# Patient Record
Sex: FEMALE | Race: WHITE | NOT HISPANIC OR LATINO | Employment: PART TIME | URBAN - METROPOLITAN AREA
[De-identification: names, ages, dates, MRNs, and addresses within clinical notes are randomized per-mention and may not be internally consistent; named-entity substitution may affect disease eponyms.]

---

## 2019-06-11 LAB — HBA1C MFR BLD HPLC: 5.6 %

## 2019-12-05 ENCOUNTER — OFFICE VISIT (OUTPATIENT)
Dept: FAMILY MEDICINE CLINIC | Facility: CLINIC | Age: 54
End: 2019-12-05
Payer: COMMERCIAL

## 2019-12-05 VITALS
SYSTOLIC BLOOD PRESSURE: 122 MMHG | HEIGHT: 61 IN | OXYGEN SATURATION: 96 % | TEMPERATURE: 96.1 F | HEART RATE: 89 BPM | BODY MASS INDEX: 36.93 KG/M2 | DIASTOLIC BLOOD PRESSURE: 74 MMHG | WEIGHT: 195.6 LBS

## 2019-12-05 DIAGNOSIS — E78.2 MIXED HYPERLIPIDEMIA: ICD-10-CM

## 2019-12-05 DIAGNOSIS — Z11.4 SCREENING FOR HUMAN IMMUNODEFICIENCY VIRUS: ICD-10-CM

## 2019-12-05 DIAGNOSIS — I10 ESSENTIAL HYPERTENSION: ICD-10-CM

## 2019-12-05 DIAGNOSIS — F32.0 CURRENT MILD EPISODE OF MAJOR DEPRESSIVE DISORDER WITHOUT PRIOR EPISODE (HCC): Primary | ICD-10-CM

## 2019-12-05 DIAGNOSIS — Z12.39 SCREENING FOR BREAST CANCER: ICD-10-CM

## 2019-12-05 DIAGNOSIS — M47.817 SPONDYLOSIS OF LUMBOSACRAL REGION WITHOUT MYELOPATHY OR RADICULOPATHY: ICD-10-CM

## 2019-12-05 DIAGNOSIS — F17.200 TOBACCO USE DISORDER: ICD-10-CM

## 2019-12-05 DIAGNOSIS — E66.9 CLASS 2 OBESITY WITHOUT SERIOUS COMORBIDITY WITH BODY MASS INDEX (BMI) OF 36.0 TO 36.9 IN ADULT, UNSPECIFIED OBESITY TYPE: ICD-10-CM

## 2019-12-05 DIAGNOSIS — F41.1 GAD (GENERALIZED ANXIETY DISORDER): ICD-10-CM

## 2019-12-05 DIAGNOSIS — Z11.59 NEED FOR HEPATITIS C SCREENING TEST: ICD-10-CM

## 2019-12-05 DIAGNOSIS — Z12.11 COLON CANCER SCREENING: ICD-10-CM

## 2019-12-05 DIAGNOSIS — Z12.4 CERVICAL CANCER SCREENING: ICD-10-CM

## 2019-12-05 DIAGNOSIS — G25.81 RESTLESS LEG SYNDROME: ICD-10-CM

## 2019-12-05 PROBLEM — E66.812 CLASS 2 OBESITY WITHOUT SERIOUS COMORBIDITY WITH BODY MASS INDEX (BMI) OF 36.0 TO 36.9 IN ADULT: Status: ACTIVE | Noted: 2019-12-05

## 2019-12-05 PROCEDURE — 3008F BODY MASS INDEX DOCD: CPT | Performed by: FAMILY MEDICINE

## 2019-12-05 PROCEDURE — 3078F DIAST BP <80 MM HG: CPT | Performed by: FAMILY MEDICINE

## 2019-12-05 PROCEDURE — 3725F SCREEN DEPRESSION PERFORMED: CPT | Performed by: FAMILY MEDICINE

## 2019-12-05 PROCEDURE — 99204 OFFICE O/P NEW MOD 45 MIN: CPT | Performed by: FAMILY MEDICINE

## 2019-12-05 PROCEDURE — 3074F SYST BP LT 130 MM HG: CPT | Performed by: FAMILY MEDICINE

## 2019-12-05 RX ORDER — SERTRALINE HYDROCHLORIDE 100 MG/1
100 TABLET, FILM COATED ORAL DAILY
Refills: 5 | COMMUNITY
Start: 2019-12-03 | End: 2020-01-16 | Stop reason: SDUPTHER

## 2019-12-05 RX ORDER — LORAZEPAM 0.5 MG/1
0.5 TABLET ORAL
Qty: 30 TABLET | Refills: 0 | Status: SHIPPED | OUTPATIENT
Start: 2019-12-05 | End: 2020-01-03 | Stop reason: SDUPTHER

## 2019-12-05 RX ORDER — LORAZEPAM 0.5 MG/1
0.5 TABLET ORAL
COMMUNITY
Start: 2015-01-09 | End: 2019-12-05 | Stop reason: SDUPTHER

## 2019-12-05 RX ORDER — PRAMIPEXOLE DIHYDROCHLORIDE 0.25 MG/1
0.25 TABLET ORAL DAILY
Refills: 1 | COMMUNITY
Start: 2019-11-20 | End: 2020-03-30 | Stop reason: SDUPTHER

## 2019-12-05 NOTE — ASSESSMENT & PLAN NOTE
On zoloft and ativan as needed from her prior pcp  Long discussion about use of ativan and if she is using it more than needed she will need med adjustments   She states she previously saw [facility] and will be calling them to re est care with them

## 2019-12-05 NOTE — PROGRESS NOTES
Ashely Scout 1965 female MRN: [id number]    [facility] Medicine New Patient    ASSESSMENT/PLAN  Problem List Items Addressed This Visit        Cardiovascular and Mediastinum    Essential hypertension     Well controlled on current medications from prior PCP  Due for lab work ordered today         Relevant Orders    Mammo screening bilateral w 3d & cad    Ambulatory referral to Gastroenterology    CBC and differential    Comprehensive metabolic panel    TSH, 3rd generation with Free T4 reflex    Lipid panel    UA (URINE) with reflex to Scope       Musculoskeletal and Integument    Spondylosis of lumbosacral region without myelopathy or radiculopathy     Had stimulator placed at [place] 4 years ago  Is not on any chronic pain medication             Other    Mixed hyperlipidemia     Stable on Lipitor  Due for lipid panel          Relevant Orders    Mammo screening bilateral w 3d & cad    Ambulatory referral to Gastroenterology    CBC and differential    Comprehensive metabolic panel    TSH, 3rd generation with Free T4 reflex    Lipid panel    UA (URINE) with reflex to Scope    Current mild episode of major depressive disorder without prior episode ([facility] [address] ) - Primary     On zoloft and ativan as needed from her prior pcp  Long discussion about use of ativan and if she is using it more than needed she will need med adjustments   She states she previously saw [facility] and will be calling them to re est care with them         Relevant Medications    sertraline (ZOLOFT) 100 mg tablet    sertraline (ZOLOFT) 50 mg tablet    LORazepam (ATIVAN) 0 5 mg tablet    Other Relevant Orders    Mammo screening bilateral w 3d & cad    Ambulatory referral to Gastroenterology    CBC and differential    Comprehensive metabolic panel    TSH, 3rd generation with Free T4 reflex    Lipid panel    UA (URINE) with reflex to Scope    KENDALL (generalized anxiety disorder)    Relevant Medications    sertraline (ZOLOFT) 100 mg tablet    sertraline (ZOLOFT) 50 mg tablet    LORazepam (ATIVAN) 0 5 mg tablet    Other Relevant Orders    Mammo screening bilateral w 3d & cad    Ambulatory referral to Gastroenterology    CBC and differential    Comprehensive metabolic panel    TSH, 3rd generation with Free T4 reflex    Lipid panel    UA (URINE) with reflex to Scope    Restless leg syndrome     Stable on mirapex from prior pcp         Tobacco use disorder     40 years- 1/2 PPD-3/4 PPD  Continue to encourage cessation          Relevant Orders    Mammo screening bilateral w 3d & cad    Ambulatory referral to Gastroenterology    CBC and differential    Comprehensive metabolic panel    TSH, 3rd generation with Free T4 reflex    Lipid panel    UA (URINE) with reflex to Scope    Screening for breast cancer    Colon cancer screening    Class 2 obesity without serious comorbidity with body mass index (BMI) of 36 0 to 36 9 in adult     BMI Counseling: Body mass index is 36 96 kg/m²  The BMI is above normal  Nutrition recommendations include reducing portion sizes, decreasing overall calorie intake and 3-5 servings of fruits/vegetables daily  Exercise recommendations include moderate aerobic physical activity for 150 minutes/week           Relevant Orders    Mammo screening bilateral w 3d & cad    Ambulatory referral to Gastroenterology    CBC and differential    Comprehensive metabolic panel    TSH, 3rd generation with Free T4 reflex    Lipid panel    UA (URINE) with reflex to Scope      Other Visit Diagnoses     Screening for human immunodeficiency virus        Relevant Orders    Human Immunodeficiency Virus 1/2 Antigen / Antibody ( Fourth Generation) with Reflex Testing    Cervical cancer screening        Relevant Orders    Ambulatory referral to Obstetrics / Gynecology    Need for hepatitis C screening test        Relevant Orders    Hepatitis C antibody             States she had flu shot this year at prior PCP- need to request records   Had c scope 5 years ago at [facility] Has gyn at [facility] patient no records able to review prior to todays visit     Follow up in 6 weeks with blood work done prior to next visit     Future Appointments   Date Time Provider Lorenzo Reagan   1/16/2020 10:00 AM DO [facility]          SUBJECTIVE  CC: new patient (establish care )      HPI:  Tone Hawley is a 53 y o  female who presents for new patient apt  Works as  in between jobs right now    [address]: on [address], uses ativan at night  Has been to [facility] in the past  HTN/HLD: on medication   Restless leg syndrome     PSH: nerve stimulator in the back, wisdom teeth, gallbladder    Social: tobacco use- 40 years- 1/2 PPD  Denies alcohol or drug use     15year old daughter    HPI    Review of Systems   Constitutional: Negative for chills, fatigue and fever  HENT: Negative for congestion, postnasal drip, rhinorrhea and sinus pressure  Eyes: Negative for photophobia and visual disturbance  Respiratory: Negative for cough and shortness of breath  Cardiovascular: Negative for chest pain, palpitations and leg swelling  Gastrointestinal: Negative for abdominal pain, constipation, diarrhea, nausea and vomiting  Genitourinary: Negative for difficulty urinating and dysuria  Musculoskeletal: Negative for arthralgias and myalgias  Skin: Negative for color change and rash  Neurological: Negative for dizziness, weakness, light-headedness and headaches         Historical Information   The patient history was reviewed as follows:    Past Medical History:   Diagnosis Date    Bulging lumbar disc     Chronic pain     low back    Degenerative disk disease     lumbar spine    Hypertension     Pinched nerve     lumbar spine    Psychiatric disorder     depression     Past Surgical History:   Procedure Laterality Date    CARPAL TUNNEL RELEASE Bilateral     CHOLECYSTECTOMY      WISDOM TOOTH EXTRACTION Bilateral History reviewed  No pertinent family history  Social History   Social History     Substance and Sexual Activity   Alcohol Use No     Social History     Substance and Sexual Activity   Drug Use No     Social History     Tobacco Use   Smoking Status Current Every Day Smoker    Packs/day: 1 00   Smokeless Tobacco Never Used       Medications:     Current Outpatient Medications:     atorvastatin (LIPITOR) 40 mg tablet, Take 80 mg by mouth daily , Disp: , Rfl:     lisinopril (ZESTRIL) 20 mg tablet, Take 10 mg by mouth daily , Disp: , Rfl:     LORazepam (ATIVAN) 0 5 mg tablet, Take 1 tablet (0 5 mg total) by mouth daily at bedtime, Disp: 30 tablet, Rfl: 0    sertraline (ZOLOFT) 100 mg tablet, Take 100 mg by mouth daily, Disp: , Rfl: 5    sertraline (ZOLOFT) 50 mg tablet, Take 50 mg by mouth daily, Disp: , Rfl: 1    pramipexole (MIRAPEX) 0 25 mg tablet, Take 0 25 mg by mouth daily, Disp: , Rfl: 1  Allergies   Allergen Reactions    Penicillins        OBJECTIVE    Vitals:   Vitals:    12/05/19 1501   BP: 122/74   BP Location: Left arm   Patient Position: Sitting   Cuff Size: Large   Pulse: 89   Temp: (!) 96 1 °F (35 6 °C)   TempSrc: Tympanic   SpO2: 96%   Weight: 88 7 kg (195 lb 9 6 oz)   Height: 5' 1" (1 549 m)           Physical Exam   Constitutional: She is oriented to person, place, and time  She appears well-developed and well-nourished  HENT:   Head: Normocephalic and atraumatic  Mouth/Throat: Oropharynx is clear and moist    Eyes: Pupils are equal, round, and reactive to light  Neck: Normal range of motion  Neck supple  Cardiovascular: Normal rate, regular rhythm and normal heart sounds  Pulmonary/Chest: Effort normal and breath sounds normal  No respiratory distress  She has no wheezes  Abdominal: Soft  Bowel sounds are normal  She exhibits no distension  There is no tenderness  Musculoskeletal: Normal range of motion  She exhibits no edema or tenderness     Neurological: She is alert and oriented to person, place, and time  Skin: Skin is warm and dry  Psychiatric: She has a normal mood and affect   Her behavior is normal           Labs:        Abel Lehman DO    12/5/2019

## 2019-12-05 NOTE — ASSESSMENT & PLAN NOTE
BMI Counseling: Body mass index is 36 96 kg/m²  The BMI is above normal  Nutrition recommendations include reducing portion sizes, decreasing overall calorie intake and 3-5 servings of fruits/vegetables daily  Exercise recommendations include moderate aerobic physical activity for 150 minutes/week

## 2020-01-03 DIAGNOSIS — F41.1 GAD (GENERALIZED ANXIETY DISORDER): ICD-10-CM

## 2020-01-03 RX ORDER — LORAZEPAM 0.5 MG/1
0.5 TABLET ORAL
Qty: 30 TABLET | Refills: 0 | Status: SHIPPED | OUTPATIENT
Start: 2020-01-03 | End: 2020-02-05 | Stop reason: SDUPTHER

## 2020-01-06 ENCOUNTER — HOSPITAL ENCOUNTER (OUTPATIENT)
Dept: BONE DENSITY | Facility: CLINIC | Age: 55
Discharge: HOME | End: 2020-01-06
Payer: COMMERCIAL

## 2020-01-06 VITALS — BODY MASS INDEX: 36.82 KG/M2 | HEIGHT: 61 IN | WEIGHT: 195 LBS

## 2020-01-06 DIAGNOSIS — F17.200 TOBACCO USE DISORDER: ICD-10-CM

## 2020-01-06 DIAGNOSIS — Z12.31 ENCOUNTER FOR SCREENING MAMMOGRAM FOR MALIGNANT NEOPLASM OF BREAST: ICD-10-CM

## 2020-01-06 DIAGNOSIS — F41.1 GAD (GENERALIZED ANXIETY DISORDER): ICD-10-CM

## 2020-01-06 DIAGNOSIS — I10 ESSENTIAL HYPERTENSION: ICD-10-CM

## 2020-01-06 DIAGNOSIS — F32.0 CURRENT MILD EPISODE OF MAJOR DEPRESSIVE DISORDER WITHOUT PRIOR EPISODE (HCC): ICD-10-CM

## 2020-01-06 DIAGNOSIS — E66.9 CLASS 2 OBESITY WITHOUT SERIOUS COMORBIDITY WITH BODY MASS INDEX (BMI) OF 36.0 TO 36.9 IN ADULT, UNSPECIFIED OBESITY TYPE: ICD-10-CM

## 2020-01-06 DIAGNOSIS — E78.2 MIXED HYPERLIPIDEMIA: ICD-10-CM

## 2020-01-06 PROCEDURE — 77063 BREAST TOMOSYNTHESIS BI: CPT

## 2020-01-06 PROCEDURE — 77067 SCR MAMMO BI INCL CAD: CPT

## 2020-01-15 LAB
ALBUMIN SERPL-MCNC: 4.3 G/DL (ref 3.5–5.5)
ALBUMIN/GLOB SERPL: 2.3 {RATIO} (ref 1.2–2.2)
ALP SERPL-CCNC: 88 IU/L (ref 39–117)
ALT SERPL-CCNC: 16 IU/L (ref 0–32)
APPEARANCE UR: ABNORMAL
AST SERPL-CCNC: 17 IU/L (ref 0–40)
BACTERIA URNS QL MICRO: NORMAL
BASOPHILS # BLD AUTO: 0 X10E3/UL (ref 0–0.2)
BASOPHILS NFR BLD AUTO: 0 %
BILIRUB SERPL-MCNC: <0.2 MG/DL (ref 0–1.2)
BILIRUB UR QL STRIP: NEGATIVE
BUN SERPL-MCNC: 15 MG/DL (ref 6–24)
BUN/CREAT SERPL: 21 (ref 9–23)
CALCIUM SERPL-MCNC: 9.2 MG/DL (ref 8.7–10.2)
CHLORIDE SERPL-SCNC: 101 MMOL/L (ref 96–106)
CHOLEST SERPL-MCNC: 149 MG/DL (ref 100–199)
CHOLEST/HDLC SERPL: 4.8 RATIO (ref 0–4.4)
CO2 SERPL-SCNC: 23 MMOL/L (ref 20–29)
COLOR UR: YELLOW
CREAT SERPL-MCNC: 0.73 MG/DL (ref 0.57–1)
EOSINOPHIL # BLD AUTO: 0.3 X10E3/UL (ref 0–0.4)
EOSINOPHIL NFR BLD AUTO: 3 %
EPI CELLS #/AREA URNS HPF: NORMAL /HPF (ref 0–10)
ERYTHROCYTE [DISTWIDTH] IN BLOOD BY AUTOMATED COUNT: 14.3 % (ref 11.7–15.4)
GLOBULIN SER-MCNC: 1.9 G/DL (ref 1.5–4.5)
GLUCOSE SERPL-MCNC: 99 MG/DL (ref 65–99)
GLUCOSE UR QL: NEGATIVE
HCT VFR BLD AUTO: 40.8 % (ref 34–46.6)
HCV AB S/CO SERPL IA: 0.2 S/CO RATIO (ref 0–0.9)
HDLC SERPL-MCNC: 31 MG/DL
HGB BLD-MCNC: 13.7 G/DL (ref 11.1–15.9)
HGB UR QL STRIP: NEGATIVE
HIV 1+2 AB+HIV1 P24 AG SERPL QL IA: NON REACTIVE
IMM GRANULOCYTES # BLD: 0 X10E3/UL (ref 0–0.1)
IMM GRANULOCYTES NFR BLD: 0 %
KETONES UR QL STRIP: NEGATIVE
LDLC SERPL CALC-MCNC: 52 MG/DL (ref 0–99)
LDLC SERPL DIRECT ASSAY-MCNC: 66 MG/DL (ref 0–99)
LEUKOCYTE ESTERASE UR QL STRIP: ABNORMAL
LYMPHOCYTES # BLD AUTO: 2.5 X10E3/UL (ref 0.7–3.1)
LYMPHOCYTES NFR BLD AUTO: 26 %
MCH RBC QN AUTO: 31.8 PG (ref 26.6–33)
MCHC RBC AUTO-ENTMCNC: 33.6 G/DL (ref 31.5–35.7)
MCV RBC AUTO: 95 FL (ref 79–97)
MICRO URNS: ABNORMAL
MONOCYTES # BLD AUTO: 0.8 X10E3/UL (ref 0.1–0.9)
MONOCYTES NFR BLD AUTO: 8 %
MUCOUS THREADS URNS QL MICRO: PRESENT
NEUTROPHILS # BLD AUTO: 6.1 X10E3/UL (ref 1.4–7)
NEUTROPHILS NFR BLD AUTO: 63 %
NITRITE UR QL STRIP: NEGATIVE
PH UR STRIP: 5.5 [PH] (ref 5–7.5)
PLATELET # BLD AUTO: 200 X10E3/UL (ref 150–450)
POTASSIUM SERPL-SCNC: 3.9 MMOL/L (ref 3.5–5.2)
PROT SERPL-MCNC: 6.2 G/DL (ref 6–8.5)
PROT UR QL STRIP: NEGATIVE
RBC # BLD AUTO: 4.31 X10E6/UL (ref 3.77–5.28)
RBC #/AREA URNS HPF: NORMAL /HPF (ref 0–2)
SL AMB EGFR AFRICAN AMERICAN: 107 ML/MIN/1.73
SL AMB EGFR NON AFRICAN AMERICAN: 93 ML/MIN/1.73
SL AMB T4, FREE (DIRECT): 1.01 NG/DL (ref 0.82–1.77)
SL AMB VLDL CHOLESTEROL CALC: 66 MG/DL (ref 5–40)
SODIUM SERPL-SCNC: 137 MMOL/L (ref 134–144)
SP GR UR: >=1.03 (ref 1–1.03)
TRIGL SERPL-MCNC: 329 MG/DL (ref 0–149)
TSH SERPL DL<=0.005 MIU/L-ACNC: 5.03 UIU/ML (ref 0.45–4.5)
UROBILINOGEN UR STRIP-ACNC: 0.2 MG/DL (ref 0.2–1)
WBC # BLD AUTO: 9.7 X10E3/UL (ref 3.4–10.8)
WBC #/AREA URNS HPF: NORMAL /HPF (ref 0–5)

## 2020-01-16 ENCOUNTER — OFFICE VISIT (OUTPATIENT)
Dept: FAMILY MEDICINE CLINIC | Facility: CLINIC | Age: 55
End: 2020-01-16
Payer: COMMERCIAL

## 2020-01-16 VITALS
SYSTOLIC BLOOD PRESSURE: 122 MMHG | TEMPERATURE: 97.3 F | HEIGHT: 61 IN | BODY MASS INDEX: 36.67 KG/M2 | WEIGHT: 194.2 LBS | DIASTOLIC BLOOD PRESSURE: 78 MMHG | HEART RATE: 71 BPM | OXYGEN SATURATION: 96 %

## 2020-01-16 DIAGNOSIS — E78.2 MIXED HYPERLIPIDEMIA: ICD-10-CM

## 2020-01-16 DIAGNOSIS — F17.200 TOBACCO USE DISORDER: ICD-10-CM

## 2020-01-16 DIAGNOSIS — M47.817 SPONDYLOSIS OF LUMBOSACRAL REGION WITHOUT MYELOPATHY OR RADICULOPATHY: ICD-10-CM

## 2020-01-16 DIAGNOSIS — I10 ESSENTIAL HYPERTENSION: ICD-10-CM

## 2020-01-16 DIAGNOSIS — F32.0 CURRENT MILD EPISODE OF MAJOR DEPRESSIVE DISORDER WITHOUT PRIOR EPISODE (HCC): ICD-10-CM

## 2020-01-16 DIAGNOSIS — E03.8 SUBCLINICAL HYPOTHYROIDISM: Primary | ICD-10-CM

## 2020-01-16 PROCEDURE — 3074F SYST BP LT 130 MM HG: CPT | Performed by: FAMILY MEDICINE

## 2020-01-16 PROCEDURE — 3078F DIAST BP <80 MM HG: CPT | Performed by: FAMILY MEDICINE

## 2020-01-16 PROCEDURE — 3725F SCREEN DEPRESSION PERFORMED: CPT | Performed by: FAMILY MEDICINE

## 2020-01-16 PROCEDURE — 99214 OFFICE O/P EST MOD 30 MIN: CPT | Performed by: FAMILY MEDICINE

## 2020-01-16 PROCEDURE — 3008F BODY MASS INDEX DOCD: CPT | Performed by: FAMILY MEDICINE

## 2020-01-16 RX ORDER — ATORVASTATIN CALCIUM 80 MG/1
80 TABLET, FILM COATED ORAL DAILY
Qty: 90 TABLET | Refills: 1 | Status: SHIPPED | OUTPATIENT
Start: 2020-01-16 | End: 2020-08-19 | Stop reason: SDUPTHER

## 2020-01-16 RX ORDER — SERTRALINE HYDROCHLORIDE 100 MG/1
100 TABLET, FILM COATED ORAL DAILY
Qty: 90 TABLET | Refills: 1 | Status: SHIPPED | OUTPATIENT
Start: 2020-01-16 | End: 2020-07-28 | Stop reason: SDUPTHER

## 2020-01-16 NOTE — ASSESSMENT & PLAN NOTE
Lab Results   Component Value Date    TSH 5 030 (H) 01/13/2020     Free T4 is normal  No symptoms reported   Will continue to monitor and recheck TSH in 3 months

## 2020-01-16 NOTE — ASSESSMENT & PLAN NOTE
Lab Results   Component Value Date    CHOLESTEROL 149 01/13/2020     Lab Results   Component Value Date    HDL 31 (L) 01/13/2020     Lab Results   Component Value Date    TRIG 329 (H) 01/13/2020     Well controlled on statin therapy  Patient wishes to see nutrition  We will recheck lipid in 3 months  Consider fenofibrate if TG continue to elevated

## 2020-01-16 NOTE — PROGRESS NOTES
Kimberly Mayor 1965 female MRN: [id number]    Family Medicine Follow-up Visit    ASSESSMENT/PLAN  Problem List Items Addressed This Visit        Endocrine    Subclinical hypothyroidism - Primary     Lab Results   Component Value Date    TSH 5 030 (H) 01/13/2020     Free T4 is normal  No symptoms reported   Will continue to monitor and recheck TSH in 3 months          Relevant Orders    TSH, 3rd generation with Free T4 reflex       Cardiovascular and Mediastinum    Essential hypertension     Well controlled on lisinopril  Continue medication as prescribed          Relevant Orders    TSH, 3rd generation with Free T4 reflex    Lipid panel    Comprehensive metabolic panel       Musculoskeletal and Integument    Spondylosis of lumbosacral region without myelopathy or radiculopathy     S/p stimulator placement at [facility] 4 years ago            Other    Mixed hyperlipidemia     Lab Results   Component Value Date    CHOLESTEROL 149 01/13/2020     Lab Results   Component Value Date    HDL 31 (L) 01/13/2020     Lab Results   Component Value Date    TRIG 329 (H) 01/13/2020     Well controlled on statin therapy  Patient wishes to see nutrition  We will recheck lipid in 3 months  Consider fenofibrate if TG continue to elevated          Relevant Medications    atorvastatin (LIPITOR) 80 mg tablet    Other Relevant Orders    Lipid panel    Comprehensive metabolic panel    Ambulatory referral to Nutrition Services    Current mild episode of major depressive disorder without prior episode ([address] )     Well controlled on medication  Continue to monitor  Has apt to est with Ane Venkat          Relevant Medications    sertraline (ZOLOFT) 100 mg tablet    sertraline (ZOLOFT) 50 mg tablet    Tobacco use disorder     Will further follow up at next visit and evaluate need for lung cancer screening                  Follow up in 3 months for CP, get lab work done prior to next visit       Future Appointments   Date Time Provider Department Center   3/2/2020  1:00 PM [facility]   4/16/2020 10:00 AM DO [facility]          SUBJECTIVE  CC: Follow-up (6 week)      HPI:  Sarmad De La Cruz is a 53 y o  female who presents for follow up  Had her blood work done and needs to review  HLD- no symptoms, taking medication  HTN: no cp, sob or vision changes, taking medication, no side effects  Depression-stable, on medication, has apt with abby DELUNA    Review of Systems   Constitutional: Negative for chills, fatigue and fever  HENT: Negative for congestion, postnasal drip, rhinorrhea and sinus pressure  Eyes: Negative for photophobia and visual disturbance  Respiratory: Negative for cough and shortness of breath  Cardiovascular: Negative for chest pain, palpitations and leg swelling  Gastrointestinal: Negative for abdominal pain, constipation, diarrhea, nausea and vomiting  Genitourinary: Negative for difficulty urinating and dysuria  Musculoskeletal: Negative for arthralgias and myalgias  Skin: Negative for color change and rash  Neurological: Negative for dizziness, weakness, light-headedness and headaches         Historical Information   The patient history was reviewed as follows:    Past Medical History:   Diagnosis Date    Bulging lumbar disc     Chronic pain     low back    Degenerative disk disease     lumbar spine    Hypertension     Pinched nerve     lumbar spine    Psychiatric disorder     depression     Past Surgical History:   Procedure Laterality Date    CARPAL TUNNEL RELEASE Bilateral     CHOLECYSTECTOMY      WISDOM TOOTH EXTRACTION Bilateral      Family History   Problem Relation Age of Onset    Breast cancer Maternal Aunt 29    Breast cancer Sister 64    BRCA1 Negative Sister     BRCA2 Negative Sister     Prostate cancer Maternal Uncle     Cancer Maternal Uncle         BLADDER      Social History   Social History     Substance and Sexual Activity Alcohol Use No     Social History     Substance and Sexual Activity   Drug Use No     Social History     Tobacco Use   Smoking Status Current Every Day Smoker    Packs/day: 1 00   Smokeless Tobacco Never Used       Medications:     Current Outpatient Medications:     atorvastatin (LIPITOR) 80 mg tablet, Take 1 tablet (80 mg total) by mouth daily, Disp: 90 tablet, Rfl: 1    lisinopril (ZESTRIL) 20 mg tablet, Take 10 mg by mouth daily , Disp: , Rfl:     LORazepam (ATIVAN) 0 5 mg tablet, Take 1 tablet (0 5 mg total) by mouth daily at bedtime, Disp: 30 tablet, Rfl: 0    pramipexole (MIRAPEX) 0 25 mg tablet, Take 0 25 mg by mouth daily, Disp: , Rfl: 1    sertraline (ZOLOFT) 100 mg tablet, Take 1 tablet (100 mg total) by mouth daily, Disp: 90 tablet, Rfl: 1    sertraline (ZOLOFT) 50 mg tablet, Take 1 tablet (50 mg total) by mouth daily, Disp: 90 tablet, Rfl: 1  Allergies   Allergen Reactions    Penicillins        OBJECTIVE    Vitals:   Vitals:    01/16/20 0958   BP: 122/78   BP Location: Right arm   Patient Position: Sitting   Cuff Size: Large   Pulse: 71   Temp: (!) 97 3 °F (36 3 °C)   TempSrc: Tympanic   SpO2: 96%   Weight: 88 1 kg (194 lb 3 2 oz)   Height: 5' 1" (1 549 m)           Physical Exam   Constitutional: She is oriented to person, place, and time  She appears well-developed and well-nourished  HENT:   Head: Normocephalic and atraumatic  Mouth/Throat: Oropharynx is clear and moist    Eyes: Pupils are equal, round, and reactive to light  Neck: Normal range of motion  Neck supple  Cardiovascular: Normal rate, regular rhythm and normal heart sounds  Pulmonary/Chest: Effort normal and breath sounds normal  No respiratory distress  She has no wheezes  Abdominal: Soft  Bowel sounds are normal  She exhibits no distension  There is no tenderness  Musculoskeletal: Normal range of motion  She exhibits no edema or tenderness  Neurological: She is alert and oriented to person, place, and time  Skin: Skin is warm and dry  Psychiatric: She has a normal mood and affect   Her behavior is normal           Labs:        DO Sherri    1/16/2020

## 2020-01-22 ENCOUNTER — TELEPHONE (OUTPATIENT)
Dept: FAMILY MEDICINE CLINIC | Facility: CLINIC | Age: 55
End: 2020-01-22

## 2020-02-05 DIAGNOSIS — F41.1 GAD (GENERALIZED ANXIETY DISORDER): ICD-10-CM

## 2020-02-05 RX ORDER — LORAZEPAM 0.5 MG/1
0.5 TABLET ORAL
Qty: 30 TABLET | Refills: 0 | Status: SHIPPED | OUTPATIENT
Start: 2020-02-05 | End: 2020-03-09 | Stop reason: SDUPTHER

## 2020-02-18 ENCOUNTER — OFFICE VISIT (OUTPATIENT)
Dept: FAMILY MEDICINE CLINIC | Facility: CLINIC | Age: 55
End: 2020-02-18
Payer: COMMERCIAL

## 2020-02-18 ENCOUNTER — TELEPHONE (OUTPATIENT)
Dept: FAMILY MEDICINE CLINIC | Facility: CLINIC | Age: 55
End: 2020-02-18

## 2020-02-18 VITALS
OXYGEN SATURATION: 96 % | HEART RATE: 97 BPM | WEIGHT: 191.8 LBS | SYSTOLIC BLOOD PRESSURE: 120 MMHG | TEMPERATURE: 97 F | DIASTOLIC BLOOD PRESSURE: 78 MMHG | BODY MASS INDEX: 36.21 KG/M2 | HEIGHT: 61 IN | RESPIRATION RATE: 17 BRPM

## 2020-02-18 DIAGNOSIS — R91.1 PULMONARY NODULE, RIGHT: Primary | ICD-10-CM

## 2020-02-18 DIAGNOSIS — F32.0 CURRENT MILD EPISODE OF MAJOR DEPRESSIVE DISORDER WITHOUT PRIOR EPISODE (HCC): ICD-10-CM

## 2020-02-18 DIAGNOSIS — F41.1 GAD (GENERALIZED ANXIETY DISORDER): ICD-10-CM

## 2020-02-18 DIAGNOSIS — F17.200 TOBACCO USE DISORDER: ICD-10-CM

## 2020-02-18 DIAGNOSIS — E03.8 SUBCLINICAL HYPOTHYROIDISM: ICD-10-CM

## 2020-02-18 DIAGNOSIS — E78.2 MIXED HYPERLIPIDEMIA: ICD-10-CM

## 2020-02-18 DIAGNOSIS — I10 ESSENTIAL HYPERTENSION: ICD-10-CM

## 2020-02-18 PROCEDURE — 99214 OFFICE O/P EST MOD 30 MIN: CPT | Performed by: FAMILY MEDICINE

## 2020-02-18 PROCEDURE — 3078F DIAST BP <80 MM HG: CPT | Performed by: FAMILY MEDICINE

## 2020-02-18 PROCEDURE — 3008F BODY MASS INDEX DOCD: CPT | Performed by: FAMILY MEDICINE

## 2020-02-18 PROCEDURE — 3074F SYST BP LT 130 MM HG: CPT | Performed by: FAMILY MEDICINE

## 2020-02-18 RX ORDER — LISINOPRIL 10 MG/1
10 TABLET ORAL DAILY
COMMUNITY
Start: 2020-01-14 | End: 2020-04-17 | Stop reason: SDUPTHER

## 2020-02-18 NOTE — ASSESSMENT & PLAN NOTE
Lab Results   Component Value Date    CHOLESTEROL 149 01/13/2020     Lab Results   Component Value Date    HDL 31 (L) 01/13/2020     Lab Results   Component Value Date    TRIG 329 (H) 01/13/2020     Continue statin therapy

## 2020-02-18 NOTE — TELEPHONE ENCOUNTER
THE CT YOU ASKED NANY TO GET WILL NEED A PRE CERT BECAUSE SHE JUST RECENTLY HAD ONE    [phone] IF YOU HAVE ANY QUESTIONS OF NANY

## 2020-02-18 NOTE — TELEPHONE ENCOUNTER
THE CT YOU ASKED NANY TO GET WILL NEED A PRE CERT BECAUSE SHE JUST RECENTLY HAD ONE    [phone] IF YOU HAVE ANY QUESTIONS OF

## 2020-02-18 NOTE — ASSESSMENT & PLAN NOTE
Partially seen on CT abd/pel as ordered by gyn  Will check CT chest for complete evaluation and follow up pending results of study

## 2020-02-18 NOTE — TELEPHONE ENCOUNTER
Patient needs a CT chest, this is a different study then the CT abd/pelvis she had done by her gyn  The CT abd/pel showed part of a pulmonary nodule so now she needs a CT chest to look more completely at this nodule  This is a different study completely  Please forward to our prior auth team  The study is not stat, just routine  Thank you!

## 2020-02-18 NOTE — PROGRESS NOTES
Telma Bear 1965 female MRN: [id number]    Family Medicine Acute Visit    ASSESSMENT/PLAN  Problem List Items Addressed This Visit        Endocrine    Subclinical hypothyroidism     Lab Results   Component Value Date    TSH 5 030 (H) 01/13/2020       Continue to monitor             Cardiovascular and Mediastinum    Essential hypertension     Well controlled on lisinopril            Other    Mixed hyperlipidemia     Lab Results   Component Value Date    CHOLESTEROL 149 01/13/2020     Lab Results   Component Value Date    HDL 31 (L) 01/13/2020     Lab Results   Component Value Date    TRIG 329 (H) 01/13/2020     Continue statin therapy          Current mild episode of major depressive disorder without prior episode ([address] )     Continue medication as prescribed           KENDALL (generalized anxiety disorder)    Tobacco use disorder     Continue to encourage cessation          Relevant Orders    CT chest wo contrast    Pulmonary nodule, right - Primary     Partially seen on CT abd/pel as ordered by gyn  Will check CT chest for complete evaluation and follow up pending results of study         Relevant Orders    CT chest wo contrast            Follow up for CP as scheduled     Future Appointments   Date Time Provider Lorenzo Reagan   2/20/2020  9:00 AM [facility]UA[facility] [address] [facility]   3/2/2020  1:00 PM [facility]   4/17/2020 10:45 AM DO [facility]          SUBJECTIVE  CC: Follow-up (review CT)      HPI:  Telma Bear is a 53 y o  female who presents for acute visit  Seen by GYN had CT scan done for follow up of pelvic pain '  Per patient told to follow up with her PCP for incidental finding seen on CT  Taking BP medication as prescribed  Cholesterol-tolerating statin therapy   Feels well otherwise no complaints     Review of Systems   Constitutional: Negative for chills, fatigue and fever     HENT: Negative for congestion, postnasal drip, rhinorrhea and sinus pressure  Eyes: Negative for photophobia and visual disturbance  Respiratory: Negative for cough and shortness of breath  Cardiovascular: Negative for chest pain, palpitations and leg swelling  Gastrointestinal: Negative for abdominal pain, constipation, diarrhea, nausea and vomiting  Genitourinary: Negative for difficulty urinating and dysuria  Musculoskeletal: Negative for arthralgias and myalgias  Skin: Negative for color change and rash  Neurological: Negative for dizziness, weakness, light-headedness and headaches         Historical Information   The patient history was reviewed as follows:  Past Medical History:   Diagnosis Date    Bulging lumbar disc     Chronic pain     low back    Degenerative disk disease     lumbar spine    Hypertension     Pinched nerve     lumbar spine    Psychiatric disorder     depression         Past Surgical History:   Procedure Laterality Date    CARPAL TUNNEL RELEASE Bilateral     CHOLECYSTECTOMY      COLONOSCOPY  06/15/2015    WISDOM TOOTH EXTRACTION Bilateral      Family History   Problem Relation Age of Onset    Breast cancer Maternal Aunt 29    Breast cancer Sister 64    BRCA1 Negative Sister     BRCA2 Negative Sister     Prostate cancer Maternal Uncle     Cancer Maternal Uncle         BLADDER      Social History   Social History     Substance and Sexual Activity   Alcohol Use No     Social History     Substance and Sexual Activity   Drug Use No     Social History     Tobacco Use   Smoking Status Current Every Day Smoker    Packs/day: 1 00    Types: Cigarettes   Smokeless Tobacco Never Used       Medications:     Current Outpatient Medications:     atorvastatin (LIPITOR) 80 mg tablet, Take 1 tablet (80 mg total) by mouth daily, Disp: 90 tablet, Rfl: 1    lisinopril (ZESTRIL) 10 mg tablet, Take 10 mg by mouth daily, Disp: , Rfl:     LORazepam (ATIVAN) 0 5 mg tablet, Take 1 tablet (0 5 mg total) by mouth daily at bedtime, Disp: 30 tablet, Rfl: 0    pramipexole (MIRAPEX) 0 25 mg tablet, Take 0 25 mg by mouth daily, Disp: , Rfl: 1    sertraline (ZOLOFT) 100 mg tablet, Take 1 tablet (100 mg total) by mouth daily, Disp: 90 tablet, Rfl: 1    sertraline (ZOLOFT) 50 mg tablet, Take 1 tablet (50 mg total) by mouth daily, Disp: 90 tablet, Rfl: 1    lisinopril (ZESTRIL) 20 mg tablet, Take 10 mg by mouth daily , Disp: , Rfl:     Allergies   Allergen Reactions    Penicillins        OBJECTIVE  Vitals:   Vitals:    02/18/20 0931 02/18/20 0947   BP: 120/78    BP Location: Left arm    Patient Position: Sitting    Pulse: 97    Resp: 17    Temp: (!) 97 °F (36 1 °C)    TempSrc: Tympanic    SpO2: 93% 96%   Weight: 87 kg (191 lb 12 8 oz)    Height: 5' 1" (1 549 m)          Physical Exam   Constitutional: She is oriented to person, place, and time  She appears well-developed and well-nourished  HENT:   Head: Normocephalic and atraumatic  Mouth/Throat: Oropharynx is clear and moist    Eyes: Pupils are equal, round, and reactive to light  Neck: Normal range of motion  Neck supple  Cardiovascular: Normal rate, regular rhythm and normal heart sounds  Pulmonary/Chest: Effort normal and breath sounds normal  No respiratory distress  She has no wheezes  Abdominal: Soft  Bowel sounds are normal  She exhibits no distension  There is no tenderness  Musculoskeletal: Normal range of motion  She exhibits no edema or tenderness  Neurological: She is alert and oriented to person, place, and time  Skin: Skin is warm and dry  Psychiatric: She has a normal mood and affect   Her behavior is normal                   [place], DO    2/18/2020

## 2020-02-20 ENCOUNTER — TELEPHONE (OUTPATIENT)
Dept: GASTROENTEROLOGY | Facility: CLINIC | Age: 55
End: 2020-02-20

## 2020-02-20 ENCOUNTER — OFFICE VISIT (OUTPATIENT)
Dept: GASTROENTEROLOGY | Facility: CLINIC | Age: 55
End: 2020-02-20
Payer: COMMERCIAL

## 2020-02-20 VITALS
DIASTOLIC BLOOD PRESSURE: 82 MMHG | HEART RATE: 75 BPM | WEIGHT: 192 LBS | BODY MASS INDEX: 36.25 KG/M2 | HEIGHT: 61 IN | SYSTOLIC BLOOD PRESSURE: 126 MMHG

## 2020-02-20 DIAGNOSIS — K76.0 FATTY LIVER: ICD-10-CM

## 2020-02-20 DIAGNOSIS — Z12.11 COLON CANCER SCREENING: ICD-10-CM

## 2020-02-20 DIAGNOSIS — R19.7 DIARRHEA, UNSPECIFIED TYPE: Primary | ICD-10-CM

## 2020-02-20 PROCEDURE — 3079F DIAST BP 80-89 MM HG: CPT | Performed by: NURSE PRACTITIONER

## 2020-02-20 PROCEDURE — 99203 OFFICE O/P NEW LOW 30 MIN: CPT | Performed by: NURSE PRACTITIONER

## 2020-02-20 PROCEDURE — 3074F SYST BP LT 130 MM HG: CPT | Performed by: NURSE PRACTITIONER

## 2020-02-20 PROCEDURE — 3008F BODY MASS INDEX DOCD: CPT | Performed by: NURSE PRACTITIONER

## 2020-02-20 NOTE — TELEPHONE ENCOUNTER
Roberto Snyder,   Can you add a 1 year recall for LFTs for this patient  She has a history of fatty liver  Thank you so much

## 2020-02-20 NOTE — PATIENT INSTRUCTIONS
Check stool  Studies and celiac panel   increase fluid intake   start taking daily fiber supplementation (Benefiber or Metamucil, 1 cap full in the morning)      follow-up in 6-8 weeks

## 2020-02-20 NOTE — PROGRESS NOTES
[address] [facility] - Outpatient Consultation  Yaquelin Jurado 53 y o  female MRN: [id number]  Encounter: [id number]    ASSESSMENT AND PLAN:      1  Diarrhea, unspecified type  Intermittent episodes of diarrhea for the past 4 years  She will have approximately 3 watery stools per day  She will occasionally have formed stools as well  Differential diagnoses include IBS, IBD, microscopic colitis, small intestinal bacterial overgrowth  This could also be related to patient's change in her diet, she did eliminate most carbohydrates and has been trying to eat healthier  Will check:   - Celiac Disease Antibody Profile; Future  - Calprotectin,Fecal; Future  - Fecal fat, qualitative; Future  - Giardia antigen; Future  - FECAL LEUKOCYTES; Future    -recommended that patient start taking daily fiber supplementation (Benefiber or Metamucil, 1 capful daily)   -increase fluid intake  -if above studies are negative and symptoms do not improve with fluid and fiber, will consider breath test for small intestinal bacterial overgrowth  Can also consider a repeat colonoscopy as well or if fecal calprotectin is abnormal      2  Fatty liver  Identified on CT abdomen and pelvis on 2/12  Patient had LFTs on 1/29 that were all normal     -encouraged patient to continue low-fat diet  -encouraged regular exercise  -encouraged healthy weight loss  -will check repeat LFT's in 1 year     3  Colon cancer screening  Average risk  Last colonoscopy was on 06/02/2015  This showed internal hemorrhoids and hyperplastic polyps which were removed  A 10 year recall was recommended at that time, June 2025  Followup Appointment: 6-8 weeks   ______________________________________________________________________    Chief Complaint   Patient presents with    Diarrhea    Abdominal Pain     ct scan done   referred by obdarrynn, Dr Zenaida Chan       HPI:   Yaquelin Jurado is a 53y o  year old female who presents to the office today as a new patient  She has been having intermittent episodes of diarrhea for the past 4 years  She states that she tried to identify certain food triggers, but has been unable to  She will occasionally have episodes of formed stools  She experiences watery diarrhea at least every day  She did change her diet about a year ago and had an intentional 20 lb weight loss, she has eliminated carbohydrates from her diet and has tried to eat healthier  She denies any nocturnal stooling  She denies any fever, chills, recent travel, recent antibiotic use, sick contacts  She also reports she has been under increased stress with work  She also reports occasional abdominal cramping that is relieved with defecation  She denies any rectal bleeding or melena  Her appetite is stable  She denies any new medications recently      Historical Information   Past Medical History:   Diagnosis Date    Bulging lumbar disc     Chronic pain     low back    Degenerative disk disease     lumbar spine    Hypertension     Pinched nerve     lumbar spine    Psychiatric disorder     depression     Past Surgical History:   Procedure Laterality Date    CARPAL TUNNEL RELEASE Bilateral     CHOLECYSTECTOMY      COLONOSCOPY  06/15/2015    WISDOM TOOTH EXTRACTION Bilateral      Social History     Substance and Sexual Activity   Alcohol Use No     Social History     Substance and Sexual Activity   Drug Use No     Social History     Tobacco Use   Smoking Status Current Every Day Smoker    Packs/day: 1 00    Types: Cigarettes   Smokeless Tobacco Never Used     Family History   Problem Relation Age of Onset    Breast cancer Maternal Aunt 29    Breast cancer Sister 64    BRCA1 Negative Sister     BRCA2 Negative Sister     Prostate cancer Maternal Uncle     Cancer Maternal Uncle         BLADDER    Colon cancer Maternal Uncle     Colon polyps Neg Hx        Meds/Allergies     Current Outpatient Medications:    atorvastatin (LIPITOR) 80 mg tablet    lisinopril (ZESTRIL) 10 mg tablet    LORazepam (ATIVAN) 0 5 mg tablet    pramipexole (MIRAPEX) 0 25 mg tablet    sertraline (ZOLOFT) 100 mg tablet    sertraline (ZOLOFT) 50 mg tablet    lisinopril (ZESTRIL) 20 mg tablet    Allergies   Allergen Reactions    Penicillins        PHYSICAL EXAM:    Blood pressure 126/82, pulse 75, height 5' 1" (1 549 m), weight 87 1 kg (192 lb)  Body mass index is 36 28 kg/m²  General Appearance: NAD, cooperative, alert  Eyes: Anicteric, PERRLA, EOMI  ENT:  Normocephalic, atraumatic, normal mucosa  Neck:  Supple, symmetrical, trachea midline,   Resp:  Clear to auscultation bilaterally; no rales, rhonchi or wheezing; respirations unlabored   CV:  S1 S2, Regular rate and rhythm; no murmur, rub, or gallop  GI:  Soft, +suprapubic ttp, no guarding, no rebound, non-distended; normal bowel sounds; no masses, no organomegaly   Rectal: Deferred  Musculoskeletal: No cyanosis, clubbing or edema  Normal ROM  Skin:  No jaundice, rashes, or lesions   Heme/Lymph: No palpable cervical lymphadenopathy  Psych: Normal affect, good eye contact  Neuro: No gross deficits, AAOx3    Lab Results:   Lab Results   Component Value Date    WBC 9 7 01/13/2020    HGB 13 7 01/13/2020    HCT 40 8 01/13/2020    MCV 95 01/13/2020     01/13/2020     Lab Results   Component Value Date    K 3 9 01/13/2020     01/13/2020    CO2 23 01/13/2020    BUN 15 01/13/2020    CREATININE 0 73 01/13/2020    AST 17 01/13/2020    ALT 16 01/13/2020     No results found for: IRON, TIBC, FERRITIN  No results found for: LIPASE    Radiology Results:   No results found  REVIEW OF SYSTEMS:    CONSTITUTIONAL: Denies any fever, chills, rigors, and weight loss  HEENT: No earache or tinnitus  Denies hearing loss or visual disturbances  CARDIOVASCULAR: No chest pain or palpitations     RESPIRATORY: Denies any cough, hemoptysis, positive for shortness of breath with exertion  GASTROINTESTINAL: As noted in the History of Present Illness  GENITOURINARY: No problems with urination  Denies any hematuria or dysuria  NEUROLOGIC: No dizziness or vertigo, denies headaches  MUSCULOSKELETAL:  Positive for painful joints, leg cramps  SKIN: Denies skin rashes or itching  ENDOCRINE: Denies excessive thirst  Denies intolerance to heat or cold  PSYCHOSOCIAL:  Positive for anxiety and depression     But denies SI or HI  Denies any recent memory loss

## 2020-02-20 NOTE — LETTER
February 20, 2020     Tyree Ponce [address] [place] [address]    Patient: Sarmad De La Cruz   YOB: 1965   Date of Visit: 2/20/2020       Dear Dr Sis White: Thank you for referring Claudia Lang to me for evaluation  Below are my notes for this consultation  If you have questions, please do not hesitate to call me  I look forward to following your patient along with you           Sincerely,        JUANA Valadez        CC: No Recipients

## 2020-02-21 LAB
ENDOMYSIUM IGA SER QL: NEGATIVE
GLIADIN PEPTIDE IGA SER-ACNC: 3 UNITS (ref 0–19)
GLIADIN PEPTIDE IGG SER-ACNC: 3 UNITS (ref 0–19)
IGA SERPL-MCNC: 91 MG/DL (ref 87–352)
TTG IGA SER-ACNC: <2 U/ML (ref 0–3)
TTG IGG SER-ACNC: 4 U/ML (ref 0–5)

## 2020-02-25 ENCOUNTER — TELEPHONE (OUTPATIENT)
Dept: FAMILY MEDICINE CLINIC | Facility: CLINIC | Age: 55
End: 2020-02-25

## 2020-02-25 NOTE — TELEPHONE ENCOUNTER
Left message on machine we do not do our Prior Auths  We have someone from the outside that does them    trb

## 2020-02-25 NOTE — TELEPHONE ENCOUNTER
Patient called, she is planning on having her chest CT done through [facility] and knows that she is going to need a prior authorization for this  I told her I would leave a message

## 2020-02-27 ENCOUNTER — TELEPHONE (OUTPATIENT)
Dept: GASTROENTEROLOGY | Facility: CLINIC | Age: 55
End: 2020-02-27

## 2020-02-27 NOTE — TELEPHONE ENCOUNTER
Pt states she had testing done for [facility]; dropped off stool specimen today but did not have order  Conf'd earlier call from lab and that nurse had fax'd orders over

## 2020-02-27 NOTE — TELEPHONE ENCOUNTER
Orders printed out and faxed as requested  Fax did not go through as is the same as their phone number  Called and received new fax #, [phone]  Faxed again, confirmation complete

## 2020-02-27 NOTE — TELEPHONE ENCOUNTER
DELL mssg left by Aldair Bautista at [employer] stating Pt dropped off stool collection but had no order/states she threw it away; needs to have order by 2 PM today fax'd to [phone]    (Caller did not leave CB#/mssg on phone log from [phone])

## 2020-03-02 ENCOUNTER — SOCIAL WORK (OUTPATIENT)
Dept: BEHAVIORAL/MENTAL HEALTH CLINIC | Facility: CLINIC | Age: 55
End: 2020-03-02
Payer: COMMERCIAL

## 2020-03-02 DIAGNOSIS — F43.23 ADJUSTMENT DISORDER WITH MIXED ANXIETY AND DEPRESSED MOOD: Primary | ICD-10-CM

## 2020-03-02 LAB
CALPROTECTIN STL-MCNT: 21 UG/G (ref 0–120)
FAT STL QL: NORMAL
G LAMBLIA AG STL QL IA: NEGATIVE
Lab: NORMAL
NEUTRAL FAT STL QL: NORMAL
WBC SPEC QL GRAM STN: NORMAL

## 2020-03-02 PROCEDURE — 90834 PSYTX W PT 45 MINUTES: CPT | Performed by: SOCIAL WORKER
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
## 2020-03-03 DIAGNOSIS — R91.1 PULMONARY NODULE, RIGHT: Primary | ICD-10-CM

## 2020-03-03 DIAGNOSIS — F17.200 TOBACCO USE DISORDER: ICD-10-CM

## 2020-03-09 DIAGNOSIS — F41.1 GAD (GENERALIZED ANXIETY DISORDER): ICD-10-CM

## 2020-03-09 RX ORDER — LORAZEPAM 0.5 MG/1
0.5 TABLET ORAL
Qty: 30 TABLET | Refills: 0 | Status: SHIPPED | OUTPATIENT
Start: 2020-03-09 | End: 2020-04-06 | Stop reason: SDUPTHER

## 2020-03-11 ENCOUNTER — HOSPITAL ENCOUNTER (OUTPATIENT)
Dept: CT IMAGING | Facility: HOSPITAL | Age: 55
Discharge: HOME | End: 2020-03-11
Payer: COMMERCIAL

## 2020-03-11 DIAGNOSIS — F17.200 TOBACCO USE DISORDER: ICD-10-CM

## 2020-03-11 DIAGNOSIS — R91.1 PULMONARY NODULE, RIGHT: ICD-10-CM

## 2020-03-11 PROCEDURE — 71250 CT THORAX DX C-: CPT

## 2020-03-13 ENCOUNTER — TELEPHONE (OUTPATIENT)
Dept: FAMILY MEDICINE CLINIC | Facility: CLINIC | Age: 55
End: 2020-03-13

## 2020-03-13 NOTE — TELEPHONE ENCOUNTER
Anaid,Please let patient know her CT is not yet read by radiology  Once we have a read we will call her with this  Thank you!

## 2020-03-16 ENCOUNTER — TELEPHONE (OUTPATIENT)
Dept: FAMILY MEDICINE CLINIC | Facility: CLINIC | Age: 55
End: 2020-03-16
Payer: COMMERCIAL

## 2020-03-16 PROCEDURE — G2012 BRIEF CHECK IN BY MD/QHP: HCPCS | Performed by: FAMILY MEDICINE

## 2020-03-16 NOTE — TELEPHONE ENCOUNTER
COVID-19 Virtual Visit     This virtual check-in was done via telephone  Encounter provider Rene Lizarraga DO    Provider located at [address] [address] [phone]    Recent Visits  Date Type Provider Dept   03/13/20 Telephone Dena Laura, [address] [place] [facility]   Showing recent visits within past 7 days and meeting all other requirements     Today's Visits  Date Type Provider Dept   03/16/20 Telephone Rene Lizarraga DO [facility]   Showing today's visits and meeting all other requirements     Future Appointments  No visits were found meeting these conditions  Showing future appointments within next 150 days and meeting all other requirements        Patient agrees to participate in a virtual check in via telephone or video visit instead of presenting to the office to address urgent/immediate medical needs  Patient is aware this is a billable service but at a lesser fee than in person care  After connecting through telephone, the patient was identified by name and date of birth  Destiny Moore was informed that this was a telemedicine visit and that the exam was being conducted confidentially over secure lines  Other methods to assure confidentiality were taken -the call was taken in a private area of the office    No one else was in the room  Destiny Moore acknowledged consent and understanding of privacy and security of the telemedicine visit  I informed the patient that I have reviewed her record in Epic and presented the opportunity for her to ask any questions regarding the visit today  The patient agreed to participate  Destiny Moore is a 53 y o  female who is concerned about COVID-19  She reports cough and shortness of breath  She has not traveled recently  She has not had contact with a person who is under investigation for or who is positive for COVID-19 within the last 14 days   She has not been hospitalized recently for fever and/or lower respiratory symptoms  The patient has a history of COPD  She started with a sore throat on 3/14/2020  She does also have some mild sinus pressure  She had a low-grade fever of 100 3 last evening and took 1 dose of Tylenol which resulted  She has had no further fever since then  She does have mild shortness of breath but is no different from her baseline with her history of COPD  She does have a very mild cough  She states that her daughter recently had a cold  Past Medical History:   Diagnosis Date    Bulging lumbar disc     Chronic pain     low back    Degenerative disk disease     lumbar spine    Hypertension     Pinched nerve     lumbar spine    Psychiatric disorder     depression       Past Surgical History:   Procedure Laterality Date    CARPAL TUNNEL RELEASE Bilateral     CHOLECYSTECTOMY      COLONOSCOPY  06/15/2015    COLONOSCOPY  06/02/2015    Internal hemorrhoids, hyperplastic polyps which were removed  Ten year recall recommended June 2025    WISDOM TOOTH EXTRACTION Bilateral        Current Outpatient Medications   Medication Sig Dispense Refill    atorvastatin (LIPITOR) 80 mg tablet Take 1 tablet (80 mg total) by mouth daily 90 tablet 1    lisinopril (ZESTRIL) 10 mg tablet Take 10 mg by mouth daily      lisinopril (ZESTRIL) 20 mg tablet Take 10 mg by mouth daily       LORazepam (ATIVAN) 0 5 mg tablet Take 1 tablet (0 5 mg total) by mouth daily at bedtime 30 tablet 0    pramipexole (MIRAPEX) 0 25 mg tablet Take 0 25 mg by mouth daily  1    sertraline (ZOLOFT) 100 mg tablet Take 1 tablet (100 mg total) by mouth daily 90 tablet 1    sertraline (ZOLOFT) 50 mg tablet Take 1 tablet (50 mg total) by mouth daily 90 tablet 1     No current facility-administered medications for this visit  Allergies   Allergen Reactions    Penicillins        Video Exam    Rahel Feng appears no distress       Disposition:      After clarifying the patient's history, my suspicion for COVID-19 infection is very low  I advised the patient that her symptoms do not seem consistent with COVID-19  Most likely she has viral upper respiratory infection  She will treat this symptomatically  She will increase her fluids and rest   She can take Mucinex as needed for the cough  We will see her back as scheduled  I spent 15 minutes with the patient during this virtual check-in visit

## 2020-03-17 ENCOUNTER — TELEPHONE (OUTPATIENT)
Dept: FAMILY MEDICINE CLINIC | Facility: CLINIC | Age: 55
End: 2020-03-17

## 2020-03-20 ENCOUNTER — TELEPHONE (OUTPATIENT)
Dept: FAMILY MEDICINE CLINIC | Facility: CLINIC | Age: 55
End: 2020-03-20

## 2020-03-20 DIAGNOSIS — R91.8 PULMONARY NODULES: Primary | ICD-10-CM

## 2020-03-20 NOTE — TELEPHONE ENCOUNTER
NANY is calling to see if you would be so kind and call her with her xray results that she had done last week      [phone]

## 2020-03-30 DIAGNOSIS — G25.81 RESTLESS LEG SYNDROME: Primary | ICD-10-CM

## 2020-03-30 RX ORDER — PRAMIPEXOLE DIHYDROCHLORIDE 0.25 MG/1
0.25 TABLET ORAL DAILY
Qty: 90 TABLET | Refills: 1 | Status: SHIPPED | OUTPATIENT
Start: 2020-03-30 | End: 2020-08-31 | Stop reason: SDUPTHER

## 2020-04-01 ENCOUNTER — TELEPHONE (OUTPATIENT)
Dept: BEHAVIORAL/MENTAL HEALTH CLINIC | Facility: CLINIC | Age: 55
End: 2020-04-01

## 2020-04-02 ENCOUNTER — TELEPHONE (OUTPATIENT)
Dept: GASTROENTEROLOGY | Facility: CLINIC | Age: 55
End: 2020-04-02

## 2020-04-02 ENCOUNTER — OFFICE VISIT (OUTPATIENT)
Dept: GASTROENTEROLOGY | Facility: CLINIC | Age: 55
End: 2020-04-02
Payer: COMMERCIAL

## 2020-04-02 VITALS
WEIGHT: 189 LBS | DIASTOLIC BLOOD PRESSURE: 72 MMHG | HEART RATE: 92 BPM | SYSTOLIC BLOOD PRESSURE: 124 MMHG | BODY MASS INDEX: 35.68 KG/M2 | HEIGHT: 61 IN

## 2020-04-02 DIAGNOSIS — R19.7 DIARRHEA, UNSPECIFIED TYPE: Primary | ICD-10-CM

## 2020-04-02 DIAGNOSIS — Z12.11 COLON CANCER SCREENING: ICD-10-CM

## 2020-04-02 DIAGNOSIS — K76.0 FATTY LIVER: ICD-10-CM

## 2020-04-02 PROCEDURE — 3074F SYST BP LT 130 MM HG: CPT | Performed by: NURSE PRACTITIONER

## 2020-04-02 PROCEDURE — 3078F DIAST BP <80 MM HG: CPT | Performed by: NURSE PRACTITIONER

## 2020-04-02 PROCEDURE — 99213 OFFICE O/P EST LOW 20 MIN: CPT | Performed by: NURSE PRACTITIONER

## 2020-04-02 PROCEDURE — 3008F BODY MASS INDEX DOCD: CPT | Performed by: NURSE PRACTITIONER

## 2020-04-03 ENCOUNTER — TELEMEDICINE (OUTPATIENT)
Dept: BEHAVIORAL/MENTAL HEALTH CLINIC | Facility: CLINIC | Age: 55
End: 2020-04-03
Payer: COMMERCIAL

## 2020-04-03 DIAGNOSIS — F43.23 ADJUSTMENT DISORDER WITH MIXED ANXIETY AND DEPRESSED MOOD: Primary | ICD-10-CM

## 2020-04-03 PROCEDURE — 90834 PSYTX W PT 45 MINUTES: CPT | Performed by: SOCIAL WORKER

## 2020-04-06 DIAGNOSIS — F41.1 GAD (GENERALIZED ANXIETY DISORDER): ICD-10-CM

## 2020-04-06 RX ORDER — LORAZEPAM 0.5 MG/1
0.5 TABLET ORAL
Qty: 30 TABLET | Refills: 0 | Status: SHIPPED | OUTPATIENT
Start: 2020-04-09 | End: 2020-05-05 | Stop reason: SDUPTHER

## 2020-04-08 ENCOUNTER — TELEMEDICINE (OUTPATIENT)
Dept: BEHAVIORAL/MENTAL HEALTH CLINIC | Facility: CLINIC | Age: 55
End: 2020-04-08
Payer: COMMERCIAL

## 2020-04-08 DIAGNOSIS — F43.23 ADJUSTMENT DISORDER WITH MIXED ANXIETY AND DEPRESSED MOOD: Primary | ICD-10-CM

## 2020-04-08 PROCEDURE — 90834 PSYTX W PT 45 MINUTES: CPT | Performed by: SOCIAL WORKER

## 2020-04-15 ENCOUNTER — TELEMEDICINE (OUTPATIENT)
Dept: BEHAVIORAL/MENTAL HEALTH CLINIC | Facility: CLINIC | Age: 55
End: 2020-04-15
Payer: COMMERCIAL

## 2020-04-15 DIAGNOSIS — F43.23 ADJUSTMENT DISORDER WITH MIXED ANXIETY AND DEPRESSED MOOD: Primary | ICD-10-CM

## 2020-04-15 PROCEDURE — 90834 PSYTX W PT 45 MINUTES: CPT | Performed by: SOCIAL WORKER

## 2020-04-17 ENCOUNTER — OFFICE VISIT (OUTPATIENT)
Dept: FAMILY MEDICINE CLINIC | Facility: CLINIC | Age: 55
End: 2020-04-17
Payer: COMMERCIAL

## 2020-04-17 VITALS
SYSTOLIC BLOOD PRESSURE: 116 MMHG | TEMPERATURE: 97.6 F | BODY MASS INDEX: 35.87 KG/M2 | WEIGHT: 190 LBS | RESPIRATION RATE: 16 BRPM | DIASTOLIC BLOOD PRESSURE: 62 MMHG | HEART RATE: 68 BPM | OXYGEN SATURATION: 96 % | HEIGHT: 61 IN

## 2020-04-17 DIAGNOSIS — E03.8 SUBCLINICAL HYPOTHYROIDISM: ICD-10-CM

## 2020-04-17 DIAGNOSIS — F17.200 TOBACCO USE DISORDER: ICD-10-CM

## 2020-04-17 DIAGNOSIS — E78.2 MIXED HYPERLIPIDEMIA: ICD-10-CM

## 2020-04-17 DIAGNOSIS — Z13.1 SCREENING FOR DIABETES MELLITUS: ICD-10-CM

## 2020-04-17 DIAGNOSIS — F41.1 GAD (GENERALIZED ANXIETY DISORDER): ICD-10-CM

## 2020-04-17 DIAGNOSIS — Z13.6 SCREENING FOR CARDIOVASCULAR CONDITION: ICD-10-CM

## 2020-04-17 DIAGNOSIS — F32.0 CURRENT MILD EPISODE OF MAJOR DEPRESSIVE DISORDER WITHOUT PRIOR EPISODE (HCC): ICD-10-CM

## 2020-04-17 DIAGNOSIS — G25.81 RESTLESS LEG SYNDROME: ICD-10-CM

## 2020-04-17 DIAGNOSIS — R91.8 PULMONARY NODULES: ICD-10-CM

## 2020-04-17 DIAGNOSIS — Z00.00 ANNUAL PHYSICAL EXAM: Primary | ICD-10-CM

## 2020-04-17 DIAGNOSIS — I10 ESSENTIAL HYPERTENSION: ICD-10-CM

## 2020-04-17 PROCEDURE — 99396 PREV VISIT EST AGE 40-64: CPT | Performed by: FAMILY MEDICINE

## 2020-04-17 RX ORDER — LISINOPRIL 10 MG/1
10 TABLET ORAL DAILY
Qty: 90 TABLET | Refills: 1 | Status: SHIPPED | OUTPATIENT
Start: 2020-04-17 | End: 2020-10-05 | Stop reason: SDUPTHER

## 2020-04-29 ENCOUNTER — TELEMEDICINE (OUTPATIENT)
Dept: BEHAVIORAL/MENTAL HEALTH CLINIC | Facility: CLINIC | Age: 55
End: 2020-04-29
Payer: COMMERCIAL

## 2020-04-29 DIAGNOSIS — F43.23 ADJUSTMENT DISORDER WITH MIXED ANXIETY AND DEPRESSED MOOD: Primary | ICD-10-CM

## 2020-04-29 PROCEDURE — 90834 PSYTX W PT 45 MINUTES: CPT | Performed by: SOCIAL WORKER

## 2020-05-05 DIAGNOSIS — F41.1 GAD (GENERALIZED ANXIETY DISORDER): ICD-10-CM

## 2020-05-05 RX ORDER — LORAZEPAM 0.5 MG/1
0.5 TABLET ORAL
Qty: 30 TABLET | Refills: 0 | Status: SHIPPED | OUTPATIENT
Start: 2020-05-06 | End: 2020-06-04 | Stop reason: SDUPTHER

## 2020-05-07 ENCOUNTER — TELEPHONE (OUTPATIENT)
Dept: BEHAVIORAL/MENTAL HEALTH CLINIC | Facility: CLINIC | Age: 55
End: 2020-05-07

## 2020-05-07 PROBLEM — F32.0 CURRENT MILD EPISODE OF MAJOR DEPRESSIVE DISORDER WITHOUT PRIOR EPISODE (HCC): Status: RESOLVED | Noted: 2019-12-05 | Resolved: 2020-05-07

## 2020-05-07 PROBLEM — F41.1 GAD (GENERALIZED ANXIETY DISORDER): Status: RESOLVED | Noted: 2019-12-05 | Resolved: 2020-05-07

## 2020-05-20 ENCOUNTER — TELEPHONE (OUTPATIENT)
Dept: BEHAVIORAL/MENTAL HEALTH CLINIC | Facility: CLINIC | Age: 55
End: 2020-05-20

## 2020-05-22 ENCOUNTER — TELEMEDICINE (OUTPATIENT)
Dept: BEHAVIORAL/MENTAL HEALTH CLINIC | Facility: CLINIC | Age: 55
End: 2020-05-22
Payer: COMMERCIAL

## 2020-05-22 DIAGNOSIS — F43.23 ADJUSTMENT DISORDER WITH MIXED ANXIETY AND DEPRESSED MOOD: Primary | ICD-10-CM

## 2020-05-22 PROCEDURE — 90834 PSYTX W PT 45 MINUTES: CPT | Performed by: SOCIAL WORKER

## 2020-06-04 DIAGNOSIS — F41.1 GAD (GENERALIZED ANXIETY DISORDER): ICD-10-CM

## 2020-06-04 RX ORDER — LORAZEPAM 0.5 MG/1
0.5 TABLET ORAL
Qty: 30 TABLET | Refills: 0 | Status: SHIPPED | OUTPATIENT
Start: 2020-06-04 | End: 2020-07-07 | Stop reason: SDUPTHER

## 2020-06-08 ENCOUNTER — TELEMEDICINE (OUTPATIENT)
Dept: BEHAVIORAL/MENTAL HEALTH CLINIC | Facility: CLINIC | Age: 55
End: 2020-06-08
Payer: COMMERCIAL

## 2020-06-08 DIAGNOSIS — F43.23 ADJUSTMENT DISORDER WITH MIXED ANXIETY AND DEPRESSED MOOD: Primary | ICD-10-CM

## 2020-06-08 PROCEDURE — 90834 PSYTX W PT 45 MINUTES: CPT | Performed by: SOCIAL WORKER

## 2020-07-07 DIAGNOSIS — F41.1 GAD (GENERALIZED ANXIETY DISORDER): ICD-10-CM

## 2020-07-07 RX ORDER — LORAZEPAM 0.5 MG/1
0.5 TABLET ORAL
Qty: 30 TABLET | Refills: 0 | Status: SHIPPED | OUTPATIENT
Start: 2020-07-07 | End: 2020-08-03 | Stop reason: SDUPTHER

## 2020-07-08 ENCOUNTER — HOSPITAL ENCOUNTER (OUTPATIENT)
Dept: CT IMAGING | Facility: HOSPITAL | Age: 55
Discharge: HOME | End: 2020-07-08
Payer: COMMERCIAL

## 2020-07-08 DIAGNOSIS — R91.8 PULMONARY NODULES: ICD-10-CM

## 2020-07-08 PROCEDURE — 71250 CT THORAX DX C-: CPT

## 2020-07-13 ENCOUNTER — TELEMEDICINE (OUTPATIENT)
Dept: BEHAVIORAL/MENTAL HEALTH CLINIC | Facility: CLINIC | Age: 55
End: 2020-07-13
Payer: COMMERCIAL

## 2020-07-13 DIAGNOSIS — F43.23 ADJUSTMENT DISORDER WITH MIXED ANXIETY AND DEPRESSED MOOD: Primary | ICD-10-CM

## 2020-07-13 PROCEDURE — 90834 PSYTX W PT 45 MINUTES: CPT | Performed by: SOCIAL WORKER

## 2020-07-13 NOTE — PSYCH
Virtual Regular Visit    Assessment/Plan:    Problem List Items Addressed This Visit        Other    Adjustment disorder with mixed anxiety and depressed mood - Primary        Reason for visit is   Chief Complaint   Patient presents with    Virtual Regular Visit      Encounter provider Thomas Brewster    Provider located at [address] [id number] [phone]      Recent Visits  No visits were found meeting these conditions  Showing recent visits within past 7 days and meeting all other requirements     Today's Visits  Date Type Provider Dept   07/13/20 Telemedicine Thomas Brewster [facility]   Showing today's visits and meeting all other requirements     Future Appointments  No visits were found meeting these conditions  Showing future appointments within next 150 days and meeting all other requirements        The patient was identified by name and date of birth  Melina Hopkins was informed that this is a telemedicine visit and that the visit is being conducted through telephone  My office door was closed  No one else was in the room  She acknowledged consent and understanding of privacy and security of the video platform  The patient has agreed to participate and understands they can discontinue the visit at any time  It was my intent to perform this visit via video technology but the patient was not able to do a video connection so the visit was completed via audio telephone only  Patient is aware this is a billable service  Subjective  Melina Hopkins is a 53 y o  female        HPI     Past Medical History:   Diagnosis Date    Bulging lumbar disc     Chronic pain     low back    Degenerative disk disease     lumbar spine    Hypertension     Pinched nerve     lumbar spine    Psychiatric disorder     depression       Past Surgical History:   Procedure Laterality Date    CARPAL TUNNEL RELEASE Bilateral     CHOLECYSTECTOMY      COLONOSCOPY  06/15/2015    COLONOSCOPY  06/02/2015    Internal hemorrhoids, hyperplastic polyps which were removed  Ten year recall recommended June 2025    WISDOM TOOTH EXTRACTION Bilateral        Current Outpatient Medications   Medication Sig Dispense Refill    atorvastatin (LIPITOR) 80 mg tablet Take 1 tablet (80 mg total) by mouth daily 90 tablet 1    lisinopril (ZESTRIL) 10 mg tablet Take 1 tablet (10 mg total) by mouth daily 90 tablet 1    LORazepam (ATIVAN) 0 5 mg tablet Take 1 tablet (0 5 mg total) by mouth daily at bedtime 30 tablet 0    pramipexole (MIRAPEX) 0 25 mg tablet Take 1 tablet (0 25 mg total) by mouth daily 90 tablet 1    sertraline (ZOLOFT) 100 mg tablet Take 1 tablet (100 mg total) by mouth daily 90 tablet 1    sertraline (ZOLOFT) 50 mg tablet Take 1 tablet (50 mg total) by mouth daily 90 tablet 1     No current facility-administered medications for this visit  Allergies   Allergen Reactions    Penicillins        Review of Systems    Video Exam    There were no vitals filed for this visit  Physical Exam     (D) Grecialeonel Cody attended her follow up psychotherapy session today  Grecia Cody communicated that since her last session, she went back to work last week  Grecia Cody reports that her job is very slow right now, reporting that 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  Grecialeonel Cody describes some anxiety in relation to this, when it comes to finances  Grecia Cody describes psychosocial stressors related to COVID-19 and the pandemic  Grecia Cody reports experiencing symptoms with nervousness, worrying, anxiety, feeling tired and having little energy, and feeling like she is letting her family down at times  Grecialeonel Wheatleye describes some symptoms of guilt as well   Grecialeonel Cody reports that she continues to be in this new relationship with her boyfriend, reporting that she has integrated her daughter into meeting him  Morris Boyd reports that she does spend some time with her boyfriend on the weekends, and reports that her daughter hasn't wanted to do this, and reports that her daughter will either stay with her father or grandparents  Morris Boyd reports that she doesn't want to, "push," her daughter to staying over at her boyfriends place  Morris Boyd reports that things are going well in her relationship  Morris Boyd spent time discussing this, and her communication with her family in relation to this  Morris Boyd describes anticipatory anxiety in relation to her daughter going back to school, and her choice to send her daughter back to school in person  Morris Boyd reports feeling proud of herself for having her  move out of her house, sticking with limits and boundaries, paying off debt, asserting herself, advocating for herself, utilizing effective forms of communication, and prioritizing herself and her daughter  Morris Boyd reports some interpersonal relationship stressors with her and her mother, and discussed issues with their living will  Morris Boyd communicated that recently over the 4th of July over the weekend, her and her siblings learned that her parents made (3) granddaughter the financial POA and (1) granddaughter is the medical POA  Morris Boyd discussed the impact of this in relation to her siblings  Morris Boyd and this writer processed this at length  Provided ongoing psychoeducation  Discussed ongoing skills  Reviewed limits and boundaries  Modeled effective forms of communication  (A) Morris Boyd does not appear to be endorsing criteria for ernesto at this time  Morris Boyd denies any symptoms of grandiose thinking, impulsivity, ernesto, or elevated/ hyperactive moods  Morris Boyd denies that she is experiencing any evident or immediate risk factors for self-harm, SI, or HI  Morris Boyd presented as forward thinking during session, discussed upcoming plans, and identified reasons to live  Morris Boyd presented as alert and oriented x3   Zenaida's speech presented at a normal rate, volume, and rhythm  Zenaida's eye contact was unable to be assessed as a result of this being a phone session  Zenaida's mood presented as anxious and her affect was unable to be assessed as a result of this being a phone session  Maureen Henderson describes some symptoms of feeling tired and having little energy  Maureen Henderson reports struggling with symptoms of nervousness, worrying, and anxiety at times  Maureen Henderson describes some symptoms of guilt, reporting that sometimes she feels that she is letting her family down  Maureen Henderson describes some symptoms of anticipatory anxiety in relation to psychosocial stressors  Connie Henderson plans to continue to decrease triggers associated with the COVID-19 and the pandemic, by decreasing time spent watching the news, reading news articles, and spending time on social media  Maureen Henderson plans to continue to work on actively being present in the moment  Maureen Henderson plans to lean into her jen  Maureen Henderson plans to practice radical acceptance, focusing on what she has control over, verses what she doesn't have control over, and make choices and decisions that align with both short-term and long-term goals  Maureen Henderson plans to continue to outweigh the risks verses the benefits in order to make informed decisions  Maureen Henderson plans to continue to structure her schedule, and utilize opposite action skills  Maureen Henderson plans to continue to explore unmet needs, express her feelings, and utilize effective forms of communication  Maureen ya to implement ongoing limits and boundaries, prioritize her mental health needs, and increase the use of self-care  Maureen Henderson plans to monitor and track symptoms, cycles, and stressors with ongoing distraction techniques, distress tolerance skills, coping skills, and grounding techniques  Maureen Henderson plans to continue to assert and advocate for herself  Maureen Henderson plans to outreach for additional support as needed  I spent 45 minutes directly with the patient during this visit         VIRTUAL VISIT Shikha Espinoza acknowledges that she has consented to an online visit or consultation  She understands that the online visit is based solely on information provided by her, and that, in the absence of a face-to-face physical evaluation by the physician, the diagnosis she receives is both limited and provisional in terms of accuracy and completeness  This is not intended to replace a full medical face-to-face evaluation by the physician  Deondre Hernandez understands and accepts these terms

## 2020-07-16 LAB
ALBUMIN SERPL-MCNC: 4.4 G/DL (ref 3.8–4.9)
ALBUMIN/GLOB SERPL: 2.2 {RATIO} (ref 1.2–2.2)
ALP SERPL-CCNC: 84 IU/L (ref 39–117)
ALT SERPL-CCNC: 16 IU/L (ref 0–32)
APPEARANCE UR: CLEAR
AST SERPL-CCNC: 18 IU/L (ref 0–40)
BACTERIA URNS QL MICRO: ABNORMAL
BASOPHILS # BLD AUTO: 0.1 X10E3/UL (ref 0–0.2)
BASOPHILS NFR BLD AUTO: 1 %
BILIRUB SERPL-MCNC: 0.3 MG/DL (ref 0–1.2)
BILIRUB UR QL STRIP: NEGATIVE
BUN SERPL-MCNC: 13 MG/DL (ref 6–24)
BUN/CREAT SERPL: 18 (ref 9–23)
CALCIUM SERPL-MCNC: 9.4 MG/DL (ref 8.7–10.2)
CHLORIDE SERPL-SCNC: 104 MMOL/L (ref 96–106)
CHOLEST SERPL-MCNC: 184 MG/DL (ref 100–199)
CHOLEST/HDLC SERPL: 5.8 RATIO (ref 0–4.4)
CO2 SERPL-SCNC: 27 MMOL/L (ref 20–29)
COLOR UR: YELLOW
CREAT SERPL-MCNC: 0.74 MG/DL (ref 0.57–1)
EOSINOPHIL # BLD AUTO: 0.2 X10E3/UL (ref 0–0.4)
EOSINOPHIL NFR BLD AUTO: 3 %
EPI CELLS #/AREA URNS HPF: >10 /HPF (ref 0–10)
ERYTHROCYTE [DISTWIDTH] IN BLOOD BY AUTOMATED COUNT: 13 % (ref 11.7–15.4)
GLOBULIN SER-MCNC: 2 G/DL (ref 1.5–4.5)
GLUCOSE SERPL-MCNC: 118 MG/DL (ref 65–99)
GLUCOSE UR QL: NEGATIVE
HCT VFR BLD AUTO: 40.5 % (ref 34–46.6)
HDLC SERPL-MCNC: 32 MG/DL
HGB BLD-MCNC: 13.9 G/DL (ref 11.1–15.9)
HGB UR QL STRIP: NEGATIVE
IMM GRANULOCYTES # BLD: 0 X10E3/UL (ref 0–0.1)
IMM GRANULOCYTES NFR BLD: 0 %
KETONES UR QL STRIP: NEGATIVE
LDLC SERPL CALC-MCNC: 93 MG/DL (ref 0–99)
LDLC SERPL DIRECT ASSAY-MCNC: 86 MG/DL (ref 0–99)
LEUKOCYTE ESTERASE UR QL STRIP: ABNORMAL
LYMPHOCYTES # BLD AUTO: 2.4 X10E3/UL (ref 0.7–3.1)
LYMPHOCYTES NFR BLD AUTO: 29 %
MCH RBC QN AUTO: 32.6 PG (ref 26.6–33)
MCHC RBC AUTO-ENTMCNC: 34.3 G/DL (ref 31.5–35.7)
MCV RBC AUTO: 95 FL (ref 79–97)
MICRO URNS: ABNORMAL
MONOCYTES # BLD AUTO: 0.6 X10E3/UL (ref 0.1–0.9)
MONOCYTES NFR BLD AUTO: 7 %
MUCOUS THREADS URNS QL MICRO: PRESENT
NEUTROPHILS # BLD AUTO: 5 X10E3/UL (ref 1.4–7)
NEUTROPHILS NFR BLD AUTO: 60 %
NITRITE UR QL STRIP: NEGATIVE
PH UR STRIP: 5.5 [PH] (ref 5–7.5)
PLATELET # BLD AUTO: 179 X10E3/UL (ref 150–450)
POTASSIUM SERPL-SCNC: 4.4 MMOL/L (ref 3.5–5.2)
PROT SERPL-MCNC: 6.4 G/DL (ref 6–8.5)
PROT UR QL STRIP: NEGATIVE
RBC # BLD AUTO: 4.26 X10E6/UL (ref 3.77–5.28)
RBC #/AREA URNS HPF: ABNORMAL /HPF (ref 0–2)
SL AMB EGFR AFRICAN AMERICAN: 105 ML/MIN/1.73
SL AMB EGFR NON AFRICAN AMERICAN: 91 ML/MIN/1.73
SL AMB VLDL CHOLESTEROL CALC: 59 MG/DL (ref 5–40)
SODIUM SERPL-SCNC: 141 MMOL/L (ref 134–144)
SP GR UR: 1.02 (ref 1–1.03)
TRIGL SERPL-MCNC: 297 MG/DL (ref 0–149)
TSH SERPL DL<=0.005 MIU/L-ACNC: 2.15 UIU/ML (ref 0.45–4.5)
UROBILINOGEN UR STRIP-ACNC: 0.2 MG/DL (ref 0.2–1)
WBC # BLD AUTO: 8.4 X10E3/UL (ref 3.4–10.8)
WBC #/AREA URNS HPF: ABNORMAL /HPF (ref 0–5)

## 2020-07-17 ENCOUNTER — OFFICE VISIT (OUTPATIENT)
Dept: FAMILY MEDICINE CLINIC | Facility: CLINIC | Age: 55
End: 2020-07-17
Payer: COMMERCIAL

## 2020-07-17 VITALS
HEIGHT: 61 IN | SYSTOLIC BLOOD PRESSURE: 120 MMHG | OXYGEN SATURATION: 97 % | BODY MASS INDEX: 37.31 KG/M2 | TEMPERATURE: 97.5 F | DIASTOLIC BLOOD PRESSURE: 80 MMHG | WEIGHT: 197.6 LBS | HEART RATE: 80 BPM

## 2020-07-17 DIAGNOSIS — R93.89 ABNORMAL CT OF THE CHEST: ICD-10-CM

## 2020-07-17 DIAGNOSIS — I10 ESSENTIAL HYPERTENSION: ICD-10-CM

## 2020-07-17 DIAGNOSIS — I25.10 MILD CAD: ICD-10-CM

## 2020-07-17 DIAGNOSIS — F43.23 ADJUSTMENT DISORDER WITH MIXED ANXIETY AND DEPRESSED MOOD: ICD-10-CM

## 2020-07-17 DIAGNOSIS — R91.8 PULMONARY NODULES: ICD-10-CM

## 2020-07-17 DIAGNOSIS — E03.8 SUBCLINICAL HYPOTHYROIDISM: ICD-10-CM

## 2020-07-17 DIAGNOSIS — R73.01 IFG (IMPAIRED FASTING GLUCOSE): ICD-10-CM

## 2020-07-17 DIAGNOSIS — E78.2 MIXED HYPERLIPIDEMIA: Primary | ICD-10-CM

## 2020-07-17 DIAGNOSIS — F17.200 TOBACCO USE DISORDER: ICD-10-CM

## 2020-07-17 DIAGNOSIS — G25.81 RESTLESS LEG SYNDROME: ICD-10-CM

## 2020-07-17 PROBLEM — Z13.1 SCREENING FOR DIABETES MELLITUS: Status: RESOLVED | Noted: 2020-04-17 | Resolved: 2020-07-17

## 2020-07-17 PROBLEM — Z00.00 ANNUAL PHYSICAL EXAM: Status: RESOLVED | Noted: 2020-04-17 | Resolved: 2020-07-17

## 2020-07-17 PROBLEM — E66.812 CLASS 2 OBESITY WITHOUT SERIOUS COMORBIDITY WITH BODY MASS INDEX (BMI) OF 36.0 TO 36.9 IN ADULT: Status: RESOLVED | Noted: 2019-12-05 | Resolved: 2020-07-17

## 2020-07-17 PROBLEM — E66.9 CLASS 2 OBESITY WITHOUT SERIOUS COMORBIDITY WITH BODY MASS INDEX (BMI) OF 36.0 TO 36.9 IN ADULT: Status: RESOLVED | Noted: 2019-12-05 | Resolved: 2020-07-17

## 2020-07-17 PROBLEM — Z13.6 SCREENING FOR CARDIOVASCULAR CONDITION: Status: RESOLVED | Noted: 2020-04-17 | Resolved: 2020-07-17

## 2020-07-17 PROCEDURE — 3079F DIAST BP 80-89 MM HG: CPT | Performed by: FAMILY MEDICINE

## 2020-07-17 PROCEDURE — 3074F SYST BP LT 130 MM HG: CPT | Performed by: FAMILY MEDICINE

## 2020-07-17 PROCEDURE — 99214 OFFICE O/P EST MOD 30 MIN: CPT | Performed by: FAMILY MEDICINE

## 2020-07-17 PROCEDURE — 3008F BODY MASS INDEX DOCD: CPT | Performed by: FAMILY MEDICINE

## 2020-07-17 RX ORDER — BUPROPION HYDROCHLORIDE 150 MG/1
150 TABLET, EXTENDED RELEASE ORAL 2 TIMES DAILY
Qty: 60 TABLET | Refills: 1 | Status: SHIPPED | OUTPATIENT
Start: 2020-07-17 | End: 2020-10-07 | Stop reason: ALTCHOICE

## 2020-07-17 NOTE — ASSESSMENT & PLAN NOTE
Smoking about 1 PPD  Failed patches and states chantix gave her palpations  Will trial wellbutrin   Follow up in 6 weeks for medication check

## 2020-07-17 NOTE — ASSESSMENT & PLAN NOTE
Lab Results   Component Value Date    SODIUM 141 07/15/2020    K 4 4 07/15/2020     07/15/2020    CO2 27 07/15/2020    BUN 13 07/15/2020    CREATININE 0 74 07/15/2020    GLUC 118 (H) 07/15/2020       Advised lifestyle modifications  Will add on HA1C at next lab draw

## 2020-07-17 NOTE — ASSESSMENT & PLAN NOTE
Lab Results   Component Value Date    CHOLESTEROL 184 07/15/2020    CHOLESTEROL 149 01/13/2020     Lab Results   Component Value Date    HDL 32 (L) 07/15/2020    HDL 31 (L) 01/13/2020     Lab Results   Component Value Date    TRIG 297 (H) 07/15/2020    TRIG 329 (H) 01/13/2020     Lab Results   Component Value Date    LDLCALC 93 07/15/2020     On statin therapy

## 2020-07-17 NOTE — PROGRESS NOTES
Chloe Rincon 1965 female MRN: [id number]    Family Medicine Follow-up Visit    ASSESSMENT/PLAN  Problem List Items Addressed This Visit        Endocrine    Subclinical hypothyroidism     Lab Results   Component Value Date    TSH 2 150 07/15/2020       TSH is now normal  Continue to monitor  She denies any symptoms          IFG (impaired fasting glucose)     Lab Results   Component Value Date    SODIUM 141 07/15/2020    K 4 4 07/15/2020     07/15/2020    CO2 27 07/15/2020    BUN 13 07/15/2020    CREATININE 0 74 07/15/2020    GLUC 118 (H) 07/15/2020       Advised lifestyle modifications  Will add on HA1C at next lab draw            Cardiovascular and Mediastinum    Essential hypertension     Well controlled  Continue medication as prescribed          Relevant Orders    Ambulatory referral to Cardiology    Mild CAD    Relevant Orders    Ambulatory referral to Cardiology       Other    Mixed hyperlipidemia - Primary     Lab Results   Component Value Date    CHOLESTEROL 184 07/15/2020    CHOLESTEROL 149 01/13/2020     Lab Results   Component Value Date    HDL 32 (L) 07/15/2020    HDL 31 (L) 01/13/2020     Lab Results   Component Value Date    TRIG 297 (H) 07/15/2020    TRIG 329 (H) 01/13/2020     Lab Results   Component Value Date    LDLCALC 93 07/15/2020     On statin therapy          Relevant Orders    Ambulatory referral to Cardiology    Restless leg syndrome     Stable on mirapex         Tobacco use disorder     Smoking about 1 PPD  Failed patches and states chantix gave her palpations  Will trial wellbutrin   Follow up in 6 weeks for medication check          Relevant Medications    buPROPion (WELLBUTRIN SR) 150 mg 12 hr tablet    Other Relevant Orders    Ambulatory referral to Pulmonology    Adjustment disorder with mixed anxiety and depressed mood    Relevant Medications    buPROPion (WELLBUTRIN SR) 150 mg 12 hr tablet    Pulmonary nodules    Relevant Orders    Ambulatory referral to Pulmonology Abnormal CT of the chest     Ct chest 7/8:    IMPRESSION:     Nothing to indicate malignancy      2 discoid opacities in the left lower lobe are benign scars      Several bilateral upper lobe centrilobular nodules are compatible with respiratory bronchiolitis, a smoking-related lung disease      Minimal coronary artery calcification indicating atherosclerotic heart disease             Relevant Orders    Ambulatory referral to Pulmonology    Ambulatory referral to Cardiology    BMI 37 0-37 9, adult     BMI Counseling: Body mass index is 37 34 kg/m²  The BMI is above normal  Nutrition recommendations include reducing portion sizes, decreasing overall calorie intake and 3-5 servings of fruits/vegetables daily  Exercise recommendations include moderate aerobic physical activity for 150 minutes/week  Future Appointments   Date Time Provider Lorenzo Reagan   8/10/2020 10:00 AM 23 Felisha Abhi [facility]   [facility]   9/14/2020 10:00 AM [facility]   10/12/2020 11:00 AM [facility]          SUBJECTIVE  CC: Follow-up (Check up 3 months Hypothyroidism)      HPI:  Tati Nunn is a 53 y o  female who presents for check up  Had CT chest done for her pulm nodule follow up  Also had lab work done and needs to review  States she is still having trouble quitting smoking and wants to try something else  Taking BP medication- no headache, CP or SOB  Depression/anxiety-stable on medication       HPI    Review of Systems   Constitutional: Negative for chills, fatigue and fever  HENT: Negative for congestion, postnasal drip, rhinorrhea and sinus pressure  Eyes: Negative for photophobia and visual disturbance  Respiratory: Negative for cough and shortness of breath  Cardiovascular: Negative for chest pain, palpitations and leg swelling     Gastrointestinal: Negative for abdominal pain, constipation, diarrhea, nausea and vomiting  Genitourinary: Negative for difficulty urinating and dysuria  Musculoskeletal: Negative for arthralgias and myalgias  Skin: Negative for color change and rash  Neurological: Negative for dizziness, weakness, light-headedness and headaches  Historical Information   The patient history was reviewed as follows:    Past Medical History:   Diagnosis Date    Bulging lumbar disc     Chronic pain     low back    Degenerative disk disease     lumbar spine    Hypertension     Pinched nerve     lumbar spine    Psychiatric disorder     depression     Past Surgical History:   Procedure Laterality Date    CARPAL TUNNEL RELEASE Bilateral     CHOLECYSTECTOMY      COLONOSCOPY  06/15/2015    COLONOSCOPY  06/02/2015    Internal hemorrhoids, hyperplastic polyps which were removed    Ten year recall recommended June 2025    WISDOM TOOTH EXTRACTION Bilateral      Family History   Problem Relation Age of Onset    Breast cancer Maternal Aunt 29    Breast cancer Sister 64    BRCA1 Negative Sister     BRCA2 Negative Sister     Prostate cancer Maternal Uncle     Cancer Maternal Uncle         BLADDER    Colon cancer Maternal Uncle     Colon polyps Neg Hx       Social History   Social History     Substance and Sexual Activity   Alcohol Use Yes    Frequency: 2-4 times a month    Drinks per session: 1 or 2    Binge frequency: Never     Social History     Substance and Sexual Activity   Drug Use No     Social History     Tobacco Use   Smoking Status Current Every Day Smoker    Packs/day: 1 00    Types: Cigarettes   Smokeless Tobacco Never Used       Medications:     Current Outpatient Medications:     atorvastatin (LIPITOR) 80 mg tablet, Take 1 tablet (80 mg total) by mouth daily, Disp: 90 tablet, Rfl: 1    lisinopril (ZESTRIL) 10 mg tablet, Take 1 tablet (10 mg total) by mouth daily, Disp: 90 tablet, Rfl: 1    LORazepam (ATIVAN) 0 5 mg tablet, Take 1 tablet (0 5 mg total) by mouth daily at bedtime, Disp: 30 tablet, Rfl: 0    pramipexole (MIRAPEX) 0 25 mg tablet, Take 1 tablet (0 25 mg total) by mouth daily, Disp: 90 tablet, Rfl: 1    sertraline (ZOLOFT) 100 mg tablet, Take 1 tablet (100 mg total) by mouth daily, Disp: 90 tablet, Rfl: 1    sertraline (ZOLOFT) 50 mg tablet, Take 1 tablet (50 mg total) by mouth daily, Disp: 90 tablet, Rfl: 1    buPROPion (WELLBUTRIN SR) 150 mg 12 hr tablet, Take 1 tablet (150 mg total) by mouth 2 (two) times a day, Disp: 60 tablet, Rfl: 1  Allergies   Allergen Reactions    Penicillins        OBJECTIVE    Vitals:   Vitals:    07/17/20 0939   BP: 120/80   BP Location: Right arm   Patient Position: Sitting   Cuff Size: Standard   Pulse: 80   Temp: 97 5 °F (36 4 °C)   TempSrc: Tympanic   SpO2: 97%   Weight: 89 6 kg (197 lb 9 6 oz)   Height: 5' 1" (1 549 m)           Physical Exam   Constitutional: She is oriented to person, place, and time  She appears well-developed and well-nourished  HENT:   Head: Normocephalic and atraumatic  Mouth/Throat: Oropharynx is clear and moist    Eyes: Pupils are equal, round, and reactive to light  Neck: Normal range of motion  Neck supple  Cardiovascular: Normal rate, regular rhythm and normal heart sounds  Pulmonary/Chest: Effort normal and breath sounds normal  No respiratory distress  She has no wheezes  Abdominal: Soft  Bowel sounds are normal  She exhibits no distension  There is no tenderness  Musculoskeletal: Normal range of motion  She exhibits no edema or tenderness  Neurological: She is alert and oriented to person, place, and time  Skin: Skin is warm and dry  Psychiatric: She has a normal mood and affect   Her behavior is normal           Labs:        DO Sherri    7/17/2020

## 2020-07-17 NOTE — ASSESSMENT & PLAN NOTE
BMI Counseling: Body mass index is 37 34 kg/m²  The BMI is above normal  Nutrition recommendations include reducing portion sizes, decreasing overall calorie intake and 3-5 servings of fruits/vegetables daily  Exercise recommendations include moderate aerobic physical activity for 150 minutes/week

## 2020-07-17 NOTE — ASSESSMENT & PLAN NOTE
Lab Results   Component Value Date    TSH 2 150 07/15/2020       TSH is now normal  Continue to monitor  She denies any symptoms

## 2020-07-17 NOTE — ASSESSMENT & PLAN NOTE
Ct chest 7/8:    IMPRESSION:     Nothing to indicate malignancy      2 discoid opacities in the left lower lobe are benign scars      Several bilateral upper lobe centrilobular nodules are compatible with respiratory bronchiolitis, a smoking-related lung disease      Minimal coronary artery calcification indicating atherosclerotic heart disease

## 2020-07-28 DIAGNOSIS — F32.0 CURRENT MILD EPISODE OF MAJOR DEPRESSIVE DISORDER WITHOUT PRIOR EPISODE (HCC): ICD-10-CM

## 2020-07-28 RX ORDER — SERTRALINE HYDROCHLORIDE 100 MG/1
100 TABLET, FILM COATED ORAL DAILY
Qty: 90 TABLET | Refills: 1 | Status: SHIPPED | OUTPATIENT
Start: 2020-07-28 | End: 2020-12-09 | Stop reason: SDUPTHER

## 2020-07-28 NOTE — TELEPHONE ENCOUNTER
[facility],[address], [address]

## 2020-08-03 DIAGNOSIS — F32.0 CURRENT MILD EPISODE OF MAJOR DEPRESSIVE DISORDER WITHOUT PRIOR EPISODE (HCC): ICD-10-CM

## 2020-08-03 DIAGNOSIS — F41.1 GAD (GENERALIZED ANXIETY DISORDER): ICD-10-CM

## 2020-08-04 RX ORDER — LORAZEPAM 0.5 MG/1
0.5 TABLET ORAL
Qty: 30 TABLET | Refills: 0 | Status: SHIPPED | OUTPATIENT
Start: 2020-08-04 | End: 2020-08-31 | Stop reason: SDUPTHER

## 2020-08-10 ENCOUNTER — TELEPHONE (OUTPATIENT)
Dept: BEHAVIORAL/MENTAL HEALTH CLINIC | Facility: CLINIC | Age: 55
End: 2020-08-10

## 2020-08-10 NOTE — TELEPHONE ENCOUNTER
This writer outreached to Morris Boyd for her [employer]; however, she did not answer  Left message requesting a call back

## 2020-08-19 DIAGNOSIS — E78.2 MIXED HYPERLIPIDEMIA: ICD-10-CM

## 2020-08-19 RX ORDER — ATORVASTATIN CALCIUM 80 MG/1
80 TABLET, FILM COATED ORAL DAILY
Qty: 90 TABLET | Refills: 1 | Status: SHIPPED | OUTPATIENT
Start: 2020-08-19 | End: 2020-12-09 | Stop reason: SDUPTHER

## 2020-08-20 ENCOUNTER — TELEPHONE (OUTPATIENT)
Dept: BEHAVIORAL/MENTAL HEALTH CLINIC | Facility: CLINIC | Age: 55
End: 2020-08-20

## 2020-08-20 NOTE — TELEPHONE ENCOUNTER
Returned a call from Marilou Mcconnell who apologized that she missed her last apt  Confirmed next apt

## 2020-08-31 DIAGNOSIS — G25.81 RESTLESS LEG SYNDROME: ICD-10-CM

## 2020-08-31 DIAGNOSIS — F41.1 GAD (GENERALIZED ANXIETY DISORDER): ICD-10-CM

## 2020-08-31 RX ORDER — PRAMIPEXOLE DIHYDROCHLORIDE 0.25 MG/1
0.25 TABLET ORAL DAILY
Qty: 90 TABLET | Refills: 1 | Status: SHIPPED | OUTPATIENT
Start: 2020-08-31 | End: 2021-02-03 | Stop reason: SDUPTHER

## 2020-08-31 RX ORDER — LORAZEPAM 0.5 MG/1
0.5 TABLET ORAL
Qty: 30 TABLET | Refills: 0 | Status: SHIPPED | OUTPATIENT
Start: 2020-09-04 | End: 2020-10-05 | Stop reason: SDUPTHER

## 2020-09-04 ENCOUNTER — OFFICE VISIT (OUTPATIENT)
Dept: GASTROENTEROLOGY | Facility: CLINIC | Age: 55
End: 2020-09-04
Payer: COMMERCIAL

## 2020-09-04 VITALS
TEMPERATURE: 96.8 F | HEIGHT: 61 IN | HEART RATE: 82 BPM | SYSTOLIC BLOOD PRESSURE: 122 MMHG | WEIGHT: 195 LBS | BODY MASS INDEX: 36.82 KG/M2 | DIASTOLIC BLOOD PRESSURE: 76 MMHG

## 2020-09-04 DIAGNOSIS — R19.7 DIARRHEA, UNSPECIFIED TYPE: ICD-10-CM

## 2020-09-04 DIAGNOSIS — R10.9 ABDOMINAL CRAMPING: Primary | ICD-10-CM

## 2020-09-04 DIAGNOSIS — Z12.11 COLON CANCER SCREENING: ICD-10-CM

## 2020-09-04 DIAGNOSIS — K76.0 FATTY LIVER: ICD-10-CM

## 2020-09-04 PROCEDURE — 99213 OFFICE O/P EST LOW 20 MIN: CPT | Performed by: NURSE PRACTITIONER

## 2020-09-04 RX ORDER — DICYCLOMINE HCL 20 MG
20 TABLET ORAL EVERY 6 HOURS PRN
Qty: 90 TABLET | Refills: 1 | Status: SHIPPED | OUTPATIENT
Start: 2020-09-04

## 2020-09-04 NOTE — PATIENT INSTRUCTIONS
Trial Bentyl every 6 hours as needed for pain /cramping   Can take Imodium as needed     Arrange for colonoscopy     Follow up in 6 weeks

## 2020-09-04 NOTE — PROGRESS NOTES
[id number] - Outpatient Follow-up Note  Deondre Hernandez 53 y o  female MRN: [id number]  Encounter: [id number]    ASSESSMENT AND PLAN:      1  Abdominal cramping  2  Diarrhea, unspecified type  Continues with diarrhea, 2-3 episodes of watery stools daily  She will have occasional formed stools as well  Fecal calprotectin, fecal fat, fecal leukocytes and giardia were normal in Feb 2020  Labs including celiac panel, TSH were normal  Differential diagnoses include IBS, IBD, microscopic colitis, bile salt diarrhea (hx of previous cholecystectomy)  -continue Benefiber daily   - high fiber diet  - will trial dicyclomine (BENTYL) 20 mg tablet; Take 1 tablet (20 mg total) by mouth every 6 (six) hours as needed (pain, cramping)  Dispense: 90 tablet; Refill: 1  - consider trialing Questran, pt declined and would like to wait until results of colonoscopy   - arrange for colonoscopy at [facility]     3  Fatty liver   Identified on CT abdomen and pelvis on 2/12/20  LFT's in January 2020 were normal  She has been continuing to follow a healthy diet  - encouraged pt to continue to follow low fat diet   - encouraged regular exercise   - encouraged healthy weight loss   - will repeat LFT's and US in 1 year     4  Colon cancer screening  Average risk  Last colonoscopy on 6/12/2015 showed internal hemorrhoids and hyperplastic polyps which were removed  - will arrange for colonoscopy as above       Followup Appointment:  6 weeks   ______________________________________________________________________    Chief Complaint   Patient presents with    Follow-up     discuss colon     HPI:  Deondre Hernandez is a 53y o  year old female was seen for a follow-up visit  She was last in office in April 2020  She has had chronic diarrhea for many years  She states that she still continues with persistent diarrhea, will have 2-3 episodes of watery diarrhea per day   She occasionally will have formed stools  She also reports occasional lower abdominal pain and cramping that is relieved with defecation  She has tried to cut back on fried/fatty foods which can trigger her symptoms  She denies any fever, chills, upper GI symptoms, nausea, vomiting, hematochezia, melena, nocturnal stooling  Her appetite is stable  She does report intentional 4-5 lb weight loss over the past few months  Historical Information   Past Medical History:   Diagnosis Date    Bulging lumbar disc     Chronic pain     low back    Degenerative disk disease     lumbar spine    Hypertension     Pinched nerve     lumbar spine    Psychiatric disorder     depression     Past Surgical History:   Procedure Laterality Date    CARPAL TUNNEL RELEASE Bilateral     CHOLECYSTECTOMY      COLONOSCOPY  06/15/2015    COLONOSCOPY  06/02/2015    Internal hemorrhoids, hyperplastic polyps which were removed    Ten year recall recommended June 2025    WISDOM TOOTH EXTRACTION Bilateral      Social History     Substance and Sexual Activity   Alcohol Use Yes    Frequency: 2-4 times a month    Drinks per session: 1 or 2    Binge frequency: Never     Social History     Substance and Sexual Activity   Drug Use No     Social History     Tobacco Use   Smoking Status Current Every Day Smoker    Packs/day: 1 00    Types: Cigarettes   Smokeless Tobacco Never Used     Family History   Problem Relation Age of Onset    Breast cancer Maternal Aunt 29    Breast cancer Sister 64    BRCA1 Negative Sister     BRCA2 Negative Sister     Prostate cancer Maternal Uncle     Cancer Maternal Uncle         BLADDER    Colon cancer Maternal Uncle     Colon polyps Neg Hx          Current Outpatient Medications:     atorvastatin (LIPITOR) 80 mg tablet    lisinopril (ZESTRIL) 10 mg tablet    LORazepam (ATIVAN) 0 5 mg tablet    pramipexole (MIRAPEX) 0 25 mg tablet    sertraline (ZOLOFT) 100 mg tablet    sertraline (ZOLOFT) 50 mg tablet    buPROPion (WELLBUTRIN SR) 150 mg 12 hr tablet    dicyclomine (BENTYL) 20 mg tablet  Allergies   Allergen Reactions    Penicillins      Reviewed medications and allergies and updated as indicated    PHYSICAL EXAM:    Blood pressure 122/76, pulse 82, temperature (!) 96 8 °F (36 °C), height 5' 1" (1 549 m), weight 88 5 kg (195 lb)  Body mass index is 36 84 kg/m²  General Appearance: NAD, cooperative, alert  Eyes: Anicteric, PERRLA, EOMI  ENT:  Normocephalic, atraumatic, normal mucosa  Neck:  Supple, symmetrical, trachea midline  Resp:  Clear to auscultation bilaterally; no rales, rhonchi or wheezing; respirations unlabored   CV:  S1 S2, Regular rate and rhythm; no murmur, rub, or gallop  GI:  Soft, +mild bilateral lower abdominal ttp, no guarding or rebound, normal bowel sounds; no masses, no organomegaly   Rectal: Deferred  Musculoskeletal: No cyanosis, clubbing or edema  Normal ROM  Skin:  No jaundice, rashes, or lesions   Heme/Lymph: No palpable cervical lymphadenopathy  Psych: Normal affect, good eye contact  Neuro: No gross deficits, AAOx3    Lab Results:   Lab Results   Component Value Date    WBC 8 4 07/15/2020    HGB 13 9 07/15/2020    HCT 40 5 07/15/2020    MCV 95 07/15/2020     07/15/2020     Lab Results   Component Value Date    K 4 4 07/15/2020     07/15/2020    CO2 27 07/15/2020    BUN 13 07/15/2020    CREATININE 0 74 07/15/2020    AST 18 07/15/2020    ALT 16 07/15/2020     No results found for: IRON, TIBC, FERRITIN  No results found for: LIPASE    Radiology Results:   No results found

## 2020-09-09 ENCOUNTER — CLINICAL SUPPORT (OUTPATIENT)
Dept: GASTROENTEROLOGY | Facility: CLINIC | Age: 55
End: 2020-09-09

## 2020-09-09 VITALS — HEIGHT: 61 IN | WEIGHT: 195 LBS | BODY MASS INDEX: 36.82 KG/M2

## 2020-09-09 DIAGNOSIS — R19.7 DIARRHEA, UNSPECIFIED TYPE: Primary | ICD-10-CM

## 2020-09-09 NOTE — PROGRESS NOTES
Why does your doctor want you to have this procedure? diarrhea    Do you have kidney disease?  no  If yes, are you on dialysis :     Have you had diverticulitis within the past 2 months? no    Are you diabetic?  no  If yes, insulin dependent: If yes, provide diabetic instructions sheet     Do take iron supplements?  no  If yes, instruct patient to hold iron supplement for 7 days prior    Are you on a blood thinner? no   Was the blood thinner sheet complete and faxed to cardiologist no  Plavix (clopidogrel), Coumadin (warfarin), Lovenox (enoxaparin), Xarelto (rivaroxaban), Pradaxa(dabigatran), Eliquis(apixaban) Savaysa/Lixiana (edoxapan)    Do you have an automatic implantable cardiac defibrillator (AICD)/pacemaker ([facility])? no  Was AICD/pacemaker sheet completed and faxed to cardiologist? no    Are you on home oxygen? no  If yes, continuous or nocturnal:     Have you been treated for MRSA, VRE or any communicable diseases? no    Heart attack, stroke, or stent within 3 months? no  Schedule at Hospital if within 3-6 months   Use nitroglycerin for chest pain in the last 6 months? no    History of organ  transplant?  no   If yes, notify Endo      History of neck/throat/tongue surgery or cancer? no  IF yes, notify Endo      Any problems with anesthesia in the past? no     Was stool C diff ordered?  no Stool specimen needs to be completed prior to procedure    Do have any facial or body piercings?no     Do you have a latex allergy? no     Do have an allergy to metals?  (Bravo study only) no     If pediatric patient, was consent faxed to pediatrician no     Patient rights reviewed yes     colyte instructions emailed and rx sent to pharmacy

## 2020-09-11 ENCOUNTER — OFFICE VISIT (OUTPATIENT)
Dept: FAMILY MEDICINE CLINIC | Facility: CLINIC | Age: 55
End: 2020-09-11
Payer: COMMERCIAL

## 2020-09-11 VITALS
RESPIRATION RATE: 19 BRPM | DIASTOLIC BLOOD PRESSURE: 82 MMHG | HEIGHT: 61 IN | BODY MASS INDEX: 37.04 KG/M2 | HEART RATE: 85 BPM | OXYGEN SATURATION: 95 % | WEIGHT: 196.2 LBS | SYSTOLIC BLOOD PRESSURE: 120 MMHG | TEMPERATURE: 98.4 F

## 2020-09-11 DIAGNOSIS — Z23 NEED FOR VACCINATION: ICD-10-CM

## 2020-09-11 DIAGNOSIS — I25.10 MILD CAD: ICD-10-CM

## 2020-09-11 DIAGNOSIS — I10 ESSENTIAL HYPERTENSION: ICD-10-CM

## 2020-09-11 DIAGNOSIS — F43.23 ADJUSTMENT DISORDER WITH MIXED ANXIETY AND DEPRESSED MOOD: ICD-10-CM

## 2020-09-11 DIAGNOSIS — E03.8 SUBCLINICAL HYPOTHYROIDISM: ICD-10-CM

## 2020-09-11 DIAGNOSIS — R93.89 ABNORMAL CT OF THE CHEST: ICD-10-CM

## 2020-09-11 DIAGNOSIS — F17.200 TOBACCO USE DISORDER: ICD-10-CM

## 2020-09-11 DIAGNOSIS — R91.1 PULMONARY NODULE, RIGHT: ICD-10-CM

## 2020-09-11 DIAGNOSIS — G25.81 RESTLESS LEG SYNDROME: ICD-10-CM

## 2020-09-11 DIAGNOSIS — R73.01 IFG (IMPAIRED FASTING GLUCOSE): ICD-10-CM

## 2020-09-11 DIAGNOSIS — E78.2 MIXED HYPERLIPIDEMIA: Primary | ICD-10-CM

## 2020-09-11 PROCEDURE — 99214 OFFICE O/P EST MOD 30 MIN: CPT | Performed by: FAMILY MEDICINE

## 2020-09-11 PROCEDURE — 90471 IMMUNIZATION ADMIN: CPT | Performed by: FAMILY MEDICINE

## 2020-09-11 PROCEDURE — 90682 RIV4 VACC RECOMBINANT DNA IM: CPT | Performed by: FAMILY MEDICINE

## 2020-09-11 NOTE — ASSESSMENT & PLAN NOTE
Lab Results   Component Value Date    TSH 2 150 07/15/2020       Last TSH was normal  Continue to monitor

## 2020-09-11 NOTE — ASSESSMENT & PLAN NOTE
Failed chantix and patches  We tried wellbutrin but she states she isnt ready to quit  She is down to 1/2 ppd  Wants to try gum

## 2020-09-11 NOTE — PROGRESS NOTES
Amish Hutchinson 1965 female MRN: [id number]    Family Medicine Follow-up Visit    ASSESSMENT/PLAN  Problem List Items Addressed This Visit        Endocrine    Subclinical hypothyroidism     Lab Results   Component Value Date    TSH 2 150 07/15/2020       Last TSH was normal  Continue to monitor          IFG (impaired fasting glucose)     Will check HA1C with next set of labs  Will continue to monitor diet and exercise          Relevant Orders    Lipid panel    Hemoglobin A1C    Comprehensive metabolic panel       Cardiovascular and Mediastinum    Essential hypertension     Well controlled on current medication          Relevant Orders    Lipid panel    Hemoglobin A1C    Comprehensive metabolic panel    Mild CAD     Apt with cardiology next month  Continue statin therapy          Relevant Orders    Lipid panel    Hemoglobin A1C    Comprehensive metabolic panel       Other    Mixed hyperlipidemia - Primary     Continue statin         Relevant Orders    Lipid panel    Hemoglobin A1C    Comprehensive metabolic panel    Restless leg syndrome     Stable on mirapex          Tobacco use disorder     Failed chantix and patches  We tried wellbutrin but she states she isnt ready to quit  She is down to 1/2 ppd  Wants to try gum         Relevant Medications    nicotine polacrilex (NICORETTE) 2 mg gum    Pulmonary nodule, right    Adjustment disorder with mixed anxiety and depressed mood     Continue medications as prescribed  Continue therapy with Joanna          Relevant Medications    nicotine polacrilex (NICORETTE) 2 mg gum    Abnormal CT of the chest     Patient has apt with pulmonary for follow up            Other Visit Diagnoses     Need for vaccination        Relevant Orders    influenza vaccine, quadrivalent, recombinant, PF, 0 5 mL, for patients 18 yr+ (FLUBLOK) (Completed)          Follow up in 3 months with labs prior            Future Appointments   Date Time Provider Lorenzo Reagan   9/14/2020 10:00 AM [facility]   9/16/2020  8:30 AM AC BX ROOM 01 ASC BUX Practice-Med   9/23/2020 10:00 AM MD DERRICK Aguiar Practice-Hos   10/12/2020 11:00 AM [facility]Be[facility]   10/16/2020  8:00 AM Bobo Lancaster MD CARD BE Practice-Hea   10/16/2020 10:30 AM JUANA Kohler [facility] [address] Practice-Med   11/9/2020 10:00 AM [facility]Be[facility]   12/9/2020  8:45 AM DO EDA Irene  Practice-[facility]          SUBJECTIVE  CC: Follow-up (medication check up-patient states she is feeling better but Wellbutrin did not help)      HPI:  Tati Nunn is a 53 y o  female who presents for follow up visit  States she is not yet ready to quit- she stopped the wellbutrin  She wants to try the gum  States lots of stress with daughter in [employer] school, issues with parents  She is working with Juan Nimrod  htn- stable with medications  hld- taking statin, no complaints    HPI    Review of Systems   Constitutional: Negative for chills, fatigue and fever  HENT: Negative for congestion, postnasal drip, rhinorrhea and sinus pressure  Eyes: Negative for photophobia and visual disturbance  Respiratory: Negative for cough and shortness of breath  Cardiovascular: Negative for chest pain, palpitations and leg swelling  Gastrointestinal: Negative for abdominal pain, constipation, diarrhea, nausea and vomiting  Genitourinary: Negative for difficulty urinating and dysuria  Musculoskeletal: Negative for arthralgias and myalgias  Skin: Negative for color change and rash  Neurological: Negative for dizziness, weakness, light-headedness and headaches         Historical Information   The patient history was reviewed as follows:    Past Medical History:   Diagnosis Date    Bulging lumbar disc     Chronic pain     low back    Degenerative disk disease     lumbar spine    Hypertension     Pinched nerve     lumbar spine    Psychiatric disorder depression     Past Surgical History:   Procedure Laterality Date    CARPAL TUNNEL RELEASE Bilateral     CHOLECYSTECTOMY      COLONOSCOPY  06/15/2015    COLONOSCOPY  06/02/2015    Internal hemorrhoids, hyperplastic polyps which were removed    Ten year recall recommended June 2025    WISDOM TOOTH EXTRACTION Bilateral      Family History   Problem Relation Age of Onset    Breast cancer Maternal Aunt 29    Breast cancer Sister 64    BRCA1 Negative Sister     BRCA2 Negative Sister     Prostate cancer Maternal Uncle     Cancer Maternal Uncle         BLADDER    Colon cancer Maternal Uncle     Colon polyps Neg Hx       Social History   Social History     Substance and Sexual Activity   Alcohol Use Yes    Frequency: 2-4 times a month    Drinks per session: 1 or 2    Binge frequency: Never     Social History     Substance and Sexual Activity   Drug Use No     Social History     Tobacco Use   Smoking Status Current Every Day Smoker    Packs/day: 0 50    Types: Cigarettes   Smokeless Tobacco Never Used       Medications:     Current Outpatient Medications:     atorvastatin (LIPITOR) 80 mg tablet, Take 1 tablet (80 mg total) by mouth daily, Disp: 90 tablet, Rfl: 1    dicyclomine (BENTYL) 20 mg tablet, Take 1 tablet (20 mg total) by mouth every 6 (six) hours as needed (pain, cramping), Disp: 90 tablet, Rfl: 1    lisinopril (ZESTRIL) 10 mg tablet, Take 1 tablet (10 mg total) by mouth daily, Disp: 90 tablet, Rfl: 1    LORazepam (ATIVAN) 0 5 mg tablet, Take 1 tablet (0 5 mg total) by mouth daily at bedtime, Disp: 30 tablet, Rfl: 0    pramipexole (MIRAPEX) 0 25 mg tablet, Take 1 tablet (0 25 mg total) by mouth daily, Disp: 90 tablet, Rfl: 1    sertraline (ZOLOFT) 100 mg tablet, Take 1 tablet (100 mg total) by mouth daily, Disp: 90 tablet, Rfl: 1    sertraline (ZOLOFT) 50 mg tablet, Take 1 tablet (50 mg total) by mouth daily, Disp: 90 tablet, Rfl: 1    buPROPion (WELLBUTRIN SR) 150 mg 12 hr tablet, Take 1 tablet (150 mg total) by mouth 2 (two) times a day (Patient not taking: Reported on 9/4/2020), Disp: 60 tablet, Rfl: 1    nicotine polacrilex (NICORETTE) 2 mg gum, Chew 1 each (2 mg total) as needed for smoking cessation, Disp: 100 each, Rfl: 0  Allergies   Allergen Reactions    Penicillins        OBJECTIVE    Vitals:   Vitals:    09/11/20 0920   BP: 120/82   BP Location: Right arm   Patient Position: Sitting   Cuff Size: Large   Pulse: 85   Resp: 19   Temp: 98 4 °F (36 9 °C)   TempSrc: Tympanic   SpO2: 95%   Weight: 89 kg (196 lb 3 2 oz)   Height: 5' 1" (1 549 m)           Physical Exam  Constitutional:       Appearance: She is well-developed  HENT:      Head: Normocephalic and atraumatic  Eyes:      Pupils: Pupils are equal, round, and reactive to light  Neck:      Musculoskeletal: Normal range of motion and neck supple  Cardiovascular:      Rate and Rhythm: Normal rate and regular rhythm  Heart sounds: Normal heart sounds  Pulmonary:      Effort: Pulmonary effort is normal  No respiratory distress  Breath sounds: Normal breath sounds  No wheezing  Abdominal:      General: Bowel sounds are normal  There is no distension  Palpations: Abdomen is soft  Tenderness: There is no abdominal tenderness  Musculoskeletal: Normal range of motion  General: No tenderness  Skin:     General: Skin is warm and dry  Neurological:      Mental Status: She is alert and oriented to person, place, and time     Psychiatric:         Behavior: Behavior normal             Labs:        Jhon Tom, DO    9/11/2020

## 2020-09-14 ENCOUNTER — TELEMEDICINE (OUTPATIENT)
Dept: BEHAVIORAL/MENTAL HEALTH CLINIC | Facility: CLINIC | Age: 55
End: 2020-09-14
Payer: COMMERCIAL

## 2020-09-14 DIAGNOSIS — F43.23 ADJUSTMENT DISORDER WITH MIXED ANXIETY AND DEPRESSED MOOD: Primary | ICD-10-CM

## 2020-09-14 PROCEDURE — 90834 PSYTX W PT 45 MINUTES: CPT | Performed by: SOCIAL WORKER

## 2020-09-14 NOTE — PSYCH
Virtual Regular Visit    Assessment/Plan:    Problem List Items Addressed This Visit        Other    Adjustment disorder with mixed anxiety and depressed mood - Primary        Reason for visit is   Chief Complaint   Patient presents with    Virtual Regular Visit      Encounter provider Sherice Cox    Provider located at [address] [address]  [phone]    Recent Visits  Date Type Provider Dept   09/11/20 Office Visit Froy Martínez DO [facility]   Showing recent visits within past 7 days and meeting all other requirements     Today's Visits  Date Type Provider Dept   09/14/20 Telemedicine Sherice Cox [facility]   Showing today's visits and meeting all other requirements     Future Appointments  No visits were found meeting these conditions  Showing future appointments within next 150 days and meeting all other requirements        The patient was identified by name and date of birth  Narcisa Lakisha was informed that this is a telemedicine visit and that the visit is being conducted through telephone  My office door was closed  No one else was in the room  She acknowledged consent and understanding of privacy and security of the video platform  The patient has agreed to participate and understands they can discontinue the visit at any time  It was my intent to perform this visit via video technology but the patient was not able to do a video connection so the visit was completed via audio telephone only  Patient is aware this is a billable service  Subjective  Narcisa Banuelos is a 53 y o  female        HPI     Past Medical History:   Diagnosis Date    Bulging lumbar disc     Chronic pain     low back    Degenerative disk disease     lumbar spine    Hypertension     Pinched nerve     lumbar spine    Psychiatric disorder     depression Past Surgical History:   Procedure Laterality Date    CARPAL TUNNEL RELEASE Bilateral     CHOLECYSTECTOMY      COLONOSCOPY  06/15/2015    COLONOSCOPY  06/02/2015    Internal hemorrhoids, hyperplastic polyps which were removed  Ten year recall recommended June 2025    WISDOM TOOTH EXTRACTION Bilateral        Current Outpatient Medications   Medication Sig Dispense Refill    atorvastatin (LIPITOR) 80 mg tablet Take 1 tablet (80 mg total) by mouth daily 90 tablet 1    buPROPion (WELLBUTRIN SR) 150 mg 12 hr tablet Take 1 tablet (150 mg total) by mouth 2 (two) times a day (Patient not taking: Reported on 9/4/2020) 60 tablet 1    dicyclomine (BENTYL) 20 mg tablet Take 1 tablet (20 mg total) by mouth every 6 (six) hours as needed (pain, cramping) 90 tablet 1    lisinopril (ZESTRIL) 10 mg tablet Take 1 tablet (10 mg total) by mouth daily 90 tablet 1    LORazepam (ATIVAN) 0 5 mg tablet Take 1 tablet (0 5 mg total) by mouth daily at bedtime 30 tablet 0    nicotine polacrilex (NICORETTE) 2 mg gum Chew 1 each (2 mg total) as needed for smoking cessation 100 each 0    pramipexole (MIRAPEX) 0 25 mg tablet Take 1 tablet (0 25 mg total) by mouth daily 90 tablet 1    sertraline (ZOLOFT) 100 mg tablet Take 1 tablet (100 mg total) by mouth daily 90 tablet 1    sertraline (ZOLOFT) 50 mg tablet Take 1 tablet (50 mg total) by mouth daily 90 tablet 1     No current facility-administered medications for this visit  Allergies   Allergen Reactions    Penicillins        Review of Systems    Video Exam    There were no vitals filed for this visit  Physical Exam     (D) Sushma Manrique attended her follow up psychotherapy session today  Sushma Burton reports that since her last session, she has struggled with fluctuating symptoms of anxiety and depression  Sushma Burton reported that things are going well in her new relationship, describing herself as feeling respected, loved, valued, taken care of, and safe   Sushma Manrique spent time discussing psychosocial stressors related to her parents making her nieces the medical and financial POA's, rather than Marilou Mcconnell or her siblings  Marilou Mcconnell describes feeling frustration in relation to this, describing herself as feeling hurt, sad, and anxious in relation to this  Marilou Mcconnell communicated that she is working up the courage to have a conversation with her mother surrounding her feelings in relation to this  Marilou Mcconnell spent time discussing interpersonal relationship stressors with her and various family members  Amrilou Jayesh spent time discussing her family history and the dynamic of dysfunction over the years  Marilou Jayesh discussed stressors related to her step-father having korin body dementia and stressors and grief in relation to this  Marilou Jayesh and this writer processed this at length  Provided ongoing psychoeducation  Discussed ongoing skills  Reviewed limits and boundaries  Modeled effective forms of communication  (A) Marilou Jayesh denies any evident or immediate risk factors for self-harm, SI, or HI  Marilou Jayesh presented as forward thinking during session, discussed upcoming plans, and identified reasons to live  Marilou Jayesh denies any symptoms of impulsivity, hyperactive/ elevated moods, ernesto, or grandiose thinking  Marilou Jayesh does not appear to be endorsing criteria for ernesto at this time  Marilou Yanesrodney presented as alert and oriented x3  Zenaida's speech presented at a normal rate volume, and rhythm  Zenaida's eye contact was unable to be assessed as a result of this being a phone session  Marilou Jayesh describes symptoms of sadness, depression, worrying, anxiety, and poor frustration tolerance at times  Zenaida's mood presented as depressed and anxious and her affect was unable to be assessed as a result of this being a phone session  Marilou Mcconnell describes symptoms of grief in relation to symbolic loss  Marilou Mcconnell reports some symptoms of crying episodes at times  Camelia Charter) Marilou Mcconnell plans to continue to work on acknowledging and validating her feelings   Marilou Mcconnell plans to increase the use of positive self-talk  [place] plans to prioritize her mental health needs, actively take time for herself, advocate for herself, and assert herself  [place] plans to increase the use of self-care  [place] plans to practice radical acceptance, focusing on what she has control over, verses what she doesn't have control over  [place] plans to lean into her jen, and her natural supports  [place] plans to continue to explore psychosomatic symptoms, and increase self-awareness in relation to this  [place] plans to explore unmet needs, ask for what she needs, express her feelings, and utilize effective forms of communication  [place] plans to increase balance within her schedule, and identify specific tasks to complete to increase consistency and routine  [place] plans to target fluctuating symptoms with coping skills, distress tolerance skills, grounding techniques, and distraction techniques  [place] plans to continue to identify ways to implement ongoing limits and boundaries  [place] plans to increase deep breathing techniques, while practicing mindfulness and meditation techniques  [place] plans to outreach for additional support as needed  I spent 45 minutes directly with the patient during this visit  VIRTUAL VISIT 89 Ermiasrickie Olga acknowledges that she has consented to an online visit or consultation  She understands that the online visit is based solely on information provided by her, and that, in the absence of a face-to-face physical evaluation by the physician, the diagnosis she receives is both limited and provisional in terms of accuracy and completeness  This is not intended to replace a full medical face-to-face evaluation by the physician  Shari Cheng understands and accepts these terms

## 2020-09-16 ENCOUNTER — ANESTHESIA EVENT (OUTPATIENT)
Dept: GASTROENTEROLOGY | Facility: AMBULATORY SURGERY CENTER | Age: 55
End: 2020-09-16

## 2020-09-16 ENCOUNTER — ANESTHESIA (OUTPATIENT)
Dept: GASTROENTEROLOGY | Facility: AMBULATORY SURGERY CENTER | Age: 55
End: 2020-09-16

## 2020-09-16 ENCOUNTER — HOSPITAL ENCOUNTER (OUTPATIENT)
Dept: GASTROENTEROLOGY | Facility: AMBULATORY SURGERY CENTER | Age: 55
Discharge: HOME | End: 2020-09-16
Payer: COMMERCIAL

## 2020-09-16 VITALS
TEMPERATURE: 97.1 F | WEIGHT: 195 LBS | SYSTOLIC BLOOD PRESSURE: 110 MMHG | HEIGHT: 61 IN | HEART RATE: 64 BPM | DIASTOLIC BLOOD PRESSURE: 53 MMHG | OXYGEN SATURATION: 96 % | RESPIRATION RATE: 18 BRPM | BODY MASS INDEX: 36.82 KG/M2

## 2020-09-16 VITALS — HEART RATE: 65 BPM

## 2020-09-16 DIAGNOSIS — R10.9 ABDOMINAL CRAMPING: ICD-10-CM

## 2020-09-16 DIAGNOSIS — R19.7 DIARRHEA, UNSPECIFIED TYPE: ICD-10-CM

## 2020-09-16 PROCEDURE — 45380 COLONOSCOPY AND BIOPSY: CPT | Performed by: INTERNAL MEDICINE

## 2020-09-16 RX ORDER — SODIUM CHLORIDE 9 MG/ML
75 INJECTION, SOLUTION INTRAVENOUS CONTINUOUS
Status: DISCONTINUED | OUTPATIENT
Start: 2020-09-16 | End: 2020-09-20 | Stop reason: HOSPADM

## 2020-09-16 RX ORDER — PROPOFOL 10 MG/ML
INJECTION, EMULSION INTRAVENOUS AS NEEDED
Status: DISCONTINUED | OUTPATIENT
Start: 2020-09-16 | End: 2020-09-16

## 2020-09-16 RX ADMIN — PROPOFOL 20 MG: 10 INJECTION, EMULSION INTRAVENOUS at 08:43

## 2020-09-16 RX ADMIN — SODIUM CHLORIDE: 9 INJECTION, SOLUTION INTRAVENOUS at 08:11

## 2020-09-16 RX ADMIN — PROPOFOL 30 MG: 10 INJECTION, EMULSION INTRAVENOUS at 08:39

## 2020-09-16 RX ADMIN — PROPOFOL 150 MG: 10 INJECTION, EMULSION INTRAVENOUS at 08:33

## 2020-09-16 RX ADMIN — PROPOFOL 50 MG: 10 INJECTION, EMULSION INTRAVENOUS at 08:36

## 2020-09-16 RX ADMIN — PROPOFOL 50 MG: 10 INJECTION, EMULSION INTRAVENOUS at 08:41

## 2020-09-16 RX ADMIN — SODIUM CHLORIDE 75 ML/HR: 9 INJECTION, SOLUTION INTRAVENOUS at 08:28

## 2020-09-16 NOTE — ANESTHESIA PREPROCEDURE EVALUATION
Procedure:  COLONOSCOPY    Relevant Problems   CARDIO   (+) Essential hypertension   (+) Mild CAD   (+) Mixed hyperlipidemia      ENDO   (+) Subclinical hypothyroidism      MUSCULOSKELETAL   (+) Spondylosis of lumbosacral region without myelopathy or radiculopathy        Physical Exam    Airway    Mallampati score: II  TM Distance: >3 FB  Neck ROM: full     Dental   upper dentures,     Cardiovascular  Cardiovascular exam normal    Pulmonary  Pulmonary exam normal     Other Findings        Anesthesia Plan  ASA Score- 2     Anesthesia Type- IV sedation with anesthesia with ASA Monitors  Additional Monitors:   Airway Plan:           Plan Factors-    Chart reviewed  Patient summary reviewed  Induction- intravenous  Postoperative Plan-     Informed Consent- Anesthetic plan and risks discussed with patient

## 2020-09-16 NOTE — DISCHARGE INSTRUCTIONS
Gastric Polyps   WHAT YOU NEED TO KNOW:   What are gastric polyps? Gastric polyps are growths that form in the lining of your stomach  They are not cancerous, but certain types of polyps can change into cancer  What puts me at risk for gastric polyps? · Chronic gastritis caused by NSAIDs use or ulcers    · Long-term use of proton pump inhibitor medicines (used to decrease stomach acid)    · An infection in your stomach caused by H  pylori bacteria  What are the symptoms of gastric polyps? You may have no symptoms  Large polyps may cause any of the following:  · Abdominal pain    · Indigestion    · Vomiting after meals or vomiting blood    · Dark or bloody bowel movements  How are gastric polyps diagnosed? Gastric polyps are usually found during an endoscopy for another reason  All or part of the polyp will be removed during the test  Your healthcare provider may also remove tissue from your stomach  The polyps and tissue are sent to the lab for testing  How are gastric polyps treated? Some types of polyps go away on their own  Other types may be removed if they are large, you have symptoms, or abnormal cells are found  Large polyps and abnormal cells increase your risk for cancer  You may also need antibiotics if you have an infection caused by H  pylori bacteria  Part of your stomach may be removed if the polyps cannot be removed and abnormal cells are found  When should I seek immediate care? · You have blood in your vomit  · You have dark or bloody bowel movements  · You have severe pain in your abdomen that does not go away after you take medicine  When should I contact my healthcare provider? · You have indigestion that does not go away with treatment  · You vomit after meals  · You have questions or concerns about your condition or care  CARE AGREEMENT:   You have the right to help plan your care  Learn about your health condition and how it may be treated   Discuss treatment options with your caregivers to decide what care you want to receive  You always have the right to refuse treatment  The above information is an  only  It is not intended as medical advice for individual conditions or treatments  Talk to your doctor, nurse or pharmacist before following any medical regimen to see if it is safe and effective for you  © 2017 [employer] Information is for End User's use only and may not be sold, redistributed or otherwise used for commercial purposes  All illustrations and images included in CareNotes® are the copyrighted property of A D [employer] M [employer]  or [employer] M[employer]

## 2020-09-16 NOTE — ANESTHESIA PREPROCEDURE EVALUATION
Procedure:  COLONOSCOPY    Relevant Problems   CARDIO   (+) Essential hypertension   (+) Mild CAD   (+) Mixed hyperlipidemia      ENDO   (+) Subclinical hypothyroidism      MUSCULOSKELETAL   (+) Spondylosis of lumbosacral region without myelopathy or radiculopathy        Physical Exam    Airway    Mallampati score: II  TM Distance: >3 FB  Neck ROM: full     Dental   No notable dental hx     Cardiovascular  Cardiovascular exam normal    Pulmonary  Pulmonary exam normal     Other Findings        Anesthesia Plan  ASA Score- 2     Anesthesia Type- IV sedation with anesthesia with ASA Monitors  Additional Monitors:   Airway Plan:           Plan Factors-    Chart reviewed  Patient summary reviewed  Induction- intravenous  Postoperative Plan-     Informed Consent- Anesthetic plan and risks discussed with patient

## 2020-09-16 NOTE — PROGRESS NOTES
Dr Kervin Villafuerte spoke with pt and reviewed results  No questions at this time  Pt given written and verbal instructions

## 2020-10-05 DIAGNOSIS — F41.1 GAD (GENERALIZED ANXIETY DISORDER): ICD-10-CM

## 2020-10-05 DIAGNOSIS — I10 ESSENTIAL HYPERTENSION: ICD-10-CM

## 2020-10-05 RX ORDER — LORAZEPAM 0.5 MG/1
0.5 TABLET ORAL
Qty: 30 TABLET | Refills: 0 | Status: SHIPPED | OUTPATIENT
Start: 2020-10-05 | End: 2020-11-02 | Stop reason: SDUPTHER

## 2020-10-05 RX ORDER — LISINOPRIL 10 MG/1
10 TABLET ORAL DAILY
Qty: 90 TABLET | Refills: 1 | Status: SHIPPED | OUTPATIENT
Start: 2020-10-05 | End: 2021-04-21 | Stop reason: SDUPTHER

## 2020-10-07 ENCOUNTER — CONSULT (OUTPATIENT)
Dept: PULMONOLOGY | Facility: CLINIC | Age: 55
End: 2020-10-07
Payer: COMMERCIAL

## 2020-10-07 VITALS
HEIGHT: 61 IN | OXYGEN SATURATION: 94 % | TEMPERATURE: 96.5 F | HEART RATE: 72 BPM | BODY MASS INDEX: 37.38 KG/M2 | DIASTOLIC BLOOD PRESSURE: 62 MMHG | SYSTOLIC BLOOD PRESSURE: 120 MMHG | RESPIRATION RATE: 18 BRPM | WEIGHT: 198 LBS

## 2020-10-07 DIAGNOSIS — R91.8 PULMONARY NODULES: Primary | ICD-10-CM

## 2020-10-07 DIAGNOSIS — F17.210 CIGARETTE NICOTINE DEPENDENCE WITHOUT COMPLICATION: ICD-10-CM

## 2020-10-07 DIAGNOSIS — F17.200 TOBACCO USE DISORDER: ICD-10-CM

## 2020-10-07 PROBLEM — R93.89 ABNORMAL CT OF THE CHEST: Status: RESOLVED | Noted: 2020-07-17 | Resolved: 2020-10-07

## 2020-10-07 PROCEDURE — 3074F SYST BP LT 130 MM HG: CPT | Performed by: INTERNAL MEDICINE

## 2020-10-07 PROCEDURE — 94010 BREATHING CAPACITY TEST: CPT | Performed by: INTERNAL MEDICINE

## 2020-10-07 PROCEDURE — 3078F DIAST BP <80 MM HG: CPT | Performed by: INTERNAL MEDICINE

## 2020-10-07 PROCEDURE — 99244 OFF/OP CNSLTJ NEW/EST MOD 40: CPT | Performed by: INTERNAL MEDICINE

## 2020-10-07 PROCEDURE — 99406 BEHAV CHNG SMOKING 3-10 MIN: CPT | Performed by: INTERNAL MEDICINE

## 2020-10-12 ENCOUNTER — TELEMEDICINE (OUTPATIENT)
Dept: BEHAVIORAL/MENTAL HEALTH CLINIC | Facility: CLINIC | Age: 55
End: 2020-10-12
Payer: COMMERCIAL

## 2020-10-12 ENCOUNTER — TELEPHONE (OUTPATIENT)
Dept: BEHAVIORAL/MENTAL HEALTH CLINIC | Facility: CLINIC | Age: 55
End: 2020-10-12

## 2020-10-12 DIAGNOSIS — F43.23 ADJUSTMENT DISORDER WITH MIXED ANXIETY AND DEPRESSED MOOD: Primary | ICD-10-CM

## 2020-10-12 PROCEDURE — 90834 PSYTX W PT 45 MINUTES: CPT | Performed by: SOCIAL WORKER

## 2020-10-16 ENCOUNTER — OFFICE VISIT (OUTPATIENT)
Dept: CARDIOLOGY CLINIC | Facility: CLINIC | Age: 55
End: 2020-10-16
Payer: COMMERCIAL

## 2020-10-16 VITALS
BODY MASS INDEX: 37.19 KG/M2 | HEIGHT: 61 IN | HEART RATE: 82 BPM | WEIGHT: 197 LBS | TEMPERATURE: 97.7 F | SYSTOLIC BLOOD PRESSURE: 118 MMHG | DIASTOLIC BLOOD PRESSURE: 82 MMHG

## 2020-10-16 DIAGNOSIS — I25.10 MILD CAD: ICD-10-CM

## 2020-10-16 DIAGNOSIS — I10 ESSENTIAL HYPERTENSION: ICD-10-CM

## 2020-10-16 DIAGNOSIS — R93.89 ABNORMAL CT OF THE CHEST: ICD-10-CM

## 2020-10-16 DIAGNOSIS — E78.2 MIXED HYPERLIPIDEMIA: ICD-10-CM

## 2020-10-16 PROCEDURE — 93000 ELECTROCARDIOGRAM COMPLETE: CPT | Performed by: INTERNAL MEDICINE

## 2020-10-16 PROCEDURE — 99204 OFFICE O/P NEW MOD 45 MIN: CPT | Performed by: INTERNAL MEDICINE

## 2020-10-16 PROCEDURE — 4004F PT TOBACCO SCREEN RCVD TLK: CPT | Performed by: INTERNAL MEDICINE

## 2020-10-22 ENCOUNTER — PATIENT OUTREACH (OUTPATIENT)
Dept: OTHER | Facility: CLINIC | Age: 55
End: 2020-10-22

## 2020-10-28 ENCOUNTER — OFFICE VISIT (OUTPATIENT)
Dept: GASTROENTEROLOGY | Facility: CLINIC | Age: 55
End: 2020-10-28
Payer: COMMERCIAL

## 2020-10-28 ENCOUNTER — HOSPITAL ENCOUNTER (OUTPATIENT)
Dept: NON INVASIVE DIAGNOSTICS | Age: 55
Discharge: HOME | End: 2020-10-28
Payer: COMMERCIAL

## 2020-10-28 VITALS
HEIGHT: 61 IN | DIASTOLIC BLOOD PRESSURE: 78 MMHG | SYSTOLIC BLOOD PRESSURE: 144 MMHG | WEIGHT: 196 LBS | BODY MASS INDEX: 37 KG/M2 | TEMPERATURE: 97.5 F | HEART RATE: 97 BPM

## 2020-10-28 DIAGNOSIS — I25.10 MILD CAD: ICD-10-CM

## 2020-10-28 DIAGNOSIS — K58.0 IRRITABLE BOWEL SYNDROME WITH DIARRHEA: Primary | ICD-10-CM

## 2020-10-28 DIAGNOSIS — R93.89 ABNORMAL CT OF THE CHEST: ICD-10-CM

## 2020-10-28 DIAGNOSIS — K76.0 FATTY LIVER: ICD-10-CM

## 2020-10-28 DIAGNOSIS — Z86.010 HISTORY OF COLON POLYPS: ICD-10-CM

## 2020-10-28 PROCEDURE — 3078F DIAST BP <80 MM HG: CPT | Performed by: NURSE PRACTITIONER

## 2020-10-28 PROCEDURE — 93016 CV STRESS TEST SUPVJ ONLY: CPT | Performed by: INTERNAL MEDICINE

## 2020-10-28 PROCEDURE — 93017 CV STRESS TEST TRACING ONLY: CPT

## 2020-10-28 PROCEDURE — 99213 OFFICE O/P EST LOW 20 MIN: CPT | Performed by: NURSE PRACTITIONER

## 2020-10-28 PROCEDURE — 3008F BODY MASS INDEX DOCD: CPT | Performed by: NURSE PRACTITIONER

## 2020-10-28 PROCEDURE — 93018 CV STRESS TEST I&R ONLY: CPT | Performed by: INTERNAL MEDICINE

## 2020-10-28 PROCEDURE — 3077F SYST BP >= 140 MM HG: CPT | Performed by: NURSE PRACTITIONER

## 2020-10-29 LAB
CHEST PAIN STATEMENT: NORMAL
MAX DIASTOLIC BP: 54 MMHG
MAX HEART RATE: 153 BPM
MAX PREDICTED HEART RATE: 165 BPM
MAX. SYSTOLIC BP: 174 MMHG
PROTOCOL NAME: NORMAL
REASON FOR TERMINATION: NORMAL
TARGET HR FORMULA: NORMAL
TEST INDICATION: NORMAL
TIME IN EXERCISE PHASE: NORMAL

## 2020-11-02 DIAGNOSIS — F41.1 GAD (GENERALIZED ANXIETY DISORDER): ICD-10-CM

## 2020-11-02 RX ORDER — LORAZEPAM 0.5 MG/1
0.5 TABLET ORAL
Qty: 30 TABLET | Refills: 0 | Status: SHIPPED | OUTPATIENT
Start: 2020-11-05 | End: 2020-11-30 | Stop reason: SDUPTHER

## 2020-11-09 ENCOUNTER — TELEMEDICINE (OUTPATIENT)
Dept: BEHAVIORAL/MENTAL HEALTH CLINIC | Facility: CLINIC | Age: 55
End: 2020-11-09
Payer: COMMERCIAL

## 2020-11-09 DIAGNOSIS — F43.23 ADJUSTMENT DISORDER WITH MIXED ANXIETY AND DEPRESSED MOOD: Primary | ICD-10-CM

## 2020-11-09 PROCEDURE — 90834 PSYTX W PT 45 MINUTES: CPT | Performed by: SOCIAL WORKER

## 2020-11-24 ENCOUNTER — TELEPHONE (OUTPATIENT)
Dept: BEHAVIORAL/MENTAL HEALTH CLINIC | Facility: CLINIC | Age: 55
End: 2020-11-24

## 2020-11-24 ENCOUNTER — TELEMEDICINE (OUTPATIENT)
Dept: BEHAVIORAL/MENTAL HEALTH CLINIC | Facility: CLINIC | Age: 55
End: 2020-11-24
Payer: COMMERCIAL

## 2020-11-24 DIAGNOSIS — F43.23 ADJUSTMENT DISORDER WITH MIXED ANXIETY AND DEPRESSED MOOD: Primary | ICD-10-CM

## 2020-11-24 PROCEDURE — 90832 PSYTX W PT 30 MINUTES: CPT | Performed by: SOCIAL WORKER

## 2020-11-30 DIAGNOSIS — F41.1 GAD (GENERALIZED ANXIETY DISORDER): ICD-10-CM

## 2020-11-30 RX ORDER — LORAZEPAM 0.5 MG/1
0.5 TABLET ORAL
Qty: 30 TABLET | Refills: 0 | Status: SHIPPED | OUTPATIENT
Start: 2020-11-30 | End: 2020-12-28 | Stop reason: SDUPTHER

## 2020-12-07 ENCOUNTER — TELEMEDICINE (OUTPATIENT)
Dept: BEHAVIORAL/MENTAL HEALTH CLINIC | Facility: CLINIC | Age: 55
End: 2020-12-07
Payer: COMMERCIAL

## 2020-12-07 DIAGNOSIS — F43.23 ADJUSTMENT DISORDER WITH MIXED ANXIETY AND DEPRESSED MOOD: Primary | ICD-10-CM

## 2020-12-07 PROCEDURE — 90834 PSYTX W PT 45 MINUTES: CPT | Performed by: SOCIAL WORKER

## 2020-12-09 ENCOUNTER — OFFICE VISIT (OUTPATIENT)
Dept: FAMILY MEDICINE CLINIC | Facility: CLINIC | Age: 55
End: 2020-12-09
Payer: COMMERCIAL

## 2020-12-09 VITALS
WEIGHT: 195.4 LBS | HEIGHT: 61 IN | BODY MASS INDEX: 36.89 KG/M2 | DIASTOLIC BLOOD PRESSURE: 70 MMHG | SYSTOLIC BLOOD PRESSURE: 118 MMHG | TEMPERATURE: 96.4 F | HEART RATE: 78 BPM | OXYGEN SATURATION: 96 %

## 2020-12-09 DIAGNOSIS — F43.23 ADJUSTMENT DISORDER WITH MIXED ANXIETY AND DEPRESSED MOOD: ICD-10-CM

## 2020-12-09 DIAGNOSIS — I10 ESSENTIAL HYPERTENSION: Primary | ICD-10-CM

## 2020-12-09 DIAGNOSIS — K58.0 IRRITABLE BOWEL SYNDROME WITH DIARRHEA: ICD-10-CM

## 2020-12-09 DIAGNOSIS — F32.0 CURRENT MILD EPISODE OF MAJOR DEPRESSIVE DISORDER WITHOUT PRIOR EPISODE (HCC): ICD-10-CM

## 2020-12-09 DIAGNOSIS — R91.8 PULMONARY NODULES: ICD-10-CM

## 2020-12-09 DIAGNOSIS — I25.10 MILD CAD: ICD-10-CM

## 2020-12-09 DIAGNOSIS — E78.2 MIXED HYPERLIPIDEMIA: ICD-10-CM

## 2020-12-09 DIAGNOSIS — R73.01 IFG (IMPAIRED FASTING GLUCOSE): ICD-10-CM

## 2020-12-09 PROCEDURE — 99214 OFFICE O/P EST MOD 30 MIN: CPT | Performed by: FAMILY MEDICINE

## 2020-12-09 PROCEDURE — 3078F DIAST BP <80 MM HG: CPT | Performed by: FAMILY MEDICINE

## 2020-12-09 PROCEDURE — 4004F PT TOBACCO SCREEN RCVD TLK: CPT | Performed by: FAMILY MEDICINE

## 2020-12-09 PROCEDURE — 3074F SYST BP LT 130 MM HG: CPT | Performed by: FAMILY MEDICINE

## 2020-12-09 PROCEDURE — 3008F BODY MASS INDEX DOCD: CPT | Performed by: FAMILY MEDICINE

## 2020-12-09 RX ORDER — SERTRALINE HYDROCHLORIDE 100 MG/1
100 TABLET, FILM COATED ORAL DAILY
Qty: 90 TABLET | Refills: 1 | Status: SHIPPED | OUTPATIENT
Start: 2020-12-09 | End: 2021-02-03 | Stop reason: SDUPTHER

## 2020-12-09 RX ORDER — ATORVASTATIN CALCIUM 80 MG/1
80 TABLET, FILM COATED ORAL DAILY
Qty: 90 TABLET | Refills: 1 | Status: SHIPPED | OUTPATIENT
Start: 2020-12-09 | End: 2021-08-23 | Stop reason: SDUPTHER

## 2020-12-28 DIAGNOSIS — F41.1 GAD (GENERALIZED ANXIETY DISORDER): ICD-10-CM

## 2020-12-28 RX ORDER — LORAZEPAM 0.5 MG/1
0.5 TABLET ORAL
Qty: 30 TABLET | Refills: 0 | Status: SHIPPED | OUTPATIENT
Start: 2020-12-28 | End: 2021-01-25 | Stop reason: SDUPTHER

## 2021-01-04 ENCOUNTER — TELEPHONE (OUTPATIENT)
Dept: BEHAVIORAL/MENTAL HEALTH CLINIC | Facility: CLINIC | Age: 56
End: 2021-01-04

## 2021-01-04 NOTE — TELEPHONE ENCOUNTER
This writer outreached to Michelle Sánchez for her telemedicine session; however, she did not answer  Left a message requesting a call back regarding this

## 2021-01-07 ENCOUNTER — TELEPHONE (OUTPATIENT)
Dept: FAMILY MEDICINE CLINIC | Facility: CLINIC | Age: 56
End: 2021-01-07

## 2021-01-07 NOTE — TELEPHONE ENCOUNTER
Michelle Sánchez is wondering if you have any recommendations for a neuro surgeon  Because her battery in her spinal cord implant has gone dead and her surgeon isnt in practice anymore    She is in a decent amount of pain (8 out of 10)

## 2021-01-07 NOTE — TELEPHONE ENCOUNTER
Dr Bernardo Valdes,   I contacted the patient, she will check back with us      Thank you     Marta Monday

## 2021-01-12 ENCOUNTER — TELEPHONE (OUTPATIENT)
Dept: BEHAVIORAL/MENTAL HEALTH CLINIC | Facility: CLINIC | Age: 56
End: 2021-01-12

## 2021-01-12 NOTE — TELEPHONE ENCOUNTER
Received return call from Jaquan Ibarra about her missed appointment  Was rescheduled for follow up for Feb, March, and April

## 2021-01-25 DIAGNOSIS — K76.0 FATTY LIVER: Primary | ICD-10-CM

## 2021-01-25 DIAGNOSIS — F41.1 GAD (GENERALIZED ANXIETY DISORDER): ICD-10-CM

## 2021-01-25 RX ORDER — LORAZEPAM 0.5 MG/1
0.5 TABLET ORAL
Qty: 30 TABLET | Refills: 1 | Status: SHIPPED | OUTPATIENT
Start: 2021-01-25 | End: 2021-03-29 | Stop reason: SDUPTHER

## 2021-02-01 ENCOUNTER — TELEMEDICINE (OUTPATIENT)
Dept: BEHAVIORAL/MENTAL HEALTH CLINIC | Facility: CLINIC | Age: 56
End: 2021-02-01
Payer: COMMERCIAL

## 2021-02-01 DIAGNOSIS — F43.23 ADJUSTMENT DISORDER WITH MIXED ANXIETY AND DEPRESSED MOOD: Primary | ICD-10-CM

## 2021-02-01 PROCEDURE — 90834 PSYTX W PT 45 MINUTES: CPT | Performed by: SOCIAL WORKER

## 2021-02-01 NOTE — PSYCH
Virtual Regular Visit    Assessment/Plan:    Problem List Items Addressed This Visit        Other    Adjustment disorder with mixed anxiety and depressed mood - Primary        Reason for visit is   Chief Complaint   Patient presents with    Virtual Regular Visit      Encounter provider Neo Garcia    Provider located at [address] [id number] [phone]    Recent Visits  No visits were found meeting these conditions  Showing recent visits within past 7 days and meeting all other requirements     Today's Visits  Date Type Provider Dept   02/01/21 Telemedicine Neo Garcia [facility]   Showing today's visits and meeting all other requirements     Future Appointments  No visits were found meeting these conditions  Showing future appointments within next 150 days and meeting all other requirements      The patient was identified by name and date of birth  Janey Andersen was informed that this is a telemedicine visit and that the visit is being conducted through Phone and patient was informed that this is not a secure, HIPAA-compliant platform  She agrees to proceed  My office door was closed  No one else was in the room  She acknowledged consent and understanding of privacy and security of the video platform  The patient has agreed to participate and understands they can discontinue the visit at any time  It was my intent to perform this visit via video technology but the patient was not able to do a video connection so the visit was completed via audio telephone only  *This note was not shared with the patient due to this being a psychotherapy note  *    Patient is aware this is a billable service  Subjective  Janey Andersen is a 63 y o  female        HPI     Past Medical History:   Diagnosis Date    Bulging lumbar disc     Chronic pain     low back  Degenerative disk disease     lumbar spine    Hyperlipidemia     Hypertension     Pinched nerve     lumbar spine    Psychiatric disorder     depression       Past Surgical History:   Procedure Laterality Date    CARPAL TUNNEL RELEASE Bilateral 1998    CHOLECYSTECTOMY  2008    COLONOSCOPY  06/15/2015    COLONOSCOPY  06/02/2015    Internal hemorrhoids, hyperplastic polyps which were removed  Ten year recall recommended June 2025    COLONOSCOPY  09/16/2020    normal terminal ileum, 1 small polyp in the distal sigmoid colon that was hyperplastic  Lynn Trammell She did have a small amount of retained stool in the colon  She did have a small amount of retained stool in the colon  A 5 year recall was recommended due to poor prep, September 2025  [facility] STIMULATOR IMPLANT      2014    WISDOM TOOTH EXTRACTION Bilateral 1982       Current Outpatient Medications   Medication Sig Dispense Refill    atorvastatin (LIPITOR) 80 mg tablet Take 1 tablet (80 mg total) by mouth daily 90 tablet 1    cholestyramine (QUESTRAN) 4 g packet Take 1 packet (4 g total) by mouth daily at bedtime 30 packet 2    dicyclomine (BENTYL) 20 mg tablet Take 1 tablet (20 mg total) by mouth every 6 (six) hours as needed (pain, cramping) 90 tablet 1    lisinopril (ZESTRIL) 10 mg tablet Take 1 tablet (10 mg total) by mouth daily 90 tablet 1    LORazepam (ATIVAN) 0 5 mg tablet Take 1 tablet (0 5 mg total) by mouth daily at bedtime 30 tablet 1    pramipexole (MIRAPEX) 0 25 mg tablet Take 1 tablet (0 25 mg total) by mouth daily 90 tablet 1    sertraline (ZOLOFT) 100 mg tablet Take 1 tablet (100 mg total) by mouth daily 90 tablet 1    sertraline (ZOLOFT) 50 mg tablet Take 1 tablet (50 mg total) by mouth daily 90 tablet 1     No current facility-administered medications for this visit           Allergies   Allergen Reactions    Penicillins Hives and Throat Swelling       Review of Systems    Video Exam    There were no vitals filed for this visit  Physical Exam     (D) Placido Kevin attended her follow up psychotherapy session today  Cass De reports that since her last session, her ex-boyfriend outreached to her, and reports that he asked to work on things and get back together  Placido Kevin reports that she and her ex-boyfriend were able to talk, and reports that they worked through some previous issues  Placido Kevin communicated that she set some limits and boundaries with her ex-boyfriend, in trying to work things out and get back together  Placido Kevin reports that they are working on their relationship, and reports that they are back in a relationship together since 12/27  Placido Kevin reports that he cut back on his drinking, and reports that her is respecting her limits and boundaries  Placido Kevin reports that she hasn't brought her daughter around him just yet, reporting that she is still trying to assess the relationship  Placido Kevin reports that he also apologized to her family as well  Placido Kevin discussed some psychosocial stressors related to her father struggling with Dewey Adrián Body Dementia, and reports noticing a decline in his mental status  Placido Kevin reports that her mother too has been struggling with medical issues  Placido Kevin reports that her parents have decided to sell their house and move into an assisted living facility  Placido Kevin describes grief in relation to symbolic loss surrounding this  Placido Kevin reports that her parents have been accepted in [facility], reporting that they plan to move within the next month into a cottage  Placido Yihaylee reports that her niece is their financial POA in relation to this  Placido Herberth reports that her brother is struggling with this decisions and isn't supportive of it, causing some friction within the family  Placido Kevin reports that she has made the decision to go back to college, and work towards earning her [employer], a (3) year program through [employer]   Discussed some stressors related to the global pandemic, her daughter being at home and doing [employer] school, and some financial stressors  Jj Figueroa and this writer processed this at length  Provided ongoing psychoeducation  Discussed limits and boundaries  Reviewed ongoing skills  Modeled effective forms of communication  (A) Jj Figueroa reports a slight reduction in the intensity and frequency of her symptoms since her last session  Eye contact was unable to be assessed as a result of this being a phone session  Presented as alert and oriented x3  Speech presented at a normal rate, volume, and rhythm  Denies any evident or immediate risk factors for self-harm, SI, or HI  Presented as future oriented during session  Does not appear to be endorsing criteria for ernesto at this time  Denies symptoms of grandiose thinking, ernesto, impulsivity, and elevated/ hyperactive moods  Zenaida's mood presented as slightly down and anxious and her affect was unable to be assessed as a result of this being a phone session  Jj Figueroa describes symptoms of feeling nervous, anxious, and on edge, and some struggles with not being able to stop and control worry  Jj Figueroa reports some lower moods as well, along with some sleep disturbances  Jj Figueroa describes grief in relation to symbolic loss  Ale Novak [employer] plans to actively work on being present in the moment, making space for her feelings and emotions  Tivis Marine plans to work on validating her feelings, acknowledging them, identifying them, associating them, and increasing compassion for herself  Tivis Marine plans to decrease judgment towards herself, and decipher between facts and feelings, when it comes to decreasing negative thinking traps  Tivis Marine plans to continue to work through stages of grief in relation to symbolic loss with her parents decline in health and decision to move into an assisted living facility  [employer] plans to prioritize her mental health needs, and identify ongoing ways to implement limits and boundaries   [employer] plans to continue to assert herself and advocate for herself, while utilizing effective forms of communication to address interpersonal relationship stressors  Elle Humphrey plans to self-manage ongoing symptoms with varying skills  Elle Humphrey plans to structure her schedule, increase balance, and practice radical acceptance  Elle Humphrey plans to continue to lean into her jen and natural supports  Elle Humphrey plans to actively implement self-care into her routine  Elle Humphrey plans to outreach for additional support as needed  I spent 45 minutes directly with the patient during this visit  VIRTUAL VISIT 89 Pablo Espinoza acknowledges that she has consented to an online visit or consultation  She understands that the online visit is based solely on information provided by her, and that, in the absence of a face-to-face physical evaluation by the physician, the diagnosis she receives is both limited and provisional in terms of accuracy and completeness  This is not intended to replace a full medical face-to-face evaluation by the physician  Zahra Saxena understands and accepts these terms

## 2021-02-03 DIAGNOSIS — F32.0 CURRENT MILD EPISODE OF MAJOR DEPRESSIVE DISORDER WITHOUT PRIOR EPISODE (HCC): ICD-10-CM

## 2021-02-03 DIAGNOSIS — G25.81 RESTLESS LEG SYNDROME: ICD-10-CM

## 2021-02-03 RX ORDER — PRAMIPEXOLE DIHYDROCHLORIDE 0.25 MG/1
0.25 TABLET ORAL DAILY
Qty: 90 TABLET | Refills: 1 | Status: SHIPPED | OUTPATIENT
Start: 2021-02-03 | End: 2021-05-10 | Stop reason: SDUPTHER

## 2021-02-03 RX ORDER — SERTRALINE HYDROCHLORIDE 100 MG/1
100 TABLET, FILM COATED ORAL DAILY
Qty: 90 TABLET | Refills: 1 | Status: SHIPPED | OUTPATIENT
Start: 2021-02-03 | End: 2021-08-03 | Stop reason: SDUPTHER

## 2021-02-19 ENCOUNTER — TELEPHONE (OUTPATIENT)
Dept: PULMONOLOGY | Facility: CLINIC | Age: 56
End: 2021-02-19

## 2021-02-19 NOTE — TELEPHONE ENCOUNTER
Left message for patient to call and schedule f/u in July with Dr Catrachito Varghese  Also to make sure ct lung screen gets done prior

## 2021-02-24 DIAGNOSIS — F41.1 GAD (GENERALIZED ANXIETY DISORDER): ICD-10-CM

## 2021-02-24 RX ORDER — LORAZEPAM 0.5 MG/1
0.5 TABLET ORAL
Qty: 30 TABLET | Refills: 1 | Status: CANCELLED | OUTPATIENT
Start: 2021-02-24 | End: 2021-03-26

## 2021-03-01 ENCOUNTER — TELEPHONE (OUTPATIENT)
Dept: BEHAVIORAL/MENTAL HEALTH CLINIC | Facility: CLINIC | Age: 56
End: 2021-03-01

## 2021-03-01 NOTE — TELEPHONE ENCOUNTER
This writer outreached to Moises Harris and left a message requesting a call back regarding her session today

## 2021-03-04 ENCOUNTER — TELEMEDICINE (OUTPATIENT)
Dept: BEHAVIORAL/MENTAL HEALTH CLINIC | Facility: CLINIC | Age: 56
End: 2021-03-04
Payer: COMMERCIAL

## 2021-03-04 DIAGNOSIS — F43.23 ADJUSTMENT DISORDER WITH MIXED ANXIETY AND DEPRESSED MOOD: Primary | ICD-10-CM

## 2021-03-04 PROCEDURE — 90834 PSYTX W PT 45 MINUTES: CPT | Performed by: SOCIAL WORKER

## 2021-03-04 NOTE — PSYCH
Virtual Regular Visit    Assessment/Plan:    Problem List Items Addressed This Visit        Other    Adjustment disorder with mixed anxiety and depressed mood - Primary        Reason for visit is   Chief Complaint   Patient presents with    Virtual Regular Visit      Encounter provider Olga Lidia Hatch    Provider located at [address] [facility] [id number] [phone]      Recent Visits  Date Type Provider Dept   03/01/21 Telephone [facility]   Showing recent visits within past 7 days and meeting all other requirements     Today's Visits  Date Type Provider Dept   03/04/21 Telemedicine Joanna Nguyen [facility]   Showing today's visits and meeting all other requirements     Future Appointments  No visits were found meeting these conditions  Showing future appointments within next 150 days and meeting all other requirements      The patient was identified by name and date of birth  Zahra Saxena was informed that this is a telemedicine visit and that the visit is being conducted through telephone  My office door was closed  No one else was in the room  She acknowledged consent and understanding of privacy and security of the video platform  The patient has agreed to participate and understands they can discontinue the visit at any time  It was my intent to perform this visit via video technology but the patient was not able to do a video connection so the visit was completed via audio telephone only  *This note was not shared with the patient due to it being a psychotherapy note  *     Patient is aware this is a billable service  Subjective  Zahra Saxena is a 63 y o  female        HPI     Past Medical History:   Diagnosis Date    Bulging lumbar disc     Chronic pain     low back    Degenerative disk disease     lumbar spine    Hyperlipidemia     Hypertension     Pinched nerve     lumbar spine    Psychiatric disorder     depression       Past Surgical History:   Procedure Laterality Date    CARPAL TUNNEL RELEASE Bilateral 1998    CHOLECYSTECTOMY  2008    COLONOSCOPY  06/15/2015    COLONOSCOPY  06/02/2015    Internal hemorrhoids, hyperplastic polyps which were removed  Ten year recall recommended June 2025    COLONOSCOPY  09/16/2020    normal terminal ileum, 1 small polyp in the distal sigmoid colon that was hyperplastic  Nataliya Jackson She did have a small amount of retained stool in the colon  She did have a small amount of retained stool in the colon  A 5 year recall was recommended due to poor prep, September 2025  [facility] STIMULATOR IMPLANT      2014    WISDOM TOOTH EXTRACTION Bilateral 1982       Current Outpatient Medications   Medication Sig Dispense Refill    atorvastatin (LIPITOR) 80 mg tablet Take 1 tablet (80 mg total) by mouth daily 90 tablet 1    cholestyramine (QUESTRAN) 4 g packet Take 1 packet (4 g total) by mouth daily at bedtime 30 packet 2    dicyclomine (BENTYL) 20 mg tablet Take 1 tablet (20 mg total) by mouth every 6 (six) hours as needed (pain, cramping) 90 tablet 1    lisinopril (ZESTRIL) 10 mg tablet Take 1 tablet (10 mg total) by mouth daily 90 tablet 1    LORazepam (ATIVAN) 0 5 mg tablet Take 1 tablet (0 5 mg total) by mouth daily at bedtime 30 tablet 1    pramipexole (MIRAPEX) 0 25 mg tablet Take 1 tablet (0 25 mg total) by mouth daily 90 tablet 1    sertraline (ZOLOFT) 100 mg tablet Take 1 tablet (100 mg total) by mouth daily 90 tablet 1    sertraline (ZOLOFT) 50 mg tablet Take 1 tablet (50 mg total) by mouth daily 90 tablet 1     No current facility-administered medications for this visit           Allergies   Allergen Reactions    Penicillins Hives and Throat Swelling       Review of Systems    Video Exam    There were no vitals filed for this visit     Physical Exam     (D) Portia Hernandez attended her follow up psychotherapy session today  Portia Hernandez reports that since her last session, she went to her annual appointment with her OBGYN  Portia Hernandez reports that her PAP smear test came back abnormal, reporting that she had a physical exam  Mary Hilario reported that she had to have a procedure done this past Tuesday, she had to get (2) different biopsys done, and also have a scraping done  Portia Hernandez reports that there is concern of cancer, and reports that she has to wait a week to get her results back  Portia Hernandez discussed symptoms of worrying and anxiety surrounding this, especially due to having a family history of cancer  Portia Hernandez reported feeling down, depressed, and reports crying episodes at times, worrying about this  Portia Hernandez reports fearing that she has cancer, and fears about her daughter, reporting that she is sleeping more, and eating less  Portia Hernandez reports that she has been working on trying to be present in the moment  Portia Hernandez reports that her sister and her mother have been her primary supports throughout this time, and feels that her boyfriend could be more supportive to her, and reports that he is not  Portia Hernandez discussed stressors related to transitioning her parents to the [facility], and the plan to sell their house  Portia Hernandez reports that today her father is being tested for dementia by the [facility]  Portia Hernandez reports that they specifically chose this place, due to them having a memory unit as well, for when the time comes that her mother and father cannot live in the apartment together anymore  Portia Hernandez reports psychosocial stressors related to working, being a single mother, caring for her parents, having a relationship, and making time for everything  Portia Maciash and this writer processed this at length  Discussed ongoing skills  Reviewed limits and boundaries  Modeled effective forms of communication       (A) Portia Hernandez discussed some increased anticipatory anxiety, reporting symptoms of worrying too much about different, not being able to stop and control worrying, and feeling nervous, anxious, and on edge  Chadd Mazariegos reports feeling tired and having little energy, reporting little interest and pleasure and doing things, reporting some lower moods, and sadness at times  Darlines mood presented as anxious and depressed, and her affect was unable to be assessed as a result of this being a phone session  Chadd Mazariegos describes symptoms of intrusive, ruminating, obsessive, and repetitive thoughts  Chadd Mazariegos presented as alert and oriented x3  Zenaida's speech presented at a normal rate, volume, and rhythm  Denies any evident or immediate risk factors for self-harm, SI, or HI  Denies any symptoms of ernesto, grandiose thinking, impulsivity, or hyperactive/ elevated moods  Zenaida's eye contact was unable to be assessed as a result of this being a phone session  Chadd Mazariegos reports increased sleep, and decreased appetite  Dori Naranjo) Chadd Mazariegos plans to actively work on implementing grounding techniques, sensory related skills, coping skills, distraction techniques, and distress tolerance skills to target increased symptom presentation  Chadd Mazariegos plans to utilize mindfulness and meditation techniques, along with deep breathing techniques  Chadd Mazariegos plans to continue to work on prioritizing her mental health and physical health needs  Chadd Mazariegos plans to increase the use of self-care, take time for herself, and work on being present in the moment  Chadd Mazariegos plans to utilize ongoing self-compassion, validate her feelings, and give herself credit  Chaddmalina Mazariegos plans to decipher between facts verses feelings, challenging negative thinking traps, and increasing the use of automatic positive self-talk  Chadd Mazariegos plans to continue to utilize effective forms of communication to assert herself, advocate for herself, and target fluctuating symptoms  Chadd Mazareigos plans to make space for her ongoing feeling and emotions, and expressing them   Chadd Mazariegos plans to continue to work on leaning into her jen, and leaning into her natural supports, having balance, and increasing structure in her schedule  Igor Hanson plans to outreach for additional support as needed  I spent 45 minutes directly with the patient during this visit  10:31am-11:16am       VIRTUAL VISIT 89 Pablo Espinoza acknowledges that she has consented to an online visit or consultation  She understands that the online visit is based solely on information provided by her, and that, in the absence of a face-to-face physical evaluation by the physician, the diagnosis she receives is both limited and provisional in terms of accuracy and completeness  This is not intended to replace a full medical face-to-face evaluation by the physician  Yaquelin Jurado understands and accepts these terms

## 2021-03-27 LAB
ALBUMIN SERPL-MCNC: 4.4 G/DL (ref 3.8–4.9)
ALBUMIN SERPL-MCNC: 4.6 G/DL (ref 3.8–4.9)
ALBUMIN/GLOB SERPL: 2.4 {RATIO} (ref 1.2–2.2)
ALP SERPL-CCNC: 81 IU/L (ref 39–117)
ALP SERPL-CCNC: 84 IU/L (ref 39–117)
ALT SERPL-CCNC: 14 IU/L (ref 0–32)
ALT SERPL-CCNC: 17 IU/L (ref 0–32)
AST SERPL-CCNC: 14 IU/L (ref 0–40)
AST SERPL-CCNC: 16 IU/L (ref 0–40)
BILIRUB DIRECT SERPL-MCNC: 0.08 MG/DL (ref 0–0.4)
BILIRUB SERPL-MCNC: 0.3 MG/DL (ref 0–1.2)
BILIRUB SERPL-MCNC: 0.3 MG/DL (ref 0–1.2)
BUN SERPL-MCNC: 12 MG/DL (ref 6–24)
BUN/CREAT SERPL: 16 (ref 9–23)
CALCIUM SERPL-MCNC: 10 MG/DL (ref 8.7–10.2)
CHLORIDE SERPL-SCNC: 101 MMOL/L (ref 96–106)
CHOLEST SERPL-MCNC: 173 MG/DL (ref 100–199)
CHOLEST/HDLC SERPL: 4.7 RATIO (ref 0–4.4)
CO2 SERPL-SCNC: 28 MMOL/L (ref 20–29)
CREAT SERPL-MCNC: 0.76 MG/DL (ref 0.57–1)
EST. AVERAGE GLUCOSE BLD GHB EST-MCNC: 120 MG/DL
GLOBULIN SER-MCNC: 1.9 G/DL (ref 1.5–4.5)
GLUCOSE SERPL-MCNC: 108 MG/DL (ref 65–99)
HBA1C MFR BLD: 5.8 % (ref 4.8–5.6)
HDLC SERPL-MCNC: 37 MG/DL
LDLC SERPL CALC-MCNC: 95 MG/DL (ref 0–99)
POTASSIUM SERPL-SCNC: 5 MMOL/L (ref 3.5–5.2)
PROT SERPL-MCNC: 6.5 G/DL (ref 6–8.5)
PROT SERPL-MCNC: 6.6 G/DL (ref 6–8.5)
SL AMB EGFR AFRICAN AMERICAN: 101 ML/MIN/1.73
SL AMB EGFR NON AFRICAN AMERICAN: 88 ML/MIN/1.73
SL AMB VLDL CHOLESTEROL CALC: 41 MG/DL (ref 5–40)
SODIUM SERPL-SCNC: 142 MMOL/L (ref 134–144)
TRIGL SERPL-MCNC: 244 MG/DL (ref 0–149)

## 2021-03-29 DIAGNOSIS — F41.1 GAD (GENERALIZED ANXIETY DISORDER): ICD-10-CM

## 2021-03-29 RX ORDER — LORAZEPAM 0.5 MG/1
0.5 TABLET ORAL
Qty: 30 TABLET | Refills: 1 | Status: SHIPPED | OUTPATIENT
Start: 2021-03-29 | End: 2021-05-24 | Stop reason: SDUPTHER

## 2021-04-03 ENCOUNTER — IMMUNIZATIONS (OUTPATIENT)
Dept: FAMILY MEDICINE CLINIC | Facility: HOSPITAL | Age: 56
End: 2021-04-03

## 2021-04-03 DIAGNOSIS — Z23 ENCOUNTER FOR IMMUNIZATION: Primary | ICD-10-CM

## 2021-04-03 PROCEDURE — 91300 SARS-COV-2 / COVID-19 MRNA VACCINE (PFIZER-BIONTECH) 30 MCG: CPT

## 2021-04-03 PROCEDURE — 0001A SARS-COV-2 / COVID-19 MRNA VACCINE (PFIZER-BIONTECH) 30 MCG: CPT

## 2021-04-05 ENCOUNTER — TELEPHONE (OUTPATIENT)
Dept: BEHAVIORAL/MENTAL HEALTH CLINIC | Facility: CLINIC | Age: 56
End: 2021-04-05

## 2021-04-05 NOTE — TELEPHONE ENCOUNTER
This writer outreached to Kumar Montaño for her session; however, she did not answer  This writer left a message requesting a call back in relation to this

## 2021-04-21 DIAGNOSIS — I10 ESSENTIAL HYPERTENSION: ICD-10-CM

## 2021-04-21 RX ORDER — LISINOPRIL 10 MG/1
10 TABLET ORAL DAILY
Qty: 90 TABLET | Refills: 1 | Status: SHIPPED | OUTPATIENT
Start: 2021-04-21 | End: 2021-10-14 | Stop reason: SDUPTHER

## 2021-04-28 ENCOUNTER — IMMUNIZATIONS (OUTPATIENT)
Dept: FAMILY MEDICINE CLINIC | Facility: HOSPITAL | Age: 56
End: 2021-04-28

## 2021-04-28 DIAGNOSIS — Z23 ENCOUNTER FOR IMMUNIZATION: Primary | ICD-10-CM

## 2021-04-28 PROCEDURE — 0002A SARS-COV-2 / COVID-19 MRNA VACCINE (PFIZER-BIONTECH) 30 MCG: CPT

## 2021-04-28 PROCEDURE — 91300 SARS-COV-2 / COVID-19 MRNA VACCINE (PFIZER-BIONTECH) 30 MCG: CPT

## 2021-05-03 ENCOUNTER — TELEMEDICINE (OUTPATIENT)
Dept: BEHAVIORAL/MENTAL HEALTH CLINIC | Facility: CLINIC | Age: 56
End: 2021-05-03
Payer: COMMERCIAL

## 2021-05-03 ENCOUNTER — TELEPHONE (OUTPATIENT)
Dept: BEHAVIORAL/MENTAL HEALTH CLINIC | Facility: CLINIC | Age: 56
End: 2021-05-03

## 2021-05-03 DIAGNOSIS — F43.23 ADJUSTMENT DISORDER WITH MIXED ANXIETY AND DEPRESSED MOOD: Primary | ICD-10-CM

## 2021-05-03 PROCEDURE — 90834 PSYTX W PT 45 MINUTES: CPT | Performed by: SOCIAL WORKER

## 2021-05-03 NOTE — TELEPHONE ENCOUNTER
Outreached to Sushma Manrique for her session via telehealth; however, she did not answer  Left message requesting a call back in relation to this
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
## 2021-05-10 ENCOUNTER — OFFICE VISIT (OUTPATIENT)
Dept: FAMILY MEDICINE CLINIC | Facility: CLINIC | Age: 56
End: 2021-05-10
Payer: COMMERCIAL

## 2021-05-10 VITALS
OXYGEN SATURATION: 94 % | RESPIRATION RATE: 16 BRPM | HEART RATE: 87 BPM | TEMPERATURE: 97.9 F | HEIGHT: 61 IN | BODY MASS INDEX: 36.93 KG/M2 | SYSTOLIC BLOOD PRESSURE: 140 MMHG | DIASTOLIC BLOOD PRESSURE: 80 MMHG | WEIGHT: 195.6 LBS

## 2021-05-10 DIAGNOSIS — R73.01 IFG (IMPAIRED FASTING GLUCOSE): ICD-10-CM

## 2021-05-10 DIAGNOSIS — G25.81 RESTLESS LEG SYNDROME: ICD-10-CM

## 2021-05-10 DIAGNOSIS — E03.8 SUBCLINICAL HYPOTHYROIDISM: ICD-10-CM

## 2021-05-10 DIAGNOSIS — R91.8 PULMONARY NODULES: ICD-10-CM

## 2021-05-10 DIAGNOSIS — E78.2 MIXED HYPERLIPIDEMIA: ICD-10-CM

## 2021-05-10 DIAGNOSIS — I10 ESSENTIAL HYPERTENSION: ICD-10-CM

## 2021-05-10 DIAGNOSIS — F43.23 ADJUSTMENT DISORDER WITH MIXED ANXIETY AND DEPRESSED MOOD: Primary | ICD-10-CM

## 2021-05-10 PROBLEM — Z12.39 SCREENING FOR BREAST CANCER: Status: RESOLVED | Noted: 2019-12-05 | Resolved: 2021-05-10

## 2021-05-10 PROBLEM — Z12.11 COLON CANCER SCREENING: Status: RESOLVED | Noted: 2019-12-05 | Resolved: 2021-05-10

## 2021-05-10 PROCEDURE — 3079F DIAST BP 80-89 MM HG: CPT | Performed by: FAMILY MEDICINE

## 2021-05-10 PROCEDURE — 3008F BODY MASS INDEX DOCD: CPT | Performed by: FAMILY MEDICINE

## 2021-05-10 PROCEDURE — 3725F SCREEN DEPRESSION PERFORMED: CPT | Performed by: FAMILY MEDICINE

## 2021-05-10 PROCEDURE — 4004F PT TOBACCO SCREEN RCVD TLK: CPT | Performed by: FAMILY MEDICINE

## 2021-05-10 PROCEDURE — 3077F SYST BP >= 140 MM HG: CPT | Performed by: FAMILY MEDICINE

## 2021-05-10 PROCEDURE — 99214 OFFICE O/P EST MOD 30 MIN: CPT | Performed by: FAMILY MEDICINE

## 2021-05-10 RX ORDER — PRAMIPEXOLE DIHYDROCHLORIDE 0.5 MG/1
0.5 TABLET ORAL DAILY
Qty: 90 TABLET | Refills: 1 | Status: SHIPPED | OUTPATIENT
Start: 2021-05-10 | End: 2021-10-13 | Stop reason: SDUPTHER

## 2021-05-10 NOTE — ASSESSMENT & PLAN NOTE
Emergency Department  [address] [facility] [address]  Phone:  [phone]  Fax:  [phone]                                    Josh Reinoso   MRN: [id number]    Department:   Emergency Department   Date of Visit:  7/13/2020           After Visit Summary Signature Page    I have received my discharge instructions, and my questions have been answered. I have discussed any challenges I see with this plan with the nurse or doctor.    ..........................................................................................................................................  Patient/Patient Representative Signature      ..........................................................................................................................................  Patient Representative Print Name and Relationship to Patient    ..................................................               ................................................  Date                                   Time    ..........................................................................................................................................  Reviewed by Signature/Title    ...................................................              ..............................................  Date                                               Time          22EPIC Rev 08/18        Continue medication as prescribed and BH apts with [facility]

## 2021-05-10 NOTE — PROGRESS NOTES
Gamaliel Baron 1965 female MRN: [id number]    Family Medicine Follow-up Visit    ASSESSMENT/PLAN  Problem List Items Addressed This Visit        Endocrine    Subclinical hypothyroidism    Relevant Orders    Lipid panel    Comprehensive metabolic panel    CBC and differential    TSH, 3rd generation with Free T4 reflex    UA (URINE) with reflex to Scope    Hemoglobin A1C    IFG (impaired fasting glucose)     Lab Results   Component Value Date    HGBA1C 5 8 (H) 03/26/2021     Continue lifestyle modifications          Relevant Orders    Lipid panel    Comprehensive metabolic panel    CBC and differential    TSH, 3rd generation with Free T4 reflex    UA (URINE) with reflex to Scope    Hemoglobin A1C       Cardiovascular and Mediastinum    Essential hypertension     Stable continue medication as prescribed          Relevant Orders    Lipid panel    Comprehensive metabolic panel    CBC and differential    TSH, 3rd generation with Free T4 reflex    UA (URINE) with reflex to Scope    Hemoglobin A1C       Other    Mixed hyperlipidemia     continue statin therapy and lifestyle modification          Relevant Orders    Lipid panel    Comprehensive metabolic panel    CBC and differential    TSH, 3rd generation with Free T4 reflex    UA (URINE) with reflex to Scope    Hemoglobin A1C    Restless leg syndrome    Relevant Medications    pramipexole (MIRAPEX) 0 5 mg tablet    Other Relevant Orders    Lipid panel    Comprehensive metabolic panel    CBC and differential    TSH, 3rd generation with Free T4 reflex    UA (URINE) with reflex to Scope    Hemoglobin A1C    Adjustment disorder with mixed anxiety and depressed mood - Primary     Continue medication as prescribed and BH apts with Joanna          Relevant Orders    Lipid panel    Comprehensive metabolic panel    CBC and differential    TSH, 3rd generation with Free T4 reflex    UA (URINE) with reflex to Scope    Hemoglobin A1C    Pulmonary nodules     Stable, follows with pulmonary          BMI 36 0-36 9,adult     BMI Counseling: Body mass index is 36 96 kg/m²  The BMI is above normal  Nutrition recommendations include reducing portion sizes, decreasing overall calorie intake and 3-5 servings of fruits/vegetables daily  Exercise recommendations include moderate aerobic physical activity for 150 minutes/week  Relevant Orders    Lipid panel    Comprehensive metabolic panel    CBC and differential    TSH, 3rd generation with Free T4 reflex    UA (URINE) with reflex to Scope    Hemoglobin A1C        Follow up in 3-4 months for CP             Future Appointments   Date Time Provider Lorenzo Reagan   6/7/2021  1:00 PM [address]   7/13/2021  2:00 PM Leigha Clink [facility]   8/9/2021  3:00 PM Leigha Clink [facility]   8/16/2021 10:45 AM DO EDA Snow  [facility]          SUBJECTIVE  CC: Follow-up (Check up 4 months Hypertension)      HPI:  Olivia Low is a 63 y o  female who presents for check up  Anxiety/depression- she is working with [facility] for therapy, stresses with parents home, relationship ending  HTN- stable on medication   HLD- taking statin, admits some trouble with diet lately     HPI    Review of Systems   Constitutional: Negative for chills, fatigue and fever  HENT: Negative for congestion, postnasal drip, rhinorrhea and sinus pressure  Eyes: Negative for photophobia and visual disturbance  Respiratory: Negative for cough and shortness of breath  Cardiovascular: Negative for chest pain, palpitations and leg swelling  Gastrointestinal: Negative for abdominal pain, constipation, diarrhea, nausea and vomiting  Genitourinary: Negative for difficulty urinating and dysuria  Musculoskeletal: Negative for arthralgias and myalgias  Skin: Negative for color change and rash  Neurological: Negative for dizziness, weakness, light-headedness and headaches  Historical Information   The patient history was reviewed as follows:    Past Medical History:   Diagnosis Date    Bulging lumbar disc     Chronic pain     low back    Degenerative disk disease     lumbar spine    Hyperlipidemia     Hypertension     Pinched nerve     lumbar spine    Psychiatric disorder     depression     Past Surgical History:   Procedure Laterality Date    CARPAL TUNNEL RELEASE Bilateral 1998    CHOLECYSTECTOMY  2008    COLONOSCOPY  06/15/2015    COLONOSCOPY  06/02/2015    Internal hemorrhoids, hyperplastic polyps which were removed  Ten year recall recommended June 2025    COLONOSCOPY  09/16/2020    normal terminal ileum, 1 small polyp in the distal sigmoid colon that was hyperplastic  Heriberto Mcfarland She did have a small amount of retained stool in the colon  She did have a small amount of retained stool in the colon  A 5 year recall was recommended due to poor prep, September 2025       SPINAL CORD STIMULATOR IMPLANT      2014    WISDOM TOOTH EXTRACTION Bilateral 1982     Family History   Problem Relation Age of Onset    Skin cancer Mother     Heart disease Father     Breast cancer Maternal Aunt 29    Breast cancer Sister 64    BRCA1 Negative Sister     BRCA2 Negative Sister     Prostate cancer Maternal Uncle     Cancer Maternal Uncle         BLADDER    Colon cancer Maternal Uncle     Colon polyps Neg Hx       Social History   Social History     Substance and Sexual Activity   Alcohol Use Yes    Frequency: 2-4 times a month    Drinks per session: 1 or 2    Binge frequency: Never     Social History     Substance and Sexual Activity   Drug Use No     Social History     Tobacco Use   Smoking Status Current Every Day Smoker    Packs/day: 1 00    Years: 43 00    Pack years: 43 00    Types: Cigarettes    Start date: 1977   Smokeless Tobacco Never Used   Tobacco Comment    smokes 10 cigs daily as of 10/20       Medications:     Current Outpatient Medications:    atorvastatin (LIPITOR) 80 mg tablet, Take 1 tablet (80 mg total) by mouth daily, Disp: 90 tablet, Rfl: 1    cholestyramine (QUESTRAN) 4 g packet, Take 1 packet (4 g total) by mouth daily at bedtime, Disp: 30 packet, Rfl: 2    dicyclomine (BENTYL) 20 mg tablet, Take 1 tablet (20 mg total) by mouth every 6 (six) hours as needed (pain, cramping), Disp: 90 tablet, Rfl: 1    lisinopril (ZESTRIL) 10 mg tablet, Take 1 tablet (10 mg total) by mouth daily, Disp: 90 tablet, Rfl: 1    LORazepam (ATIVAN) 0 5 mg tablet, Take 1 tablet (0 5 mg total) by mouth daily at bedtime, Disp: 30 tablet, Rfl: 1    pramipexole (MIRAPEX) 0 5 mg tablet, Take 1 tablet (0 5 mg total) by mouth daily, Disp: 90 tablet, Rfl: 1    sertraline (ZOLOFT) 100 mg tablet, Take 1 tablet (100 mg total) by mouth daily, Disp: 90 tablet, Rfl: 1    sertraline (ZOLOFT) 50 mg tablet, Take 1 tablet (50 mg total) by mouth daily, Disp: 90 tablet, Rfl: 1  Allergies   Allergen Reactions    Penicillins Hives and Throat Swelling       OBJECTIVE    Vitals:   Vitals:    05/10/21 1037   BP: 140/80   BP Location: Right arm   Patient Position: Sitting   Cuff Size: Standard   Pulse: 87   Resp: 16   Temp: 97 9 °F (36 6 °C)   TempSrc: Tympanic   SpO2: 94%   Weight: 88 7 kg (195 lb 9 6 oz)   Height: 5' 1" (1 549 m)           Physical Exam  Constitutional:       Appearance: She is well-developed  HENT:      Head: Normocephalic and atraumatic  Eyes:      Pupils: Pupils are equal, round, and reactive to light  Neck:      Musculoskeletal: Normal range of motion and neck supple  Cardiovascular:      Rate and Rhythm: Normal rate and regular rhythm  Heart sounds: Normal heart sounds  Pulmonary:      Effort: Pulmonary effort is normal  No respiratory distress  Breath sounds: Normal breath sounds  No wheezing  Abdominal:      General: Bowel sounds are normal  There is no distension  Palpations: Abdomen is soft  Tenderness:  There is no abdominal tenderness  Musculoskeletal: Normal range of motion  General: No tenderness  Skin:     General: Skin is warm and dry  Neurological:      Mental Status: She is alert and oriented to person, place, and time     Psychiatric:         Behavior: Behavior normal             Labs:        DO Sherri    5/10/2021

## 2021-05-10 NOTE — ASSESSMENT & PLAN NOTE
Lab Results   Component Value Date    HGBA1C 5 8 (H) 03/26/2021     Continue lifestyle modifications

## 2021-05-18 ENCOUNTER — TELEPHONE (OUTPATIENT)
Dept: PULMONOLOGY | Facility: CLINIC | Age: 56
End: 2021-05-18

## 2021-05-24 DIAGNOSIS — F41.1 GAD (GENERALIZED ANXIETY DISORDER): ICD-10-CM

## 2021-05-24 RX ORDER — LORAZEPAM 0.5 MG/1
0.5 TABLET ORAL
Qty: 30 TABLET | Refills: 1 | Status: SHIPPED | OUTPATIENT
Start: 2021-05-24 | End: 2021-07-22 | Stop reason: SDUPTHER

## 2021-06-07 ENCOUNTER — TELEMEDICINE (OUTPATIENT)
Dept: BEHAVIORAL/MENTAL HEALTH CLINIC | Facility: CLINIC | Age: 56
End: 2021-06-07
Payer: COMMERCIAL

## 2021-06-07 DIAGNOSIS — F43.23 ADJUSTMENT DISORDER WITH MIXED ANXIETY AND DEPRESSED MOOD: Primary | ICD-10-CM

## 2021-06-07 PROCEDURE — 90834 PSYTX W PT 45 MINUTES: CPT | Performed by: SOCIAL WORKER
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
## 2021-07-12 ENCOUNTER — TELEPHONE (OUTPATIENT)
Dept: BEHAVIORAL/MENTAL HEALTH CLINIC | Facility: CLINIC | Age: 56
End: 2021-07-12

## 2021-07-12 NOTE — TELEPHONE ENCOUNTER
Spoke with Hector Lawrence who needed to cancel her apt for tomorrow due to work schedule, will need scheduling on Monday's- will review with staff

## 2021-07-22 DIAGNOSIS — F41.1 GAD (GENERALIZED ANXIETY DISORDER): ICD-10-CM

## 2021-07-22 RX ORDER — LORAZEPAM 0.5 MG/1
0.5 TABLET ORAL
Qty: 30 TABLET | Refills: 1 | Status: SHIPPED | OUTPATIENT
Start: 2021-07-22 | End: 2021-09-16 | Stop reason: SDUPTHER

## 2021-07-27 DIAGNOSIS — F32.0 CURRENT MILD EPISODE OF MAJOR DEPRESSIVE DISORDER WITHOUT PRIOR EPISODE (HCC): ICD-10-CM

## 2021-08-03 DIAGNOSIS — F32.0 CURRENT MILD EPISODE OF MAJOR DEPRESSIVE DISORDER WITHOUT PRIOR EPISODE (HCC): ICD-10-CM

## 2021-08-03 RX ORDER — SERTRALINE HYDROCHLORIDE 100 MG/1
100 TABLET, FILM COATED ORAL DAILY
Qty: 90 TABLET | Refills: 1 | Status: SHIPPED | OUTPATIENT
Start: 2021-08-03 | End: 2022-01-31 | Stop reason: SDUPTHER

## 2021-08-09 ENCOUNTER — TELEPHONE (OUTPATIENT)
Dept: BEHAVIORAL/MENTAL HEALTH CLINIC | Facility: CLINIC | Age: 56
End: 2021-08-09

## 2021-08-09 NOTE — TELEPHONE ENCOUNTER
This writer spoke with Precious Barlow by phone earlier today and confirmed her appointment, reporting that she was traveling to the [place] [address] and would be available at 2:45pm for her session  This writer sent Precious Barlow an [employer] link for her session; however, she did not log in  This writer contacted her and it went straight to voice-mail left requesting a call back regarding her session

## 2021-08-16 ENCOUNTER — TELEMEDICINE (OUTPATIENT)
Dept: BEHAVIORAL/MENTAL HEALTH CLINIC | Facility: CLINIC | Age: 56
End: 2021-08-16
Payer: COMMERCIAL

## 2021-08-16 DIAGNOSIS — F43.23 ADJUSTMENT DISORDER WITH MIXED ANXIETY AND DEPRESSED MOOD: Primary | ICD-10-CM

## 2021-08-16 PROCEDURE — 90834 PSYTX W PT 45 MINUTES: CPT | Performed by: SOCIAL WORKER
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
## 2021-08-23 DIAGNOSIS — E78.2 MIXED HYPERLIPIDEMIA: ICD-10-CM

## 2021-08-23 RX ORDER — ATORVASTATIN CALCIUM 80 MG/1
80 TABLET, FILM COATED ORAL DAILY
Qty: 90 TABLET | Refills: 1 | Status: SHIPPED | OUTPATIENT
Start: 2021-08-23 | End: 2022-02-15 | Stop reason: SDUPTHER

## 2021-09-08 ENCOUNTER — TELEPHONE (OUTPATIENT)
Dept: FAMILY MEDICINE CLINIC | Facility: CLINIC | Age: 56
End: 2021-09-08

## 2021-09-08 NOTE — TELEPHONE ENCOUNTER
Patient called  She is having bad pain in her one knee that is causing fluid build up  She knows she has arthritis in it  She said it has been getting bad the past couple days  No one has any openings here, so she asked if there was someone you can recommend that she can see for her knee pain  I told her I would check with you and I can call her back    [phone]

## 2021-09-09 ENCOUNTER — TELEPHONE (OUTPATIENT)
Dept: FAMILY MEDICINE CLINIC | Facility: CLINIC | Age: 56
End: 2021-09-09

## 2021-09-09 ENCOUNTER — APPOINTMENT (OUTPATIENT)
Dept: RADIOLOGY | Facility: CLINIC | Age: 56
End: 2021-09-09
Payer: COMMERCIAL

## 2021-09-09 ENCOUNTER — OFFICE VISIT (OUTPATIENT)
Dept: URGENT CARE | Facility: CLINIC | Age: 56
End: 2021-09-09
Payer: COMMERCIAL

## 2021-09-09 VITALS
BODY MASS INDEX: 35.88 KG/M2 | TEMPERATURE: 97.5 F | OXYGEN SATURATION: 97 % | HEIGHT: 62 IN | SYSTOLIC BLOOD PRESSURE: 126 MMHG | RESPIRATION RATE: 16 BRPM | DIASTOLIC BLOOD PRESSURE: 70 MMHG | WEIGHT: 195 LBS | HEART RATE: 86 BPM

## 2021-09-09 DIAGNOSIS — M25.561 ACUTE PAIN OF RIGHT KNEE: ICD-10-CM

## 2021-09-09 DIAGNOSIS — M25.561 CHRONIC PAIN OF RIGHT KNEE: Primary | ICD-10-CM

## 2021-09-09 DIAGNOSIS — G89.29 CHRONIC PAIN OF RIGHT KNEE: Primary | ICD-10-CM

## 2021-09-09 PROCEDURE — G0382 LEV 3 HOSP TYPE B ED VISIT: HCPCS | Performed by: PHYSICIAN ASSISTANT

## 2021-09-09 PROCEDURE — 73562 X-RAY EXAM OF KNEE 3: CPT

## 2021-09-09 PROCEDURE — 99283 EMERGENCY DEPT VISIT LOW MDM: CPT | Performed by: PHYSICIAN ASSISTANT

## 2021-09-09 RX ORDER — MELOXICAM 15 MG/1
15 TABLET ORAL DAILY
Qty: 20 TABLET | Refills: 0 | Status: SHIPPED | OUTPATIENT
Start: 2021-09-09 | End: 2021-09-17

## 2021-09-09 NOTE — TELEPHONE ENCOUNTER
Christos Anderson wanted me to let you know she was able to get into [facility] today for her knee    Thank you

## 2021-09-09 NOTE — PATIENT INSTRUCTIONS
take Mobic as directed  Follow up with PCP in 3-5 days  Proceed to  ER if symptoms worsen  Arthritis   AMBULATORY CARE:   Arthritis  is pain or disease in one or more joints  There are many types of arthritis  Types such as rheumatoid arthritis cause inflammation in the joints  Other types wear away the cartilage between joints, such as osteoarthritis  This makes the bones of the joint rub together when you move the joint  An infection from bacteria, a virus, or a fungus can also cause arthritis  Your symptoms may be constant, or symptoms may come and go  Arthritis often gets worse over time and can cause permanent joint damage  Common signs and symptoms of arthritis:   · Pain, swelling, or stiffness in the joint    · Limited range of motion in the joint    · Warmth or redness over the joint    · Tenderness when you touch the joint    · Stiff joints in the morning that loosen with movement    · A creaking or grinding sound when you move the joint    · Fever    Call your doctor or rheumatologist if:   · You have a fever and severe joint pain or swelling  · You cannot move the affected joint  · You have severe joint pain you cannot tolerate  · You have a new or worsening rash  · Your pain or swelling does not get better with treatment  · You have questions or concerns about your condition or care  Treatment  will depend on the type of arthritis you have and if it is severe  You may need any of the following:  · Acetaminophen  decreases pain and fever  It is available without a doctor's order  Ask how much to take and how often to take it  Follow directions  Read the labels of all other medicines you are using to see if they also contain acetaminophen, or ask your doctor or pharmacist  Acetaminophen can cause liver damage if not taken correctly  Do not use more than 4 grams (4,000 milligrams) total of acetaminophen in one day       · NSAIDs , such as ibuprofen, help decrease swelling, pain, and fever  This medicine is available with or without a doctor's order  NSAIDs can cause stomach bleeding or kidney problems in certain people  If you take blood thinner medicine, always ask your healthcare provider if NSAIDs are safe for you  Always read the medicine label and follow directions  · Steroid medicine  helps reduce swelling and pain  · Prescription pain medicine  may be given  Ask your healthcare provider how to take this medicine safely  Some prescription pain medicines contain acetaminophen  Do not take other medicines that contain acetaminophen without talking to your healthcare provider  Too much acetaminophen may cause liver damage  Prescription pain medicine may cause constipation  Ask your healthcare provider how to prevent or treat constipation  · Surgery  may be needed to repair or replace a damaged joint  Manage your symptoms:   · Rest your painful joint so it can heal   Your healthcare provider may recommend crutches or a walker if the affected joint is in a leg  · Apply ice or heat to the joint  Both can help decrease swelling and pain  Ice may also help prevent tissue damage  Use an ice pack, or put crushed ice in a plastic bag  Cover it with a towel and place it on your joint for 15 to 20 minutes every hour or as directed  You can apply heat for 20 minutes every 2 hours  Heat treatment includes hot packs or heat lamps  · Elevate your joint  Elevation helps reduce swelling and pain  Raise your joint above the level of your heart as often as you can  Prop your painful joint on pillows to keep it above your heart comfortably  Manage arthritis:   · Talk to your healthcare providers about your arthritis medicines  Some medicines may only be needed when you have arthritis pain  You may need to take other medicines every day to prevent arthritis from getting worse  Your healthcare providers will help you understand all your medicines and when to take them   It is important to take the medicines as directed, even if you start to feel better  You can continue to have joint damage and inflammation even if you do not feel it  · Eat a variety of healthy foods  Healthy foods include fruits, vegetables, whole-grain breads, low-fat dairy products, beans, lean meats, and fish  Ask if you need to be on a special diet  A diet rich in calcium and vitamin D may decrease your risk of osteoporosis  Foods high in calcium include milk, cheese, broccoli, and tofu  Vitamin D may be found in meat, fish, fortified milk, cereal and bread  Ask if you need calcium or vitamin D supplements  · Go to physical or occupational therapy as directed  A physical therapist can teach you exercises to improve flexibility and range of motion  You may also be shown non-weight-bearing exercises that are safe for your joints, such as swimming  Exercise can help keep your joints flexible and reduce pain  An occupational therapist can help you learn to do your daily activities when your joints are stiff or sore  · Maintain a healthy weight  Extra weight puts increased pressure on your joints  Ask your healthcare provider what you should weigh  If you need to lose weight, he or she can help you create a weight loss program  Weight loss can help reduce pain and increase your ability to do your activities  · Wear flat or low-heeled shoes  This will help decrease pain and reduce pressure on your ankle, knee, and hip joints  · Do not smoke  Nicotine and other chemicals in cigarettes and cigars can damage your bones and joints  Ask your healthcare provider for information if you currently smoke and need help to quit  E-cigarettes or smokeless tobacco still contain nicotine  Talk to your healthcare provider before you use these products  Support devices:   · Orthotic shoes or insoles  help support your feet when you walk      · Crutches, a cane, or a walker  may help decrease your risk for falling  They also decrease stress on affected joints  · Devices to prevent falls  include raised toilet seats and bathtub bars to help you get up from sitting  Handrails can be placed in areas where you need balance and support  · Devices to help with support and rest  include splints to wear on your hands and a firm pillow while you sleep  Use a pillow that is firm enough to support your neck and head  Follow up with your healthcare provider or rheumatologist as directed:  Write down your questions so you remember to ask them during your visits  © Copyright [employer] 2021 Information is for End User's use only and may not be sold, redistributed or otherwise used for commercial purposes  All illustrations and images included in CareNotes® are the copyrighted property of [employer]  or Candice Mohr  The above information is an  only  It is not intended as medical advice for individual conditions or treatments  Talk to your doctor, nurse or pharmacist before following any medical regimen to see if it is safe and effective for you

## 2021-09-09 NOTE — PROGRESS NOTES
[facility]        NAME: Rony Taylor is a 63 y o  female  : 1965    MRN: [id number]  DATE: 2021  TIME: 5:11 PM    Assessment and Plan   Chronic pain of right knee [M25 561, G89 29]  1  Chronic pain of right knee  XR knee 3 vw right non injury    meloxicam (MOBIC) 15 mg tablet         Patient Instructions      take Mobic as directed  Follow up with PCP in 3-5 days  Proceed to  ER if symptoms worsen  Chief Complaint     Chief Complaint   Patient presents with    Knee Pain     hx of right knee pain  she reports she was referred by her PCP  History of Present Illness        62-year-old female presents with right knee pain  Patient reports she has had chronic knee pain for years  Has been getting injections from time to time in her right knee which improves it for some time  Patient reports recently been D knee started to bother her again become more painful and swollen  No traumas reported  Patient was to see her orthopedic that she sees in the past but they declined her visit because change of insurance  Patient contact PCP who told her to come to urgent care to be evaluated  Patient reports that she has follow-up appointment for PCP on Monday and with new orthopedic doctor on Tuesday  Knee Pain   The incident occurred more than 1 week ago  The incident occurred at home  There was no injury mechanism  The pain is present in the right knee  The quality of the pain is described as aching  The pain is moderate  The pain has been fluctuating since onset  Pertinent negatives include no inability to bear weight, loss of motion, loss of sensation, muscle weakness, numbness or tingling  She reports no foreign bodies present  Nothing aggravates the symptoms  She has tried nothing for the symptoms  The treatment provided no relief  Review of Systems   Review of Systems   Constitutional: Negative  HENT: Negative  Eyes: Negative  Respiratory: Negative  Cardiovascular: Negative  Gastrointestinal: Negative  Musculoskeletal: Negative  Skin: Negative  Neurological: Negative  Negative for tingling and numbness           Current Medications       Current Outpatient Medications:     atorvastatin (LIPITOR) 80 mg tablet, Take 1 tablet (80 mg total) by mouth daily, Disp: 90 tablet, Rfl: 1    cholestyramine (QUESTRAN) 4 g packet, Take 1 packet (4 g total) by mouth daily at bedtime, Disp: 30 packet, Rfl: 2    dicyclomine (BENTYL) 20 mg tablet, Take 1 tablet (20 mg total) by mouth every 6 (six) hours as needed (pain, cramping), Disp: 90 tablet, Rfl: 1    lisinopril (ZESTRIL) 10 mg tablet, Take 1 tablet (10 mg total) by mouth daily, Disp: 90 tablet, Rfl: 1    LORazepam (ATIVAN) 0 5 mg tablet, Take 1 tablet (0 5 mg total) by mouth daily at bedtime, Disp: 30 tablet, Rfl: 1    meloxicam (MOBIC) 15 mg tablet, Take 1 tablet (15 mg total) by mouth daily, Disp: 20 tablet, Rfl: 0    pramipexole (MIRAPEX) 0 5 mg tablet, Take 1 tablet (0 5 mg total) by mouth daily, Disp: 90 tablet, Rfl: 1    sertraline (ZOLOFT) 100 mg tablet, Take 1 tablet (100 mg total) by mouth daily, Disp: 90 tablet, Rfl: 1    sertraline (ZOLOFT) 50 mg tablet, Take 1 tablet (50 mg total) by mouth daily, Disp: 90 tablet, Rfl: 1    Current Allergies     Allergies as of 09/09/2021 - Reviewed 09/09/2021   Allergen Reaction Noted    Penicillins Hives and Throat Swelling 03/05/2016            The following portions of the patient's history were reviewed and updated as appropriate: allergies, current medications, past family history, past medical history, past social history, past surgical history and problem list      Past Medical History:   Diagnosis Date    Bulging lumbar disc     Chronic pain     low back    Degenerative disk disease     lumbar spine    Hyperlipidemia     Hypertension     Pinched nerve     lumbar spine    Psychiatric disorder     depression       Past Surgical History: Procedure Laterality Date    CARPAL TUNNEL RELEASE Bilateral 1998    CHOLECYSTECTOMY  2008    COLONOSCOPY  06/15/2015    COLONOSCOPY  06/02/2015    Internal hemorrhoids, hyperplastic polyps which were removed  Ten year recall recommended June 2025    COLONOSCOPY  09/16/2020    normal terminal ileum, 1 small polyp in the distal sigmoid colon that was hyperplastic  Melissa Hawk She did have a small amount of retained stool in the colon  She did have a small amount of retained stool in the colon  A 5 year recall was recommended due to poor prep, September 2025   SPINAL CORD STIMULATOR IMPLANT      2014    WISDOM TOOTH EXTRACTION Bilateral 1982       Family History   Problem Relation Age of Onset    Skin cancer Mother     Heart disease Father     Breast cancer Maternal Aunt 29    Breast cancer Sister 64    BRCA1 Negative Sister     BRCA2 Negative Sister     Prostate cancer Maternal Uncle     Cancer Maternal Uncle         BLADDER    Colon cancer Maternal Uncle     Colon polyps Neg Hx          Medications have been verified  Objective   /70   Pulse 86   Temp 97 5 °F (36 4 °C)   Resp 16   Ht 5' 2" (1 575 m)   Wt 88 5 kg (195 lb)   LMP  (LMP Unknown)   SpO2 97%   BMI 35 67 kg/m²   No LMP recorded (lmp unknown)  Patient is postmenopausal        Physical Exam     Physical Exam  Vitals and nursing note reviewed  Constitutional:       General: She is not in acute distress  Appearance: She is well-developed  HENT:      Head: Normocephalic and atraumatic  Right Ear: External ear normal       Left Ear: External ear normal       Nose: Nose normal       Mouth/Throat:      Pharynx: No oropharyngeal exudate  Eyes:      General:         Right eye: No discharge  Left eye: No discharge  Conjunctiva/sclera: Conjunctivae normal    Pulmonary:      Effort: Pulmonary effort is normal  No respiratory distress  Musculoskeletal:         General: Normal range of motion  Cervical back: Normal range of motion and neck supple  Right knee: No swelling, deformity, effusion, erythema, ecchymosis, lacerations, bony tenderness or crepitus  Normal range of motion  Tenderness (  Generalized tenderness to knee) present  No LCL laxity, MCL laxity, ACL laxity or PCL laxity  Normal alignment, normal meniscus and normal patellar mobility  Normal pulse  Skin:     General: Skin is warm and dry  Neurological:      Mental Status: She is alert and oriented to person, place, and time  x-rays reviewed  No fractures noted  Degenerative knee noted    Pending radiologist interpretation

## 2021-09-13 ENCOUNTER — OFFICE VISIT (OUTPATIENT)
Dept: FAMILY MEDICINE CLINIC | Facility: CLINIC | Age: 56
End: 2021-09-13
Payer: COMMERCIAL

## 2021-09-13 VITALS
TEMPERATURE: 98.7 F | OXYGEN SATURATION: 95 % | HEART RATE: 100 BPM | WEIGHT: 199.8 LBS | BODY MASS INDEX: 36.77 KG/M2 | RESPIRATION RATE: 16 BRPM | SYSTOLIC BLOOD PRESSURE: 124 MMHG | DIASTOLIC BLOOD PRESSURE: 76 MMHG | HEIGHT: 62 IN

## 2021-09-13 DIAGNOSIS — G89.29 CHRONIC PAIN OF RIGHT KNEE: Primary | ICD-10-CM

## 2021-09-13 DIAGNOSIS — M25.461 SWELLING OF KNEE JOINT, RIGHT: ICD-10-CM

## 2021-09-13 DIAGNOSIS — M25.561 CHRONIC PAIN OF RIGHT KNEE: Primary | ICD-10-CM

## 2021-09-13 PROCEDURE — 3725F SCREEN DEPRESSION PERFORMED: CPT | Performed by: FAMILY MEDICINE

## 2021-09-13 PROCEDURE — 99214 OFFICE O/P EST MOD 30 MIN: CPT | Performed by: FAMILY MEDICINE

## 2021-09-13 NOTE — PROGRESS NOTES
Trung  1965 female MRN: [id number]    Family Medicine Acute Visit    ASSESSMENT/PLAN  Problem List Items Addressed This Visit        Other    Chronic pain of right knee - Primary    Relevant Orders    Lyme Antibody Profile with reflex to WB    Sedimentation rate, automated    Rheumatoid Arthritis Profile    Uric acid    Ambulatory referral to Orthopedic Surgery    Swelling of knee joint, right    Relevant Orders    Lyme Antibody Profile with reflex to WB    Sedimentation rate, automated    Rheumatoid Arthritis Profile    Uric acid    Ambulatory referral to Orthopedic Surgery          Has apt with ortho tomorrow  Get labs and follow up for CP scheduled next week  Future Appointments   Date Time Provider Lorenzo Reagan   9/14/2021 11:15 AM Ray Gastelum MD [facility]TH[facility]   9/20/2021  8:30 AM DO EDA Polanco  [facility]   10/11/2021  3:00 PM [facility]   11/8/2021  2:00 PM [facility]          SUBJECTIVE  CC: Knee Pain (Patient reports pain in her right knee that started about a week ago  She was seen at urgent care on 09/09/2021 and her xrays showed osteoarthritis  She has an appointment to follow-up with Dr Riaz Giron tomorrow )      HPI:  Trung  is a 63 y o  female who presents for knee pain  Right knee pain x 1 month which 1 week ago got extreme she was limping could hardly walk  She was in tears  She had swelling  Tried, heat, ice, advil, tylenol  She had seen Katelin Apple for an injection a few years ago  States now with her new insurance she can not see Richelle Uribe  She was then referred to Dr Riaz Giron in [facility] and has apt with him tomorrow  She was seen at urgent care- given [facility] which gave her hives  Review of Systems   Constitutional: Negative for chills, fatigue and fever  HENT: Negative for congestion, postnasal drip, rhinorrhea and sinus pressure      Eyes: Negative for photophobia and visual disturbance  Respiratory: Negative for cough and shortness of breath  Cardiovascular: Negative for chest pain, palpitations and leg swelling  Gastrointestinal: Negative for abdominal pain, constipation, diarrhea, nausea and vomiting  Genitourinary: Negative for difficulty urinating and dysuria  Musculoskeletal: Positive for joint swelling  Negative for arthralgias and myalgias  Skin: Negative for color change and rash  Neurological: Negative for dizziness, weakness, light-headedness and headaches  Historical Information   The patient history was reviewed as follows:  Past Medical History:   Diagnosis Date    Bulging lumbar disc     Chronic pain     low back    Degenerative disk disease     lumbar spine    Hyperlipidemia     Hypertension     Pinched nerve     lumbar spine    Psychiatric disorder     depression         Past Surgical History:   Procedure Laterality Date    CARPAL TUNNEL RELEASE Bilateral 1998    CHOLECYSTECTOMY  2008    COLONOSCOPY  06/15/2015    COLONOSCOPY  06/02/2015    Internal hemorrhoids, hyperplastic polyps which were removed  Ten year recall recommended June 2025    COLONOSCOPY  09/16/2020    normal terminal ileum, 1 small polyp in the distal sigmoid colon that was hyperplastic  Marcine Grow She did have a small amount of retained stool in the colon  She did have a small amount of retained stool in the colon  A 5 year recall was recommended due to poor prep, September 2025       SPINAL CORD STIMULATOR IMPLANT      2014    WISDOM TOOTH EXTRACTION Bilateral 1982     Family History   Problem Relation Age of Onset    Skin cancer Mother     Heart disease Father     Breast cancer Maternal Aunt 29    Breast cancer Sister 64    BRCA1 Negative Sister     BRCA2 Negative Sister     Prostate cancer Maternal Uncle     Cancer Maternal Uncle         BLADDER    Colon cancer Maternal Uncle     Colon polyps Neg Hx       Social History   Social History     Substance and Sexual Activity   Alcohol Use Yes     Social History     Substance and Sexual Activity   Drug Use No     Social History     Tobacco Use   Smoking Status Current Every Day Smoker    Packs/day: 1 00    Years: 43 00    Pack years: 43 00    Types: Cigarettes    Start date: 1977   Smokeless Tobacco Never Used   Tobacco Comment    smokes 10 cigs daily as of 10/20       Medications:     Current Outpatient Medications:     atorvastatin (LIPITOR) 80 mg tablet, Take 1 tablet (80 mg total) by mouth daily, Disp: 90 tablet, Rfl: 1    cholestyramine (QUESTRAN) 4 g packet, Take 1 packet (4 g total) by mouth daily at bedtime, Disp: 30 packet, Rfl: 2    dicyclomine (BENTYL) 20 mg tablet, Take 1 tablet (20 mg total) by mouth every 6 (six) hours as needed (pain, cramping), Disp: 90 tablet, Rfl: 1    sertraline (ZOLOFT) 100 mg tablet, Take 1 tablet (100 mg total) by mouth daily, Disp: 90 tablet, Rfl: 1    sertraline (ZOLOFT) 50 mg tablet, Take 1 tablet (50 mg total) by mouth daily, Disp: 90 tablet, Rfl: 1    lisinopril (ZESTRIL) 10 mg tablet, Take 1 tablet (10 mg total) by mouth daily, Disp: 90 tablet, Rfl: 1    LORazepam (ATIVAN) 0 5 mg tablet, Take 1 tablet (0 5 mg total) by mouth daily at bedtime, Disp: 30 tablet, Rfl: 1    meloxicam (MOBIC) 15 mg tablet, Take 1 tablet (15 mg total) by mouth daily (Patient not taking: Reported on 9/13/2021), Disp: 20 tablet, Rfl: 0    pramipexole (MIRAPEX) 0 5 mg tablet, Take 1 tablet (0 5 mg total) by mouth daily, Disp: 90 tablet, Rfl: 1    Allergies   Allergen Reactions    Penicillins Hives and Throat Swelling    Meloxicam Hives and Itching       OBJECTIVE  Vitals:   Vitals:    09/13/21 1501   BP: 124/76   BP Location: Right arm   Patient Position: Sitting   Cuff Size: Large   Pulse: 100   Resp: 16   Temp: 98 7 °F (37 1 °C)   TempSrc: Tympanic   SpO2: 95%   Weight: 90 6 kg (199 lb 12 8 oz)   Height: 5' 2" (1 575 m)         Physical Exam  Constitutional:       Appearance: She is well-developed  HENT:      Head: Normocephalic and atraumatic  Eyes:      Extraocular Movements: Extraocular movements intact  Cardiovascular:      Rate and Rhythm: Normal rate and regular rhythm  Heart sounds: Normal heart sounds  Pulmonary:      Effort: Pulmonary effort is normal  No respiratory distress  Breath sounds: Normal breath sounds  No wheezing  Abdominal:      General: Bowel sounds are normal  There is no distension  Palpations: Abdomen is soft  Tenderness: There is no abdominal tenderness  Musculoskeletal:         General: Normal range of motion  Cervical back: Normal range of motion and neck supple  Right knee: Swelling, bony tenderness and crepitus present  Tenderness present over the lateral joint line  Skin:     General: Skin is warm and dry  Neurological:      Mental Status: She is alert and oriented to person, place, and time     Psychiatric:         Behavior: Behavior normal                     Bradley Frankel, DO    9/13/2021

## 2021-09-14 ENCOUNTER — OFFICE VISIT (OUTPATIENT)
Dept: OBGYN CLINIC | Facility: CLINIC | Age: 56
End: 2021-09-14
Payer: COMMERCIAL

## 2021-09-14 ENCOUNTER — APPOINTMENT (OUTPATIENT)
Dept: RADIOLOGY | Facility: CLINIC | Age: 56
End: 2021-09-14
Payer: COMMERCIAL

## 2021-09-14 VITALS
SYSTOLIC BLOOD PRESSURE: 120 MMHG | DIASTOLIC BLOOD PRESSURE: 72 MMHG | HEIGHT: 62 IN | WEIGHT: 200 LBS | BODY MASS INDEX: 36.8 KG/M2

## 2021-09-14 DIAGNOSIS — G89.29 CHRONIC PAIN OF RIGHT KNEE: ICD-10-CM

## 2021-09-14 DIAGNOSIS — M25.561 RIGHT KNEE PAIN, UNSPECIFIED CHRONICITY: ICD-10-CM

## 2021-09-14 DIAGNOSIS — M25.561 CHRONIC PAIN OF RIGHT KNEE: ICD-10-CM

## 2021-09-14 DIAGNOSIS — M17.11 PRIMARY OSTEOARTHRITIS OF RIGHT KNEE: Primary | ICD-10-CM

## 2021-09-14 PROCEDURE — 99243 OFF/OP CNSLTJ NEW/EST LOW 30: CPT | Performed by: ORTHOPAEDIC SURGERY

## 2021-09-14 PROCEDURE — 20610 DRAIN/INJ JOINT/BURSA W/O US: CPT | Performed by: ORTHOPAEDIC SURGERY

## 2021-09-14 PROCEDURE — 73560 X-RAY EXAM OF KNEE 1 OR 2: CPT

## 2021-09-14 RX ORDER — LIDOCAINE HYDROCHLORIDE 10 MG/ML
7 INJECTION, SOLUTION EPIDURAL; INFILTRATION; INTRACAUDAL; PERINEURAL
Status: COMPLETED | OUTPATIENT
Start: 2021-09-14 | End: 2021-09-14

## 2021-09-14 RX ORDER — BETAMETHASONE SODIUM PHOSPHATE AND BETAMETHASONE ACETATE 3; 3 MG/ML; MG/ML
6 INJECTION, SUSPENSION INTRA-ARTICULAR; INTRALESIONAL; INTRAMUSCULAR; SOFT TISSUE
Status: COMPLETED | OUTPATIENT
Start: 2021-09-14 | End: 2021-09-14

## 2021-09-14 RX ADMIN — LIDOCAINE HYDROCHLORIDE 7 ML: 10 INJECTION, SOLUTION EPIDURAL; INFILTRATION; INTRACAUDAL; PERINEURAL at 12:29

## 2021-09-14 RX ADMIN — BETAMETHASONE SODIUM PHOSPHATE AND BETAMETHASONE ACETATE 6 MG: 3; 3 INJECTION, SUSPENSION INTRA-ARTICULAR; INTRALESIONAL; INTRAMUSCULAR; SOFT TISSUE at 12:29

## 2021-09-14 NOTE — LETTER
September 14, 2021     [place], [address] [place] [address]    Patient: Solomon Lawrence   YOB: 1965   Date of Visit: 9/14/2021       Dear Dr Tucker Min: Thank you for referring Maximiliano Pratt to me for evaluation  Below are my notes for this consultation  If you have questions, please do not hesitate to call me  I look forward to following your patient along with you           Sincerely,        Joseph Washington MD        CC: No Recipients

## 2021-09-14 NOTE — PROGRESS NOTES
Assessment:     1  Primary osteoarthritis of right knee    2  Chronic pain of right knee        Plan:     Problem List Items Addressed This Visit        Musculoskeletal and Integument    Primary osteoarthritis of right knee - Primary     Findings consistent with right knee primary osteoarthritis/effusion  Imaging and prognosis reviewed with patient  Start with aspiration 5 cc from knee followed by cortisone injection, patient tolerated procedure well  Ice knee over next few days  Avoid high impact activities, kneeling, crawling, repetitive stairs  She can use OTC nsaids, voltaren gel for pain  Low impact exercises such as stationary bike, elliptical recommended for exercise  Can repeat CSI in 3-4 months if effective, other option would be gel injections  Patient can set 3 months follow up but if doing well can cancel  All patient's questions were answered to her satisfaction  This note is created using dictation transcription  It may contain typographical errors, grammatical errors, improperly dictated words, background noise and other errors  Relevant Medications    lidocaine (PF) (XYLOCAINE-MPF) 1 % injection 7 mL (Completed)    betamethasone acetate-betamethasone sodium phosphate (CELESTONE) injection 6 mg (Completed)    Other Relevant Orders    XR knee 1 or 2 vw right    Large joint arthrocentesis: R knee (Completed)       Other    Chronic pain of right knee          Subjective:     Patient ID: Maddie Washington is a 63 y o  female  Chief Complaint:  63 yr old female in for evaluation of right knee pain  Referred by Nica Kauffman  She denies any injury or trauma, just gradual increase in knee pain over past few weeks  She is   Pain primarily anterior knee region and worse with standing, walking, stairs, getting up from seated positions  Knee will swell, worse at end of work day  She notes crunching, cracking, popping in knee and declines locking or instability   She has had 2 cortisone injections last was 2 yrs ago which offered good relief  She will use OTC medications as needed  Information on patient's intake form was reviewed  Allergy:  Allergies   Allergen Reactions    Penicillins Hives and Throat Swelling    Meloxicam Hives and Itching     Medications:  all current active meds have been reviewed  Past Medical History:  Past Medical History:   Diagnosis Date    Bulging lumbar disc     Chronic pain     low back    Degenerative disk disease     lumbar spine    Hyperlipidemia     Hypertension     Pinched nerve     lumbar spine    Psychiatric disorder     depression     Past Surgical History:  Past Surgical History:   Procedure Laterality Date    CARPAL TUNNEL RELEASE Bilateral 1998    CHOLECYSTECTOMY  2008    COLONOSCOPY  06/15/2015    COLONOSCOPY  06/02/2015    Internal hemorrhoids, hyperplastic polyps which were removed  Ten year recall recommended June 2025    COLONOSCOPY  09/16/2020    normal terminal ileum, 1 small polyp in the distal sigmoid colon that was hyperplastic  Bartholome Pinks She did have a small amount of retained stool in the colon  She did have a small amount of retained stool in the colon  A 5 year recall was recommended due to poor prep, September 2025       SPINAL CORD STIMULATOR IMPLANT      2014    WISDOM TOOTH EXTRACTION Bilateral 1982     Family History:  Family History   Problem Relation Age of Onset    Skin cancer Mother     Heart disease Father     Breast cancer Maternal Aunt 29    Breast cancer Sister 64    BRCA1 Negative Sister     BRCA2 Negative Sister     Prostate cancer Maternal Uncle     Cancer Maternal Uncle         BLADDER    Colon cancer Maternal Uncle     Colon polyps Neg Hx      Social History:  Social History     Substance and Sexual Activity   Alcohol Use Yes     Social History     Substance and Sexual Activity   Drug Use No     Social History     Tobacco Use   Smoking Status Current Every Day Smoker    Packs/day: 1 00    Years: 43 00    Pack years: 43 00    Types: Cigarettes    Start date: 1977   Smokeless Tobacco Never Used   Tobacco Comment    smokes 10 cigs daily as of 10/20     Review of Systems   Constitutional: Negative for chills and fever  HENT: Negative for ear pain and sore throat  Eyes: Negative for pain and visual disturbance  Respiratory: Negative for cough and shortness of breath  Cardiovascular: Negative for chest pain and palpitations  Gastrointestinal: Negative for abdominal pain and vomiting  Genitourinary: Negative for dysuria and hematuria  Musculoskeletal: Positive for arthralgias (Right knee), gait problem ( antalgic) and joint swelling (Right knee)  Negative for back pain  Skin: Negative for color change and rash  Neurological: Negative for seizures and syncope  Psychiatric/Behavioral: Negative  All other systems reviewed and are negative  Objective:  BP Readings from Last 1 Encounters:   09/14/21 120/72      Wt Readings from Last 1 Encounters:   09/14/21 90 7 kg (200 lb)      BMI:   Estimated body mass index is 36 58 kg/m² as calculated from the following:    Height as of this encounter: 5' 2" (1 575 m)  Weight as of this encounter: 90 7 kg (200 lb)  BSA:   Estimated body surface area is 1 91 meters squared as calculated from the following:    Height as of this encounter: 5' 2" (1 575 m)  Weight as of this encounter: 90 7 kg (200 lb)  Physical Exam  Vitals and nursing note reviewed  Constitutional:       Appearance: Normal appearance  She is well-developed  HENT:      Head: Normocephalic and atraumatic  Right Ear: External ear normal       Left Ear: External ear normal    Eyes:      Extraocular Movements: Extraocular movements intact  Conjunctiva/sclera: Conjunctivae normal    Pulmonary:      Effort: Pulmonary effort is normal    Musculoskeletal:      Cervical back: Neck supple  Right knee: No effusion ( trace)        Instability Tests: [facility] test negative and lateral Rolf test negative  Skin:     General: Skin is warm and dry  Neurological:      Mental Status: She is alert and oriented to person, place, and time  Deep Tendon Reflexes: Reflexes are normal and symmetric  Psychiatric:         Mood and Affect: Mood normal          Behavior: Behavior normal        Right Knee Exam     Muscle Strength   The patient has normal right knee strength  Tenderness   The patient is experiencing tenderness in the patella, medial joint line and lateral joint line  Range of Motion   Extension: normal Right knee extension: pain  Flexion: normal     Tests   Rolf:  Medial - negative Lateral - negative  Varus: negative Valgus: negative  Patellar apprehension: negative    Other   Erythema: absent  Scars: absent  Sensation: normal  Pulse: present  Swelling: mild  Effusion: no effusion ( trace) present    Comments: Well tracking patella with crepitation             I have personally reviewed pertinent films in PACS and my interpretation is xr right knee demonstrates mild to moderate degenerative changes throughtout the knee with tricompartmental osteophytes   Large joint arthrocentesis: R knee  Universal Protocol:  Consent: Verbal consent obtained    Risks and benefits: risks, benefits and alternatives were discussed  Consent given by: patient  Patient understanding: patient states understanding of the procedure being performed  Site marked: the operative site was marked  Supporting Documentation  Indications: pain   Procedure Details  Location: knee - R knee  Preparation: Patient was prepped and draped in the usual sterile fashion  Needle size: 22 G  Ultrasound guidance: no  Approach: Superolateral   Medications administered: 7 mL lidocaine (PF) 1 %; 6 mg betamethasone acetate-betamethasone sodium phosphate 6 (3-3) mg/mL (5 cc lidocaine was used for local anesthetic)    Aspirate amount: 5 mL  Aspirate: yellow and clear    Patient tolerance: patient tolerated the procedure well with no immediate complications  Dressing:  Sterile dressing applied        Scribe Attestation    I,:  rSi De am acting as a scribe while in the presence of the attending physician :       I,:  Edel Torres MD personally performed the services described in this documentation    as scribed in my presence :

## 2021-09-14 NOTE — ASSESSMENT & PLAN NOTE
Findings consistent with right knee primary osteoarthritis/effusion  Imaging and prognosis reviewed with patient  Start with aspiration 5 cc from knee followed by cortisone injection, patient tolerated procedure well  Ice knee over next few days  Avoid high impact activities, kneeling, crawling, repetitive stairs  She can use OTC nsaids, voltaren gel for pain  Low impact exercises such as stationary bike, elliptical recommended for exercise  Can repeat CSI in 3-4 months if effective, other option would be gel injections  Patient can set 3 months follow up but if doing well can cancel  All patient's questions were answered to her satisfaction  This note is created using dictation transcription  It may contain typographical errors, grammatical errors, improperly dictated words, background noise and other errors

## 2021-09-15 LAB
ALBUMIN SERPL-MCNC: 4.3 G/DL (ref 3.8–4.9)
ALBUMIN/GLOB SERPL: 2.2 {RATIO} (ref 1.2–2.2)
ALP SERPL-CCNC: 87 IU/L (ref 44–121)
ALT SERPL-CCNC: 25 IU/L (ref 0–32)
APPEARANCE UR: CLEAR
AST SERPL-CCNC: 21 IU/L (ref 0–40)
BACTERIA URNS QL MICRO: NORMAL
BASOPHILS # BLD AUTO: 0.1 X10E3/UL (ref 0–0.2)
BASOPHILS NFR BLD AUTO: 1 %
BILIRUB SERPL-MCNC: 0.2 MG/DL (ref 0–1.2)
BILIRUB UR QL STRIP: NEGATIVE
BUN SERPL-MCNC: 14 MG/DL (ref 6–24)
BUN/CREAT SERPL: 19 (ref 9–23)
CALCIUM SERPL-MCNC: 9.5 MG/DL (ref 8.7–10.2)
CASTS URNS QL MICRO: NORMAL /LPF
CHLORIDE SERPL-SCNC: 102 MMOL/L (ref 96–106)
CHOLEST SERPL-MCNC: 169 MG/DL (ref 100–199)
CHOLEST/HDLC SERPL: 5 RATIO (ref 0–4.4)
CO2 SERPL-SCNC: 28 MMOL/L (ref 20–29)
COLOR UR: YELLOW
CREAT SERPL-MCNC: 0.74 MG/DL (ref 0.57–1)
EOSINOPHIL # BLD AUTO: 0.3 X10E3/UL (ref 0–0.4)
EOSINOPHIL NFR BLD AUTO: 5 %
EPI CELLS #/AREA URNS HPF: NORMAL /HPF (ref 0–10)
ERYTHROCYTE [DISTWIDTH] IN BLOOD BY AUTOMATED COUNT: 13.2 % (ref 11.7–15.4)
EST. AVERAGE GLUCOSE BLD GHB EST-MCNC: 120 MG/DL
GLOBULIN SER-MCNC: 2 G/DL (ref 1.5–4.5)
GLUCOSE SERPL-MCNC: 102 MG/DL (ref 65–99)
GLUCOSE UR QL: NEGATIVE
HBA1C MFR BLD: 5.8 % (ref 4.8–5.6)
HCT VFR BLD AUTO: 39.4 % (ref 34–46.6)
HDLC SERPL-MCNC: 34 MG/DL
HGB BLD-MCNC: 13.5 G/DL (ref 11.1–15.9)
HGB UR QL STRIP: ABNORMAL
IMM GRANULOCYTES # BLD: 0 X10E3/UL (ref 0–0.1)
IMM GRANULOCYTES NFR BLD: 0 %
KETONES UR QL STRIP: NEGATIVE
LDLC SERPL CALC-MCNC: 88 MG/DL (ref 0–99)
LDLC SERPL DIRECT ASSAY-MCNC: 75 MG/DL (ref 0–99)
LEUKOCYTE ESTERASE UR QL STRIP: ABNORMAL
LYMPHOCYTES # BLD AUTO: 1.9 X10E3/UL (ref 0.7–3.1)
LYMPHOCYTES NFR BLD AUTO: 28 %
MCH RBC QN AUTO: 32.6 PG (ref 26.6–33)
MCHC RBC AUTO-ENTMCNC: 34.3 G/DL (ref 31.5–35.7)
MCV RBC AUTO: 95 FL (ref 79–97)
MICRO URNS: ABNORMAL
MONOCYTES # BLD AUTO: 0.5 X10E3/UL (ref 0.1–0.9)
MONOCYTES NFR BLD AUTO: 8 %
NEUTROPHILS # BLD AUTO: 3.9 X10E3/UL (ref 1.4–7)
NEUTROPHILS NFR BLD AUTO: 58 %
NITRITE UR QL STRIP: NEGATIVE
PH UR STRIP: 6.5 [PH] (ref 5–7.5)
PLATELET # BLD AUTO: 172 X10E3/UL (ref 150–450)
POTASSIUM SERPL-SCNC: 4.2 MMOL/L (ref 3.5–5.2)
PROT SERPL-MCNC: 6.3 G/DL (ref 6–8.5)
PROT UR QL STRIP: NEGATIVE
RBC # BLD AUTO: 4.14 X10E6/UL (ref 3.77–5.28)
RBC #/AREA URNS HPF: NORMAL /HPF (ref 0–2)
SL AMB EGFR AFRICAN AMERICAN: 105 ML/MIN/1.73
SL AMB EGFR NON AFRICAN AMERICAN: 91 ML/MIN/1.73
SL AMB VLDL CHOLESTEROL CALC: 47 MG/DL (ref 5–40)
SODIUM SERPL-SCNC: 141 MMOL/L (ref 134–144)
SP GR UR: 1.01 (ref 1–1.03)
TRIGL SERPL-MCNC: 281 MG/DL (ref 0–149)
TSH SERPL DL<=0.005 MIU/L-ACNC: 3.16 UIU/ML (ref 0.45–4.5)
UROBILINOGEN UR STRIP-ACNC: 0.2 MG/DL (ref 0.2–1)
WBC # BLD AUTO: 6.8 X10E3/UL (ref 3.4–10.8)
WBC #/AREA URNS HPF: NORMAL /HPF (ref 0–5)

## 2021-09-16 DIAGNOSIS — F41.1 GAD (GENERALIZED ANXIETY DISORDER): ICD-10-CM

## 2021-09-16 LAB
B BURGDOR IGG+IGM SER-ACNC: <0.91 ISR (ref 0–0.9)
CCP IGA+IGG SERPL IA-ACNC: 8 UNITS (ref 0–19)
ERYTHROCYTE [SEDIMENTATION RATE] IN BLOOD BY WESTERGREN METHOD: 5 MM/HR (ref 0–40)
RHEUMATOID FACT SERPL-ACNC: <10 IU/ML (ref 0–13.9)
URATE SERPL-MCNC: 4.5 MG/DL (ref 3–7.2)

## 2021-09-16 RX ORDER — LORAZEPAM 0.5 MG/1
0.5 TABLET ORAL
Qty: 30 TABLET | Refills: 0 | Status: SHIPPED | OUTPATIENT
Start: 2021-09-16 | End: 2021-10-13 | Stop reason: SDUPTHER

## 2021-09-20 ENCOUNTER — OFFICE VISIT (OUTPATIENT)
Dept: FAMILY MEDICINE CLINIC | Facility: CLINIC | Age: 56
End: 2021-09-20
Payer: COMMERCIAL

## 2021-09-20 VITALS
SYSTOLIC BLOOD PRESSURE: 118 MMHG | HEIGHT: 62 IN | BODY MASS INDEX: 35.96 KG/M2 | OXYGEN SATURATION: 98 % | WEIGHT: 195.4 LBS | RESPIRATION RATE: 18 BRPM | HEART RATE: 85 BPM | TEMPERATURE: 98.1 F | DIASTOLIC BLOOD PRESSURE: 76 MMHG

## 2021-09-20 DIAGNOSIS — I10 ESSENTIAL HYPERTENSION: ICD-10-CM

## 2021-09-20 DIAGNOSIS — G25.81 RESTLESS LEG SYNDROME: ICD-10-CM

## 2021-09-20 DIAGNOSIS — F17.210 CIGARETTE NICOTINE DEPENDENCE WITHOUT COMPLICATION: ICD-10-CM

## 2021-09-20 DIAGNOSIS — R91.8 PULMONARY NODULES: ICD-10-CM

## 2021-09-20 DIAGNOSIS — Z00.00 ANNUAL PHYSICAL EXAM: Primary | ICD-10-CM

## 2021-09-20 DIAGNOSIS — Z23 NEED FOR IMMUNIZATION AGAINST INFLUENZA: ICD-10-CM

## 2021-09-20 DIAGNOSIS — M17.11 PRIMARY OSTEOARTHRITIS OF RIGHT KNEE: ICD-10-CM

## 2021-09-20 DIAGNOSIS — R73.01 IFG (IMPAIRED FASTING GLUCOSE): ICD-10-CM

## 2021-09-20 DIAGNOSIS — E03.8 SUBCLINICAL HYPOTHYROIDISM: ICD-10-CM

## 2021-09-20 DIAGNOSIS — E78.2 MIXED HYPERLIPIDEMIA: ICD-10-CM

## 2021-09-20 DIAGNOSIS — F43.23 ADJUSTMENT DISORDER WITH MIXED ANXIETY AND DEPRESSED MOOD: ICD-10-CM

## 2021-09-20 DIAGNOSIS — I25.10 MILD CAD: ICD-10-CM

## 2021-09-20 PROCEDURE — 99396 PREV VISIT EST AGE 40-64: CPT | Performed by: FAMILY MEDICINE

## 2021-09-20 PROCEDURE — 90471 IMMUNIZATION ADMIN: CPT

## 2021-09-20 PROCEDURE — 90682 RIV4 VACC RECOMBINANT DNA IM: CPT

## 2021-09-20 NOTE — PROGRESS NOTES
[address] [facility]    NAME: Rony Taylor  AGE: 63 y o  SEX: female  : 1965     DATE: 2021     Assessment and Plan:     Problem List Items Addressed This Visit        Endocrine    Subclinical hypothyroidism     TSH normal now  Will continue to monitor          IFG (impaired fasting glucose)     Lab Results   Component Value Date    HGBA1C 5 8 (H) 2021       Stable from last check will continue lifestyle modifications          Relevant Orders    Lipid panel    Comprehensive metabolic panel    Hemoglobin A1C       Cardiovascular and Mediastinum    Essential hypertension     Stable continue medication as prescribed          Relevant Orders    Lipid panel    Comprehensive metabolic panel    Hemoglobin A1C    Mild CAD     Stress test was normal  Has est with cardiology and encourage to continue at least yearly follow up with them             Musculoskeletal and Integument    Primary osteoarthritis of right knee     S/p injection by ortho  Continue follow up  Referral to PT         Relevant Orders    Ambulatory referral to Physical Therapy       Other    Mixed hyperlipidemia     Continue high dose statin  Watch diet more closely          Relevant Orders    Lipid panel    Comprehensive metabolic panel    Hemoglobin A1C    Restless leg syndrome    Current cigarette smoker    Adjustment disorder with mixed anxiety and depressed mood     Stable on zoloft and ativan as needed          Pulmonary nodules     Overdue for CT chest follow up,  Reminded patient to complete and follow up with Pulm            Other Visit Diagnoses     Annual physical exam    -  Primary    Need for immunization against influenza        Relevant Orders    influenza vaccine, quadrivalent, recombinant, PF, 0 5 mL, for patients 18 yr+ (FLUBLOK) (Completed)          Immunizations and preventive care screenings were discussed with patient today   Appropriate education was printed on patient's after visit summary  Counseling:  Alcohol/drug use: discussed moderation in alcohol intake, the recommendations for healthy alcohol use, and avoidance of illicit drug use  Dental Health: discussed importance of regular tooth brushing, flossing, and dental visits  Injury prevention: discussed safety/seat belts, safety helmets, smoke detectors, carbon dioxide detectors, and smoking near bedding or upholstery  Sexual health: discussed sexually transmitted diseases, partner selection, use of condoms, avoidance of unintended pregnancy, and contraceptive alternatives  · Exercise: the importance of regular exercise/physical activity was discussed  Recommend exercise 3-5 times per week for at least 30 minutes  Return in about 4 months (around 2022) for Medication check up  Chief Complaint:     Chief Complaint   Patient presents with    Physical Exam     No new or ongoing issues to be addressed today  History of Present Illness:     Adult Annual Physical   Patient here for a comprehensive physical exam      Diet and Physical Activity  · Diet/Nutrition: well balanced diet  · Exercise: moderate cardiovascular exercise  Depression Screening  PHQ-9 Depression Screening    PHQ-9:   Frequency of the following problems over the past two weeks:      Little interest or pleasure in doing things: 0 - not at all  Feeling down, depressed, or hopeless: 0 - not at all  Trouble falling or staying asleep, or sleeping too much: 0 - not at all  Feeling tired or having little energy: 0 - not at all  Poor appetite or overeatin - not at all  Feeling bad about yourself - or that you are a failure or have let yourself or your family down: 0 - not at all  Trouble concentrating on things, such as reading the newspaper or watching television: 0 - not at all  Moving or speaking so slowly that other people could have noticed   Or the opposite - being so fidgety or restless that you have been moving around a lot more than usual: 0 - not at all  Thoughts that you would be better off dead, or of hurting yourself in some way: 0 - not at all  PHQ-2 Score: 0  PHQ-9 Score: 0          /GYN Health  · Follows with GYN at [facility]  47 of Systems:     Review of Systems   Constitutional: Negative for chills, fatigue and fever  HENT: Negative for congestion, postnasal drip, rhinorrhea and sinus pressure  Eyes: Negative for photophobia and visual disturbance  Respiratory: Negative for cough and shortness of breath  Cardiovascular: Negative for chest pain, palpitations and leg swelling  Gastrointestinal: Negative for abdominal pain, constipation, diarrhea, nausea and vomiting  Genitourinary: Negative for difficulty urinating and dysuria  Musculoskeletal: Negative for arthralgias and myalgias  Skin: Negative for color change and rash  Neurological: Negative for dizziness, weakness, light-headedness and headaches  Past Medical History:     Past Medical History:   Diagnosis Date    Bulging lumbar disc     Chronic pain     low back    Degenerative disk disease     lumbar spine    Hyperlipidemia     Hypertension     Pinched nerve     lumbar spine    Psychiatric disorder     depression      Past Surgical History:     Past Surgical History:   Procedure Laterality Date    CARPAL TUNNEL RELEASE Bilateral 1998    CHOLECYSTECTOMY  2008    COLONOSCOPY  06/15/2015    COLONOSCOPY  06/02/2015    Internal hemorrhoids, hyperplastic polyps which were removed  Ten year recall recommended June 2025    COLONOSCOPY  09/16/2020    normal terminal ileum, 1 small polyp in the distal sigmoid colon that was hyperplastic  Georgette Ormond She did have a small amount of retained stool in the colon  She did have a small amount of retained stool in the colon  A 5 year recall was recommended due to poor prep, September 2025       SPINAL CORD STIMULATOR IMPLANT      2014    WISDOM TOOTH EXTRACTION Bilateral 17      Social History:     Social History     Socioeconomic History    Marital status:      Spouse name: None    Number of children: 1    Years of education: None    Highest education level: None   Occupational History    Occupation: not working    Tobacco Use    Smoking status: Current Every Day Smoker     Packs/day: 1 00     Years: 43 00     Pack years: 43 00     Types: Cigarettes     Start date: 1977    Smokeless tobacco: Never Used    Tobacco comment: smokes 10 cigs daily as of 10/20   Vaping Use    Vaping Use: Never used   Substance and Sexual Activity    Alcohol use: Yes    Drug use: No    Sexual activity: Yes     Partners: Male     Birth control/protection: None   Other Topics Concern    None   Social History Narrative         Social Determinants of Health     Financial Resource Strain: Low Risk     Difficulty of Paying Living Expenses: Not very hard   Food Insecurity: No Food Insecurity    Worried About Running Out of Food in the Last Year: Never true    Patricia of Food in the Last Year: Never true   Transportation Needs: No Transportation Needs    Lack of Transportation (Medical): No    Lack of Transportation (Non-Medical):  No   Physical Activity: Insufficiently Active    Days of Exercise per Week: 3 days    Minutes of Exercise per Session: 30 min   Stress: No Stress Concern Present    Feeling of Stress : Not at all   Social Connections: Socially Isolated    Frequency of Communication with Friends and Family: More than three times a week    Frequency of Social Gatherings with Friends and Family: Never    Attends Tenriism Services: Never    Active Member of Clubs or Organizations: No    Attends Club or Organization Meetings: Never    Marital Status:    Intimate Partner Violence: Not At Risk    Fear of Current or Ex-Partner: No    Emotionally Abused: No    Physically Abused: No    Sexually Abused: No      Family History:     Family History   Problem Relation Age of Onset    Skin cancer Mother     Heart disease Father     Breast cancer Maternal Aunt 29    Breast cancer Sister 64    BRCA1 Negative Sister     BRCA2 Negative Sister     Prostate cancer Maternal Uncle     Cancer Maternal Uncle         BLADDER    Colon cancer Maternal Uncle     Colon polyps Neg Hx       Current Medications:     Current Outpatient Medications   Medication Sig Dispense Refill    atorvastatin (LIPITOR) 80 mg tablet Take 1 tablet (80 mg total) by mouth daily 90 tablet 1    cholestyramine (QUESTRAN) 4 g packet Take 1 packet (4 g total) by mouth daily at bedtime 30 packet 2    dicyclomine (BENTYL) 20 mg tablet Take 1 tablet (20 mg total) by mouth every 6 (six) hours as needed (pain, cramping) 90 tablet 1    LORazepam (ATIVAN) 0 5 mg tablet Take 1 tablet (0 5 mg total) by mouth daily at bedtime 30 tablet 0    sertraline (ZOLOFT) 100 mg tablet Take 1 tablet (100 mg total) by mouth daily 90 tablet 1    sertraline (ZOLOFT) 50 mg tablet Take 1 tablet (50 mg total) by mouth daily 90 tablet 1    lisinopril (ZESTRIL) 10 mg tablet Take 1 tablet (10 mg total) by mouth daily 90 tablet 1    pramipexole (MIRAPEX) 0 5 mg tablet Take 1 tablet (0 5 mg total) by mouth daily 90 tablet 1     No current facility-administered medications for this visit  Allergies: Allergies   Allergen Reactions    Penicillins Hives and Throat Swelling    Meloxicam Hives and Itching      Physical Exam:     /76 (BP Location: Left arm, Patient Position: Sitting, Cuff Size: Large)   Pulse 85   Temp 98 1 °F (36 7 °C) (Tympanic)   Resp 18   Ht 5' 2 21" (1 58 m)   Wt 88 6 kg (195 lb 6 4 oz)   LMP  (LMP Unknown)   SpO2 98%   Breastfeeding No   BMI 35 50 kg/m²     Physical Exam  Constitutional:       General: She is not in acute distress  Appearance: Normal appearance  She is not ill-appearing, toxic-appearing or diaphoretic  HENT:      Head: Normocephalic and atraumatic        Right Ear: Tympanic membrane and ear canal normal       Left Ear: Tympanic membrane and ear canal normal       Nose: Nose normal  No congestion  Mouth/Throat:      Mouth: Mucous membranes are moist       Pharynx: Oropharynx is clear  No oropharyngeal exudate  Eyes:      Extraocular Movements: Extraocular movements intact  Conjunctiva/sclera: Conjunctivae normal       Pupils: Pupils are equal, round, and reactive to light  Cardiovascular:      Rate and Rhythm: Normal rate and regular rhythm  Pulses: Normal pulses  Heart sounds: No murmur heard  Pulmonary:      Effort: Pulmonary effort is normal       Breath sounds: Normal breath sounds  No wheezing, rhonchi or rales  Abdominal:      General: Bowel sounds are normal  There is no distension  Palpations: Abdomen is soft  Tenderness: There is no abdominal tenderness  Musculoskeletal:         General: No swelling or tenderness  Normal range of motion  Cervical back: Normal range of motion and neck supple  Skin:     General: Skin is warm and dry  Capillary Refill: Capillary refill takes less than 2 seconds  Neurological:      General: No focal deficit present  Mental Status: She is alert and oriented to person, place, and time  Cranial Nerves: No cranial nerve deficit  Psychiatric:         Mood and Affect: Mood normal          Behavior: Behavior normal          Thought Content:  Thought content normal           Mireya Morris DO  [address]

## 2021-09-20 NOTE — PATIENT INSTRUCTIONS

## 2021-09-20 NOTE — ASSESSMENT & PLAN NOTE
Stress test was normal  Has est with cardiology and encourage to continue at least yearly follow up with them

## 2021-09-20 NOTE — ASSESSMENT & PLAN NOTE
Lab Results   Component Value Date    HGBA1C 5 8 (H) 09/14/2021       Stable from last check will continue lifestyle modifications

## 2021-09-29 ENCOUNTER — OFFICE VISIT (OUTPATIENT)
Dept: OBGYN CLINIC | Facility: CLINIC | Age: 56
End: 2021-09-29
Payer: COMMERCIAL

## 2021-09-29 VITALS
BODY MASS INDEX: 36.44 KG/M2 | SYSTOLIC BLOOD PRESSURE: 122 MMHG | HEIGHT: 62 IN | WEIGHT: 198 LBS | DIASTOLIC BLOOD PRESSURE: 72 MMHG

## 2021-09-29 DIAGNOSIS — M17.11 PRIMARY OSTEOARTHRITIS OF RIGHT KNEE: Primary | ICD-10-CM

## 2021-09-29 DIAGNOSIS — M25.561 CHRONIC PAIN OF RIGHT KNEE: ICD-10-CM

## 2021-09-29 DIAGNOSIS — G89.29 CHRONIC PAIN OF RIGHT KNEE: ICD-10-CM

## 2021-09-29 PROCEDURE — 3074F SYST BP LT 130 MM HG: CPT | Performed by: ORTHOPAEDIC SURGERY

## 2021-09-29 PROCEDURE — 99213 OFFICE O/P EST LOW 20 MIN: CPT | Performed by: ORTHOPAEDIC SURGERY

## 2021-09-29 PROCEDURE — 3078F DIAST BP <80 MM HG: CPT | Performed by: ORTHOPAEDIC SURGERY

## 2021-09-29 PROCEDURE — 3008F BODY MASS INDEX DOCD: CPT | Performed by: ORTHOPAEDIC SURGERY

## 2021-09-29 NOTE — PROGRESS NOTES
Assessment:     1  Primary osteoarthritis of right knee    2  Chronic pain of right knee        Plan:     Problem List Items Addressed This Visit        Musculoskeletal and Integument    Primary osteoarthritis of right knee - Primary     Findings consistent with right knee osteoarthritis  Patient underwent a right knee corticosteroid injection on 09/14/2021  Unfortunately she received no pain relief from the injection  I discussed with the patient this time we can order joint supplementation injections and provide her with a hinged knee brace  She was agreeable to both treatment options  Will refer patient to physical therapy for knee rehabilitation  The pre authorization for joint supplement was prescribed at today's visit  She was fitted for a hinged knee brace at today's visit  I will follow-up with her once the injections are approved  All patient's questions were answered to her satisfaction  This note is created using dictation transcription  It may contain typographical errors, grammatical errors, improperly dictated words, background noise and other errors  Relevant Orders    Injection procedure prior authorization    Brace    Ambulatory referral to Physical Therapy       Other    Chronic pain of right knee         Subjective:     Patient ID: Dennis Guardado is a 63 y o  female  Chief Complaint:  Patient is a 65-year-old who presents the office today for follow-up evaluation her right knee osteoarthritis  She underwent a right knee corticosteroid injection on 09/14/2021 and was provided with no pain relief  She states ,that she will experience daily intermittent sharp pain which is exacerbated with prolonged periods of standing and walking  She states that her pain will shoot down her leg  She states that she will experience pain with ambulating up and down stairs  She states that she is a  and is constantly on her feet for prolonged periods of time    She notes swelling about her knee   She localizes her pain over the medial lateral and anterior aspects of her right knee  She will use Advil to help alleviate her pain  Allergy:  Allergies   Allergen Reactions    Penicillins Hives and Throat Swelling    Meloxicam Hives and Itching     Medications:  all current active meds have been reviewed  Past Medical History:  Past Medical History:   Diagnosis Date    Bulging lumbar disc     Chronic pain     low back    Degenerative disk disease     lumbar spine    Hyperlipidemia     Hypertension     Pinched nerve     lumbar spine    Psychiatric disorder     depression     Past Surgical History:  Past Surgical History:   Procedure Laterality Date    CARPAL TUNNEL RELEASE Bilateral 1998    CHOLECYSTECTOMY  2008    COLONOSCOPY  06/15/2015    COLONOSCOPY  06/02/2015    Internal hemorrhoids, hyperplastic polyps which were removed  Ten year recall recommended June 2025    COLONOSCOPY  09/16/2020    normal terminal ileum, 1 small polyp in the distal sigmoid colon that was hyperplastic  Ferol Jimi She did have a small amount of retained stool in the colon  She did have a small amount of retained stool in the colon  A 5 year recall was recommended due to poor prep, September 2025       SPINAL CORD STIMULATOR IMPLANT      2014    WISDOM TOOTH EXTRACTION Bilateral 1982     Family History:  Family History   Problem Relation Age of Onset    Skin cancer Mother     Heart disease Father     Breast cancer Maternal Aunt 29    Breast cancer Sister 64    BRCA1 Negative Sister     BRCA2 Negative Sister     Prostate cancer Maternal Uncle     Cancer Maternal Uncle         BLADDER    Colon cancer Maternal Uncle     Colon polyps Neg Hx      Social History:  Social History     Substance and Sexual Activity   Alcohol Use Yes     Social History     Substance and Sexual Activity   Drug Use No     Social History     Tobacco Use   Smoking Status Current Every Day Smoker    Packs/day: 1 00    Years: 43 00    Pack years: 43 00    Types: Cigarettes    Start date: 1977   Smokeless Tobacco Never Used   Tobacco Comment    smokes 10 cigs daily as of 10/20     Review of Systems   Constitutional: Positive for activity change  Negative for chills, fever and unexpected weight change  HENT: Negative for hearing loss, nosebleeds and sore throat  Eyes: Negative for pain, redness and visual disturbance  Respiratory: Negative for cough, shortness of breath and wheezing  Cardiovascular: Negative for chest pain, palpitations and leg swelling  Gastrointestinal: Negative for abdominal pain, nausea and vomiting  Endocrine: Negative for polyphagia and polyuria  Genitourinary: Negative for dysuria and hematuria  Musculoskeletal: Positive for arthralgias (Right knee), gait problem (Antalgic) and joint swelling (Right knee)  Skin: Negative for rash and wound  Neurological: Negative for dizziness, numbness and headaches  Psychiatric/Behavioral: Negative for decreased concentration and suicidal ideas  The patient is not nervous/anxious  Objective:  BP Readings from Last 1 Encounters:   09/29/21 122/72      Wt Readings from Last 1 Encounters:   09/29/21 89 8 kg (198 lb)      BMI:   Estimated body mass index is 36 21 kg/m² as calculated from the following:    Height as of this encounter: 5' 2" (1 575 m)  Weight as of this encounter: 89 8 kg (198 lb)  BSA:   Estimated body surface area is 1 9 meters squared as calculated from the following:    Height as of this encounter: 5' 2" (1 575 m)  Weight as of this encounter: 89 8 kg (198 lb)  Physical Exam  Vitals and nursing note reviewed  Constitutional:       Appearance: Normal appearance  She is well-developed  HENT:      Head: Normocephalic and atraumatic  Right Ear: External ear normal       Left Ear: External ear normal    Eyes:      Extraocular Movements: Extraocular movements intact        Conjunctiva/sclera: Conjunctivae normal    Pulmonary: Effort: Pulmonary effort is normal    Musculoskeletal:      Cervical back: Neck supple  Right knee: No effusion  Instability Tests: Medial Rolf test negative and lateral Rolf test negative  Skin:     General: Skin is warm and dry  Neurological:      Mental Status: She is alert and oriented to person, place, and time  Deep Tendon Reflexes: Reflexes are normal and symmetric  Psychiatric:         Mood and Affect: Mood normal          Behavior: Behavior normal        Right Knee Exam     Muscle Strength   The patient has normal right knee strength  Tenderness   The patient is experiencing tenderness in the patella, medial retinaculum and lateral retinaculum  Range of Motion   Extension: 0   Flexion: 120     Tests   Rolf:  Medial - negative Lateral - negative  Varus: negative Valgus: negative  Patellar apprehension: negative    Other   Erythema: absent  Scars: absent  Sensation: normal  Pulse: present  Swelling: mild  Effusion: no effusion present    Comments:  Patellofemoral joint crepitation with knee motion            No new images reviewed today      Scribe Attestation    I,:  Mine Mcneill am acting as a scribe while in the presence of the attending physician :       I,:  Martita Diggs MD personally performed the services described in this documentation    as scribed in my presence :

## 2021-09-29 NOTE — ASSESSMENT & PLAN NOTE
Findings consistent with right knee osteoarthritis  Patient underwent a right knee corticosteroid injection on 09/14/2021  Unfortunately she received no pain relief from the injection  I discussed with the patient this time we can order joint supplementation injections and provide her with a hinged knee brace  She was agreeable to both treatment options  Will refer patient to physical therapy for knee rehabilitation  The pre authorization for joint supplement was prescribed at today's visit  She was fitted for a hinged knee brace at today's visit  I will follow-up with her once the injections are approved  All patient's questions were answered to her satisfaction  This note is created using dictation transcription  It may contain typographical errors, grammatical errors, improperly dictated words, background noise and other errors

## 2021-10-08 ENCOUNTER — TELEPHONE (OUTPATIENT)
Dept: OBGYN CLINIC | Facility: CLINIC | Age: 56
End: 2021-10-08

## 2021-10-11 ENCOUNTER — TELEMEDICINE (OUTPATIENT)
Dept: BEHAVIORAL/MENTAL HEALTH CLINIC | Facility: CLINIC | Age: 56
End: 2021-10-11
Payer: COMMERCIAL

## 2021-10-11 DIAGNOSIS — M17.11 PRIMARY OSTEOARTHRITIS OF RIGHT KNEE: Primary | ICD-10-CM

## 2021-10-11 DIAGNOSIS — F43.23 ADJUSTMENT DISORDER WITH MIXED ANXIETY AND DEPRESSED MOOD: Primary | ICD-10-CM

## 2021-10-11 PROCEDURE — 90834 PSYTX W PT 45 MINUTES: CPT | Performed by: SOCIAL WORKER

## 2021-10-11 RX ORDER — METHYLPREDNISOLONE 4 MG/1
TABLET ORAL
Qty: 21 TABLET | Refills: 0 | Status: SHIPPED | OUTPATIENT
Start: 2021-10-11 | End: 2022-08-01

## 2021-10-13 ENCOUNTER — EVALUATION (OUTPATIENT)
Dept: PHYSICAL THERAPY | Facility: CLINIC | Age: 56
End: 2021-10-13
Payer: COMMERCIAL

## 2021-10-13 DIAGNOSIS — G25.81 RESTLESS LEG SYNDROME: ICD-10-CM

## 2021-10-13 DIAGNOSIS — F41.1 GAD (GENERALIZED ANXIETY DISORDER): ICD-10-CM

## 2021-10-13 DIAGNOSIS — M17.11 PRIMARY LOCALIZED OSTEOARTHRITIS OF RIGHT KNEE: Primary | ICD-10-CM

## 2021-10-13 DIAGNOSIS — M17.11 PRIMARY OSTEOARTHRITIS OF RIGHT KNEE: ICD-10-CM

## 2021-10-13 PROCEDURE — 97161 PT EVAL LOW COMPLEX 20 MIN: CPT | Performed by: PHYSICAL THERAPIST

## 2021-10-13 PROCEDURE — 97110 THERAPEUTIC EXERCISES: CPT | Performed by: PHYSICAL THERAPIST

## 2021-10-14 DIAGNOSIS — I10 ESSENTIAL HYPERTENSION: ICD-10-CM

## 2021-10-14 RX ORDER — LISINOPRIL 10 MG/1
10 TABLET ORAL DAILY
Qty: 90 TABLET | Refills: 1 | Status: SHIPPED | OUTPATIENT
Start: 2021-10-14 | End: 2022-04-18 | Stop reason: SDUPTHER

## 2021-10-14 RX ORDER — LORAZEPAM 0.5 MG/1
0.5 TABLET ORAL
Qty: 30 TABLET | Refills: 0 | Status: SHIPPED | OUTPATIENT
Start: 2021-10-14 | End: 2021-11-17 | Stop reason: SDUPTHER

## 2021-10-14 RX ORDER — PRAMIPEXOLE DIHYDROCHLORIDE 0.5 MG/1
0.5 TABLET ORAL DAILY
Qty: 90 TABLET | Refills: 1 | Status: SHIPPED | OUTPATIENT
Start: 2021-10-14 | End: 2022-03-25 | Stop reason: SDUPTHER

## 2021-10-19 ENCOUNTER — OFFICE VISIT (OUTPATIENT)
Dept: PHYSICAL THERAPY | Facility: CLINIC | Age: 56
End: 2021-10-19
Payer: COMMERCIAL

## 2021-10-19 DIAGNOSIS — M17.11 PRIMARY OSTEOARTHRITIS OF RIGHT KNEE: ICD-10-CM

## 2021-10-19 DIAGNOSIS — M17.11 PRIMARY LOCALIZED OSTEOARTHRITIS OF RIGHT KNEE: Primary | ICD-10-CM

## 2021-10-19 PROCEDURE — 97140 MANUAL THERAPY 1/> REGIONS: CPT | Performed by: PHYSICAL THERAPIST

## 2021-10-19 PROCEDURE — 97110 THERAPEUTIC EXERCISES: CPT | Performed by: PHYSICAL THERAPIST

## 2021-10-19 PROCEDURE — 97010 HOT OR COLD PACKS THERAPY: CPT | Performed by: PHYSICAL THERAPIST

## 2021-10-21 ENCOUNTER — OFFICE VISIT (OUTPATIENT)
Dept: PHYSICAL THERAPY | Facility: CLINIC | Age: 56
End: 2021-10-21
Payer: COMMERCIAL

## 2021-10-21 DIAGNOSIS — M17.11 PRIMARY LOCALIZED OSTEOARTHRITIS OF RIGHT KNEE: Primary | ICD-10-CM

## 2021-10-21 DIAGNOSIS — M17.11 PRIMARY OSTEOARTHRITIS OF RIGHT KNEE: ICD-10-CM

## 2021-10-21 PROCEDURE — 97140 MANUAL THERAPY 1/> REGIONS: CPT | Performed by: PHYSICAL THERAPIST

## 2021-10-21 PROCEDURE — 97112 NEUROMUSCULAR REEDUCATION: CPT | Performed by: PHYSICAL THERAPIST

## 2021-10-21 PROCEDURE — 97110 THERAPEUTIC EXERCISES: CPT | Performed by: PHYSICAL THERAPIST

## 2021-10-25 ENCOUNTER — OFFICE VISIT (OUTPATIENT)
Dept: PHYSICAL THERAPY | Facility: CLINIC | Age: 56
End: 2021-10-25
Payer: COMMERCIAL

## 2021-10-25 DIAGNOSIS — M17.11 PRIMARY LOCALIZED OSTEOARTHRITIS OF RIGHT KNEE: Primary | ICD-10-CM

## 2021-10-25 DIAGNOSIS — M17.11 PRIMARY OSTEOARTHRITIS OF RIGHT KNEE: ICD-10-CM

## 2021-10-25 PROCEDURE — 97110 THERAPEUTIC EXERCISES: CPT | Performed by: PHYSICAL THERAPIST

## 2021-10-25 PROCEDURE — 97140 MANUAL THERAPY 1/> REGIONS: CPT | Performed by: PHYSICAL THERAPIST

## 2021-10-26 ENCOUNTER — PROCEDURE VISIT (OUTPATIENT)
Dept: OBGYN CLINIC | Facility: CLINIC | Age: 56
End: 2021-10-26
Payer: COMMERCIAL

## 2021-10-26 VITALS
DIASTOLIC BLOOD PRESSURE: 74 MMHG | SYSTOLIC BLOOD PRESSURE: 120 MMHG | WEIGHT: 202.4 LBS | BODY MASS INDEX: 37.25 KG/M2 | HEIGHT: 62 IN

## 2021-10-26 DIAGNOSIS — M25.461 EFFUSION OF RIGHT KNEE: ICD-10-CM

## 2021-10-26 DIAGNOSIS — M17.11 PRIMARY OSTEOARTHRITIS OF RIGHT KNEE: Primary | ICD-10-CM

## 2021-10-26 PROCEDURE — 3078F DIAST BP <80 MM HG: CPT | Performed by: PHYSICIAN ASSISTANT

## 2021-10-26 PROCEDURE — 3008F BODY MASS INDEX DOCD: CPT | Performed by: PHYSICIAN ASSISTANT

## 2021-10-26 PROCEDURE — 99213 OFFICE O/P EST LOW 20 MIN: CPT | Performed by: PHYSICIAN ASSISTANT

## 2021-10-26 PROCEDURE — 20610 DRAIN/INJ JOINT/BURSA W/O US: CPT | Performed by: PHYSICIAN ASSISTANT

## 2021-10-26 PROCEDURE — 3074F SYST BP LT 130 MM HG: CPT | Performed by: PHYSICIAN ASSISTANT

## 2021-10-26 RX ORDER — HYALURONATE SODIUM 10 MG/ML
20 SYRINGE (ML) INTRAARTICULAR
Status: COMPLETED | OUTPATIENT
Start: 2021-10-26 | End: 2021-10-26

## 2021-10-26 RX ADMIN — Medication 20 MG: at 09:02

## 2021-10-28 ENCOUNTER — OFFICE VISIT (OUTPATIENT)
Dept: PHYSICAL THERAPY | Facility: CLINIC | Age: 56
End: 2021-10-28
Payer: COMMERCIAL

## 2021-10-28 DIAGNOSIS — M17.11 PRIMARY LOCALIZED OSTEOARTHRITIS OF RIGHT KNEE: Primary | ICD-10-CM

## 2021-10-28 DIAGNOSIS — M17.11 PRIMARY OSTEOARTHRITIS OF RIGHT KNEE: ICD-10-CM

## 2021-10-28 PROCEDURE — 97140 MANUAL THERAPY 1/> REGIONS: CPT

## 2021-10-28 PROCEDURE — 97110 THERAPEUTIC EXERCISES: CPT

## 2021-10-28 PROCEDURE — 97112 NEUROMUSCULAR REEDUCATION: CPT

## 2021-11-02 ENCOUNTER — PROCEDURE VISIT (OUTPATIENT)
Dept: OBGYN CLINIC | Facility: CLINIC | Age: 56
End: 2021-11-02
Payer: COMMERCIAL

## 2021-11-02 VITALS
SYSTOLIC BLOOD PRESSURE: 130 MMHG | BODY MASS INDEX: 37.76 KG/M2 | WEIGHT: 200 LBS | HEIGHT: 61 IN | DIASTOLIC BLOOD PRESSURE: 75 MMHG

## 2021-11-02 DIAGNOSIS — M17.11 PRIMARY OSTEOARTHRITIS OF RIGHT KNEE: Primary | ICD-10-CM

## 2021-11-02 PROCEDURE — 20610 DRAIN/INJ JOINT/BURSA W/O US: CPT | Performed by: PHYSICIAN ASSISTANT

## 2021-11-02 RX ORDER — HYALURONATE SODIUM 10 MG/ML
20 SYRINGE (ML) INTRAARTICULAR
Status: COMPLETED | OUTPATIENT
Start: 2021-11-02 | End: 2021-11-02

## 2021-11-02 RX ADMIN — Medication 20 MG: at 08:52

## 2021-11-04 ENCOUNTER — OFFICE VISIT (OUTPATIENT)
Dept: PHYSICAL THERAPY | Facility: CLINIC | Age: 56
End: 2021-11-04
Payer: COMMERCIAL

## 2021-11-04 DIAGNOSIS — M17.11 PRIMARY LOCALIZED OSTEOARTHRITIS OF RIGHT KNEE: Primary | ICD-10-CM

## 2021-11-04 DIAGNOSIS — M17.11 PRIMARY OSTEOARTHRITIS OF RIGHT KNEE: ICD-10-CM

## 2021-11-04 PROCEDURE — 97110 THERAPEUTIC EXERCISES: CPT | Performed by: PHYSICAL THERAPIST

## 2021-11-04 PROCEDURE — 97140 MANUAL THERAPY 1/> REGIONS: CPT | Performed by: PHYSICAL THERAPIST

## 2021-11-08 ENCOUNTER — TELEMEDICINE (OUTPATIENT)
Dept: BEHAVIORAL/MENTAL HEALTH CLINIC | Facility: CLINIC | Age: 56
End: 2021-11-08
Payer: COMMERCIAL

## 2021-11-08 DIAGNOSIS — F43.23 ADJUSTMENT DISORDER WITH MIXED ANXIETY AND DEPRESSED MOOD: Primary | ICD-10-CM

## 2021-11-08 PROCEDURE — 90832 PSYTX W PT 30 MINUTES: CPT | Performed by: SOCIAL WORKER

## 2021-11-09 ENCOUNTER — PROCEDURE VISIT (OUTPATIENT)
Dept: OBGYN CLINIC | Facility: CLINIC | Age: 56
End: 2021-11-09
Payer: COMMERCIAL

## 2021-11-09 VITALS
DIASTOLIC BLOOD PRESSURE: 72 MMHG | WEIGHT: 200 LBS | HEIGHT: 61 IN | SYSTOLIC BLOOD PRESSURE: 122 MMHG | BODY MASS INDEX: 37.76 KG/M2

## 2021-11-09 DIAGNOSIS — M17.11 PRIMARY OSTEOARTHRITIS OF RIGHT KNEE: Primary | ICD-10-CM

## 2021-11-09 PROCEDURE — 20610 DRAIN/INJ JOINT/BURSA W/O US: CPT | Performed by: PHYSICIAN ASSISTANT

## 2021-11-09 PROCEDURE — 3008F BODY MASS INDEX DOCD: CPT | Performed by: PHYSICIAN ASSISTANT

## 2021-11-09 RX ORDER — HYALURONATE SODIUM 10 MG/ML
20 SYRINGE (ML) INTRAARTICULAR
Status: COMPLETED | OUTPATIENT
Start: 2021-11-09 | End: 2021-11-09

## 2021-11-09 RX ADMIN — Medication 20 MG: at 08:43

## 2021-11-11 ENCOUNTER — OFFICE VISIT (OUTPATIENT)
Dept: PHYSICAL THERAPY | Facility: CLINIC | Age: 56
End: 2021-11-11
Payer: COMMERCIAL

## 2021-11-11 DIAGNOSIS — M17.11 PRIMARY LOCALIZED OSTEOARTHRITIS OF RIGHT KNEE: Primary | ICD-10-CM

## 2021-11-11 DIAGNOSIS — M17.11 PRIMARY OSTEOARTHRITIS OF RIGHT KNEE: ICD-10-CM

## 2021-11-11 PROCEDURE — 97112 NEUROMUSCULAR REEDUCATION: CPT

## 2021-11-11 PROCEDURE — 97140 MANUAL THERAPY 1/> REGIONS: CPT

## 2021-11-11 PROCEDURE — 97110 THERAPEUTIC EXERCISES: CPT

## 2021-11-12 ENCOUNTER — APPOINTMENT (OUTPATIENT)
Dept: PHYSICAL THERAPY | Facility: CLINIC | Age: 56
End: 2021-11-12
Payer: COMMERCIAL

## 2021-11-16 ENCOUNTER — OFFICE VISIT (OUTPATIENT)
Dept: PHYSICAL THERAPY | Facility: CLINIC | Age: 56
End: 2021-11-16
Payer: COMMERCIAL

## 2021-11-16 DIAGNOSIS — M17.11 PRIMARY LOCALIZED OSTEOARTHRITIS OF RIGHT KNEE: Primary | ICD-10-CM

## 2021-11-16 DIAGNOSIS — M17.11 PRIMARY OSTEOARTHRITIS OF RIGHT KNEE: ICD-10-CM

## 2021-11-16 PROCEDURE — 97112 NEUROMUSCULAR REEDUCATION: CPT

## 2021-11-16 PROCEDURE — 97110 THERAPEUTIC EXERCISES: CPT

## 2021-11-16 PROCEDURE — 97140 MANUAL THERAPY 1/> REGIONS: CPT

## 2021-11-17 DIAGNOSIS — F41.1 GAD (GENERALIZED ANXIETY DISORDER): ICD-10-CM

## 2021-11-18 RX ORDER — LORAZEPAM 0.5 MG/1
0.5 TABLET ORAL
Qty: 30 TABLET | Refills: 0 | Status: SHIPPED | OUTPATIENT
Start: 2021-11-18 | End: 2021-12-16 | Stop reason: SDUPTHER

## 2021-11-23 ENCOUNTER — EVALUATION (OUTPATIENT)
Dept: PHYSICAL THERAPY | Facility: CLINIC | Age: 56
End: 2021-11-23
Payer: COMMERCIAL

## 2021-11-23 DIAGNOSIS — G89.29 CHRONIC PAIN OF RIGHT KNEE: ICD-10-CM

## 2021-11-23 DIAGNOSIS — M25.561 CHRONIC PAIN OF RIGHT KNEE: ICD-10-CM

## 2021-11-23 DIAGNOSIS — M17.11 PRIMARY OSTEOARTHRITIS OF RIGHT KNEE: Primary | ICD-10-CM

## 2021-11-23 PROCEDURE — 97110 THERAPEUTIC EXERCISES: CPT | Performed by: PHYSICAL THERAPIST

## 2021-11-23 PROCEDURE — 97140 MANUAL THERAPY 1/> REGIONS: CPT | Performed by: PHYSICAL THERAPIST

## 2021-11-30 ENCOUNTER — OFFICE VISIT (OUTPATIENT)
Dept: PHYSICAL THERAPY | Facility: CLINIC | Age: 56
End: 2021-11-30
Payer: COMMERCIAL

## 2021-11-30 DIAGNOSIS — M25.561 CHRONIC PAIN OF RIGHT KNEE: ICD-10-CM

## 2021-11-30 DIAGNOSIS — M17.11 PRIMARY OSTEOARTHRITIS OF RIGHT KNEE: Primary | ICD-10-CM

## 2021-11-30 DIAGNOSIS — G89.29 CHRONIC PAIN OF RIGHT KNEE: ICD-10-CM

## 2021-11-30 DIAGNOSIS — M17.11 PRIMARY LOCALIZED OSTEOARTHRITIS OF RIGHT KNEE: ICD-10-CM

## 2021-11-30 PROCEDURE — 97110 THERAPEUTIC EXERCISES: CPT | Performed by: PHYSICAL THERAPIST

## 2021-11-30 PROCEDURE — 97140 MANUAL THERAPY 1/> REGIONS: CPT | Performed by: PHYSICAL THERAPIST

## 2021-12-07 ENCOUNTER — OFFICE VISIT (OUTPATIENT)
Dept: PHYSICAL THERAPY | Facility: CLINIC | Age: 56
End: 2021-12-07
Payer: COMMERCIAL

## 2021-12-07 DIAGNOSIS — M17.11 PRIMARY OSTEOARTHRITIS OF RIGHT KNEE: Primary | ICD-10-CM

## 2021-12-07 DIAGNOSIS — M17.11 PRIMARY LOCALIZED OSTEOARTHRITIS OF RIGHT KNEE: ICD-10-CM

## 2021-12-07 DIAGNOSIS — M25.561 CHRONIC PAIN OF RIGHT KNEE: ICD-10-CM

## 2021-12-07 DIAGNOSIS — G89.29 CHRONIC PAIN OF RIGHT KNEE: ICD-10-CM

## 2021-12-07 PROCEDURE — 97140 MANUAL THERAPY 1/> REGIONS: CPT | Performed by: PHYSICAL THERAPIST

## 2021-12-07 PROCEDURE — 97110 THERAPEUTIC EXERCISES: CPT | Performed by: PHYSICAL THERAPIST

## 2021-12-13 ENCOUNTER — TELEMEDICINE (OUTPATIENT)
Dept: BEHAVIORAL/MENTAL HEALTH CLINIC | Facility: CLINIC | Age: 56
End: 2021-12-13
Payer: COMMERCIAL

## 2021-12-13 DIAGNOSIS — F43.23 ADJUSTMENT DISORDER WITH MIXED ANXIETY AND DEPRESSED MOOD: Primary | ICD-10-CM

## 2021-12-13 PROCEDURE — 90834 PSYTX W PT 45 MINUTES: CPT | Performed by: SOCIAL WORKER

## 2021-12-14 ENCOUNTER — OFFICE VISIT (OUTPATIENT)
Dept: PHYSICAL THERAPY | Facility: CLINIC | Age: 56
End: 2021-12-14
Payer: COMMERCIAL

## 2021-12-14 DIAGNOSIS — M17.11 PRIMARY OSTEOARTHRITIS OF RIGHT KNEE: Primary | ICD-10-CM

## 2021-12-14 DIAGNOSIS — G89.29 CHRONIC PAIN OF RIGHT KNEE: ICD-10-CM

## 2021-12-14 DIAGNOSIS — M25.561 CHRONIC PAIN OF RIGHT KNEE: ICD-10-CM

## 2021-12-14 PROCEDURE — 97112 NEUROMUSCULAR REEDUCATION: CPT

## 2021-12-14 PROCEDURE — 97110 THERAPEUTIC EXERCISES: CPT

## 2021-12-14 PROCEDURE — 97140 MANUAL THERAPY 1/> REGIONS: CPT

## 2021-12-16 DIAGNOSIS — F41.1 GAD (GENERALIZED ANXIETY DISORDER): ICD-10-CM

## 2021-12-17 RX ORDER — LORAZEPAM 0.5 MG/1
0.5 TABLET ORAL
Qty: 30 TABLET | Refills: 0 | Status: SHIPPED | OUTPATIENT
Start: 2021-12-17 | End: 2022-01-17 | Stop reason: SDUPTHER

## 2021-12-21 ENCOUNTER — EVALUATION (OUTPATIENT)
Dept: PHYSICAL THERAPY | Facility: CLINIC | Age: 56
End: 2021-12-21
Payer: COMMERCIAL

## 2021-12-21 DIAGNOSIS — G89.29 CHRONIC PAIN OF RIGHT KNEE: ICD-10-CM

## 2021-12-21 DIAGNOSIS — M25.561 CHRONIC PAIN OF RIGHT KNEE: ICD-10-CM

## 2021-12-21 DIAGNOSIS — M17.11 PRIMARY LOCALIZED OSTEOARTHRITIS OF RIGHT KNEE: ICD-10-CM

## 2021-12-21 DIAGNOSIS — M17.11 PRIMARY OSTEOARTHRITIS OF RIGHT KNEE: Primary | ICD-10-CM

## 2021-12-21 PROCEDURE — 97140 MANUAL THERAPY 1/> REGIONS: CPT | Performed by: PHYSICAL THERAPIST

## 2021-12-21 PROCEDURE — 97110 THERAPEUTIC EXERCISES: CPT | Performed by: PHYSICAL THERAPIST

## 2022-01-17 ENCOUNTER — TELEPHONE (OUTPATIENT)
Dept: BEHAVIORAL/MENTAL HEALTH CLINIC | Facility: CLINIC | Age: 57
End: 2022-01-17

## 2022-01-17 DIAGNOSIS — F41.1 GAD (GENERALIZED ANXIETY DISORDER): ICD-10-CM

## 2022-01-17 RX ORDER — LORAZEPAM 0.5 MG/1
0.5 TABLET ORAL
Qty: 30 TABLET | Refills: 0 | Status: SHIPPED | OUTPATIENT
Start: 2022-01-17 | End: 2022-02-15 | Stop reason: SDUPTHER

## 2022-01-24 ENCOUNTER — TELEMEDICINE (OUTPATIENT)
Dept: BEHAVIORAL/MENTAL HEALTH CLINIC | Facility: CLINIC | Age: 57
End: 2022-01-24
Payer: COMMERCIAL

## 2022-01-24 DIAGNOSIS — F43.23 ADJUSTMENT DISORDER WITH MIXED ANXIETY AND DEPRESSED MOOD: Primary | ICD-10-CM

## 2022-01-24 PROCEDURE — 90834 PSYTX W PT 45 MINUTES: CPT | Performed by: SOCIAL WORKER
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
## 2022-01-26 DIAGNOSIS — F32.0 CURRENT MILD EPISODE OF MAJOR DEPRESSIVE DISORDER WITHOUT PRIOR EPISODE (HCC): ICD-10-CM

## 2022-01-31 DIAGNOSIS — F32.0 CURRENT MILD EPISODE OF MAJOR DEPRESSIVE DISORDER WITHOUT PRIOR EPISODE (HCC): ICD-10-CM

## 2022-01-31 RX ORDER — SERTRALINE HYDROCHLORIDE 100 MG/1
100 TABLET, FILM COATED ORAL DAILY
Qty: 90 TABLET | Refills: 1 | Status: SHIPPED | OUTPATIENT
Start: 2022-01-31 | End: 2022-08-01 | Stop reason: SDUPTHER

## 2022-02-02 ENCOUNTER — HOSPITAL ENCOUNTER (OUTPATIENT)
Dept: CT IMAGING | Facility: HOSPITAL | Age: 57
Discharge: HOME | End: 2022-02-02
Attending: INTERNAL MEDICINE
Payer: COMMERCIAL

## 2022-02-02 DIAGNOSIS — F17.210 CIGARETTE NICOTINE DEPENDENCE WITHOUT COMPLICATION: ICD-10-CM

## 2022-02-02 PROCEDURE — 71271 CT THORAX LUNG CANCER SCR C-: CPT

## 2022-02-03 LAB
ALBUMIN SERPL-MCNC: 4.6 G/DL (ref 3.8–4.9)
ALBUMIN/GLOB SERPL: 1.9 {RATIO} (ref 1.2–2.2)
ALP SERPL-CCNC: 93 IU/L (ref 44–121)
ALT SERPL-CCNC: 19 IU/L (ref 0–32)
AST SERPL-CCNC: 17 IU/L (ref 0–40)
BILIRUB SERPL-MCNC: 0.3 MG/DL (ref 0–1.2)
BUN SERPL-MCNC: 15 MG/DL (ref 6–24)
BUN/CREAT SERPL: 22 (ref 9–23)
CALCIUM SERPL-MCNC: 9.8 MG/DL (ref 8.7–10.2)
CHLORIDE SERPL-SCNC: 97 MMOL/L (ref 96–106)
CHOLEST SERPL-MCNC: 204 MG/DL (ref 100–199)
CHOLEST/HDLC SERPL: 5.5 RATIO (ref 0–4.4)
CO2 SERPL-SCNC: 25 MMOL/L (ref 20–29)
CREAT SERPL-MCNC: 0.69 MG/DL (ref 0.57–1)
EST. AVERAGE GLUCOSE BLD GHB EST-MCNC: 126 MG/DL
GLOBULIN SER-MCNC: 2.4 G/DL (ref 1.5–4.5)
GLUCOSE SERPL-MCNC: 96 MG/DL (ref 65–99)
HBA1C MFR BLD: 6 % (ref 4.8–5.6)
HDLC SERPL-MCNC: 37 MG/DL
LDLC SERPL CALC-MCNC: 106 MG/DL (ref 0–99)
LDLC SERPL DIRECT ASSAY-MCNC: 105 MG/DL (ref 0–99)
POTASSIUM SERPL-SCNC: 4.3 MMOL/L (ref 3.5–5.2)
PROT SERPL-MCNC: 7 G/DL (ref 6–8.5)
SL AMB EGFR AFRICAN AMERICAN: 112 ML/MIN/1.73
SL AMB EGFR NON AFRICAN AMERICAN: 97 ML/MIN/1.73
SL AMB VLDL CHOLESTEROL CALC: 61 MG/DL (ref 5–40)
SODIUM SERPL-SCNC: 138 MMOL/L (ref 134–144)
TRIGL SERPL-MCNC: 360 MG/DL (ref 0–149)

## 2022-02-10 ENCOUNTER — OFFICE VISIT (OUTPATIENT)
Dept: FAMILY MEDICINE CLINIC | Facility: CLINIC | Age: 57
End: 2022-02-10
Payer: COMMERCIAL

## 2022-02-10 VITALS
RESPIRATION RATE: 17 BRPM | HEIGHT: 62 IN | WEIGHT: 200 LBS | DIASTOLIC BLOOD PRESSURE: 78 MMHG | TEMPERATURE: 97.5 F | BODY MASS INDEX: 36.8 KG/M2 | HEART RATE: 73 BPM | SYSTOLIC BLOOD PRESSURE: 130 MMHG | OXYGEN SATURATION: 95 %

## 2022-02-10 DIAGNOSIS — F43.23 ADJUSTMENT DISORDER WITH MIXED ANXIETY AND DEPRESSED MOOD: Primary | ICD-10-CM

## 2022-02-10 DIAGNOSIS — I10 ESSENTIAL HYPERTENSION: ICD-10-CM

## 2022-02-10 DIAGNOSIS — R91.8 PULMONARY NODULES: ICD-10-CM

## 2022-02-10 DIAGNOSIS — K58.0 IRRITABLE BOWEL SYNDROME WITH DIARRHEA: ICD-10-CM

## 2022-02-10 DIAGNOSIS — E03.8 SUBCLINICAL HYPOTHYROIDISM: ICD-10-CM

## 2022-02-10 DIAGNOSIS — M77.8 RIGHT ELBOW TENDINITIS: ICD-10-CM

## 2022-02-10 DIAGNOSIS — R73.01 IFG (IMPAIRED FASTING GLUCOSE): ICD-10-CM

## 2022-02-10 DIAGNOSIS — E78.2 MIXED HYPERLIPIDEMIA: ICD-10-CM

## 2022-02-10 PROCEDURE — 3078F DIAST BP <80 MM HG: CPT | Performed by: FAMILY MEDICINE

## 2022-02-10 PROCEDURE — 3008F BODY MASS INDEX DOCD: CPT | Performed by: FAMILY MEDICINE

## 2022-02-10 PROCEDURE — 3725F SCREEN DEPRESSION PERFORMED: CPT | Performed by: FAMILY MEDICINE

## 2022-02-10 PROCEDURE — 99214 OFFICE O/P EST MOD 30 MIN: CPT | Performed by: FAMILY MEDICINE

## 2022-02-10 PROCEDURE — 3075F SYST BP GE 130 - 139MM HG: CPT | Performed by: FAMILY MEDICINE

## 2022-02-10 RX ORDER — PREDNISONE 10 MG/1
TABLET ORAL
Qty: 21 TABLET | Refills: 0 | Status: SHIPPED | OUTPATIENT
Start: 2022-02-10 | End: 2022-08-01

## 2022-02-10 NOTE — ASSESSMENT & PLAN NOTE
BMI Counseling: Body mass index is 36 35 kg/m²  The BMI is above normal  Nutrition recommendations include reducing portion sizes, decreasing overall calorie intake and 3-5 servings of fruits/vegetables daily  Exercise recommendations include exercising 3-5 times per week

## 2022-02-10 NOTE — PROGRESS NOTES
Tati Nunn 1965 female MRN: [id number]    Family Medicine Follow-up Visit    ASSESSMENT/PLAN  Problem List Items Addressed This Visit        Digestive    Irritable bowel syndrome with diarrhea     Well controlled on medication at this time             Endocrine    Subclinical hypothyroidism    Relevant Medications    predniSONE 10 mg tablet    IFG (impaired fasting glucose)     Increased slightly since last visit  Will watch diet more closely             Cardiovascular and Mediastinum    Essential hypertension     Well controlled on lisinopril             Musculoskeletal and Integument    Right elbow tendinitis    Relevant Medications    predniSONE 10 mg tablet       Other    Mixed hyperlipidemia    Adjustment disorder with mixed anxiety and depressed mood - Primary     On zoloft, feeling well, also has session with Juan Roldan  Pulmonary nodules     Follows with pulmonary, most recent CT scan was stable          BMI 36 0-36 9,adult     BMI Counseling: Body mass index is 36 35 kg/m²  The BMI is above normal  Nutrition recommendations include reducing portion sizes, decreasing overall calorie intake and 3-5 servings of fruits/vegetables daily  Exercise recommendations include exercising 3-5 times per week  Follow up for CP in September or sooner if needed          Future Appointments   Date Time Provider Lorenzo Reagan   2/14/2022  2:00 PM [address]   3/14/2022  2:00 PM [facility]   4/18/2022 11:00 AM [facility]   9/26/2022  9:15 AM DO [facility]          JACI  CC: Follow-up (4 month check up  No new issues  )      HPI:  Tati Nunn is a 61 y o  female who presents for check up  Reports right elbow pain, slight swelling, on and off for about a month  Tried motrin  No numbness or tingling down the arm        HPI    Review of Systems   Constitutional: Negative for chills, fatigue and fever  HENT: Negative for congestion, postnasal drip, rhinorrhea and sinus pressure  Eyes: Negative for photophobia and visual disturbance  Respiratory: Negative for cough and shortness of breath  Cardiovascular: Negative for chest pain, palpitations and leg swelling  Gastrointestinal: Negative for abdominal pain, constipation, diarrhea, nausea and vomiting  Genitourinary: Negative for difficulty urinating and dysuria  Musculoskeletal: Negative for arthralgias and myalgias  Skin: Negative for color change and rash  Neurological: Negative for dizziness, weakness, light-headedness and headaches  Historical Information   The patient history was reviewed as follows:    Past Medical History:   Diagnosis Date    Anxiety 2005    Bulging lumbar disc     Chronic pain     low back    Degenerative disk disease     lumbar spine    Depression 2005    Hyperlipidemia     Hypertension     Pinched nerve     lumbar spine    Psychiatric disorder     depression    Shingles 20179     Past Surgical History:   Procedure Laterality Date    CARPAL TUNNEL RELEASE Bilateral 1998    CHOLECYSTECTOMY  2008    COLONOSCOPY  06/15/2015    COLONOSCOPY  06/02/2015    Internal hemorrhoids, hyperplastic polyps which were removed  Ten year recall recommended June 2025    COLONOSCOPY  09/16/2020    normal terminal ileum, 1 small polyp in the distal sigmoid colon that was hyperplastic  Tran Lee She did have a small amount of retained stool in the colon  She did have a small amount of retained stool in the colon  A 5 year recall was recommended due to poor prep, September 2025       SPINAL CORD STIMULATOR IMPLANT      2014    SPINE SURGERY  2017    Stimulator inplant    WISDOM TOOTH EXTRACTION Bilateral 1982     Family History   Problem Relation Age of Onset    Skin cancer Mother     Heart disease Father         Dx with lewy body dementia in 2012    Dementia Father         Dx with lewy body dementia in 2012    Breast cancer Maternal Aunt 29    Breast cancer Sister 64    BRCA1 Negative Sister     BRCA2 Negative Sister     Depression Sister     Prostate cancer Maternal Uncle     Cancer Maternal Uncle         BLADDER    Colon cancer Maternal Uncle     Colon polyps Neg Hx       Social History   Social History     Substance and Sexual Activity   Alcohol Use Yes    Alcohol/week: 0 0 standard drinks    Comment: Social drinker     Social History     Substance and Sexual Activity   Drug Use No     Social History     Tobacco Use   Smoking Status Current Every Day Smoker    Packs/day: 1 00    Years: 43 00    Pack years: 43 00    Types: Cigarettes    Start date: 1977   Smokeless Tobacco Never Used   Tobacco Comment    smokes 10 cigs daily as of 10/20       Medications:     Current Outpatient Medications:     dicyclomine (BENTYL) 20 mg tablet, Take 1 tablet (20 mg total) by mouth every 6 (six) hours as needed (pain, cramping), Disp: 90 tablet, Rfl: 1    LORazepam (ATIVAN) 0 5 mg tablet, Take 1 tablet (0 5 mg total) by mouth daily at bedtime, Disp: 30 tablet, Rfl: 0    sertraline (ZOLOFT) 100 mg tablet, Take 1 tablet (100 mg total) by mouth daily, Disp: 90 tablet, Rfl: 1    sertraline (ZOLOFT) 50 mg tablet, Take 1 tablet (50 mg total) by mouth daily, Disp: 90 tablet, Rfl: 1    atorvastatin (LIPITOR) 80 mg tablet, Take 1 tablet (80 mg total) by mouth daily, Disp: 90 tablet, Rfl: 1    cholestyramine (QUESTRAN) 4 g packet, Take 1 packet (4 g total) by mouth daily at bedtime, Disp: 30 packet, Rfl: 2    lisinopril (ZESTRIL) 10 mg tablet, Take 1 tablet (10 mg total) by mouth daily, Disp: 90 tablet, Rfl: 1    methylPREDNISolone 4 MG tablet therapy pack, Use as directed on package, Disp: 21 tablet, Rfl: 0    pramipexole (MIRAPEX) 0 5 mg tablet, Take 1 tablet (0 5 mg total) by mouth daily, Disp: 90 tablet, Rfl: 1    predniSONE 10 mg tablet, Take 60 mg x 1 day, then 50 mg x1 day, then 40 mg x1 day, then 30 mg x1 day, then 20 mg x 1 day, then  10 mg x1 day, Disp: 21 tablet, Rfl: 0  Allergies   Allergen Reactions    Penicillins Hives and Throat Swelling    Meloxicam Hives and Itching       OBJECTIVE    Vitals:   Vitals:    02/10/22 1000   BP: 130/78   BP Location: Right arm   Patient Position: Sitting   Cuff Size: Standard   Pulse: 73   Resp: 17   Temp: 97 5 °F (36 4 °C)   TempSrc: Tympanic   SpO2: 95%   Weight: 90 7 kg (200 lb)   Height: 5' 2 2" (1 58 m)           Physical Exam  Constitutional:       Appearance: She is well-developed  HENT:      Head: Normocephalic and atraumatic  Eyes:      Extraocular Movements: Extraocular movements intact  Conjunctiva/sclera: Conjunctivae normal    Cardiovascular:      Rate and Rhythm: Normal rate and regular rhythm  Heart sounds: Normal heart sounds  Pulmonary:      Effort: Pulmonary effort is normal  No respiratory distress  Breath sounds: Normal breath sounds  No wheezing  Musculoskeletal:      Right elbow: Swelling present  Decreased range of motion  Tenderness present  Cervical back: Normal range of motion and neck supple  Skin:     General: Skin is warm and dry  Neurological:      Mental Status: She is alert and oriented to person, place, and time     Psychiatric:         Mood and Affect: Mood normal          Behavior: Behavior normal             Labs:        Stan Mosley DO    2/10/2022

## 2022-02-14 ENCOUNTER — TELEMEDICINE (OUTPATIENT)
Dept: BEHAVIORAL/MENTAL HEALTH CLINIC | Facility: CLINIC | Age: 57
End: 2022-02-14
Payer: COMMERCIAL

## 2022-02-14 DIAGNOSIS — F43.23 ADJUSTMENT DISORDER WITH MIXED ANXIETY AND DEPRESSED MOOD: Primary | ICD-10-CM

## 2022-02-14 PROCEDURE — 90834 PSYTX W PT 45 MINUTES: CPT | Performed by: SOCIAL WORKER
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
## 2022-02-15 DIAGNOSIS — E78.2 MIXED HYPERLIPIDEMIA: ICD-10-CM

## 2022-02-15 DIAGNOSIS — F41.1 GAD (GENERALIZED ANXIETY DISORDER): ICD-10-CM

## 2022-02-15 RX ORDER — ATORVASTATIN CALCIUM 80 MG/1
80 TABLET, FILM COATED ORAL DAILY
Qty: 90 TABLET | Refills: 1 | Status: SHIPPED | OUTPATIENT
Start: 2022-02-15 | End: 2022-05-16

## 2022-02-15 RX ORDER — LORAZEPAM 0.5 MG/1
0.5 TABLET ORAL
Qty: 30 TABLET | Refills: 0 | Status: SHIPPED | OUTPATIENT
Start: 2022-02-15 | End: 2022-03-16 | Stop reason: SDUPTHER

## 2022-03-14 ENCOUNTER — TELEPHONE (OUTPATIENT)
Dept: BEHAVIORAL/MENTAL HEALTH CLINIC | Facility: CLINIC | Age: 57
End: 2022-03-14

## 2022-03-16 DIAGNOSIS — F41.1 GAD (GENERALIZED ANXIETY DISORDER): ICD-10-CM

## 2022-03-16 RX ORDER — LORAZEPAM 0.5 MG/1
0.5 TABLET ORAL
Qty: 30 TABLET | Refills: 0 | Status: SHIPPED | OUTPATIENT
Start: 2022-03-16 | End: 2022-04-18 | Stop reason: SDUPTHER

## 2022-03-16 NOTE — TELEPHONE ENCOUNTER
Patient is requesting a refill for lorazepam 0 5mg to be sent to [facility]  She can be reached at [phone]

## 2022-03-25 DIAGNOSIS — G25.81 RESTLESS LEG SYNDROME: ICD-10-CM

## 2022-03-25 RX ORDER — PRAMIPEXOLE DIHYDROCHLORIDE 0.5 MG/1
0.5 TABLET ORAL DAILY
Qty: 90 TABLET | Refills: 1 | Status: SHIPPED | OUTPATIENT
Start: 2022-03-25 | End: 2022-06-30 | Stop reason: SDUPTHER

## 2022-04-18 ENCOUNTER — TELEMEDICINE (OUTPATIENT)
Dept: BEHAVIORAL/MENTAL HEALTH CLINIC | Facility: CLINIC | Age: 57
End: 2022-04-18
Payer: COMMERCIAL

## 2022-04-18 DIAGNOSIS — F43.23 ADJUSTMENT DISORDER WITH MIXED ANXIETY AND DEPRESSED MOOD: Primary | ICD-10-CM

## 2022-04-18 DIAGNOSIS — I10 ESSENTIAL HYPERTENSION: ICD-10-CM

## 2022-04-18 DIAGNOSIS — F41.1 GAD (GENERALIZED ANXIETY DISORDER): ICD-10-CM

## 2022-04-18 PROCEDURE — 90834 PSYTX W PT 45 MINUTES: CPT | Performed by: SOCIAL WORKER

## 2022-04-18 RX ORDER — LISINOPRIL 10 MG/1
10 TABLET ORAL DAILY
Qty: 90 TABLET | Refills: 1 | Status: SHIPPED | OUTPATIENT
Start: 2022-04-18 | End: 2022-07-17

## 2022-04-18 RX ORDER — LORAZEPAM 0.5 MG/1
0.5 TABLET ORAL
Qty: 30 TABLET | Refills: 0 | Status: SHIPPED | OUTPATIENT
Start: 2022-04-18 | End: 2022-05-18 | Stop reason: SDUPTHER

## 2022-04-18 NOTE — PSYCH
Psychotherapy Provided: Individual Psychotherapy 45 minutes  Length of time in session: 45 minutes  Current suicide risk : Low   Behavioral Health Treatment Plan [facility]: Diagnosis and Treatment Plan explained to Hansel Drummond relates understanding diagnosis and is agreeable to Treatment Plan  Yes    Virtual Regular Visit    Verification of patient location: [place]  Patient is located in the following state in which I hold an active license [place]    Assessment/Plan:    Problem List Items Addressed This Visit        Other    Adjustment disorder with mixed anxiety and depressed mood - Primary        Goals addressed in session: Goal 1 Follow up psychotherapy session  Reason for visit is   Chief Complaint   Patient presents with    Virtual Regular Visit      Encounter provider Grazyna Culver    Provider located at [place] [id number]  [address] [address] [phone]    Recent Visits  No visits were found meeting these conditions  Showing recent visits within past 7 days and meeting all other requirements  Today's Visits  Date Type Provider Dept   04/18/22 [facility]   Showing today's visits and meeting all other requirements  Future Appointments  No visits were found meeting these conditions  Showing future appointments within next 150 days and meeting all other requirements     The patient was identified by name and date of birth  Diannetom Brush was informed that this is a telemedicine visit and that the visit is being conducted throughTelephone  My office door was closed  No one else was in the room  She acknowledged consent and understanding of privacy and security of the video platform  The patient has agreed to participate and understands they can discontinue the visit at any time   It was my intent to perform this visit via video technology but the patient was not able to do a video connection so the visit was completed via audio telephone only  Patient is aware this is a billable service  Subjective  Kimberly Horowitz is a 61 y o  female  HPI     Past Medical History:   Diagnosis Date    Anxiety 2005    Bulging lumbar disc     Chronic pain     low back    Degenerative disk disease     lumbar spine    Depression 2005    Hyperlipidemia     Hypertension     Pinched nerve     lumbar spine    Psychiatric disorder     depression    Shingles [id number]       Past Surgical History:   Procedure Laterality Date    CARPAL TUNNEL RELEASE Bilateral 1998    CHOLECYSTECTOMY  2008    COLONOSCOPY  06/15/2015    COLONOSCOPY  06/02/2015    Internal hemorrhoids, hyperplastic polyps which were removed  Ten year recall recommended June 2025    COLONOSCOPY  09/16/2020    normal terminal ileum, 1 small polyp in the distal sigmoid colon that was hyperplastic  [place] She did have a small amount of retained stool in the colon  She did have a small amount of retained stool in the colon  A 5 year recall was recommended due to poor prep, September 2025       SPINAL CORD STIMULATOR IMPLANT      2014    SPINE SURGERY  2017    Stimulator inplant    WISDOM TOOTH EXTRACTION Bilateral 1982       Current Outpatient Medications   Medication Sig Dispense Refill    atorvastatin (LIPITOR) 80 mg tablet Take 1 tablet (80 mg total) by mouth daily 90 tablet 1    cholestyramine (QUESTRAN) 4 g packet Take 1 packet (4 g total) by mouth daily at bedtime 30 packet 2    dicyclomine (BENTYL) 20 mg tablet Take 1 tablet (20 mg total) by mouth every 6 (six) hours as needed (pain, cramping) 90 tablet 1    lisinopril (ZESTRIL) 10 mg tablet Take 1 tablet (10 mg total) by mouth daily 90 tablet 1    LORazepam (ATIVAN) 0 5 mg tablet Take 1 tablet (0 5 mg total) by mouth daily at bedtime 30 tablet 0    methylPREDNISolone 4 MG tablet therapy pack Use as directed on package 21 tablet 0    pramipexole (MIRAPEX) 0 5 mg tablet Take 1 tablet (0 5 mg total) by mouth daily 90 tablet 1    predniSONE 10 mg tablet Take 60 mg x 1 day, then 50 mg x1 day, then 40 mg x1 day, then 30 mg x1 day, then 20 mg x 1 day, then  10 mg x1 day 21 tablet 0    sertraline (ZOLOFT) 100 mg tablet Take 1 tablet (100 mg total) by mouth daily 90 tablet 1    sertraline (ZOLOFT) 50 mg tablet Take 1 tablet (50 mg total) by mouth daily 90 tablet 1     No current facility-administered medications for this visit  Allergies   Allergen Reactions    Penicillins Hives and Throat Swelling    Meloxicam Hives and Itching       Review of Systems    Video Exam    There were no vitals filed for this visit  Physical Exam     (D) Placido Kevin attended her follow up psychotherapy session today  Placido Kevin reported that since her last session, she has struggled with psychosocial stressors and interpersonal relationship stressors  Placido Kevin reported that she and her partner have struggled with some interpersonal relationship stressors, reporting that her partner struggles with alcoholism, and has for the past five years  Placido Kevin reported that her partner communicated that he doesn't want to be in a fully committed relationship, yet reports that he wants to have her in her life and see where things go  Placido Kevin reported that she feels like her partner leans into the relationship and then leans out of it, and reports that when he leans out, she feels triggered by this, describing an anxious attachment style  Placido Kevin reports that she has been trying to identify healthy patterns in the behaviors, and unhealthy patterns in the behaviors  Placido Yihaylee reports that she is work on identifying ways to implement limits and boundaries  Placido Yihaylee described past relationship trauma, and how this has impacted her emotionally  Placido Yihaylee discussed some stressors related to her parents and their cognitive decline  Placido Herberth describes grief in relation to symbolic loss   Placido Kevin reported that she continues to read the book, "Co-Dependent No More," and reports having increased self-awareness in relation to this  Mei Sanchez described some stressors with managing schoolwork, being a single mother, working, and managing multiple aspects of her life  Mei Sanchez and this writer processed this at length  Provided ongoing psychoeducation  Discussed ongoing skills  Reviewed limits and boundaries  Modeled effective forms of communication  (A) Mei Sanchez denies any evident or immediate risk factors for self-harm, SI, or HI  Mei Sanchez denies any symptoms of ernesto  Zenaida's speech presented at a normal rate, volume, and rhythm  Mei Sanchez presented as alert and oriented x3  Zenaida's eye contact was unable to be assessed as a result of this being a phone session  Zenaida's mood presented as anxious, and slightly down, and her affect was unable to be assessed as a result of this being a phone session  Mei Sanchez describes grief in relation to symbolic loss  Mei Sanchez describes feeling nervous, anxious, and on edge  Mei Sanchez reports some ruminating, repetitive, and obsessive thoughts at times, describing shame, guilt, and vulnerability  (P) Mei Sanchez plans to work on journaling to process through feelings and emotions, specifically when reading through the book, "Co-Dependent No More " Mei Sanchez plans to honor her feelings, and emotions, honor them, and make space for them  Mei Sanchez plans to increase positive affirmations, positive self-talk, and implement grounding statements  Mei Sanchez plans to lean into her jen, and her natural supports  Mei Sanchez plans to continue to work on engaging in the use of self-care, working on mindfully being present in the moment, and taking time for herself   Mei Sanchez plans to outweigh risks verses benefits in order to make informed decisions, and align choices and decisions with what is in the best interest of her, while continuing to prioritize her needs over the needs of others and challenge unhealthy patterns and behaviors of co-dependency  Lynne Cota plans to target ongoing symptoms with skill use  Lynne Cota plans to assert herself, advocate for herself, ask for what she needs, and address interpersonal relationship conflict with healthy forms of communication  Lynne Cota plans to implement the use of radical acceptance  Lynne Cota plans to reach out for additional support as needed  I spent 45 minutes directly with the patient during this visit  10:45am-11:30am     VIRTUAL VISIT 89 Pablo Espinoza verbally agrees to participate in [employer]  Pt is aware that [employer] could be limited without vital signs or the ability to perform a full hands-on physical exam  Zenaida Banks understands she or the provider may request at any time to terminate the video visit and request the patient to seek care or treatment in person

## 2022-05-09 DIAGNOSIS — G25.81 RESTLESS LEG SYNDROME: Primary | ICD-10-CM

## 2022-05-16 ENCOUNTER — TELEPHONE (OUTPATIENT)
Dept: BEHAVIORAL/MENTAL HEALTH CLINIC | Facility: CLINIC | Age: 57
End: 2022-05-16

## 2022-05-16 NOTE — TELEPHONE ENCOUNTER
This writer reached out to Amado Xiao in relation to her session, left a message requesting a call back

## 2022-05-18 DIAGNOSIS — F41.1 GAD (GENERALIZED ANXIETY DISORDER): ICD-10-CM

## 2022-05-19 RX ORDER — LORAZEPAM 0.5 MG/1
0.5 TABLET ORAL
Qty: 30 TABLET | Refills: 0 | Status: SHIPPED | OUTPATIENT
Start: 2022-05-19 | End: 2022-06-17 | Stop reason: SDUPTHER

## 2022-06-17 DIAGNOSIS — F41.1 GAD (GENERALIZED ANXIETY DISORDER): ICD-10-CM

## 2022-06-17 RX ORDER — LORAZEPAM 0.5 MG/1
0.5 TABLET ORAL
Qty: 30 TABLET | Refills: 0 | Status: SHIPPED | OUTPATIENT
Start: 2022-06-17 | End: 2022-07-20 | Stop reason: SDUPTHER

## 2022-06-27 ENCOUNTER — TELEPHONE (OUTPATIENT)
Dept: BEHAVIORAL/MENTAL HEALTH CLINIC | Facility: CLINIC | Age: 57
End: 2022-06-27

## 2022-06-27 NOTE — TELEPHONE ENCOUNTER
Reached out to [facility] [place] [facility] regarding canceled session last week  Left a message requesting a call back to reschedule this

## 2022-06-30 DIAGNOSIS — G25.81 RESTLESS LEG SYNDROME: ICD-10-CM

## 2022-06-30 RX ORDER — PRAMIPEXOLE DIHYDROCHLORIDE 0.5 MG/1
1 TABLET ORAL DAILY
Qty: 180 TABLET | Refills: 0 | Status: SHIPPED | OUTPATIENT
Start: 2022-06-30 | End: 2022-09-28

## 2022-07-20 DIAGNOSIS — F41.1 GAD (GENERALIZED ANXIETY DISORDER): ICD-10-CM

## 2022-07-20 RX ORDER — LORAZEPAM 0.5 MG/1
0.5 TABLET ORAL
Qty: 30 TABLET | Refills: 0 | Status: SHIPPED | OUTPATIENT
Start: 2022-07-20 | End: 2022-08-22 | Stop reason: SDUPTHER

## 2022-08-01 DIAGNOSIS — F32.0 CURRENT MILD EPISODE OF MAJOR DEPRESSIVE DISORDER WITHOUT PRIOR EPISODE (HCC): ICD-10-CM

## 2022-08-01 RX ORDER — SERTRALINE HYDROCHLORIDE 100 MG/1
100 TABLET, FILM COATED ORAL DAILY
Qty: 90 TABLET | Refills: 1 | Status: SHIPPED | OUTPATIENT
Start: 2022-08-01 | End: 2022-10-30

## 2022-08-09 ENCOUNTER — OFFICE VISIT (OUTPATIENT)
Dept: SLEEP CENTER | Facility: HOSPITAL | Age: 57
End: 2022-08-09
Payer: COMMERCIAL

## 2022-08-09 VITALS
SYSTOLIC BLOOD PRESSURE: 120 MMHG | HEART RATE: 85 BPM | HEIGHT: 62 IN | WEIGHT: 197.4 LBS | BODY MASS INDEX: 36.33 KG/M2 | DIASTOLIC BLOOD PRESSURE: 60 MMHG | OXYGEN SATURATION: 91 %

## 2022-08-09 DIAGNOSIS — G47.09 OTHER INSOMNIA: ICD-10-CM

## 2022-08-09 DIAGNOSIS — I10 ESSENTIAL HYPERTENSION: ICD-10-CM

## 2022-08-09 DIAGNOSIS — F32.A ANXIETY AND DEPRESSION: ICD-10-CM

## 2022-08-09 DIAGNOSIS — E66.9 OBESITY (BMI 30-39.9): ICD-10-CM

## 2022-08-09 DIAGNOSIS — G89.4 CHRONIC PAIN DISORDER: ICD-10-CM

## 2022-08-09 DIAGNOSIS — Z96.89 SPINAL CORD STIMULATOR STATUS: ICD-10-CM

## 2022-08-09 DIAGNOSIS — G47.33 OSA (OBSTRUCTIVE SLEEP APNEA): ICD-10-CM

## 2022-08-09 DIAGNOSIS — F17.210 CIGARETTE NICOTINE DEPENDENCE WITHOUT COMPLICATION: ICD-10-CM

## 2022-08-09 DIAGNOSIS — F41.9 ANXIETY AND DEPRESSION: ICD-10-CM

## 2022-08-09 DIAGNOSIS — E61.1 IRON DEFICIENCY: ICD-10-CM

## 2022-08-09 DIAGNOSIS — I25.10 MILD CAD: ICD-10-CM

## 2022-08-09 DIAGNOSIS — G25.81 RESTLESS LEG SYNDROME: Primary | ICD-10-CM

## 2022-08-09 PROCEDURE — 3074F SYST BP LT 130 MM HG: CPT | Performed by: INTERNAL MEDICINE

## 2022-08-09 PROCEDURE — 3078F DIAST BP <80 MM HG: CPT | Performed by: INTERNAL MEDICINE

## 2022-08-09 PROCEDURE — 99244 OFF/OP CNSLTJ NEW/EST MOD 40: CPT | Performed by: INTERNAL MEDICINE

## 2022-08-09 NOTE — PROGRESS NOTES
[facility], [place] - [address] [place] [address] Ezio Kathleen  61 y o  female  :1965  XIR:[id number]  DOS:2022    Physician Requesting Consult: Mare Schilling DO             Reason for Consult : At your kind request I saw Rose Garcia for initial sleep evaluation today  She is diagnosed with restless leg syndrome and is here to establish care  PFSH, Problem List, Medications & Allergies were reviewed in EMR  Tri Bryant  has a past medical history of Anxiety (), Bulging lumbar disc, Chronic pain, Degenerative disk disease, Depression (), Hyperlipidemia, Hypertension, Pinched nerve, Psychiatric disorder, and Shingles ()  She has a current medication list which includes the following prescription(s): dicyclomine, lorazepam, pramipexole, sertraline, sertraline, atorvastatin, and lisinopril  HPI:  She is using Mirapex1 mg q h s  And symptoms are controlled since dose was doubled recently  Family report loud snoring but have not noted breathing difficulties during sleep  She is not aware of snoring or modifying factors  Other Complaints: none  Restless Leg Syndrome: has typical symptoms but controlled not disturbing sleep since on current medication;  Parasomnia: no features reported    Sleep Routine (on average):   Typical Bedtime:  11:30 p m  Gets OOB:  7:00 a m  TIB:7 5 hrs  Sleep latency:< 15 minutes  She uses lorazepam as a sleep aid  Sleep Interruptions:infrequent/nite unless she is experiencing restless leg symptoms  Awakens: needing an alarm  Upon awakening: usually feels refreshed   Tri Bryant denies Excessive Daytime Sleepiness  or napping   She rated herself at Total score: 5 /24 on the [place] Sleepiness Scale  Habits:  reports that she has been smoking cigarettes  She started smoking about 45 years ago  She has a 43 00 pack-year smoking history   She has never used smokeless tobacco  ;   E-Cigarette/Vaping    E-Cigarette Use Never User     ;  reports no history of drug use ; reports current alcohol use  ; Caffeine use:limited ; Exercise routine: regular    Family History: Negative for sleep disturbance  ROS - reviewed and as attached  Significant for weight has been stable  She reported no nasal, respiratory or cardiac symptoms  She has musculoskeletal aches and pains  Back pain is controlled with use of spinal cord stimulator but she suspects battery is running down  She has feelings of anxiety and depression for which she is on Zoloft  EXAM:  /60 (BP Location: Left arm, Cuff Size: Adult)   Pulse 85   Ht 5' 2 2" (1 58 m)   Wt 89 5 kg (197 lb 6 4 oz)   LMP  (LMP Unknown)   SpO2 91%   BMI 35 87 kg/m²    General: Well groomed female, well appearing, in no apparent distress  Neurological: Alert and orientated;  cooperative; Cranial nerves intact;    Psychiatric: Speech:clear and coherent;  Normal mood, affect & thought   Skin: warm and dry; Color& Hydration good; no facial rashes or lesions   HEENT:  Craniofacial anatomy: normal Sinuses: non- tender  TMJ: Normal    Eyes: EOM's intact;  conjunctiva/corneas clear   Ears: Externallyappear normal     Nasal Airway: is patent Septum:intact; Mucosa: normal; Turbinates: normal; Rhinorrhea: None   Mouth: Lips: normal posture; Dentition: edentulous and dentures - upper  Mucosa:moist  ; Hard Palate:normal    Oropharryx: crowded and AP narrowing Tongue: Mallampati:Class IV, Mobile and MacroglossiaSoft Palate:  redundant  Tonsils: absent  Neck:excess fatty tissue; Neck Circumference: 15 "; Supple; no abnormal masses; Thyroid:normal  Trachea:central     Lymph: No Cervical or Submandibular Lymhadenopathy  Heart: S1,S2 normal; RRR; no gallop; no murmurs   Lungs: Respiratory Effort:normal  Air entry good bilaterally  No wheezes  No rales  Abdomen: Obese, Soft & non-tender    Extremities: No pedal edema  No clubbing or cyanosis      Musculoskeletal:  Motor normal; Gait:normal        IMPRESSION: Primary/Secondary Sleep Diagnoses (to Medical or Psych conditions) & Comorbidities   1  Restless leg syndrome  Ambulatory Referral to Sleep Medicine    Diagnostic Sleep Study    Ferritin    Ferritin   2  DEVI (obstructive sleep apnea)  Diagnostic Sleep Study   3  Other insomnia  Diagnostic Sleep Study   4  Chronic pain disorder     5  Anxiety and depression     6  Essential hypertension     7  Mild CAD     8  Current cigarette smoker     9  Iron deficiency  Ferritin    Ferritin   10  Spinal cord stimulator status     11  Obesity (BMI 30-39  9)          PLAN:   With respect to above conditions, comprehensive counseling provided on pathophysiology; effects on symptoms and comorbidities, diagnostic strategies & limitations; treatment options; risks or no treament; risks & benefits of the various therapeutic options; costs and insurance aspects  I advised weight reduction, avoiding sleeping supine, using alcohol or sedating medications close to bed time and on safe driving practices  Nocturnal polysomnography is indicated and a diagnostic study will be scheduled  I advised on nonpharmacologic measures for restless legs symptoms  Advised dose reduction to 0 75 mg q h s     Consideration to be given to an alternate to Zoloft that may aggravate restless leg symptoms  Medications  such as Wellbutrin do not cause restless leg symptoms  Ferritin assay to be undertaken  Multi component Cognitive behavioral therapy for Insomnia undertaken - Sleep Restriction, Stimulus control, Relaxation techniques and Sleep hygiene were discussed  Also advised smoking cessation and she is contemplating      Follow-up to be scheduled after the studies to review results, further details of treatment options and to initiate/adjust therapy  Sincerely,      Authenticated electronically on 78/13/06   Board Certified Specialist     Portions of the record may have been created with voice recognition software   Occasional wrong word or "sound a like" substitutions may have occurred due to the inherent limitations of voice recognition software  There may also be notations and random deletions of words or characters from malfunctioning software  Read the chart carefully and recognize, using context, where substitutions/deletions have occurred

## 2022-08-09 NOTE — PATIENT INSTRUCTIONS
What you can do to improve your sleep: (Sleep Hygiene) Basic rules for a good night's sleep    Create a regular sleep schedule  This will help you form a sleep routine  Keep a record of your sleep patterns, and any sleeping problems you have  Bring the record to follow-up visits with healthcare providers  Avoid prolonged use of light-emitting screens before bedtime or watching TV in bed  Avoid forcing sleep  Do not take naps  Naps could make it hard for you to fall asleep at bedtime  Deal with your worries before bedtime  Keep your bedroom cool, quiet, and dark  Turn on white noise, such as a fan, to help you relax  Do not use your bed for any activity that will keep you awake  Do not read, exercise, eat, or watch TV in your bedroom  Get up if you do not fall asleep within 20 minutes  Move to another room and do something relaxing until you become sleepy  Limit caffeine, alcohol, nicotine and food to earlier in the day  Only drink caffeine in the morning  Do not drink alcohol within 6 hours of bedtime  Do not eat a heavy meal right before you go to bed  Avoid smoking, especially in the evening  Exercise regularly  Daily exercise will help you sleep better  Do not exercise within 4 hours of bedtime  Stimulus control therapy rules  1  Go to bed only when sleepy  2  Do not watch television, read, eat, or worry while in bed  Use bed only for sleep and sex  3  Get out of bed if unable to fall asleep within 20 minutes and go to another room  Return to bed only when sleepy  Repeat this step as many times as necessary throughout the night  4  Set an alarm clock to wake up at a fixed time each morning, including weekends  5  Do not take a nap during the day  Data from: [address] [place] [address], [address] Nonpharmacologic treatments of insomnia  J Clin Psychiatry 2634; 53:37  Go to [employer]ASM website for more information: Sleepeducation  org     Recommended Reading:  Book by authors Rajendra Mak No More sleepless nights    What is DEVI? Obstructive sleep apnea is a common and serious sleep disorder that causes you to stop breathing during sleep  The airway repeatedly becomes blocked, limiting the amount of air that reaches your lungs  When this happens, you may snore loudly or making choking noises as you try to breathe  Your brain and body becomes oxygen deprived and you may wake up  This may happen a few times a night, or in more severe cases, several hundred times a night  Sleep apnea can make you wake up in the morning feeling tired or unrefreshed even though you have had a full night of sleep  During the day, you may feel fatigued, have difficulty concentrating or you may even unintentionally fall asleep  This is because your body is waking up numerous times throughout the night, even though you might not be conscious of each awakening  The lack of oxygen your body receives can have negative long-term consequences for your health  This includes:  High blood pressure  Heart disease  Irregular heart rhythms  Stroke  Pre-diabetes and diabetes  Depression    Testing  An objective evaluation of your sleep may be needed before your board certified sleep physician can make a diagnosis  Options include:   In-lab overnight sleep study  This type of sleep study requires you to stay overnight at a sleep center, in a bed that may resemble a hotel room  You will sleep with sensors hooked up to various parts of your body  These sensors record your brain waves, heartbeat, breathing and movement  An overnight sleep study also provides your doctor with the most complete information about your sleep  Learn more about an overnight sleep study       Home sleep apnea test  Some patients with high risk factors for obstructive sleep apnea and no other medical disorders may be candidates for a home sleep apnea test  The testing equipment differs in that it is less complicated than what is used in an overnight sleep study  As such, does not give all the data an in-lab will and if negative, may not mean you do not have the problem  Treatment for sleep apnea  includes using a continuous positive airway pressure (CPAP) machine to keep your airway open during sleep  A mask is placed over your nose and mouth, or just your nose  The mask is hooked to the CPAP machine that blows a gentle stream of air into the mask when you breathe  This helps keep your airway open so you can breathe more regularly  Extra oxygen may be given to you through the machine  You may be given a mouth device  It looks like a mouth guard or dental retainer and stops your tongue and mouth tissues from blocking your throat while you sleep  Surgery may be needed to remove extra tissues that block your mouth, throat, or nose  Manage sleep apnea:   Do not smoke  Nicotine and other chemicals in cigarettes and cigars can cause lung damage  Ask your healthcare provider for information if you currently smoke and need help to quit  E-cigarettes or smokeless tobacco still contain nicotine  Talk to your healthcare provider before you use these products  Do not drink alcohol or take sedative medicine before you go to sleep  Alcohol and sedatives can relax the muscles and tissues around your throat  This can block the airflow to your lungs  Maintain a healthy weight  Excess tissue around your throat may restrict your breathing  Ask your healthcare provider for information if you need to lose weight  Sleep on your side or use pillows designed to prevent sleep apnea  This prevents your tongue or other tissues from blocking your throat  You can also raise the head of your bed  Driving Safety  Refrain from driving when drowsy  Follow up with your healthcare provider as directed:  Write down your questions so you remember to ask them during your visits  Go to AASM website for more information: Sleepeducation  org     What is DEVI?    Obstructive sleep apnea is a common and serious sleep disorder that causes you to stop breathing during sleep  The airway repeatedly becomes blocked, limiting the amount of air that reaches your lungs  When this happens, you may snore loudly or making choking noises as you try to breathe  Your brain and body becomes oxygen deprived and you may wake up  This may happen a few times a night, or in more severe cases, several hundred times a night  Sleep apnea can make you wake up in the morning feeling tired or unrefreshed even though you have had a full night of sleep  During the day, you may feel fatigued, have difficulty concentrating or you may even unintentionally fall asleep  This is because your body is waking up numerous times throughout the night, even though you might not be conscious of each awakening  The lack of oxygen your body receives can have negative long-term consequences for your health  This includes:  High blood pressure  Heart disease  Irregular heart rhythms  Stroke  Pre-diabetes and diabetes  Depression    Testing  An objective evaluation of your sleep may be needed before your board certified sleep physician can make a diagnosis  Options include:   In-lab overnight sleep study  This type of sleep study requires you to stay overnight at a sleep center, in a bed that may resemble a hotel room  You will sleep with sensors hooked up to various parts of your body  These sensors record your brain waves, heartbeat, breathing and movement  An overnight sleep study also provides your doctor with the most complete information about your sleep  Learn more about an overnight sleep study  Home sleep apnea test  Some patients with high risk factors for obstructive sleep apnea and no other medical disorders may be candidates for a home sleep apnea test  The testing equipment differs in that it is less complicated than what is used in an overnight sleep study   As such, does not give all the data an in-lab will and if negative, may not mean you do not have the problem  Treatment for sleep apnea  includes using a continuous positive airway pressure (CPAP) machine to keep your airway open during sleep  A mask is placed over your nose and mouth, or just your nose  The mask is hooked to the CPAP machine that blows a gentle stream of air into the mask when you breathe  This helps keep your airway open so you can breathe more regularly  Extra oxygen may be given to you through the machine  You may be given a mouth device  It looks like a mouth guard or dental retainer and stops your tongue and mouth tissues from blocking your throat while you sleep  Surgery may be needed to remove extra tissues that block your mouth, throat, or nose  Manage sleep apnea:   Do not smoke  Nicotine and other chemicals in cigarettes and cigars can cause lung damage  Ask your healthcare provider for information if you currently smoke and need help to quit  E-cigarettes or smokeless tobacco still contain nicotine  Talk to your healthcare provider before you use these products  Do not drink alcohol or take sedative medicine before you go to sleep  Alcohol and sedatives can relax the muscles and tissues around your throat  This can block the airflow to your lungs  Maintain a healthy weight  Excess tissue around your throat may restrict your breathing  Ask your healthcare provider for information if you need to lose weight  Sleep on your side or use pillows designed to prevent sleep apnea  This prevents your tongue or other tissues from blocking your throat  You can also raise the head of your bed  Driving Safety  Refrain from driving when drowsy  Follow up with your healthcare provider as directed:  Write down your questions so you remember to ask them during your visits  Go to AAS website for more information: Sleepeducation  org             Nursing Support:  When: Monday through Friday 7A-5PM except holidays  Where: Our direct line is [phone]  If you are having a true emergency please call 911  In the event that the line is busy or it is after hours please leave a voice message and we will return your call  Please speak clearly, leaving your full name, birth date, best number to reach you and the reason for your call  Medication refills: We will need the name of the medication, the dosage, the ordering provider, whether you get a 30 or 90 day refill, and the pharmacy name and address  Medications will be ordered by the provider only  Nurses cannot call in prescriptions  Please allow 7 days for medication refills  Physician requested updates: If your provider requested that you call with an update after starting medication, please be ready to provide us the medication and dosage, what time you take your medication, the time you attempt to fall asleep, time you fall asleep, when you wake up, and what time you get out of bed  Sleep Study Results: We will contact you with sleep study results and/or next steps after the physician has reviewed your testing

## 2022-08-09 NOTE — PROGRESS NOTES
Review of Systems      Genitourinary none   Cardiology none   Gastrointestinal none   Neurology need to move extremities   Constitutional none   Integumentary none   Psychiatry anxiety and depression   Musculoskeletal joint pain, muscle aches, back pain, legs twitching/jerking and leg cramps   Pulmonary none   ENT none   Endocrine none   Hematological none

## 2022-08-22 DIAGNOSIS — F41.1 GAD (GENERALIZED ANXIETY DISORDER): ICD-10-CM

## 2022-08-23 RX ORDER — LORAZEPAM 0.5 MG/1
0.5 TABLET ORAL
Qty: 30 TABLET | Refills: 0 | Status: SHIPPED | OUTPATIENT
Start: 2022-08-23 | End: 2022-09-22 | Stop reason: SDUPTHER

## 2022-08-25 DIAGNOSIS — E78.2 MIXED HYPERLIPIDEMIA: ICD-10-CM

## 2022-08-25 RX ORDER — ATORVASTATIN CALCIUM 80 MG/1
80 TABLET, FILM COATED ORAL DAILY
Qty: 90 TABLET | Refills: 0 | Status: SHIPPED | OUTPATIENT
Start: 2022-08-25 | End: 2022-11-23

## 2022-09-09 ENCOUNTER — OFFICE VISIT (OUTPATIENT)
Dept: URGENT CARE | Facility: CLINIC | Age: 57
End: 2022-09-09
Payer: COMMERCIAL

## 2022-09-09 VITALS
SYSTOLIC BLOOD PRESSURE: 122 MMHG | OXYGEN SATURATION: 97 % | TEMPERATURE: 97.2 F | RESPIRATION RATE: 16 BRPM | HEART RATE: 68 BPM | DIASTOLIC BLOOD PRESSURE: 78 MMHG

## 2022-09-09 DIAGNOSIS — H00.031 CELLULITIS OF RIGHT UPPER EYELID: Primary | ICD-10-CM

## 2022-09-09 PROCEDURE — G0382 LEV 3 HOSP TYPE B ED VISIT: HCPCS | Performed by: PHYSICIAN ASSISTANT

## 2022-09-09 RX ORDER — DOXYCYCLINE 100 MG/1
100 CAPSULE ORAL 2 TIMES DAILY
Qty: 14 CAPSULE | Refills: 0 | Status: SHIPPED | OUTPATIENT
Start: 2022-09-09 | End: 2022-09-16

## 2022-09-09 NOTE — PATIENT INSTRUCTIONS
Cellulitis   WHAT YOU NEED TO KNOW:   Cellulitis is a skin infection caused by bacteria  Cellulitis is common and can become severe  Cellulitis usually appears on the lower legs  It can also appear on the arms, face, and other areas  Cellulitis develops when bacteria enter a crack or break in your skin, such as a scratch, bite, or cut  DISCHARGE INSTRUCTIONS:   Return to the emergency department if:   Your wound gets larger and more painful  You feel a crackling under your skin when you touch it  You have purple dots or bumps on your skin, or you see bleeding under your skin  You see red streaks coming from the infected area  Call your doctor if:   The red, warm, swollen area gets larger  Your fever or pain does not go away or gets worse  The area does not get smaller after 3 days of antibiotics  You have questions or concerns about your condition or care  Medicines: You should start to see improvement in 3 days  If cellulitis is not treated, the infection can spread through your body and become life-threatening  You may need any of the following medicines:  Antibiotics  help treat the bacterial infection  Acetaminophen  decreases pain and fever  It is available without a doctor's order  Ask how much to take and how often to take it  Follow directions  Read the labels of all other medicines you are using to see if they also contain acetaminophen, or ask your doctor or pharmacist  Acetaminophen can cause liver damage if not taken correctly  Do not use more than 4 grams (4,000 milligrams) total of acetaminophen in one day  NSAIDs , such as ibuprofen, help decrease swelling, pain, and fever  This medicine is available with or without a doctor's order  NSAIDs can cause stomach bleeding or kidney problems in certain people  If you take blood thinner medicine, always ask your healthcare provider if NSAIDs are safe for you  Always read the medicine label and follow directions      Take your medicine as directed  Contact your healthcare provider if you think your medicine is not helping or if you have side effects  Tell him or her if you are allergic to any medicine  Keep a list of the medicines, vitamins, and herbs you take  Include the amounts, and when and why you take them  Bring the list or the pill bottles to follow-up visits  Carry your medicine list with you in case of an emergency  Self-care:   Wash the area with soap and water every day  Gently pat dry  Use bandages if directed by your healthcare provider  Elevate the area above the level of your heart  as often as you can  This will help decrease swelling and pain  Prop the area on pillows or blankets to keep it elevated comfortably  Place a cool, damp cloth on the area  Use clean cloths and clean water  You can do this as often as you need to  Cool, damp cloths may help decrease pain  Apply cream or ointment as directed  These help protect the area  Most over-the-counter products, such as petroleum jelly, are good to use  Ask your healthcare provider about specific creams or ointments you should use  Prevent cellulitis:   Do not scratch bug bites or areas of injury  You increase your risk for cellulitis by scratching these areas  Do not share personal items, such as towels, clothing, and razors  Clean exercise equipment  with germ-killing  before and after you use it  Treat athlete's foot  This can help prevent the spread of a bacterial skin infection  Wash your hands often  Use soap and water  Wash your hands after you use the bathroom, change a child's diapers, or sneeze  Wash your hands before you prepare or eat food  Use lotion to prevent dry, cracked skin  Follow up with your doctor within 3 days, or as directed:  He or she will check if your cellulitis is getting better  Write down your questions so you remember to ask them during your visits    © Copyright [address] 2022 Information is for End User's use only and may not be sold, redistributed or otherwise used for commercial purposes  All illustrations and images included in CareNotes® are the copyrighted property of A D A M [employer] Inc  or Candice Mohr  The above information is an  only  It is not intended as medical advice for individual conditions or treatments  Talk to your doctor, nurse or pharmacist before following any medical regimen to see if it is safe and effective for you

## 2022-09-12 DIAGNOSIS — G25.81 RESTLESS LEG SYNDROME: ICD-10-CM

## 2022-09-12 RX ORDER — PRAMIPEXOLE DIHYDROCHLORIDE 0.5 MG/1
1 TABLET ORAL DAILY
Qty: 180 TABLET | Refills: 0 | Status: SHIPPED | OUTPATIENT
Start: 2022-09-12 | End: 2022-12-11

## 2022-09-16 NOTE — PROGRESS NOTES
[facility]        NAME: Gamaliel Baron is a 61 y o  female  : 1965    MRN: [id number]  DATE:  2022  TIME: 8:57 AM    Assessment and Plan   Cellulitis of right upper eyelid [H00 031]  1  Cellulitis of right upper eyelid  doxycycline monohydrate (MONODOX) 100 mg capsule         Patient Instructions     Patient has right upper eyelid cellulitis which I will treat with an oral antibiotic and recommended warm compresses as well as Tylenol/NSAIDs for pain  Should be re-evaluated if condition persists or worsens  Follow up with PCP in 3-5 days  Proceed to  ER if symptoms worsen  Chief Complaint     Chief Complaint   Patient presents with    Eyelid Pain     Redness, swelling and pain in the upper right eyelid  Began three days ago  Pt reports using warm compresses with no relief  History of Present Illness       Patient presents with a red, swollen, painful right upper eyelid which she has had for the past 3 days  Denies any injury or trauma to the area  She reports any active discharge or crusting  Denies photophobia or visual changes  Has been applying warm compresses  Denies any symptoms in the left eye  Review of Systems   Review of Systems   Constitutional: Negative  Eyes:        Right upper eyelid pain, redness, swelling with no known trauma  Respiratory: Negative  Cardiovascular: Negative  Gastrointestinal: Negative  Genitourinary: Negative  Current Medications       Current Outpatient Medications:     atorvastatin (LIPITOR) 80 mg tablet, Take 1 tablet (80 mg total) by mouth daily, Disp: 90 tablet, Rfl: 0    dicyclomine (BENTYL) 20 mg tablet, Take 1 tablet (20 mg total) by mouth every 6 (six) hours as needed (pain, cramping), Disp: 90 tablet, Rfl: 1    doxycycline monohydrate (MONODOX) 100 mg capsule, Take 1 capsule (100 mg total) by mouth 2 (two) times a day for 7 days Take with food (non-dairy)  , Disp: 14 capsule, Rfl: 0   LORazepam (ATIVAN) 0 5 mg tablet, Take 1 tablet (0 5 mg total) by mouth daily at bedtime, Disp: 30 tablet, Rfl: 0    sertraline (ZOLOFT) 100 mg tablet, Take 1 tablet (100 mg total) by mouth daily, Disp: 90 tablet, Rfl: 1    lisinopril (ZESTRIL) 10 mg tablet, Take 1 tablet (10 mg total) by mouth daily, Disp: 90 tablet, Rfl: 1    pramipexole (MIRAPEX) 0 5 mg tablet, Take 2 tablets (1 mg total) by mouth daily, Disp: 180 tablet, Rfl: 0    sertraline (ZOLOFT) 50 mg tablet, Take 1 tablet (50 mg total) by mouth daily, Disp: 90 tablet, Rfl: 1    Current Allergies     Allergies as of 09/09/2022 - Reviewed 09/09/2022   Allergen Reaction Noted    Penicillins Hives and Throat Swelling 03/05/2016    Meloxicam Hives and Itching 09/13/2021            The following portions of the patient's history were reviewed and updated as appropriate: allergies, current medications, past family history, past medical history, past social history, past surgical history and problem list      Past Medical History:   Diagnosis Date    Anxiety 2005    Bulging lumbar disc     Chronic pain     low back    Degenerative disk disease     lumbar spine    Depression 2005    Hyperlipidemia     Hypertension     Pinched nerve     lumbar spine    Psychiatric disorder     depression    Shingles [id number]       Past Surgical History:   Procedure Laterality Date    CARPAL TUNNEL RELEASE Bilateral 1998    CHOLECYSTECTOMY  2008    COLONOSCOPY  06/15/2015    COLONOSCOPY  06/02/2015    Internal hemorrhoids, hyperplastic polyps which were removed  Ten year recall recommended June 2025    COLONOSCOPY  09/16/2020    normal terminal ileum, 1 small polyp in the distal sigmoid colon that was hyperplastic  Francisco Subramanian She did have a small amount of retained stool in the colon  She did have a small amount of retained stool in the colon  A 5 year recall was recommended due to poor prep, September 2025       SPINAL CORD STIMULATOR IMPLANT      2014    SPINE SURGERY 2017    Stimulator inplant    WISDOM TOOTH EXTRACTION Bilateral 1982       Family History   Problem Relation Age of Onset    Skin cancer Mother     Heart disease Father         Dx with lewy body dementia in 2012    Dementia Father         Dx with lewy body dementia in 2012    Breast cancer Maternal Aunt 29    Breast cancer Sister 64    BRCA1 Negative Sister     BRCA2 Negative Sister     Depression Sister     Prostate cancer Maternal Uncle     Cancer Maternal Uncle         BLADDER    Colon cancer Maternal Uncle     Colon polyps Neg Hx          Medications have been verified  Objective   /78   Pulse 68   Temp (!) 97 2 °F (36 2 °C)   Resp 16   LMP  (LMP Unknown)   SpO2 97%   No LMP recorded (lmp unknown)  Patient is postmenopausal        Physical Exam     Physical Exam  Vitals reviewed  Constitutional:       General: She is not in acute distress  Appearance: She is well-developed  Eyes:      Comments: Diffuse soft tissue swelling of the right upper eyelid  No crusting on lid margins are active discharge  No stye visualized  Conjunctiva is normal    Neurological:      Mental Status: She is alert and oriented to person, place, and time

## 2022-09-22 DIAGNOSIS — F41.1 GAD (GENERALIZED ANXIETY DISORDER): ICD-10-CM

## 2022-09-22 RX ORDER — LORAZEPAM 0.5 MG/1
0.5 TABLET ORAL
Qty: 30 TABLET | Refills: 0 | Status: SHIPPED | OUTPATIENT
Start: 2022-09-22 | End: 2022-10-20 | Stop reason: SDUPTHER

## 2022-09-26 ENCOUNTER — OFFICE VISIT (OUTPATIENT)
Dept: FAMILY MEDICINE CLINIC | Facility: CLINIC | Age: 57
End: 2022-09-26
Payer: COMMERCIAL

## 2022-09-26 VITALS
WEIGHT: 199.8 LBS | BODY MASS INDEX: 36.77 KG/M2 | TEMPERATURE: 97 F | HEIGHT: 62 IN | OXYGEN SATURATION: 94 % | SYSTOLIC BLOOD PRESSURE: 120 MMHG | RESPIRATION RATE: 16 BRPM | DIASTOLIC BLOOD PRESSURE: 80 MMHG | HEART RATE: 79 BPM

## 2022-09-26 DIAGNOSIS — E66.9 OBESITY (BMI 35.0-39.9 WITHOUT COMORBIDITY): ICD-10-CM

## 2022-09-26 DIAGNOSIS — R91.8 PULMONARY NODULES: ICD-10-CM

## 2022-09-26 DIAGNOSIS — R73.01 IFG (IMPAIRED FASTING GLUCOSE): ICD-10-CM

## 2022-09-26 DIAGNOSIS — Z12.2 ENCOUNTER FOR SCREENING FOR LUNG CANCER: ICD-10-CM

## 2022-09-26 DIAGNOSIS — E78.2 MIXED HYPERLIPIDEMIA: ICD-10-CM

## 2022-09-26 DIAGNOSIS — Z00.00 ANNUAL PHYSICAL EXAM: Primary | ICD-10-CM

## 2022-09-26 DIAGNOSIS — Z23 NEED FOR IMMUNIZATION AGAINST INFLUENZA: ICD-10-CM

## 2022-09-26 DIAGNOSIS — K58.0 IRRITABLE BOWEL SYNDROME WITH DIARRHEA: ICD-10-CM

## 2022-09-26 DIAGNOSIS — I10 ESSENTIAL HYPERTENSION: ICD-10-CM

## 2022-09-26 DIAGNOSIS — F43.23 ADJUSTMENT DISORDER WITH MIXED ANXIETY AND DEPRESSED MOOD: ICD-10-CM

## 2022-09-26 DIAGNOSIS — F17.210 SMOKING GREATER THAN 20 PACK YEARS: ICD-10-CM

## 2022-09-26 DIAGNOSIS — G25.81 RESTLESS LEG SYNDROME: ICD-10-CM

## 2022-09-26 PROCEDURE — 99396 PREV VISIT EST AGE 40-64: CPT | Performed by: FAMILY MEDICINE

## 2022-09-26 PROCEDURE — 90682 RIV4 VACC RECOMBINANT DNA IM: CPT | Performed by: FAMILY MEDICINE

## 2022-09-26 PROCEDURE — 3725F SCREEN DEPRESSION PERFORMED: CPT | Performed by: FAMILY MEDICINE

## 2022-09-26 PROCEDURE — 90471 IMMUNIZATION ADMIN: CPT | Performed by: FAMILY MEDICINE

## 2022-09-26 NOTE — PROGRESS NOTES
[facility]    NAME: Corbin Crockett  AGE: 61 y o  SEX: female  : 1965     DATE: 2022     Assessment and Plan:     Problem List Items Addressed This Visit        Digestive    Irritable bowel syndrome with diarrhea    Relevant Orders    Lipid panel    Comprehensive metabolic panel    CBC and differential    TSH, 3rd generation with Free T4 reflex    UA (URINE) with reflex to Scope    Hemoglobin A1C       Endocrine    IFG (impaired fasting glucose)    Relevant Orders    Lipid panel    Comprehensive metabolic panel    CBC and differential    TSH, 3rd generation with Free T4 reflex    UA (URINE) with reflex to Scope    Hemoglobin A1C       Cardiovascular and Mediastinum    Essential hypertension     Well controlled on lisinopril          Relevant Orders    Lipid panel    Comprehensive metabolic panel    CBC and differential    TSH, 3rd generation with Free T4 reflex    UA (URINE) with reflex to Scope    Hemoglobin A1C       Other    Mixed hyperlipidemia    Relevant Orders    Lipid panel    Comprehensive metabolic panel    CBC and differential    TSH, 3rd generation with Free T4 reflex    UA (URINE) with reflex to Scope    Hemoglobin A1C    Restless leg syndrome    Relevant Orders    Lipid panel    Comprehensive metabolic panel    CBC and differential    TSH, 3rd generation with Free T4 reflex    UA (URINE) with reflex to Scope    Hemoglobin A1C    Adjustment disorder with mixed anxiety and depressed mood     Stable on zoloft  She no longer needs to see Froy Neol she is feeling well         Relevant Orders    Lipid panel    Comprehensive metabolic panel    CBC and differential    TSH, 3rd generation with Free T4 reflex    UA (URINE) with reflex to Scope    Hemoglobin A1C    Pulmonary nodules    Obesity (BMI 35 0-39 9 without comorbidity)     BMI Counseling: Body mass index is 36 54 kg/m²   The BMI is above normal  Nutrition recommendations include reducing portion sizes, decreasing overall calorie intake and 3-5 servings of fruits/vegetables daily  Exercise recommendations include vigorous aerobic physical activity for 75 minutes/week  Relevant Orders    Lipid panel    Comprehensive metabolic panel    CBC and differential    TSH, 3rd generation with Free T4 reflex    UA (URINE) with reflex to Scope    Hemoglobin A1C      Other Visit Diagnoses     Annual physical exam    -  Primary    Relevant Orders    Lipid panel    Comprehensive metabolic panel    CBC and differential    TSH, 3rd generation with Free T4 reflex    UA (URINE) with reflex to Scope    Hemoglobin A1C    Need for immunization against influenza        Relevant Orders    influenza vaccine, quadrivalent, recombinant, PF, 0 5 mL, for patients 18 yr+ (FLUBLOK) (Completed)    Encounter for screening for lung cancer        Relevant Orders    CT lung screening program    Smoking greater than 20 pack years        Relevant Orders    CT lung screening program          Immunizations and preventive care screenings were discussed with patient today  Appropriate education was printed on patient's after visit summary  Counseling:  Alcohol/drug use: discussed moderation in alcohol intake, the recommendations for healthy alcohol use, and avoidance of illicit drug use  Dental Health: discussed importance of regular tooth brushing, flossing, and dental visits  Injury prevention: discussed safety/seat belts, safety helmets, smoke detectors, carbon dioxide detectors, and smoking near bedding or upholstery  Sexual health: discussed sexually transmitted diseases, partner selection, use of condoms, avoidance of unintended pregnancy, and contraceptive alternatives  · Exercise: the importance of regular exercise/physical activity was discussed  Recommend exercise 3-5 times per week for at least 30 minutes            Return in about 4 months (around 1/26/2023) for medication check up      Chief Complaint:     Chief Complaint   Patient presents with    Annual Exam     Complete Physical       History of Present Illness:     Adult Annual Physical   Patient here for a comprehensive physical exam      Diet and Physical Activity  · Diet/Nutrition: well balanced diet  · Exercise: moderate cardiovascular exercise  Depression Screening  PHQ-2/9 Depression Screening    Little interest or pleasure in doing things: 0 - not at all  Feeling down, depressed, or hopeless: 0 - not at all  Trouble falling or staying asleep, or sleeping too much: 0 - not at all  Feeling tired or having little energy: 0 - not at all  Poor appetite or overeatin - not at all  Feeling bad about yourself - or that you are a failure or have let yourself or your family down: 0 - not at all  Trouble concentrating on things, such as reading the newspaper or watching television: 0 - not at all  Moving or speaking so slowly that other people could have noticed  Or the opposite - being so fidgety or restless that you have been moving around a lot more than usual: 0 - not at all  Thoughts that you would be better off dead, or of hurting yourself in some way: 0 - not at all  PHQ-9 Score: 0   PHQ-9 Interpretation: No or Minimal depression          /GYN Health  · Follows with Dr Roberto Maldonado     Review of Systems:     Review of Systems   Constitutional: Negative for chills, fatigue and fever  HENT: Negative for congestion, postnasal drip, rhinorrhea and sinus pressure  Eyes: Negative for photophobia and visual disturbance  Respiratory: Negative for cough and shortness of breath  Cardiovascular: Negative for chest pain, palpitations and leg swelling  Gastrointestinal: Negative for abdominal pain, constipation, diarrhea, nausea and vomiting  Genitourinary: Negative for difficulty urinating and dysuria  Musculoskeletal: Negative for arthralgias and myalgias  Skin: Negative for color change and rash     Neurological: Negative for dizziness, weakness, light-headedness and headaches  Past Medical History:     Past Medical History:   Diagnosis Date    Anxiety 2005    Bulging lumbar disc     Chronic pain     low back    Degenerative disk disease     lumbar spine    Depression 2005    Hyperlipidemia     Hypertension     Pinched nerve     lumbar spine    Psychiatric disorder     depression    Shingles 20179      Past Surgical History:     Past Surgical History:   Procedure Laterality Date    CARPAL TUNNEL RELEASE Bilateral 1998    CHOLECYSTECTOMY  2008    COLONOSCOPY  06/15/2015    COLONOSCOPY  06/02/2015    Internal hemorrhoids, hyperplastic polyps which were removed  Ten year recall recommended June 2025    COLONOSCOPY  09/16/2020    normal terminal ileum, 1 small polyp in the distal sigmoid colon that was hyperplastic  Yanna Prows She did have a small amount of retained stool in the colon  She did have a small amount of retained stool in the colon  A 5 year recall was recommended due to poor prep, September 2025  [facility] STIMULATOR IMPLANT      2014    SPINE SURGERY  2017    Stimulator inplant    WISDOM TOOTH EXTRACTION Bilateral 1982      Social History:     Social History     Socioeconomic History    Marital status:      Spouse name: None    Number of children: 1    Years of education: None    Highest education level: None   Occupational History    Occupation: not working    Tobacco Use    Smoking status: Current Every Day Smoker     Packs/day: 1 00     Years: 43 00     Pack years: 43 00     Types: Cigarettes     Start date: 1977    Smokeless tobacco: Never Used    Tobacco comment: smokes 10 cigs daily as of 10/20   Vaping Use    Vaping Use: Never used   Substance and Sexual Activity    Alcohol use:  Yes     Alcohol/week: 0 0 standard drinks     Comment: Social drinker    Drug use: No    Sexual activity: Yes     Partners: Male     Birth control/protection: Inserts, None   Other Topics Concern    None   Social History Narrative         Social Determinants of Health     Financial Resource Strain: Not on file   Food Insecurity: Not on file   Transportation Needs: Not on file   Physical Activity: Not on file   Stress: Not on file   Social Connections: Not on file   Intimate Partner Violence: Not At Risk    Fear of Current or Ex-Partner: No    Emotionally Abused: No    Physically Abused: No    Sexually Abused: No   Housing Stability: Not on file      Family History:     Family History   Problem Relation Age of Onset    Skin cancer Mother     Heart disease Father         Dx with lewy body dementia in 2012    Dementia Father         Dx with lewy body dementia in 2012    Breast cancer Maternal Aunt 29    Breast cancer Sister 64    BRCA1 Negative Sister     BRCA2 Negative Sister     Depression Sister     Prostate cancer Maternal Uncle     Cancer Maternal Uncle         BLADDER    Colon cancer Maternal Uncle     Colon polyps Neg Hx       Current Medications:     Current Outpatient Medications   Medication Sig Dispense Refill    atorvastatin (LIPITOR) 80 mg tablet Take 1 tablet (80 mg total) by mouth daily 90 tablet 0    dicyclomine (BENTYL) 20 mg tablet Take 1 tablet (20 mg total) by mouth every 6 (six) hours as needed (pain, cramping) 90 tablet 1    lisinopril (ZESTRIL) 10 mg tablet Take 1 tablet (10 mg total) by mouth daily 90 tablet 1    LORazepam (ATIVAN) 0 5 mg tablet Take 1 tablet (0 5 mg total) by mouth daily at bedtime 30 tablet 0    pramipexole (MIRAPEX) 0 5 mg tablet Take 2 tablets (1 mg total) by mouth daily 180 tablet 0    sertraline (ZOLOFT) 100 mg tablet Take 1 tablet (100 mg total) by mouth daily 90 tablet 1    sertraline (ZOLOFT) 50 mg tablet Take 1 tablet (50 mg total) by mouth daily 90 tablet 1     No current facility-administered medications for this visit  Allergies:      Allergies   Allergen Reactions    Penicillins Hives and Throat Swelling    Meloxicam Hives and Itching      Physical Exam:     /80 (BP Location: Left arm, Patient Position: Sitting, Cuff Size: Large)   Pulse 79   Temp (!) 97 °F (36 1 °C) (Tympanic)   Resp 16   Ht 5' 2" (1 575 m)   Wt 90 6 kg (199 lb 12 8 oz)   LMP  (LMP Unknown)   SpO2 94%   BMI 36 54 kg/m²     Physical Exam  Constitutional:       General: She is not in acute distress  Appearance: Normal appearance  She is not ill-appearing, toxic-appearing or diaphoretic  HENT:      Head: Normocephalic and atraumatic  Right Ear: Tympanic membrane and ear canal normal       Left Ear: Tympanic membrane and ear canal normal       Nose: Nose normal  No congestion  Mouth/Throat:      Mouth: Mucous membranes are moist       Pharynx: Oropharynx is clear  No oropharyngeal exudate  Eyes:      Extraocular Movements: Extraocular movements intact  Conjunctiva/sclera: Conjunctivae normal       Pupils: Pupils are equal, round, and reactive to light  Cardiovascular:      Rate and Rhythm: Normal rate and regular rhythm  Pulses: Normal pulses  Heart sounds: No murmur heard  Pulmonary:      Effort: Pulmonary effort is normal       Breath sounds: Normal breath sounds  No wheezing, rhonchi or rales  Abdominal:      General: Bowel sounds are normal  There is no distension  Palpations: Abdomen is soft  Tenderness: There is no abdominal tenderness  Musculoskeletal:         General: No swelling or tenderness  Normal range of motion  Cervical back: Normal range of motion and neck supple  Skin:     General: Skin is warm and dry  Capillary Refill: Capillary refill takes less than 2 seconds  Neurological:      General: No focal deficit present  Mental Status: She is alert and oriented to person, place, and time  Cranial Nerves: No cranial nerve deficit  Psychiatric:         Mood and Affect: Mood normal          Behavior: Behavior normal          Thought Content:  Thought content normal           [facility], DO  [facility]

## 2022-09-26 NOTE — ASSESSMENT & PLAN NOTE
BMI Counseling: Body mass index is 36 54 kg/m²  The BMI is above normal  Nutrition recommendations include reducing portion sizes, decreasing overall calorie intake and 3-5 servings of fruits/vegetables daily  Exercise recommendations include vigorous aerobic physical activity for 75 minutes/week

## 2022-09-26 NOTE — PATIENT INSTRUCTIONS

## 2022-10-12 DIAGNOSIS — I10 ESSENTIAL HYPERTENSION: ICD-10-CM

## 2022-10-12 RX ORDER — LISINOPRIL 10 MG/1
10 TABLET ORAL DAILY
Qty: 90 TABLET | Refills: 1 | Status: SHIPPED | OUTPATIENT
Start: 2022-10-12 | End: 2023-01-10

## 2022-10-20 DIAGNOSIS — F41.1 GAD (GENERALIZED ANXIETY DISORDER): ICD-10-CM

## 2022-10-20 RX ORDER — LORAZEPAM 0.5 MG/1
0.5 TABLET ORAL
Qty: 30 TABLET | Refills: 0 | Status: SHIPPED | OUTPATIENT
Start: 2022-10-20 | End: 2022-11-19

## 2022-11-23 DIAGNOSIS — F41.1 GAD (GENERALIZED ANXIETY DISORDER): ICD-10-CM

## 2022-11-23 DIAGNOSIS — E78.2 MIXED HYPERLIPIDEMIA: ICD-10-CM

## 2022-11-23 RX ORDER — ATORVASTATIN CALCIUM 80 MG/1
80 TABLET, FILM COATED ORAL DAILY
Qty: 90 TABLET | Refills: 1 | Status: SHIPPED | OUTPATIENT
Start: 2022-11-23 | End: 2023-02-21

## 2022-11-23 RX ORDER — LORAZEPAM 0.5 MG/1
0.5 TABLET ORAL
Qty: 30 TABLET | Refills: 0 | Status: SHIPPED | OUTPATIENT
Start: 2022-11-23 | End: 2022-12-23

## 2022-12-06 LAB
ALBUMIN SERPL-MCNC: 4.6 G/DL (ref 3.8–4.9)
ALBUMIN/GLOB SERPL: 2.2 {RATIO} (ref 1.2–2.2)
ALP SERPL-CCNC: 94 IU/L (ref 44–121)
ALT SERPL-CCNC: 17 IU/L (ref 0–32)
APPEARANCE UR: ABNORMAL
AST SERPL-CCNC: 13 IU/L (ref 0–40)
BASOPHILS # BLD AUTO: 0.1 X10E3/UL (ref 0–0.2)
BASOPHILS NFR BLD AUTO: 1 %
BILIRUB SERPL-MCNC: 0.2 MG/DL (ref 0–1.2)
BILIRUB UR QL STRIP: NEGATIVE
BUN SERPL-MCNC: 14 MG/DL (ref 6–24)
BUN/CREAT SERPL: 21 (ref 9–23)
CALCIUM SERPL-MCNC: 9.2 MG/DL (ref 8.7–10.2)
CHLORIDE SERPL-SCNC: 102 MMOL/L (ref 96–106)
CHOLEST SERPL-MCNC: 188 MG/DL (ref 100–199)
CHOLEST/HDLC SERPL: 5.4 RATIO (ref 0–4.4)
CO2 SERPL-SCNC: 26 MMOL/L (ref 20–29)
COLOR UR: YELLOW
CREAT SERPL-MCNC: 0.68 MG/DL (ref 0.57–1)
EGFR: 101 ML/MIN/1.73
EOSINOPHIL # BLD AUTO: 0.2 X10E3/UL (ref 0–0.4)
EOSINOPHIL NFR BLD AUTO: 3 %
ERYTHROCYTE [DISTWIDTH] IN BLOOD BY AUTOMATED COUNT: 13.1 % (ref 11.7–15.4)
EST. AVERAGE GLUCOSE BLD GHB EST-MCNC: 123 MG/DL
FERRITIN SERPL-MCNC: 92 NG/ML (ref 15–150)
GLOBULIN SER-MCNC: 2.1 G/DL (ref 1.5–4.5)
GLUCOSE SERPL-MCNC: 121 MG/DL (ref 70–99)
GLUCOSE UR QL: NEGATIVE
HBA1C MFR BLD: 5.9 % (ref 4.8–5.6)
HCT VFR BLD AUTO: 40.8 % (ref 34–46.6)
HDLC SERPL-MCNC: 35 MG/DL
HGB BLD-MCNC: 14.2 G/DL (ref 11.1–15.9)
HGB UR QL STRIP: NEGATIVE
IMM GRANULOCYTES # BLD: 0 X10E3/UL (ref 0–0.1)
IMM GRANULOCYTES NFR BLD: 1 %
KETONES UR QL STRIP: NEGATIVE
LDLC SERPL CALC-MCNC: 104 MG/DL (ref 0–99)
LDLC SERPL DIRECT ASSAY-MCNC: 91 MG/DL (ref 0–99)
LEUKOCYTE ESTERASE UR QL STRIP: NEGATIVE
LYMPHOCYTES # BLD AUTO: 2.2 X10E3/UL (ref 0.7–3.1)
LYMPHOCYTES NFR BLD AUTO: 32 %
MCH RBC QN AUTO: 32 PG (ref 26.6–33)
MCHC RBC AUTO-ENTMCNC: 34.8 G/DL (ref 31.5–35.7)
MCV RBC AUTO: 92 FL (ref 79–97)
MICRO URNS: ABNORMAL
MONOCYTES # BLD AUTO: 0.5 X10E3/UL (ref 0.1–0.9)
MONOCYTES NFR BLD AUTO: 7 %
NEUTROPHILS # BLD AUTO: 3.8 X10E3/UL (ref 1.4–7)
NEUTROPHILS NFR BLD AUTO: 56 %
NITRITE UR QL STRIP: NEGATIVE
PH UR STRIP: 6 [PH] (ref 5–7.5)
PLATELET # BLD AUTO: 180 X10E3/UL (ref 150–450)
POTASSIUM SERPL-SCNC: 4.5 MMOL/L (ref 3.5–5.2)
PROT SERPL-MCNC: 6.7 G/DL (ref 6–8.5)
PROT UR QL STRIP: NEGATIVE
RBC # BLD AUTO: 4.44 X10E6/UL (ref 3.77–5.28)
SL AMB VLDL CHOLESTEROL CALC: 49 MG/DL (ref 5–40)
SODIUM SERPL-SCNC: 140 MMOL/L (ref 134–144)
SP GR UR: 1.02 (ref 1–1.03)
TRIGL SERPL-MCNC: 284 MG/DL (ref 0–149)
TSH SERPL DL<=0.005 MIU/L-ACNC: 2.69 UIU/ML (ref 0.45–4.5)
UROBILINOGEN UR STRIP-ACNC: 0.2 MG/DL (ref 0.2–1)
WBC # BLD AUTO: 6.8 X10E3/UL (ref 3.4–10.8)

## 2022-12-16 DIAGNOSIS — F41.1 GAD (GENERALIZED ANXIETY DISORDER): ICD-10-CM

## 2022-12-16 DIAGNOSIS — G25.81 RESTLESS LEG SYNDROME: ICD-10-CM

## 2022-12-16 RX ORDER — PRAMIPEXOLE DIHYDROCHLORIDE 0.5 MG/1
1 TABLET ORAL DAILY
Qty: 180 TABLET | Refills: 0 | Status: SHIPPED | OUTPATIENT
Start: 2022-12-16 | End: 2023-03-16

## 2022-12-16 RX ORDER — LORAZEPAM 0.5 MG/1
0.5 TABLET ORAL
Qty: 30 TABLET | Refills: 0 | Status: SHIPPED | OUTPATIENT
Start: 2022-12-22 | End: 2023-01-21

## 2022-12-28 ENCOUNTER — TELEPHONE (OUTPATIENT)
Dept: FAMILY MEDICINE CLINIC | Facility: CLINIC | Age: 57
End: 2022-12-28

## 2022-12-28 NOTE — TELEPHONE ENCOUNTER
Pt called requesting refill of ativan  Filled on 12/22  Patient made aware and will check with pharm

## 2023-01-12 ENCOUNTER — OFFICE VISIT (OUTPATIENT)
Dept: OBGYN CLINIC | Facility: CLINIC | Age: 58
End: 2023-01-12

## 2023-01-12 ENCOUNTER — APPOINTMENT (OUTPATIENT)
Dept: RADIOLOGY | Facility: CLINIC | Age: 58
End: 2023-01-12

## 2023-01-12 VITALS
SYSTOLIC BLOOD PRESSURE: 140 MMHG | WEIGHT: 205 LBS | BODY MASS INDEX: 37.73 KG/M2 | DIASTOLIC BLOOD PRESSURE: 70 MMHG | HEIGHT: 62 IN

## 2023-01-12 DIAGNOSIS — M17.11 PRIMARY OSTEOARTHRITIS OF RIGHT KNEE: ICD-10-CM

## 2023-01-12 DIAGNOSIS — M17.12 PRIMARY OSTEOARTHRITIS OF LEFT KNEE: Primary | ICD-10-CM

## 2023-01-12 DIAGNOSIS — M25.562 LEFT KNEE PAIN, UNSPECIFIED CHRONICITY: ICD-10-CM

## 2023-01-12 NOTE — PROGRESS NOTES
Assessment:     1  Primary osteoarthritis of left knee    2  Primary osteoarthritis of right knee        Plan:     Problem List Items Addressed This Visit        Musculoskeletal and Integument    Primary osteoarthritis of right knee    Relevant Orders    Injection Procedure Prior Authorization   Other Visit Diagnoses     Primary osteoarthritis of left knee    -  Primary    Relevant Orders    XR knee 4+ vw left injury    Injection Procedure Prior Authorization        Findings consistent with bilateral knee osteoarthritis  Imaging and prognosis reviewed with patient  Since patient had good relief from euflexxa in right knee she will have referral placed for both knee's  CSI gave no significant relief in right knee in past  Continue with HEP as shown by therapy, stationary bike, elliptical for exercise  Continue with OTC anti inflammatories, voltaren gel for pain  See patient back to start euflexxa bilateral knee's once approved  All patient's questions were answered to her satisfaction  This note is created using dictation transcription  It may contain typographical errors, grammatical errors, improperly dictated words, background noise and other errors  Subjective:     Patient ID: Solo Ellsworth is a 61 y o  female  Chief Complaint:  61 yr old female in for evaluation of left knee pain  Treating for known osteoarthritis of right knee  For the past 3 weeks she has been experiencing more anterior based knee pain  Worse getting up from seated positions, stairs  She has to go up and down stairs one at a time  Denies any new injury or trauma  Denies any locking, instability  Cracking and popping with motion of knee  She states the euflexxa injections she got 11/2021 were beneficial but have worn off right knee  She is performing HEP as shown by therapy for both knee's  Using voltaren gel and OTC anti inflammatories for pain control       Allergy:  Allergies   Allergen Reactions   • Penicillins Hives and Throat Swelling   • Meloxicam Hives and Itching     Medications:  all current active meds have been reviewed  Past Medical History:  Past Medical History:   Diagnosis Date   • Anxiety 2005   • Bulging lumbar disc    • Chronic pain     low back   • Degenerative disk disease     lumbar spine   • Depression 2005   • Hyperlipidemia    • Hypertension    • Pinched nerve     lumbar spine   • Psychiatric disorder     depression   • Shingles 20179     Past Surgical History:  Past Surgical History:   Procedure Laterality Date   • CARPAL TUNNEL RELEASE Bilateral 1998   • CHOLECYSTECTOMY  2008   • COLONOSCOPY  06/15/2015   • COLONOSCOPY  06/02/2015    Internal hemorrhoids, hyperplastic polyps which were removed  Ten year recall recommended June 2025   • COLONOSCOPY  09/16/2020    normal terminal ileum, 1 small polyp in the distal sigmoid colon that was hyperplastic  Yanna Prows She did have a small amount of retained stool in the colon  She did have a small amount of retained stool in the colon  A 5 year recall was recommended due to poor prep, September 2025      • SPINAL CORD STIMULATOR IMPLANT      2014   • SPINE SURGERY  2017    Stimulator inplant   • WISDOM TOOTH EXTRACTION Bilateral 1982     Family History:  Family History   Problem Relation Age of Onset   • Skin cancer Mother    • Heart disease Father         Dx with lewy body dementia in 2012   • Dementia Father         Dx with lewy body dementia in 2012   • Breast cancer Maternal Aunt 28   • Breast cancer Sister 64   • BRCA1 Negative Sister    • BRCA2 Negative Sister    • Depression Sister    • Prostate cancer Maternal Uncle    • Cancer Maternal Uncle         BLADDER   • Colon cancer Maternal Uncle    • Colon polyps Neg Hx      Social History:  Social History     Substance and Sexual Activity   Alcohol Use Yes   • Alcohol/week: 0 0 standard drinks    Comment: Social drinker     Social History     Substance and Sexual Activity   Drug Use No     Social History     Tobacco Use Smoking Status Every Day   • Packs/day: 1 00   • Years: 43 00   • Pack years: 43 00   • Types: Cigarettes   • Start date: 0   Smokeless Tobacco Never   Tobacco Comments    smokes 10 cigs daily as of 10/20     Review of Systems   Constitutional: Negative for chills and fever  HENT: Negative for ear pain and sore throat  Eyes: Negative for pain and visual disturbance  Respiratory: Negative for cough and shortness of breath  Cardiovascular: Negative for chest pain and palpitations  Gastrointestinal: Negative for abdominal pain and vomiting  Genitourinary: Negative for dysuria and hematuria  Musculoskeletal: Positive for arthralgias (Bilateral knee), gait problem (Antalgic) and joint swelling (Bilateral knee)  Negative for back pain  Skin: Negative for color change and rash  Neurological: Negative for seizures and syncope  Psychiatric/Behavioral: Negative  All other systems reviewed and are negative  Objective:  BP Readings from Last 1 Encounters:   01/12/23 140/70      Wt Readings from Last 1 Encounters:   01/12/23 93 kg (205 lb)      BMI:   Estimated body mass index is 37 49 kg/m² as calculated from the following:    Height as of this encounter: 5' 2" (1 575 m)  Weight as of this encounter: 93 kg (205 lb)  BSA:   Estimated body surface area is 1 93 meters squared as calculated from the following:    Height as of this encounter: 5' 2" (1 575 m)  Weight as of this encounter: 93 kg (205 lb)  Physical Exam  Vitals and nursing note reviewed  Constitutional:       Appearance: Normal appearance  She is well-developed  HENT:      Head: Normocephalic and atraumatic  Right Ear: External ear normal       Left Ear: External ear normal    Eyes:      Extraocular Movements: Extraocular movements intact        Conjunctiva/sclera: Conjunctivae normal    Pulmonary:      Effort: Pulmonary effort is normal    Musculoskeletal:         General: Tenderness (left knee arthralgia ) present  Cervical back: Neck supple  Right knee: Effusion (grade 1) present  Instability Tests: Medial Rolf test negative and lateral Rolf test negative  Left knee: Effusion (grade 1 ) present  Instability Tests: Medial Rolf test negative and lateral Rolf test negative  Skin:     General: Skin is warm and dry  Neurological:      Mental Status: She is alert and oriented to person, place, and time  Deep Tendon Reflexes: Reflexes are normal and symmetric  Psychiatric:         Mood and Affect: Mood normal          Behavior: Behavior normal        Right Knee Exam     Muscle Strength   The patient has normal right knee strength  Tenderness   The patient is experiencing no tenderness  Tests   Rolf:  Medial - negative Lateral - negative  Varus: negative Valgus: negative  Patellar apprehension: negative    Other   Erythema: absent  Scars: absent  Sensation: normal  Pulse: present  Swelling: mild  Effusion: effusion (grade 1) present    Comments:  Crepitation with motion of patella      Left Knee Exam     Muscle Strength   The patient has normal left knee strength  Tenderness   The patient is experiencing no tenderness  Range of Motion   Extension: normal   Flexion: normal     Tests   Rolf:  Medial - negative Lateral - negative  Varus: negative Valgus: negative  Patellar apprehension: negative    Other   Erythema: absent  Scars: absent  Sensation: normal  Pulse: present  Swelling: mild  Effusion: effusion (grade 1 ) present    Comments:  Crepitation with motion of patella            I have personally reviewed pertinent films in PACS and my interpretation is xr left knee demonstrates mild patellofemoral arthritis with spurring , no fracture or dislocation        Scribe Attestation    I,:  Alisa Almeida am acting as a scribe while in the presence of the attending physician :       I,:  Zuri Hedrick MD personally performed the services described in this documentation    as scribed in my presence :

## 2023-01-25 DIAGNOSIS — F32.0 CURRENT MILD EPISODE OF MAJOR DEPRESSIVE DISORDER WITHOUT PRIOR EPISODE (HCC): ICD-10-CM

## 2023-01-25 DIAGNOSIS — F41.1 GAD (GENERALIZED ANXIETY DISORDER): ICD-10-CM

## 2023-01-25 RX ORDER — LORAZEPAM 0.5 MG/1
0.5 TABLET ORAL
Qty: 30 TABLET | Refills: 0 | Status: SHIPPED | OUTPATIENT
Start: 2023-01-25 | End: 2023-02-24

## 2023-01-25 RX ORDER — SERTRALINE HYDROCHLORIDE 100 MG/1
100 TABLET, FILM COATED ORAL DAILY
Qty: 90 TABLET | Refills: 1 | Status: SHIPPED | OUTPATIENT
Start: 2023-01-25 | End: 2023-04-25

## 2023-02-08 ENCOUNTER — PROCEDURE VISIT (OUTPATIENT)
Dept: OBGYN CLINIC | Facility: CLINIC | Age: 58
End: 2023-02-08

## 2023-02-08 VITALS
DIASTOLIC BLOOD PRESSURE: 72 MMHG | SYSTOLIC BLOOD PRESSURE: 130 MMHG | HEART RATE: 103 BPM | HEIGHT: 62 IN | BODY MASS INDEX: 37.73 KG/M2 | WEIGHT: 205 LBS

## 2023-02-08 DIAGNOSIS — M25.461 EFFUSION OF RIGHT KNEE: ICD-10-CM

## 2023-02-08 DIAGNOSIS — M17.11 PRIMARY OSTEOARTHRITIS OF RIGHT KNEE: Primary | ICD-10-CM

## 2023-02-08 RX ORDER — HYALURONATE SODIUM 10 MG/ML
20 SYRINGE (ML) INTRAARTICULAR
Status: COMPLETED | OUTPATIENT
Start: 2023-02-08 | End: 2023-02-08

## 2023-02-08 RX ADMIN — Medication 20 MG: at 14:48

## 2023-02-08 NOTE — PROGRESS NOTES
Assessment:     1  Primary osteoarthritis of right knee    2  Effusion of right knee        Plan:     Problem List Items Addressed This Visit        Musculoskeletal and Integument    Effusion of right knee    Relevant Medications    Sodium Hyaluronate 20 mg (Completed)    Other Relevant Orders    Large joint arthrocentesis: R knee (Completed)    Primary osteoarthritis of right knee - Primary    Relevant Medications    Sodium Hyaluronate 20 mg (Completed)    Other Relevant Orders    Large joint arthrocentesis: R knee (Completed)         Findings consistent with right knee osteoarthritis and effusion  Findings and treatment options were discussed with the patient  The right knee was aspirated and the 1st of 3 Euflexxa injections was given today  She tolerated the procedure well  Advised to apply cold compress today  Follow-up in 1 week for the 2nd injection  All questions were answered to patient's satisfaction  Subjective:     Patient ID: Donald Ward is a 61 y o  female  Chief Complaint:  Patient is a 50-year-old who is following up for right knee osteoarthritis  She is here for the 1st of 3 right knee Euflexxa injections  Last series was in November 2021  She got significant relief  Pain recently returned  It is aching in nature  She feels her knee is swollen  Joint supplement injections were ordered for the left knee, but she was told they were not approved      Allergy:  Allergies   Allergen Reactions   • Penicillins Hives and Throat Swelling   • Meloxicam Hives and Itching     Medications:  all current active meds have been reviewed  Past Medical History:  Past Medical History:   Diagnosis Date   • Anxiety 2005   • Bulging lumbar disc    • Chronic pain     low back   • Degenerative disk disease     lumbar spine   • Depression 2005   • Hyperlipidemia    • Hypertension    • Pinched nerve     lumbar spine   • Psychiatric disorder     depression   • Shingles [id number]     Past Surgical History:  Past Surgical History:   Procedure Laterality Date   • CARPAL TUNNEL RELEASE Bilateral 1998   • CHOLECYSTECTOMY  2008   • COLONOSCOPY  06/15/2015   • COLONOSCOPY  06/02/2015    Internal hemorrhoids, hyperplastic polyps which were removed  Ten year recall recommended June 2025   • COLONOSCOPY  09/16/2020    normal terminal ileum, 1 small polyp in the distal sigmoid colon that was hyperplastic  Maira March She did have a small amount of retained stool in the colon  She did have a small amount of retained stool in the colon  A 5 year recall was recommended due to poor prep, September 2025  • SPINAL CORD STIMULATOR IMPLANT      2014   • SPINE SURGERY  2017    Stimulator inplant   • WISDOM TOOTH EXTRACTION Bilateral 1982     Family History:  Family History   Problem Relation Age of Onset   • Skin cancer Mother    • Heart disease Father         Dx with lewy body dementia in 2012   • Dementia Father         Dx with lewy body dementia in 2012   • Breast cancer Maternal Aunt 28   • Breast cancer Sister 64   • BRCA1 Negative Sister    • BRCA2 Negative Sister    • Depression Sister    • Prostate cancer Maternal Uncle    • Cancer Maternal Uncle         BLADDER   • Colon cancer Maternal Uncle    • Colon polyps Neg Hx      Social History:  Social History     Substance and Sexual Activity   Alcohol Use Yes   • Alcohol/week: 0 0 standard drinks    Comment: Social drinker     Social History     Substance and Sexual Activity   Drug Use No     Social History     Tobacco Use   Smoking Status Every Day   • Packs/day: 1 00   • Years: 43 00   • Pack years: 43 00   • Types: Cigarettes   • Start date: 1977   Smokeless Tobacco Never   Tobacco Comments    smokes 10 cigs daily as of 10/20     Review of Systems   Constitutional: Negative for chills, fever and unexpected weight change  HENT: Negative for hearing loss, nosebleeds and sore throat  Eyes: Negative for pain, redness and visual disturbance     Respiratory: Negative for cough, shortness of breath and wheezing  Cardiovascular: Negative for chest pain, palpitations and leg swelling  Gastrointestinal: Negative for abdominal pain, nausea and vomiting  Endocrine: Negative for polyphagia and polyuria  Genitourinary: Negative for dysuria and hematuria  Musculoskeletal: Positive for arthralgias (Right knee), gait problem (Antalgic) and joint swelling (Right knee)  Skin: Negative for rash and wound  Neurological: Negative for dizziness, numbness and headaches  Psychiatric/Behavioral: Negative for decreased concentration and suicidal ideas  The patient is not nervous/anxious  Objective:  BP Readings from Last 1 Encounters:   02/08/23 130/72      Wt Readings from Last 1 Encounters:   02/08/23 93 kg (205 lb)      BMI:   Estimated body mass index is 37 49 kg/m² as calculated from the following:    Height as of this encounter: 5' 2" (1 575 m)  Weight as of this encounter: 93 kg (205 lb)  BSA:   Estimated body surface area is 1 93 meters squared as calculated from the following:    Height as of this encounter: 5' 2" (1 575 m)  Weight as of this encounter: 93 kg (205 lb)  Physical Exam  Vitals and nursing note reviewed  Constitutional:       Appearance: Normal appearance  She is well-developed  HENT:      Head: Normocephalic and atraumatic  Right Ear: External ear normal       Left Ear: External ear normal    Eyes:      Extraocular Movements: Extraocular movements intact  Conjunctiva/sclera: Conjunctivae normal    Pulmonary:      Effort: Pulmonary effort is normal    Musculoskeletal:      Cervical back: Neck supple  Right knee: Effusion (trace) present  Instability Tests: Medial Rolf test negative and lateral Rolf test negative  Skin:     General: Skin is warm and dry  Neurological:      Mental Status: She is alert and oriented to person, place, and time  Deep Tendon Reflexes: Reflexes are normal and symmetric     Psychiatric:         Mood and Affect: Mood normal          Behavior: Behavior normal        Right Knee Exam     Muscle Strength   The patient has normal right knee strength  Tenderness   Right knee tenderness location: patellofemoral     Range of Motion   Extension: 0   Flexion: 120     Tests   Rolf:  Medial - negative Lateral - negative  Varus: negative Valgus: negative  Patellar apprehension: negative    Other   Erythema: absent  Scars: absent  Sensation: normal  Pulse: present  Swelling: mild  Effusion: effusion (trace) present    Comments:  Patellofemoral joint crepitation with knee motion            No new images reviewed today  Large joint arthrocentesis: R knee  Universal Protocol:  Consent: Verbal consent obtained    Risks and benefits: risks, benefits and alternatives were discussed  Consent given by: patient  Patient understanding: patient states understanding of the procedure being performed    Supporting Documentation  Indications: pain and joint swelling   Procedure Details  Location: knee - R knee  Preparation: Patient was prepped and draped in the usual sterile fashion  Needle size: 18 G (25-gauge for anesthetic)  Approach: superior  Medications administered: 20 mg Sodium Hyaluronate 20 MG/2ML    Aspirate amount: 1 mL  Aspirate: clear and yellow    Patient tolerance: patient tolerated the procedure well with no immediate complications  Dressing:  Sterile dressing applied    5 cc Naropin used for anesthetic

## 2023-02-14 ENCOUNTER — PROCEDURE VISIT (OUTPATIENT)
Dept: OBGYN CLINIC | Facility: CLINIC | Age: 58
End: 2023-02-14

## 2023-02-14 VITALS
SYSTOLIC BLOOD PRESSURE: 130 MMHG | HEIGHT: 62 IN | BODY MASS INDEX: 38.09 KG/M2 | WEIGHT: 207 LBS | DIASTOLIC BLOOD PRESSURE: 68 MMHG

## 2023-02-14 DIAGNOSIS — M17.11 PRIMARY OSTEOARTHRITIS OF RIGHT KNEE: Primary | ICD-10-CM

## 2023-02-14 DIAGNOSIS — M17.12 PRIMARY LOCALIZED OSTEOARTHRITIS OF LEFT KNEE: ICD-10-CM

## 2023-02-14 RX ORDER — HYALURONATE SODIUM 10 MG/ML
20 SYRINGE (ML) INTRAARTICULAR
Status: COMPLETED | OUTPATIENT
Start: 2023-02-14 | End: 2023-02-14

## 2023-02-14 RX ADMIN — Medication 20 MG: at 14:18

## 2023-02-14 NOTE — PROGRESS NOTES
Assessment:     1  Primary osteoarthritis of right knee    2  Primary localized osteoarthritis of left knee        Plan:     Problem List Items Addressed This Visit        Musculoskeletal and Integument    Primary osteoarthritis of right knee - Primary    Relevant Medications    Sodium Hyaluronate 20 mg (Completed)    Other Relevant Orders    Large joint arthrocentesis: R knee (Completed)    Primary localized osteoarthritis of left knee    Relevant Medications    Sodium Hyaluronate 20 mg (Completed)    Other Relevant Orders    Large joint arthrocentesis: L knee (Completed)         Findings consistent with bilateral knee osteoarthritis  Findings and treatment options were discussed with the patient  The 2nd of 3 right knee and 1st of 3 left knee Euflexxa injections were given today  She tolerated the procedure well  Advised to apply cold compress today  Follow-up in 1 week for the third injection for the right knee and second injection for the left knee  All questions were answered to patient's satisfaction  Subjective:     Patient ID: Aaron Sanchez is a 61 y o  female  Chief Complaint:  Patient is a 65-year-old who is following up for bilateral knee osteoarthritis  She is here for the 2nd of 3 right knee and 1st of 3 left knee Euflexxa injections  She felt some improvement with the first right knee injection      Allergy:  Allergies   Allergen Reactions   • Penicillins Hives and Throat Swelling   • Meloxicam Hives and Itching     Medications:  all current active meds have been reviewed  Past Medical History:  Past Medical History:   Diagnosis Date   • Anxiety 2005   • Bulging lumbar disc    • Chronic pain     low back   • Degenerative disk disease     lumbar spine   • Depression 2005   • Hyperlipidemia    • Hypertension    • Pinched nerve     lumbar spine   • Psychiatric disorder     depression   • Shingles [id number]     Past Surgical History:  Past Surgical History:   Procedure Laterality Date   • CARPAL TUNNEL RELEASE Bilateral 1998   • CHOLECYSTECTOMY  2008   • COLONOSCOPY  06/15/2015   • COLONOSCOPY  06/02/2015    Internal hemorrhoids, hyperplastic polyps which were removed  Ten year recall recommended June 2025   • COLONOSCOPY  09/16/2020    normal terminal ileum, 1 small polyp in the distal sigmoid colon that was hyperplastic  Melanie Lyon She did have a small amount of retained stool in the colon  She did have a small amount of retained stool in the colon  A 5 year recall was recommended due to poor prep, September 2025  • SPINAL CORD STIMULATOR IMPLANT      2014   • SPINE SURGERY  2017    Stimulator inplant   • WISDOM TOOTH EXTRACTION Bilateral 1982     Family History:  Family History   Problem Relation Age of Onset   • Skin cancer Mother    • Heart disease Father         Dx with lewy body dementia in 2012   • Dementia Father         Dx with lewy body dementia in 2012   • Breast cancer Maternal Aunt 28   • Breast cancer Sister 64   • BRCA1 Negative Sister    • BRCA2 Negative Sister    • Depression Sister    • Prostate cancer Maternal Uncle    • Cancer Maternal Uncle         BLADDER   • Colon cancer Maternal Uncle    • Colon polyps Neg Hx      Social History:  Social History     Substance and Sexual Activity   Alcohol Use Yes   • Alcohol/week: 0 0 standard drinks    Comment: Social drinker     Social History     Substance and Sexual Activity   Drug Use No     Social History     Tobacco Use   Smoking Status Every Day   • Packs/day: 1 00   • Years: 43 00   • Pack years: 43 00   • Types: Cigarettes   • Start date: 1977   Smokeless Tobacco Never   Tobacco Comments    smokes 10 cigs daily as of 10/20     Review of Systems   Constitutional: Negative for chills, fever and unexpected weight change  HENT: Negative for hearing loss, nosebleeds and sore throat  Eyes: Negative for pain, redness and visual disturbance  Respiratory: Negative for cough, shortness of breath and wheezing      Cardiovascular: Negative for chest pain, palpitations and leg swelling  Gastrointestinal: Negative for abdominal pain, nausea and vomiting  Endocrine: Negative for polyphagia and polyuria  Genitourinary: Negative for dysuria and hematuria  Musculoskeletal: Positive for arthralgias (bilateral knees), gait problem (Antalgic) and joint swelling (Right knee)  Skin: Negative for rash and wound  Neurological: Negative for dizziness, numbness and headaches  Psychiatric/Behavioral: Negative for decreased concentration and suicidal ideas  The patient is not nervous/anxious  Objective:  BP Readings from Last 1 Encounters:   02/14/23 130/68      Wt Readings from Last 1 Encounters:   02/14/23 93 9 kg (207 lb)      BMI:   Estimated body mass index is 37 86 kg/m² as calculated from the following:    Height as of this encounter: 5' 2" (1 575 m)  Weight as of this encounter: 93 9 kg (207 lb)  BSA:   Estimated body surface area is 1 94 meters squared as calculated from the following:    Height as of this encounter: 5' 2" (1 575 m)  Weight as of this encounter: 93 9 kg (207 lb)  Physical Exam  Vitals and nursing note reviewed  Constitutional:       Appearance: Normal appearance  She is well-developed  HENT:      Head: Normocephalic and atraumatic  Right Ear: External ear normal       Left Ear: External ear normal    Eyes:      Extraocular Movements: Extraocular movements intact  Conjunctiva/sclera: Conjunctivae normal    Pulmonary:      Effort: Pulmonary effort is normal    Musculoskeletal:      Cervical back: Neck supple  Right knee: No effusion  Instability Tests: Medial Rolf test negative and lateral Rolf test negative  Left knee: No effusion  Instability Tests: Medial Rolf test negative and lateral Rolf test negative  Skin:     General: Skin is warm and dry  Neurological:      Mental Status: She is alert and oriented to person, place, and time        Deep Tendon Reflexes: Reflexes are normal and symmetric  Psychiatric:         Mood and Affect: Mood normal          Behavior: Behavior normal        Right Knee Exam     Muscle Strength   The patient has normal right knee strength  Tenderness   Right knee tenderness location: patellofemoral     Range of Motion   Extension: 0   Flexion: 120     Tests   Rolf:  Medial - negative Lateral - negative  Varus: negative Valgus: negative  Patellar apprehension: negative    Other   Erythema: absent  Scars: absent  Sensation: normal  Pulse: present  Swelling: mild  Effusion: no effusion present    Comments:  Patellofemoral joint crepitation with knee motion      Left Knee Exam     Tenderness   The patient is experiencing no tenderness  Range of Motion   The patient has normal left knee ROM  Tests   Rolf:  Medial - negative Lateral - negative  Varus: negative Valgus: negative    Other   Erythema: absent  Scars: absent  Sensation: normal  Pulse: present  Swelling: mild  Effusion: no effusion present    Comments:  Patellofemoral crepitation            No new images reviewed today  Large joint arthrocentesis: R knee  Universal Protocol:  Consent: Verbal consent obtained  Risks and benefits: risks, benefits and alternatives were discussed  Consent given by: patient  Patient understanding: patient states understanding of the procedure being performed    Supporting Documentation  Indications: pain   Procedure Details  Location: knee - R knee  Preparation: Patient was prepped and draped in the usual sterile fashion  Needle size: 22 G  Approach: anterolateral  Medications administered: 20 mg Sodium Hyaluronate 20 MG/2ML    Patient tolerance: patient tolerated the procedure well with no immediate complications  Dressing:  Sterile dressing applied    Large joint arthrocentesis: L knee  Universal Protocol:  Consent: Verbal consent obtained    Risks and benefits: risks, benefits and alternatives were discussed  Consent given by: patient  Patient understanding: patient states understanding of the procedure being performed    Supporting Documentation  Indications: pain   Procedure Details  Location: knee - L knee  Preparation: Patient was prepped and draped in the usual sterile fashion  Needle size: 22 G  Approach: anterolateral  Medications administered: 20 mg Sodium Hyaluronate 20 MG/2ML    Patient tolerance: patient tolerated the procedure well with no immediate complications  Dressing:  Sterile dressing applied

## 2023-02-21 ENCOUNTER — PROCEDURE VISIT (OUTPATIENT)
Dept: OBGYN CLINIC | Facility: CLINIC | Age: 58
End: 2023-02-21

## 2023-02-21 VITALS
WEIGHT: 207 LBS | BODY MASS INDEX: 38.09 KG/M2 | DIASTOLIC BLOOD PRESSURE: 72 MMHG | SYSTOLIC BLOOD PRESSURE: 118 MMHG | HEIGHT: 62 IN

## 2023-02-21 DIAGNOSIS — M17.12 PRIMARY LOCALIZED OSTEOARTHRITIS OF LEFT KNEE: Primary | ICD-10-CM

## 2023-02-21 DIAGNOSIS — M17.11 PRIMARY OSTEOARTHRITIS OF RIGHT KNEE: ICD-10-CM

## 2023-02-21 RX ORDER — HYALURONATE SODIUM 10 MG/ML
20 SYRINGE (ML) INTRAARTICULAR
Status: COMPLETED | OUTPATIENT
Start: 2023-02-21 | End: 2023-02-21

## 2023-02-21 RX ADMIN — Medication 20 MG: at 14:42

## 2023-02-21 NOTE — PROGRESS NOTES
Assessment:     1  Primary localized osteoarthritis of left knee    2  Primary osteoarthritis of right knee        Plan:     Problem List Items Addressed This Visit        Musculoskeletal and Integument    Primary osteoarthritis of right knee    Relevant Medications    Sodium Hyaluronate 20 mg (Completed)    Other Relevant Orders    Large joint arthrocentesis: R knee (Completed)    Primary localized osteoarthritis of left knee - Primary    Relevant Medications    Sodium Hyaluronate 20 mg (Completed)    Other Relevant Orders    Large joint arthrocentesis: L knee (Completed)         Findings consistent with bilateral knee osteoarthritis  Findings and treatment options were discussed with the patient  The 3rd of 3 right knee and 2nd of 3 left knee Euflexxa injections were given today  She tolerated the procedure well  Advised to apply cold compress today  Follow-up in 1 week for the third injection for the left knee  All questions were answered to patient's satisfaction  Subjective:     Patient ID: Elisha Vila is a 61 y o  female  Chief Complaint:  Patient is a 23-year-old who is following up for bilateral knee osteoarthritis  She is here for the 3rd of 3 right knee and 2nd of 3 left knee Euflexxa injections  No issues after the last injections      Allergy:  Allergies   Allergen Reactions   • Penicillins Hives and Throat Swelling   • Meloxicam Hives and Itching     Medications:  all current active meds have been reviewed  Past Medical History:  Past Medical History:   Diagnosis Date   • Anxiety 2005   • Bulging lumbar disc    • Chronic pain     low back   • Degenerative disk disease     lumbar spine   • Depression 2005   • Hyperlipidemia    • Hypertension    • Pinched nerve     lumbar spine   • Psychiatric disorder     depression   • Shingles 20179     Past Surgical History:  Past Surgical History:   Procedure Laterality Date   • CARPAL TUNNEL RELEASE Bilateral 1998   • CHOLECYSTECTOMY  2008   • COLONOSCOPY 06/15/2015   • COLONOSCOPY  06/02/2015    Internal hemorrhoids, hyperplastic polyps which were removed  Ten year recall recommended June 2025   • COLONOSCOPY  09/16/2020    normal terminal ileum, 1 small polyp in the distal sigmoid colon that was hyperplastic  Babatunde [place] She did have a small amount of retained stool in the colon  She did have a small amount of retained stool in the colon  A 5 year recall was recommended due to poor prep, September 2025  • SPINAL CORD STIMULATOR IMPLANT      2014   • SPINE SURGERY  2017    Stimulator inplant   • WISDOM TOOTH EXTRACTION Bilateral 1982     Family History:  Family History   Problem Relation Age of Onset   • Skin cancer Mother    • Heart disease Father         Dx with lewy body dementia in 2012   • Dementia Father         Dx with lewy body dementia in 2012   • Breast cancer Maternal Aunt 28   • Breast cancer Sister 64   • BRCA1 Negative Sister    • BRCA2 Negative Sister    • Depression Sister    • Prostate cancer Maternal Uncle    • Cancer Maternal Uncle         BLADDER   • Colon cancer Maternal Uncle    • Colon polyps Neg Hx      Social History:  Social History     Substance and Sexual Activity   Alcohol Use Yes   • Alcohol/week: 0 0 standard drinks    Comment: Social drinker     Social History     Substance and Sexual Activity   Drug Use No     Social History     Tobacco Use   Smoking Status Every Day   • Packs/day: 1 00   • Years: 43 00   • Pack years: 43 00   • Types: Cigarettes   • Start date: 1977   Smokeless Tobacco Never   Tobacco Comments    smokes 10 cigs daily as of 10/20     Review of Systems   Constitutional: Negative for chills, fever and unexpected weight change  HENT: Negative for hearing loss, nosebleeds and sore throat  Eyes: Negative for pain, redness and visual disturbance  Respiratory: Negative for cough, shortness of breath and wheezing  Cardiovascular: Negative for chest pain, palpitations and leg swelling     Gastrointestinal: Negative for abdominal pain, nausea and vomiting  Endocrine: Negative for polyphagia and polyuria  Genitourinary: Negative for dysuria and hematuria  Musculoskeletal: Positive for arthralgias (bilateral knees), gait problem (Antalgic) and joint swelling (Right knee)  Skin: Negative for rash and wound  Neurological: Negative for dizziness, numbness and headaches  Psychiatric/Behavioral: Negative for decreased concentration and suicidal ideas  The patient is not nervous/anxious  Objective:  BP Readings from Last 1 Encounters:   02/21/23 118/72      Wt Readings from Last 1 Encounters:   02/21/23 93 9 kg (207 lb)      BMI:   Estimated body mass index is 37 86 kg/m² as calculated from the following:    Height as of this encounter: 5' 2" (1 575 m)  Weight as of this encounter: 93 9 kg (207 lb)  BSA:   Estimated body surface area is 1 94 meters squared as calculated from the following:    Height as of this encounter: 5' 2" (1 575 m)  Weight as of this encounter: 93 9 kg (207 lb)  Physical Exam  Vitals and nursing note reviewed  Constitutional:       Appearance: Normal appearance  She is well-developed  HENT:      Head: Normocephalic and atraumatic  Right Ear: External ear normal       Left Ear: External ear normal    Eyes:      Extraocular Movements: Extraocular movements intact  Conjunctiva/sclera: Conjunctivae normal    Pulmonary:      Effort: Pulmonary effort is normal    Musculoskeletal:      Cervical back: Neck supple  Right knee: No effusion  Instability Tests: Medial Rolf test negative and lateral Rolf test negative  Left knee: No effusion  Instability Tests: Medial Rolf test negative and lateral Rolf test negative  Skin:     General: Skin is warm and dry  Neurological:      Mental Status: She is alert and oriented to person, place, and time  Deep Tendon Reflexes: Reflexes are normal and symmetric     Psychiatric:         Mood and Affect: Mood normal          Behavior: Behavior normal        Right Knee Exam     Muscle Strength   The patient has normal right knee strength  Tenderness   Right knee tenderness location: patellofemoral     Range of Motion   Extension: 0   Flexion: 120     Tests   Rolf:  Medial - negative Lateral - negative  Varus: negative Valgus: negative  Patellar apprehension: negative    Other   Erythema: absent  Scars: absent  Sensation: normal  Pulse: present  Swelling: mild  Effusion: no effusion present    Comments:  Patellofemoral joint crepitation with knee motion      Left Knee Exam     Tenderness   The patient is experiencing no tenderness  Range of Motion   The patient has normal left knee ROM  Tests   Rolf:  Medial - negative Lateral - negative  Varus: negative Valgus: negative    Other   Erythema: absent  Scars: absent  Sensation: normal  Pulse: present  Swelling: mild  Effusion: no effusion present    Comments:  Patellofemoral crepitation            No new images reviewed today  Large joint arthrocentesis: R knee  Universal Protocol:  Consent: Verbal consent obtained  Risks and benefits: risks, benefits and alternatives were discussed  Consent given by: patient  Patient understanding: patient states understanding of the procedure being performed    Supporting Documentation  Indications: pain   Procedure Details  Location: knee - R knee  Preparation: Patient was prepped and draped in the usual sterile fashion  Needle size: 22 G  Approach: anterolateral  Medications administered: 20 mg Sodium Hyaluronate 20 MG/2ML    Patient tolerance: patient tolerated the procedure well with no immediate complications  Dressing:  Sterile dressing applied    Large joint arthrocentesis: L knee  Universal Protocol:  Consent: Verbal consent obtained    Risks and benefits: risks, benefits and alternatives were discussed  Consent given by: patient  Patient understanding: patient states understanding of the procedure being performed    Supporting Documentation  Indications: pain   Procedure Details  Location: knee - L knee  Preparation: Patient was prepped and draped in the usual sterile fashion  Needle size: 22 G  Approach: anterolateral  Medications administered: 20 mg Sodium Hyaluronate 20 MG/2ML    Patient tolerance: patient tolerated the procedure well with no immediate complications  Dressing:  Sterile dressing applied

## 2023-02-28 ENCOUNTER — PROCEDURE VISIT (OUTPATIENT)
Dept: OBGYN CLINIC | Facility: CLINIC | Age: 58
End: 2023-02-28

## 2023-02-28 VITALS
HEIGHT: 62 IN | SYSTOLIC BLOOD PRESSURE: 120 MMHG | BODY MASS INDEX: 38.09 KG/M2 | WEIGHT: 207 LBS | DIASTOLIC BLOOD PRESSURE: 70 MMHG

## 2023-02-28 DIAGNOSIS — F41.1 GAD (GENERALIZED ANXIETY DISORDER): ICD-10-CM

## 2023-02-28 DIAGNOSIS — M17.12 PRIMARY LOCALIZED OSTEOARTHRITIS OF LEFT KNEE: Primary | ICD-10-CM

## 2023-02-28 RX ORDER — HYALURONATE SODIUM 10 MG/ML
20 SYRINGE (ML) INTRAARTICULAR
Status: COMPLETED | OUTPATIENT
Start: 2023-02-28 | End: 2023-02-28

## 2023-02-28 RX ORDER — LORAZEPAM 0.5 MG/1
0.5 TABLET ORAL
Qty: 30 TABLET | Refills: 0 | Status: SHIPPED | OUTPATIENT
Start: 2023-02-28 | End: 2023-03-30

## 2023-02-28 RX ADMIN — Medication 20 MG: at 13:55

## 2023-02-28 NOTE — PROGRESS NOTES
Assessment:     1  Primary localized osteoarthritis of left knee        Plan:     Problem List Items Addressed This Visit        Musculoskeletal and Integument    Primary localized osteoarthritis of left knee - Primary    Relevant Medications    Sodium Hyaluronate 20 mg (Completed)    Other Relevant Orders    Large joint arthrocentesis: L knee (Completed)         Findings consistent with bilateral knee osteoarthritis  Findings and treatment options were discussed with the patient  The 2nd of 3 left knee Euflexxa injections were given today  She tolerated the procedure well  Advised to apply cold compress today  Follow-up as needed if symptoms return  Advised patient as soon as she can have another series is in 6 months  All questions were answered to patient's satisfaction  Subjective:     Patient ID: Scotty Mayorga is a 61 y o  female  Chief Complaint: This is a 62-year-old who is following up for bilateral knee osteoarthritis  She is here for the 3rd of 3 left knee Euflexxa injections  No issues after the last injections  Allergy:  Allergies   Allergen Reactions   • Penicillins Hives and Throat Swelling   • Meloxicam Hives and Itching     Medications:  all current active meds have been reviewed  Past Medical History:  Past Medical History:   Diagnosis Date   • Anxiety 2005   • Bulging lumbar disc    • Chronic pain     low back   • Degenerative disk disease     lumbar spine   • Depression 2005   • Hyperlipidemia    • Hypertension    • Pinched nerve     lumbar spine   • Psychiatric disorder     depression   • Shingles 20179     Past Surgical History:  Past Surgical History:   Procedure Laterality Date   • CARPAL TUNNEL RELEASE Bilateral 1998   • CHOLECYSTECTOMY  2008   • COLONOSCOPY  06/15/2015   • COLONOSCOPY  06/02/2015    Internal hemorrhoids, hyperplastic polyps which were removed    Ten year recall recommended June 2025   • COLONOSCOPY  09/16/2020    normal terminal ileum, 1 small polyp in the distal sigmoid colon that was hyperplastic  Wyaminta Caller She did have a small amount of retained stool in the colon  She did have a small amount of retained stool in the colon  A 5 year recall was recommended due to poor prep, September 2025  • SPINAL CORD STIMULATOR IMPLANT      2014   • SPINE SURGERY  2017    Stimulator inplant   • WISDOM TOOTH EXTRACTION Bilateral 1982     Family History:  Family History   Problem Relation Age of Onset   • Skin cancer Mother    • Heart disease Father         Dx with lewy body dementia in 2012   • Dementia Father         Dx with lewy body dementia in 2012   • Breast cancer Maternal Aunt 28   • Breast cancer Sister 64   • BRCA1 Negative Sister    • BRCA2 Negative Sister    • Depression Sister    • Prostate cancer Maternal Uncle    • Cancer Maternal Uncle         BLADDER   • Colon cancer Maternal Uncle    • Colon polyps Neg Hx      Social History:  Social History     Substance and Sexual Activity   Alcohol Use Yes   • Alcohol/week: 0 0 standard drinks    Comment: Social drinker     Social History     Substance and Sexual Activity   Drug Use No     Social History     Tobacco Use   Smoking Status Every Day   • Packs/day: 1 00   • Years: 43 00   • Pack years: 43 00   • Types: Cigarettes   • Start date: 1977   Smokeless Tobacco Never   Tobacco Comments    smokes 10 cigs daily as of 10/20     Review of Systems   Constitutional: Negative for chills, fever and unexpected weight change  HENT: Negative for hearing loss, nosebleeds and sore throat  Eyes: Negative for pain, redness and visual disturbance  Respiratory: Negative for cough, shortness of breath and wheezing  Cardiovascular: Negative for chest pain, palpitations and leg swelling  Gastrointestinal: Negative for abdominal pain, nausea and vomiting  Endocrine: Negative for polyphagia and polyuria  Genitourinary: Negative for dysuria and hematuria     Musculoskeletal: Positive for arthralgias (bilateral knees), gait problem (Antalgic) and joint swelling (Right knee)  Skin: Negative for rash and wound  Neurological: Negative for dizziness, numbness and headaches  Psychiatric/Behavioral: Negative for decreased concentration and suicidal ideas  The patient is not nervous/anxious  Objective:  BP Readings from Last 1 Encounters:   02/28/23 120/70      Wt Readings from Last 1 Encounters:   02/28/23 93 9 kg (207 lb)      BMI:   Estimated body mass index is 37 86 kg/m² as calculated from the following:    Height as of this encounter: 5' 2" (1 575 m)  Weight as of this encounter: 93 9 kg (207 lb)  BSA:   Estimated body surface area is 1 94 meters squared as calculated from the following:    Height as of this encounter: 5' 2" (1 575 m)  Weight as of this encounter: 93 9 kg (207 lb)  Physical Exam  Vitals and nursing note reviewed  Constitutional:       Appearance: Normal appearance  She is well-developed  HENT:      Head: Normocephalic and atraumatic  Right Ear: External ear normal       Left Ear: External ear normal    Eyes:      Extraocular Movements: Extraocular movements intact  Conjunctiva/sclera: Conjunctivae normal    Pulmonary:      Effort: Pulmonary effort is normal    Musculoskeletal:      Cervical back: Neck supple  Left knee: No effusion  Instability Tests: Medial Rolf test negative and lateral Rolf test negative  Skin:     General: Skin is warm and dry  Neurological:      Mental Status: She is alert and oriented to person, place, and time  Deep Tendon Reflexes: Reflexes are normal and symmetric  Psychiatric:         Mood and Affect: Mood normal          Behavior: Behavior normal        Left Knee Exam     Tenderness   The patient is experiencing no tenderness  Range of Motion   The patient has normal left knee ROM      Tests   Rolf:  Medial - negative Lateral - negative  Varus: negative Valgus: negative    Other   Erythema: absent  Scars: absent  Sensation: normal  Pulse: present  Swelling: mild  Effusion: no effusion present    Comments:  Patellofemoral crepitation            No new images reviewed today  Large joint arthrocentesis: L knee  Universal Protocol:  Consent: Verbal consent obtained    Risks and benefits: risks, benefits and alternatives were discussed  Consent given by: patient  Patient understanding: patient states understanding of the procedure being performed    Supporting Documentation  Indications: pain   Procedure Details  Location: knee - L knee  Preparation: Patient was prepped and draped in the usual sterile fashion  Needle size: 22 G  Approach: anterolateral  Medications administered: 20 mg Sodium Hyaluronate 20 MG/2ML    Patient tolerance: patient tolerated the procedure well with no immediate complications  Dressing:  Sterile dressing applied

## 2023-03-30 DIAGNOSIS — G25.81 RESTLESS LEG SYNDROME: ICD-10-CM

## 2023-03-30 DIAGNOSIS — F41.1 GAD (GENERALIZED ANXIETY DISORDER): ICD-10-CM

## 2023-03-31 ENCOUNTER — TELEPHONE (OUTPATIENT)
Dept: FAMILY MEDICINE CLINIC | Facility: CLINIC | Age: 58
End: 2023-03-31

## 2023-03-31 RX ORDER — LORAZEPAM 0.5 MG/1
0.5 TABLET ORAL
Qty: 30 TABLET | Refills: 0 | Status: SHIPPED | OUTPATIENT
Start: 2023-03-31 | End: 2023-04-30

## 2023-03-31 RX ORDER — PRAMIPEXOLE DIHYDROCHLORIDE 0.5 MG/1
1 TABLET ORAL DAILY
Qty: 180 TABLET | Refills: 0 | Status: SHIPPED | OUTPATIENT
Start: 2023-03-31 | End: 2023-06-29

## 2023-04-07 DIAGNOSIS — I10 ESSENTIAL HYPERTENSION: ICD-10-CM

## 2023-04-07 RX ORDER — LISINOPRIL 10 MG/1
10 TABLET ORAL DAILY
Qty: 90 TABLET | Refills: 1 | Status: SHIPPED | OUTPATIENT
Start: 2023-04-07 | End: 2023-07-06

## 2023-04-24 RX ORDER — HYALURONATE SODIUM 20 MG/2 ML
SYRINGE (ML) INTRAARTICULAR
COMMUNITY
Start: 2023-01-16

## 2023-04-25 ENCOUNTER — OFFICE VISIT (OUTPATIENT)
Dept: OBGYN CLINIC | Facility: CLINIC | Age: 58
End: 2023-04-25

## 2023-04-25 ENCOUNTER — OFFICE VISIT (OUTPATIENT)
Dept: FAMILY MEDICINE CLINIC | Facility: CLINIC | Age: 58
End: 2023-04-25

## 2023-04-25 VITALS
WEIGHT: 203 LBS | HEIGHT: 62 IN | BODY MASS INDEX: 37.36 KG/M2 | DIASTOLIC BLOOD PRESSURE: 77 MMHG | HEART RATE: 94 BPM | SYSTOLIC BLOOD PRESSURE: 125 MMHG

## 2023-04-25 VITALS
OXYGEN SATURATION: 92 % | HEIGHT: 62 IN | TEMPERATURE: 98.4 F | WEIGHT: 203.8 LBS | HEART RATE: 91 BPM | RESPIRATION RATE: 18 BRPM | BODY MASS INDEX: 37.5 KG/M2 | SYSTOLIC BLOOD PRESSURE: 132 MMHG | DIASTOLIC BLOOD PRESSURE: 80 MMHG

## 2023-04-25 DIAGNOSIS — I10 ESSENTIAL HYPERTENSION: Primary | ICD-10-CM

## 2023-04-25 DIAGNOSIS — M25.462 EFFUSION OF LEFT KNEE: ICD-10-CM

## 2023-04-25 DIAGNOSIS — F43.23 ADJUSTMENT DISORDER WITH MIXED ANXIETY AND DEPRESSED MOOD: ICD-10-CM

## 2023-04-25 DIAGNOSIS — R91.8 PULMONARY NODULES: ICD-10-CM

## 2023-04-25 DIAGNOSIS — F41.1 GAD (GENERALIZED ANXIETY DISORDER): ICD-10-CM

## 2023-04-25 DIAGNOSIS — M17.12 PRIMARY LOCALIZED OSTEOARTHRITIS OF LEFT KNEE: Primary | ICD-10-CM

## 2023-04-25 DIAGNOSIS — M17.11 PRIMARY OSTEOARTHRITIS OF RIGHT KNEE: ICD-10-CM

## 2023-04-25 RX ORDER — LORAZEPAM 0.5 MG/1
0.5 TABLET ORAL
Qty: 30 TABLET | Refills: 0 | Status: SHIPPED | OUTPATIENT
Start: 2023-04-25 | End: 2023-05-25

## 2023-04-25 RX ORDER — BETAMETHASONE SODIUM PHOSPHATE AND BETAMETHASONE ACETATE 3; 3 MG/ML; MG/ML
6 INJECTION, SUSPENSION INTRA-ARTICULAR; INTRALESIONAL; INTRAMUSCULAR; SOFT TISSUE
Status: COMPLETED | OUTPATIENT
Start: 2023-04-25 | End: 2023-04-25

## 2023-04-25 RX ORDER — BUPIVACAINE HYDROCHLORIDE 2.5 MG/ML
8 INJECTION, SOLUTION INFILTRATION; PERINEURAL
Status: COMPLETED | OUTPATIENT
Start: 2023-04-25 | End: 2023-04-25

## 2023-04-25 RX ADMIN — BETAMETHASONE SODIUM PHOSPHATE AND BETAMETHASONE ACETATE 6 MG: 3; 3 INJECTION, SUSPENSION INTRA-ARTICULAR; INTRALESIONAL; INTRAMUSCULAR; SOFT TISSUE at 15:22

## 2023-04-25 RX ADMIN — BUPIVACAINE HYDROCHLORIDE 8 ML: 2.5 INJECTION, SOLUTION INFILTRATION; PERINEURAL at 15:22

## 2023-04-25 NOTE — ASSESSMENT & PLAN NOTE
Continue zoloft as prescribed  Patient saw Valentin Thomson in the past for counseling, wishes to re start services due to increase stress with family   Will reach out to Valentin Thomson and connect for services again

## 2023-04-25 NOTE — PROGRESS NOTES
Nati Christianson 1965 female MRN: [id number]    Family Medicine Follow-up Visit    ASSESSMENT/PLAN  Problem List Items Addressed This Visit        Respiratory    Pulmonary nodules     Repeat CT was ordered at last visit encouraged patient to complete as soon as she is able             Cardiovascular and Mediastinum    Essential hypertension - Primary     BP controlled, continue medication as prescribed             Other    Adjustment disorder with mixed anxiety and depressed mood     Continue zoloft as prescribed  Patient saw Baldomero Martin in the past for counseling, wishes to re start services due to increase stress with family  Will reach out to Baldomero Martin and connect for services again         Relevant Medications    LORazepam (ATIVAN) 0 5 mg tablet   Other Visit Diagnoses     KENDALL (generalized anxiety disorder)        Relevant Medications    LORazepam (ATIVAN) 0 5 mg tablet          Follow up for CP in the fall but sooner of course if needed          Future Appointments   Date Time Provider Lorenzo Reagan   4/25/2023  2:45 PM Michelle Gutiérrez MD [facility]   10/9/2023 10:30 AM DO EDA Ray  [facility]          SUBJECTIVE  CC: Follow-up (4-6 month check up)      HPI:  Nati Christianson is a 61 y o  female who presents for follow up  Reports a lot of stress since last visit  Had to move her dad into a memory care facility  Lots of stress with her mom and dealing with her as well  fighting among family members  She is working full time, taking care of her daughter  She is feeling overwhelming  Wants to start counseling  HPI    Review of Systems   Constitutional: Negative for chills, fatigue and fever  HENT: Negative for congestion, postnasal drip, rhinorrhea and sinus pressure  Eyes: Negative for photophobia and visual disturbance  Respiratory: Negative for cough and shortness of breath  Cardiovascular: Negative for chest pain, palpitations and leg swelling     Gastrointestinal: Negative for abdominal pain, constipation, diarrhea, nausea and vomiting  Genitourinary: Negative for difficulty urinating and dysuria  Musculoskeletal: Negative for arthralgias and myalgias  Skin: Negative for color change and rash  Neurological: Negative for dizziness, weakness, light-headedness and headaches  Psychiatric/Behavioral: The patient is nervous/anxious  Historical Information   The patient history was reviewed as follows:    Past Medical History:   Diagnosis Date   • Anxiety 2005   • Bulging lumbar disc    • Chronic pain     low back   • Degenerative disk disease     lumbar spine   • Depression 2005   • Hyperlipidemia    • Hypertension    • Pinched nerve     lumbar spine   • Psychiatric disorder     depression   • Shingles 20179     Past Surgical History:   Procedure Laterality Date   • CARPAL TUNNEL RELEASE Bilateral 1998   • CHOLECYSTECTOMY  2008   • COLONOSCOPY  06/15/2015   • COLONOSCOPY  06/02/2015    Internal hemorrhoids, hyperplastic polyps which were removed  Ten year recall recommended June 2025   • COLONOSCOPY  09/16/2020    normal terminal ileum, 1 small polyp in the distal sigmoid colon that was hyperplastic  Chevy Brenden She did have a small amount of retained stool in the colon  She did have a small amount of retained stool in the colon  A 5 year recall was recommended due to poor prep, September 2025      • SPINAL CORD STIMULATOR IMPLANT      2014   • SPINE SURGERY  2017    Stimulator inplant   • WISDOM TOOTH EXTRACTION Bilateral 1982     Family History   Problem Relation Age of Onset   • Skin cancer Mother    • Heart disease Father         Dx with lewy body dementia in 2012   • Dementia Father         Dx with lewy body dementia in 2012   • Breast cancer Maternal Aunt 28   • Breast cancer Sister 64   • BRCA1 Negative Sister    • BRCA2 Negative Sister    • Depression Sister    • Prostate cancer Maternal Uncle    • Cancer Maternal Uncle         BLADDER   • Colon cancer Maternal "Uncle    • Colon polyps Neg Hx       Social History   Social History     Substance and Sexual Activity   Alcohol Use Yes   • Alcohol/week: 0 0 standard drinks    Comment: Social drinker     Social History     Substance and Sexual Activity   Drug Use No     Social History     Tobacco Use   Smoking Status Every Day   • Packs/day: 1 00   • Years: 43 00   • Pack years: 43 00   • Types: Cigarettes   • Start date: 1977   Smokeless Tobacco Never   Tobacco Comments    smokes 10 cigs daily as of 10/20       Medications:     Current Outpatient Medications:   •  atorvastatin (LIPITOR) 80 mg tablet, Take 1 tablet (80 mg total) by mouth daily, Disp: 90 tablet, Rfl: 1  •  dicyclomine (BENTYL) 20 mg tablet, Take 1 tablet (20 mg total) by mouth every 6 (six) hours as needed (pain, cramping), Disp: 90 tablet, Rfl: 1  •  Euflexxa 20 MG/2ML SOSY, , Disp: , Rfl:   •  lisinopril (ZESTRIL) 10 mg tablet, Take 1 tablet (10 mg total) by mouth daily, Disp: 90 tablet, Rfl: 1  •  LORazepam (ATIVAN) 0 5 mg tablet, Take 1 tablet (0 5 mg total) by mouth daily at bedtime, Disp: 30 tablet, Rfl: 0  •  pramipexole (MIRAPEX) 0 5 mg tablet, Take 2 tablets (1 mg total) by mouth daily, Disp: 180 tablet, Rfl: 0  •  sertraline (ZOLOFT) 100 mg tablet, Take 1 tablet (100 mg total) by mouth daily, Disp: 90 tablet, Rfl: 1  •  sertraline (ZOLOFT) 50 mg tablet, Take 1 tablet (50 mg total) by mouth daily, Disp: 90 tablet, Rfl: 1  Allergies   Allergen Reactions   • Penicillins Hives and Throat Swelling   • Meloxicam Hives and Itching       OBJECTIVE    Vitals:   Vitals:    04/25/23 0902   BP: 132/80   BP Location: Left arm   Patient Position: Sitting   Cuff Size: Large   Pulse: 91   Resp: 18   Temp: 98 4 °F (36 9 °C)   TempSrc: Tympanic   SpO2: 92%   Weight: 92 4 kg (203 lb 12 8 oz)   Height: 5' 2\" (1 575 m)           Physical Exam  Constitutional:       Appearance: She is well-developed  HENT:      Head: Normocephalic and atraumatic     Eyes:      Extraocular " Movements: Extraocular movements intact  Conjunctiva/sclera: Conjunctivae normal    Cardiovascular:      Rate and Rhythm: Normal rate and regular rhythm  Heart sounds: Normal heart sounds  Pulmonary:      Effort: Pulmonary effort is normal  No respiratory distress  Breath sounds: Normal breath sounds  No wheezing  Musculoskeletal:         General: No tenderness  Normal range of motion  Cervical back: Normal range of motion and neck supple  Skin:     General: Skin is warm and dry  Neurological:      Mental Status: She is alert and oriented to person, place, and time     Psychiatric:         Behavior: Behavior normal             Labs:        DO Sherri    4/25/2023

## 2023-04-25 NOTE — PROGRESS NOTES
Assessment:     1  Primary localized osteoarthritis of left knee    2  Primary osteoarthritis of right knee    3  Effusion of left knee        Plan:     Problem List Items Addressed This Visit        Musculoskeletal and Integument    Primary osteoarthritis of right knee    Relevant Orders    Ambulatory Referral to Physical Therapy    Primary localized osteoarthritis of left knee - Primary    Relevant Medications    betamethasone acetate-betamethasone sodium phosphate (CELESTONE) injection 6 mg (Completed)    bupivacaine (MARCAINE) 0 25 % injection 8 mL (Completed)    Other Relevant Orders    Ambulatory Referral to Physical Therapy    Large joint arthrocentesis: L knee (Completed)    Effusion of left knee    Relevant Medications    betamethasone acetate-betamethasone sodium phosphate (CELESTONE) injection 6 mg (Completed)    bupivacaine (MARCAINE) 0 25 % injection 8 mL (Completed)    Other Relevant Orders    Large joint arthrocentesis: L knee (Completed)       Findings consistent with bilateral knee osteoarthritis, most symptomatic in the patellofemoral compartment, left worse than right with left knee effusion  Findings and treatment options were discussed with the patient  Recommend aspiration and cortisone injection to the left knee joint today  She tolerated the procedure well  Advised to apply cold compress today  She was also referred to formal physical therapy for both of her knees  She will follow-up as needed if symptoms do not improve  Discussed repeat cortisone injection in the future as long as it has been 3 to 4 months  Last resort would be total knee arthroplasty  All patient's questions were answered to her satisfaction  This note is created using dictation transcription  It may contain typographical errors, grammatical errors, improperly dictated words, background noise and other errors  Subjective:     Patient ID: Francheska Hawley is a 61 y o  female  Chief Complaint:   This is a 57-year-old white female following up for bilateral knee osteoarthritis, left more symptomatic than right  She completed a series of joint supplement injections to her bilateral knees in February 2023  She feels she did not get much relief from the injections  She feels the right knee pain is about 5 out of 10 and feels aching and all over her knee  The left knee pain is 8 out of 10 and is localized over the anterior aspect of her knee  The left knee bothers her the most when she is descending stairs and when getting up from a chair  She has had cortisone injection to the right knee in the past with short-term relief  No cortisone injection given to the left knee  Allergy:  Allergies   Allergen Reactions   • Penicillins Hives and Throat Swelling   • Meloxicam Hives and Itching     Medications:  all current active meds have been reviewed  Past Medical History:  Past Medical History:   Diagnosis Date   • Anxiety 2005   • Bulging lumbar disc    • Chronic pain     low back   • Degenerative disk disease     lumbar spine   • Depression 2005   • Hyperlipidemia    • Hypertension    • Pinched nerve     lumbar spine   • Psychiatric disorder     depression   • Shingles 20179     Past Surgical History:  Past Surgical History:   Procedure Laterality Date   • CARPAL TUNNEL RELEASE Bilateral 1998   • CHOLECYSTECTOMY  2008   • COLONOSCOPY  06/15/2015   • COLONOSCOPY  06/02/2015    Internal hemorrhoids, hyperplastic polyps which were removed  Ten year recall recommended June 2025   • COLONOSCOPY  09/16/2020    normal terminal ileum, 1 small polyp in the distal sigmoid colon that was hyperplastic  Mendel Oman She did have a small amount of retained stool in the colon  She did have a small amount of retained stool in the colon  A 5 year recall was recommended due to poor prep, September 2025      • SPINAL CORD STIMULATOR IMPLANT      2014   • SPINE SURGERY  2017    Stimulator inplant   • WISDOM TOOTH EXTRACTION Bilateral 1982 Family History:  Family History   Problem Relation Age of Onset   • Skin cancer Mother    • Heart disease Father         Dx with lewy body dementia in 2012   • Dementia Father         Dx with lewy body dementia in 2012   • Breast cancer Maternal Aunt 28   • Breast cancer Sister 64   • BRCA1 Negative Sister    • BRCA2 Negative Sister    • Depression Sister    • Prostate cancer Maternal Uncle    • Cancer Maternal Uncle         BLADDER   • Colon cancer Maternal Uncle    • Colon polyps Neg Hx      Social History:  Social History     Substance and Sexual Activity   Alcohol Use Yes   • Alcohol/week: 0 0 standard drinks    Comment: Social drinker     Social History     Substance and Sexual Activity   Drug Use No     Social History     Tobacco Use   Smoking Status Every Day   • Packs/day: 1 00   • Years: 43 00   • Pack years: 43 00   • Types: Cigarettes   • Start date: 1977   Smokeless Tobacco Never   Tobacco Comments    smokes 10 cigs daily as of 10/20     Review of Systems   Constitutional: Negative for chills and fever  HENT: Negative for ear pain and sore throat  Eyes: Negative for pain and visual disturbance  Respiratory: Negative for cough and shortness of breath  Cardiovascular: Negative for chest pain and palpitations  Gastrointestinal: Negative for abdominal pain and vomiting  Genitourinary: Negative for dysuria and hematuria  Musculoskeletal: Positive for arthralgias (Bilateral knee), gait problem (Antalgic) and joint swelling (Bilateral knee)  Negative for back pain  Skin: Negative for color change and rash  Neurological: Negative for seizures and syncope  Psychiatric/Behavioral: Negative  All other systems reviewed and are negative          Objective:  BP Readings from Last 1 Encounters:   04/25/23 125/77      Wt Readings from Last 1 Encounters:   04/25/23 92 1 kg (203 lb)      BMI:   Estimated body mass index is 37 13 kg/m² as calculated from the following:    Height as of this "encounter: 5' 2\" (1 575 m)  Weight as of this encounter: 92 1 kg (203 lb)  BSA:   Estimated body surface area is 1 92 meters squared as calculated from the following:    Height as of this encounter: 5' 2\" (1 575 m)  Weight as of this encounter: 92 1 kg (203 lb)  Physical Exam  Vitals and nursing note reviewed  Constitutional:       Appearance: Normal appearance  She is well-developed  HENT:      Head: Normocephalic and atraumatic  Right Ear: External ear normal       Left Ear: External ear normal    Eyes:      Extraocular Movements: Extraocular movements intact  Conjunctiva/sclera: Conjunctivae normal    Pulmonary:      Effort: Pulmonary effort is normal    Musculoskeletal:      Cervical back: Neck supple  Right knee: Effusion (trace) present  Instability Tests: Medial Rolf test negative and lateral Rolf test negative  Left knee: Effusion (grade 1) present  Instability Tests: Medial Rolf test negative and lateral oRlf test negative  Skin:     General: Skin is warm and dry  Neurological:      Mental Status: She is alert and oriented to person, place, and time  Deep Tendon Reflexes: Reflexes are normal and symmetric  Psychiatric:         Mood and Affect: Mood normal          Behavior: Behavior normal        Right Knee Exam     Muscle Strength   The patient has normal right knee strength  Tenderness   Right knee tenderness location: diffuse  Range of Motion   The patient has normal right knee ROM  Tests   Rolf:  Medial - negative Lateral - negative  Varus: negative Valgus: negative  Patellar apprehension: negative    Other   Erythema: absent  Scars: absent  Sensation: normal  Pulse: present  Swelling: mild  Effusion: effusion (trace) present    Comments:  Crepitation with motion of patella      Left Knee Exam     Muscle Strength   The patient has normal left knee strength      Tenderness   Left knee tenderness location: " patellofemoral     Range of Motion   The patient has normal left knee ROM  Tests   Rolf:  Medial - negative Lateral - negative  Varus: negative Valgus: negative  Patellar apprehension: negative    Other   Erythema: absent  Scars: absent  Sensation: normal  Pulse: present  Swelling: mild  Effusion: effusion (grade 1) present    Comments:  Crepitation with motion of patella            No new imaging  Large joint arthrocentesis: L knee  Universal Protocol:  Consent: Verbal consent obtained    Risks and benefits: risks, benefits and alternatives were discussed  Consent given by: patient  Patient understanding: patient states understanding of the procedure being performed    Supporting Documentation  Indications: pain and joint swelling   Procedure Details  Location: knee - L knee  Preparation: Patient was prepped and draped in the usual sterile fashion  Needle size: 18 G (25 gauge for anesthetic)  Approach: superior  Medications administered: 6 mg betamethasone acetate-betamethasone sodium phosphate 6 (3-3) mg/mL; 8 mL bupivacaine 0 25 %    Aspirate amount: 15 mL  Aspirate: clear and yellow    Patient tolerance: patient tolerated the procedure well with no immediate complications  Dressing:  Sterile dressing applied    5 cc 0 25% bupivacaine used for anesthetic        Scribe Attestation    I,:  Skip Moore PA-C am acting as a scribe while in the presence of the attending physician :       I,:  Julissa Long MD personally performed the services described in this documentation    as scribed in my presence :

## 2023-04-26 ENCOUNTER — TELEPHONE (OUTPATIENT)
Dept: FAMILY MEDICINE CLINIC | Facility: CLINIC | Age: 58
End: 2023-04-26

## 2023-04-26 NOTE — TELEPHONE ENCOUNTER
Patient saw Dr CARNEY yesterday  Dr CARNEY told patient she was going to have you reach out to her for counseling  She is just wondering if you received that message  Please advise   I dont see anything in the chart

## 2023-05-08 ENCOUNTER — EVALUATION (OUTPATIENT)
Dept: PHYSICAL THERAPY | Facility: CLINIC | Age: 58
End: 2023-05-08

## 2023-05-08 DIAGNOSIS — M17.11 PRIMARY OSTEOARTHRITIS OF RIGHT KNEE: ICD-10-CM

## 2023-05-08 DIAGNOSIS — M17.12 PRIMARY LOCALIZED OSTEOARTHRITIS OF LEFT KNEE: Primary | ICD-10-CM

## 2023-05-08 NOTE — PROGRESS NOTES
PT Evaluation     Today's date: 2023  Patient name: Remy Hutchins  : 1965  MRN: [id number]  Referring provider: Jared Rashid PA-C  Dx:   Encounter Diagnosis     ICD-10-CM    1  Primary localized osteoarthritis of left knee  M17 12 Ambulatory Referral to Physical Therapy      2  Primary osteoarthritis of right knee  M17 11 Ambulatory Referral to Physical Therapy                     Assessment  Assessment details: Pt is a 61y o  year old female coming to outpatient PT with a diagnosis of B knee OA/ pain L > R onset 2023  Pt presents with increased pain and TTP, decreased L knee ROM, decreased L LE strength, decreased HS flexibility, (+) medial mensicus test, and overall decreased functional mobility  Pt would benefit from skilled PT services in order to address these deficits and reach maximum level of function  Thank you kindly for the referral!    Pt was issued an initial written HEP  Impairments: abnormal gait, abnormal or restricted ROM, activity intolerance, impaired physical strength, lacks appropriate home exercise program, pain with function and weight-bearing intolerance  Understanding of Dx/Px/POC: good   Prognosis: good    Goals  STG's ( 3-4 weeks)  1  Pt will be independent in HEP  2  Pt will have decreased L knee pain rated 4-5/10 at worst  LTG's ( 6- 8 weeks)  1  Improve FOTO score by 6-8 points  2  Pt will have improved L LE strength by 1/2 grade  3  Pt will have improved L knee flexion ROM by 8*-10*  4  Pt will have less pain going up and down the steps  5   Pt will have less pain walking community distances    Plan  Patient would benefit from: PT eval and skilled physical therapy  Planned modality interventions: cryotherapy and TENS  Planned therapy interventions: manual therapy, joint mobilization, neuromuscular re-education, strengthening, stretching, therapeutic activities, therapeutic exercise, flexibility, functional ROM exercises and home exercise program  Frequency: 2x week  Duration in weeks: 6  Plan of Care beginning date: 2023  Plan of Care expiration date: 2023  Treatment plan discussed with: patient        Subjective Evaluation    History of Present Illness  Mechanism of injury: Pt reports L > R knee pain onset 2023  X-ray testing showed mild OA  Pt has had joint supplemental injections -3/2023, R knee cortisone injection with some relief about 2 years ago  L knee was drained and injected with cortisone without relief  Pt has increased pain and difficulty going up steps, with increasing pain descending the steps  Pt needs to do one step at a time descending the steps  Pt has increased pain and soreness with prolonged standing and walking, and transferring sit to stand  Pt has less pain with sitting and sleeping  Pt is not able to squat or kneel  Pt is able to walk on a treadmill, but not on level surfaces      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)  Hobbies: gardening  Gait: mild antalgic, (+) stiff legged gait with decreased hip flexion   Pain  At best pain ratin  At worst pain ratin  Location: L knee; R knee 2-3/10 at best   6-7/10 at worst  Quality: sharp and knife-like  Relieving factors: rest, ice and medications  Aggravating factors: stair climbing, standing and walking    Social Support  Steps to enter house: yes (5)  Stairs in house: yes (13 to 2nd floor; 12 steps to basement)   Lives in: multiple-level home    Employment status: working    Diagnostic Tests  X-ray: normal  Treatments  Previous treatment: injection treatment  Patient Goals  Patient goals for therapy: decreased pain, increased strength and independence with ADLs/IADLs  Patient goal: to be able to go up and down the steps better        Objective     Neurological Testing     Sensation     Hip   Left Hip   Intact: light touch    Right Hip   Intact: light touch    Knee   Left Knee   Intact: light touch    Right Knee   Intact: light touch     Reflexes   Left   Patellar "(L4): trace (1+)  Achilles (S1): normal (2+)    Right   Patellar (L4): trace (1+)  Achilles (S1): normal (2+)    Active Range of Motion   Left Knee   Flexion: 116 degrees with pain  Extension: 0 degrees     Right Knee   Flexion: 130 degrees   Extension: 0 degrees     Additional Active Range of Motion Details  Mild edema upon visual inspection  (+) TTP B medial patellar regions L > R  (-) ligamentous testing  (-) Rolf's test  (-) [place] ER  (+) [place] IR/ medial rotation  HS flexibility: R: 80*  L: 75* with opposite knee flexed    Passive Range of Motion   Left Knee   Flexion: 122 degrees with pain  Extension: 0 degrees     Right Knee   Flexion: 135 degrees   Extension: 0 degrees     Strength/Myotome Testing     Left Hip   Planes of Motion   Flexion: 4 (pain)  External rotation: 4  Internal rotation: 4    Right Hip   Planes of Motion   Flexion: 4+  External rotation: 4+  Internal rotation: 4+    Left Knee   Flexion: 4+  Extension: 4+    Right Knee   Flexion: 5  Extension: 5    Additional Strength Details  Ankle df MMT: 5/5            Dx:  B knee OA L > R  EPOC: 6/19/23  CO-MORBIDITIES: chronic R hip and LBP  PERSONAL FACTORS:  Precautions: spinal cord stimulator implant- batteries not working; avoid mini squats      Manuals             L knee PROM/ medial knee MFR             R knee MFR             K tape for support B knees                          Neuro Re-Ed             bridges             Standing TKE w/ pball             Leg press bilat             SL leg press                                                    Ther Ex             Bike for LE strengthening             HS stretch             Prone quad stretch             Supine SLR toe straight             Supine SLR toe out             S/l hip abduction             Quad sets             LAQ             Ther Activity             Step ups FW 4\"            Step ups lat 4\"            Gait Training                                       Modalities           " Cp post tx- prn

## 2023-05-09 ENCOUNTER — OFFICE VISIT (OUTPATIENT)
Dept: PHYSICAL THERAPY | Facility: CLINIC | Age: 58
End: 2023-05-09

## 2023-05-09 DIAGNOSIS — M17.12 PRIMARY LOCALIZED OSTEOARTHRITIS OF LEFT KNEE: Primary | ICD-10-CM

## 2023-05-09 DIAGNOSIS — M17.11 PRIMARY OSTEOARTHRITIS OF RIGHT KNEE: ICD-10-CM

## 2023-05-09 NOTE — PROGRESS NOTES
"Daily Note     Today's date: 2023  Patient name: Abdi Taylor  : 1965  MRN: [id number]  Referring provider: Teresa Cano PA-C  Dx:   Encounter Diagnosis     ICD-10-CM    1  Primary localized osteoarthritis of left knee  M17 12       2  Primary osteoarthritis of right knee  M17 11                      Subjective:  Pt c/o L knee pain surrounding the jt  Objective: See treatment diary below      Assessment: Tolerated treatment well w/ initial instruction of ex's  Patient demonstrated fatigue post treatment and would benefit from continued PT to work on LE strengthening  Educated pt need to learn ex and need to con't independently since OA is a progressive disorder  Plan: Continue per plan of care  Progress treatment as tolerated         Dx: B knee OA L > R  EPOC: 23  CO-MORBIDITIES: chronic R hip and LBP  PERSONAL FACTORS:  Precautions: spinal cord stimulator implant- batteries not working; avoid mini squats      Manuals 5/9            L knee PROM/ medial knee MFR JK            R knee MFR JK            K tape for support B knees              10'            Neuro Re-Ed             bridges             Standing TKE w/ pball 10x5\"            Leg press bilat             SL leg press                                                    Ther Ex             Bike for LE strengthening 5'             HS stretch             Prone quad stretch             Supine SLR toe straight 10x2            Supine SLR toe out 10            S/l hip abduction 10x2            Quad sets             LAQ             Ther Activity             Step ups FW 4\" 10 BL            Step ups lat 4\" 10 BL            Gait Training                                       Modalities             Cp post tx- prn                               "

## 2023-05-16 ENCOUNTER — OFFICE VISIT (OUTPATIENT)
Dept: PHYSICAL THERAPY | Facility: CLINIC | Age: 58
End: 2023-05-16

## 2023-05-16 DIAGNOSIS — M17.12 PRIMARY LOCALIZED OSTEOARTHRITIS OF LEFT KNEE: Primary | ICD-10-CM

## 2023-05-16 DIAGNOSIS — M17.11 PRIMARY OSTEOARTHRITIS OF RIGHT KNEE: ICD-10-CM

## 2023-05-16 NOTE — PROGRESS NOTES
"Daily Note     Today's date: 2023  Patient name: Julia Silva  : 1965  MRN: [id number]  Referring provider: Josee Boss PA-C  Dx:   Encounter Diagnosis     ICD-10-CM    1  Primary localized osteoarthritis of left knee  M17 12       2  Primary osteoarthritis of right knee  M17 11                      Subjective: Pt reports her L knee is painful, different feeling from the arthritis in the R  Reports having an appointment w/ Dr Arslan Martin  Objective: See treatment diary below      Assessment: Tolerated treatment fair due to increased L knee pain  Patient given mat ex's to avoid aggravating the L knee  Pt w/ tightness in med quad  Some relief reported w/ TrP rls  Pt would benefit from cont'd PT  Pt made aware she tends to keep mm tension w/ hips pulled into IR and her gluts are tense when pt thinks she is at rest       Plan: Continue per plan of care  Progress treatment as tolerated         Dx: B knee OA L > R  EPOC: 23  CO-MORBIDITIES: chronic R hip and LBP  PERSONAL FACTORS:  Precautions: spinal cord stimulator implant- batteries not working; avoid mini squats      Manuals            L knee PROM/ medial knee MFR JK            R knee MFR JK JK           Laser for meniscus  JK           K tape for support B knees              10' 10'           Neuro Re-Ed             bridges             Standing TKE w/ pball 10x5\"            Leg press bilat             SL leg press                                                    Ther Ex             Bike for LE strengthening 5'  5'           HS stretch  20\"x3           Prone quad stretch  20\"x3           Supine SLR toe straight 10x2 10x2           Supine SLR toe out 10 10x2           S/l hip abduction 10x2 10x2           Quad sets  10\"x10           LAQ  10x5\"           Ther Activity             Step ups FW 4\" 10 BL Hold            Step ups lat 4\" 10 BL hold           Gait Training                                       Modalities             Cp post tx- " prn

## 2023-05-17 DIAGNOSIS — E78.2 MIXED HYPERLIPIDEMIA: ICD-10-CM

## 2023-05-17 RX ORDER — ATORVASTATIN CALCIUM 80 MG/1
80 TABLET, FILM COATED ORAL DAILY
Qty: 90 TABLET | Refills: 1 | Status: SHIPPED | OUTPATIENT
Start: 2023-05-17

## 2023-05-18 ENCOUNTER — OFFICE VISIT (OUTPATIENT)
Dept: OBGYN CLINIC | Facility: CLINIC | Age: 58
End: 2023-05-18

## 2023-05-18 ENCOUNTER — APPOINTMENT (OUTPATIENT)
Dept: PHYSICAL THERAPY | Facility: CLINIC | Age: 58
End: 2023-05-18
Payer: COMMERCIAL

## 2023-05-18 VITALS
HEIGHT: 62 IN | DIASTOLIC BLOOD PRESSURE: 80 MMHG | BODY MASS INDEX: 37.91 KG/M2 | SYSTOLIC BLOOD PRESSURE: 138 MMHG | WEIGHT: 206 LBS

## 2023-05-18 DIAGNOSIS — M17.12 PRIMARY LOCALIZED OSTEOARTHRITIS OF LEFT KNEE: Primary | ICD-10-CM

## 2023-05-18 DIAGNOSIS — M23.92 INTERNAL DERANGEMENT OF LEFT KNEE: ICD-10-CM

## 2023-05-18 NOTE — PROGRESS NOTES
Assessment:     1  Primary localized osteoarthritis of left knee    2  Internal derangement of left knee        Plan:     Problem List Items Addressed This Visit        Musculoskeletal and Integument    Primary localized osteoarthritis of left knee - Primary   Other Visit Diagnoses     Internal derangement of left knee        Relevant Orders    MRI knee left  wo contrast        Suspicious for medial meniscal tear of left knee  Patient has positive medial joint line tenderness with positive medial Rolf on exam today of left knee  Patient will hold on physical therapy until we see her back to go over MRI  She does have spinal cord stimulator and MRI scheduling will check to see if MRI compatible  If not she would then need CT arthrogram  Avoiding squatting, pivoting or twisting motions  OTC anti inflammatories as needed for pain  See patient back to review MRI of left knee to determine future course of treatment  All patient's questions were answered to her satisfaction  This note is created using dictation transcription  It may contain typographical errors, grammatical errors, improperly dictated words, background noise and other errors  Subjective:     Patient ID: Pavel Robledo is a 61 y o  female  Chief Complaint: This is a 62-year-old white female following up for bilateral knee osteoarthritis, left more symptomatic than right  She completed a series of joint supplement injections to her bilateral knees in February 2023 w/o much benefit and had bilateral knee CSI with aspiration on left 4/25/23  She states she has not seen much relief following CSI at this point either  She is attending physical therapy but feels this may be aggravating her knee's  Pain is anterior based going into medial and lateral aspects of her knee  She states she will get some sharp stabbing type pain anteromedial aspect of left knee at times with squatting, pivoting or twisting motions  She is using advil for pain relief  Denies any locking  Allergy:  Allergies   Allergen Reactions   • Penicillins Hives and Throat Swelling   • Meloxicam Hives and Itching     Medications:  all current active meds have been reviewed  Past Medical History:  Past Medical History:   Diagnosis Date   • Anxiety 2005   • Bulging lumbar disc    • Chronic pain     low back   • Degenerative disk disease     lumbar spine   • Depression 2005   • Hyperlipidemia    • Hypertension    • Pinched nerve     lumbar spine   • Psychiatric disorder     depression   • Shingles [id number]     Past Surgical History:  Past Surgical History:   Procedure Laterality Date   • CARPAL TUNNEL RELEASE Bilateral 1998   • CHOLECYSTECTOMY  2008   • COLONOSCOPY  06/15/2015   • COLONOSCOPY  06/02/2015    Internal hemorrhoids, hyperplastic polyps which were removed  Ten year recall recommended June 2025   • COLONOSCOPY  09/16/2020    normal terminal ileum, 1 small polyp in the distal sigmoid colon that was hyperplastic  Veronica Pederson She did have a small amount of retained stool in the colon  She did have a small amount of retained stool in the colon  A 5 year recall was recommended due to poor prep, September 2025      • SPINAL CORD STIMULATOR IMPLANT      2014   • SPINE SURGERY  2017    Stimulator inplant   • WISDOM TOOTH EXTRACTION Bilateral 1982     Family History:  Family History   Problem Relation Age of Onset   • Skin cancer Mother    • Heart disease Father         Dx with lewy body dementia in 2012   • Dementia Father         Dx with lewy body dementia in 2012   • Breast cancer Maternal Aunt 28   • Breast cancer Sister 64   • BRCA1 Negative Sister    • BRCA2 Negative Sister    • Depression Sister    • Prostate cancer Maternal Uncle    • Cancer Maternal Uncle         BLADDER   • Colon cancer Maternal Uncle    • Colon polyps Neg Hx      Social History:  Social History     Substance and Sexual Activity   Alcohol Use Yes   • Alcohol/week: 0 0 standard drinks    Comment: Social drinker     Social "History     Substance and Sexual Activity   Drug Use No     Social History     Tobacco Use   Smoking Status Every Day   • Packs/day: 1 00   • Years: 43 00   • Pack years: 43 00   • Types: Cigarettes   • Start date: 0   Smokeless Tobacco Never   Tobacco Comments    smokes 10 cigs daily as of 10/20     Review of Systems   Constitutional: Negative for chills and fever  HENT: Negative for ear pain and sore throat  Eyes: Negative for pain and visual disturbance  Respiratory: Negative for cough and shortness of breath  Cardiovascular: Negative for chest pain and palpitations  Gastrointestinal: Negative for abdominal pain and vomiting  Genitourinary: Negative for dysuria and hematuria  Musculoskeletal: Positive for arthralgias (bilateral knee ) and joint swelling (Left knee)  Negative for back pain  Skin: Negative for color change and rash  Neurological: Negative for seizures and syncope  Psychiatric/Behavioral: Negative  All other systems reviewed and are negative  Objective:  BP Readings from Last 1 Encounters:   05/18/23 138/80      Wt Readings from Last 1 Encounters:   05/18/23 93 4 kg (206 lb)      BMI:   Estimated body mass index is 37 68 kg/m² as calculated from the following:    Height as of this encounter: 5' 2\" (1 575 m)  Weight as of this encounter: 93 4 kg (206 lb)  BSA:   Estimated body surface area is 1 94 meters squared as calculated from the following:    Height as of this encounter: 5' 2\" (1 575 m)  Weight as of this encounter: 93 4 kg (206 lb)  Physical Exam  Vitals and nursing note reviewed  Constitutional:       Appearance: Normal appearance  She is well-developed  HENT:      Head: Normocephalic and atraumatic  Right Ear: External ear normal       Left Ear: External ear normal    Eyes:      Extraocular Movements: Extraocular movements intact        Conjunctiva/sclera: Conjunctivae normal    Pulmonary:      Effort: Pulmonary effort is normal  " Musculoskeletal:         General: Tenderness (bilateral knee arthralgia ) present  Cervical back: Neck supple  Left knee: No effusion  Instability Tests: Medial Rolf test positive  Lateral Rolf test negative  Skin:     General: Skin is warm and dry  Neurological:      Mental Status: She is alert and oriented to person, place, and time  Deep Tendon Reflexes: Reflexes are normal and symmetric  Psychiatric:         Mood and Affect: Mood normal          Behavior: Behavior normal        Left Knee Exam     Muscle Strength   The patient has normal left knee strength  Tenderness   The patient is experiencing tenderness in the medial joint line      Range of Motion   Extension: normal   Flexion: normal     Tests   Rolf:  Medial - positive Lateral - negative  Varus: negative Valgus: negative  Patellar apprehension: negative    Other   Erythema: absent  Scars: absent  Sensation: normal  Pulse: present  Swelling: mild  Effusion: no effusion present    Comments:  Crepitation with motion of patella             no new imaging      Scribe Attestation    I,:  Dennis Carey am acting as a scribe while in the presence of the attending physician :       I,:  Phyllis Ye MD personally performed the services described in this documentation    as scribed in my presence :

## 2023-05-19 ENCOUNTER — APPOINTMENT (OUTPATIENT)
Dept: PHYSICAL THERAPY | Facility: CLINIC | Age: 58
End: 2023-05-19
Payer: COMMERCIAL

## 2023-05-22 ENCOUNTER — TELEPHONE (OUTPATIENT)
Dept: OBGYN CLINIC | Facility: HOSPITAL | Age: 58
End: 2023-05-22

## 2023-05-22 ENCOUNTER — EVALUATION (OUTPATIENT)
Dept: PHYSICAL THERAPY | Facility: CLINIC | Age: 58
End: 2023-05-22

## 2023-05-22 DIAGNOSIS — M17.11 PRIMARY OSTEOARTHRITIS OF RIGHT KNEE: ICD-10-CM

## 2023-05-22 DIAGNOSIS — M17.12 PRIMARY LOCALIZED OSTEOARTHRITIS OF LEFT KNEE: Primary | ICD-10-CM

## 2023-05-22 DIAGNOSIS — M23.92 ACUTE INTERNAL DERANGEMENT OF LEFT KNEE: Primary | ICD-10-CM

## 2023-05-22 NOTE — TELEPHONE ENCOUNTER
Caller: Patient    Doctor: Penelope Pedro    Reason for call: Requesting to speak w/Joanna regarding scheduling a CT w/con.  Has battery pack stimulator in back    Call back#: [phone]

## 2023-05-22 NOTE — PROGRESS NOTES
"Daily Note     Today's date: 2023  Patient name: Anastasia Duenas  : 1965  MRN: [id number]  Referring provider: Lisy Wise PA-C  Dx:   Encounter Diagnosis     ICD-10-CM    1  Primary localized osteoarthritis of left knee  M17 12       2  Primary osteoarthritis of right knee  M17 11                      Subjective: Pt reports her L knee is really painful   R knee is sore 2* favoring her L knee  10/10 L knee; 4/10 R knee      Objective: See treatment diary below      Assessment: Tolerated treatment fair  Patient exhibited good technique with therapeutic exercises  Pt performed a modified ex program today 2* high pain levels  Plan: Continue per plan of care  Focus on decreasing pain       Dx: B knee OA L > R  EPOC: 23  CO-MORBIDITIES: chronic R hip and LBP  PERSONAL FACTORS:  Precautions: spinal cord stimulator implant- batteries not working; avoid mini squats      Manuals           L knee PROM/ medial knee MFR JK  th          R knee MFR JK JK th          Laser for meniscus  JK           K tape for support B knees   th           10' 10' 15'          Neuro Re-Ed             bridges   10x5\"          Standing TKE w/ pball 10x5\"            Leg press bilat             SL leg press                                                    Ther Ex             Bike for LE strengthening 5'  5' 5'          HS stretch  20\"x3 20\"x3          Prone quad stretch  20\"x3           Supine SLR toe straight 10x2 10x2 10x2          Supine SLR toe out 10 10x2 10          S/l hip abduction 10x2 10x2 10          Quad sets  10\"x10 10x10\"          LAQ  10x5\" 10x5\"          Ther Activity             Step ups FW 4\" 10 BL Hold            Step ups lat 4\" 10 BL hold           Gait Training                                       Modalities             Cp post tx- prn                               "

## 2023-05-23 ENCOUNTER — APPOINTMENT (OUTPATIENT)
Dept: PHYSICAL THERAPY | Facility: CLINIC | Age: 58
End: 2023-05-23
Payer: COMMERCIAL

## 2023-05-24 DIAGNOSIS — F41.1 GAD (GENERALIZED ANXIETY DISORDER): ICD-10-CM

## 2023-05-24 RX ORDER — LORAZEPAM 0.5 MG/1
0.5 TABLET ORAL
Qty: 30 TABLET | Refills: 0 | Status: SHIPPED | OUTPATIENT
Start: 2023-05-24 | End: 2023-06-23

## 2023-05-25 ENCOUNTER — APPOINTMENT (OUTPATIENT)
Dept: PHYSICAL THERAPY | Facility: CLINIC | Age: 58
End: 2023-05-25
Payer: COMMERCIAL

## 2023-05-30 ENCOUNTER — APPOINTMENT (OUTPATIENT)
Dept: PHYSICAL THERAPY | Facility: CLINIC | Age: 58
End: 2023-05-30
Payer: COMMERCIAL

## 2023-06-06 ENCOUNTER — HOSPITAL ENCOUNTER (OUTPATIENT)
Dept: CT IMAGING | Facility: HOSPITAL | Age: 58
Discharge: HOME | End: 2023-06-06
Payer: COMMERCIAL

## 2023-06-06 ENCOUNTER — HOSPITAL ENCOUNTER (OUTPATIENT)
Dept: RADIOLOGY | Facility: HOSPITAL | Age: 58
Discharge: HOME | End: 2023-06-06
Admitting: RADIOLOGY
Payer: COMMERCIAL

## 2023-06-06 DIAGNOSIS — M23.92 ACUTE INTERNAL DERANGEMENT OF LEFT KNEE: ICD-10-CM

## 2023-06-06 PROCEDURE — G1004 CDSM NDSC: HCPCS

## 2023-06-06 PROCEDURE — 27369 NJX CNTRST KNE ARTHG/CT/MRI: CPT

## 2023-06-06 PROCEDURE — A9585 GADOBUTROL INJECTION: HCPCS | Performed by: PHYSICIAN ASSISTANT

## 2023-06-06 PROCEDURE — 73701 CT LOWER EXTREMITY W/DYE: CPT

## 2023-06-06 PROCEDURE — 77002 NEEDLE LOCALIZATION BY XRAY: CPT

## 2023-06-06 RX ORDER — LIDOCAINE HYDROCHLORIDE 10 MG/ML
3 INJECTION, SOLUTION EPIDURAL; INFILTRATION; INTRACAUDAL; PERINEURAL
Status: COMPLETED | OUTPATIENT
Start: 2023-06-06 | End: 2023-06-06

## 2023-06-06 RX ADMIN — GADOBUTROL 0.2 ML: 604.72 INJECTION INTRAVENOUS at 11:31

## 2023-06-06 RX ADMIN — IOHEXOL 20 ML: 300 INJECTION, SOLUTION INTRAVENOUS at 11:31

## 2023-06-06 RX ADMIN — LIDOCAINE HYDROCHLORIDE 3 ML: 10 INJECTION, SOLUTION EPIDURAL; INFILTRATION; INTRACAUDAL; PERINEURAL at 11:35

## 2023-06-13 ENCOUNTER — OFFICE VISIT (OUTPATIENT)
Dept: OBGYN CLINIC | Facility: CLINIC | Age: 58
End: 2023-06-13
Payer: COMMERCIAL

## 2023-06-13 VITALS
SYSTOLIC BLOOD PRESSURE: 130 MMHG | HEIGHT: 62 IN | BODY MASS INDEX: 37.91 KG/M2 | WEIGHT: 206 LBS | DIASTOLIC BLOOD PRESSURE: 72 MMHG

## 2023-06-13 DIAGNOSIS — M17.12 PRIMARY OSTEOARTHRITIS OF LEFT KNEE: Primary | ICD-10-CM

## 2023-06-13 PROCEDURE — 99214 OFFICE O/P EST MOD 30 MIN: CPT | Performed by: ORTHOPAEDIC SURGERY

## 2023-06-13 NOTE — PROGRESS NOTES
Assessment:     1  Primary osteoarthritis of left knee        Plan:     Problem List Items Addressed This Visit        Musculoskeletal and Integument    Primary osteoarthritis of left knee - Primary     Findings consistent with left knee osteoarthritis  Discussed findings and treatment options with the patient  I reviewed patient's left knee arthrogram CT scan  I discussed prognosis of her knee condition  I informed patient that it is no tear in her meniscus  Her arthritis is most likely causing her knee pain  Recommended use of medial offloading brace and continue low impact exercises  Discussed possible surgical intervention with knee replacements in the future if her symptoms does not improve  Patient will try the brace for the next 2 to 3 months and follow-up as needed  All patient's questions were answered to her satisfaction  This note is created using dictation transcription  It may contain typographical errors, grammatical errors, improperly dictated words, background noise and other errors  Relevant Orders    Medial  Knee Brace      Subjective:     Patient ID: Agustina Munoz is a 61 y o  female  Chief Complaint: This is a 50-year-old white female following up for bilateral knee osteoarthritis, left more symptomatic than right  She completed a series of joint supplement injections to her bilateral knees in February 2023 w/o much benefit and had bilateral knee CSI with aspiration on left 4/25/23  She states she has not seen much relief following CSI at this point either  She is attending physical therapy but feels this may be aggravating her knee's  Pain is anterior based going into medial and lateral aspects of her knee  She states she will get some sharp stabbing type pain anteromedial aspect of left knee at times with squatting, pivoting or twisting motions  She is using advil for pain relief  Denies any locking    Patient is here to review her left knee arthrogram CT scan     Allergy:  Allergies   Allergen Reactions   • Penicillins Hives and Throat Swelling   • Meloxicam Hives and Itching     Medications:  all current active meds have been reviewed  Past Medical History:  Past Medical History:   Diagnosis Date   • Anxiety 2005   • Bulging lumbar disc    • Chronic pain     low back   • Degenerative disk disease     lumbar spine   • Depression 2005   • Hyperlipidemia    • Hypertension    • Pinched nerve     lumbar spine   • Psychiatric disorder     depression   • Shingles [id number]     Past Surgical History:  Past Surgical History:   Procedure Laterality Date   • CARPAL TUNNEL RELEASE Bilateral 1998   • CHOLECYSTECTOMY  2008   • COLONOSCOPY  06/15/2015   • COLONOSCOPY  06/02/2015    Internal hemorrhoids, hyperplastic polyps which were removed  Ten year recall recommended June 2025   • COLONOSCOPY  09/16/2020    normal terminal ileum, 1 small polyp in the distal sigmoid colon that was hyperplastic  Desirae Bedford She did have a small amount of retained stool in the colon  She did have a small amount of retained stool in the colon  A 5 year recall was recommended due to poor prep, September 2025      • FL INJECTION LEFT KNEE (ARTHROGRAM)  6/6/2023   • SPINAL CORD STIMULATOR IMPLANT      2014   • SPINE SURGERY  2017    Stimulator inplant   • WISDOM TOOTH EXTRACTION Bilateral 1982     Family History:  Family History   Problem Relation Age of Onset   • Skin cancer Mother    • Heart disease Father         Dx with lewy body dementia in 2012   • Dementia Father         Dx with lewy body dementia in 2012   • Breast cancer Maternal Aunt 28   • Breast cancer Sister 64   • BRCA1 Negative Sister    • BRCA2 Negative Sister    • Depression Sister    • Prostate cancer Maternal Uncle    • Cancer Maternal Uncle         BLADDER   • Colon cancer Maternal Uncle    • Colon polyps Neg Hx      Social History:  Social History     Substance and Sexual Activity   Alcohol Use Yes   • Alcohol/week: 0 0 standard drinks of "alcohol    Comment: Social drinker     Social History     Substance and Sexual Activity   Drug Use No     Social History     Tobacco Use   Smoking Status Every Day   • Packs/day: 1 00   • Years: 43 00   • Total pack years: 43 00   • Types: Cigarettes   • Start date: 1977   Smokeless Tobacco Never   Tobacco Comments    smokes 10 cigs daily as of 10/20     Review of Systems   Constitutional: Negative for chills and fever  HENT: Negative for ear pain and sore throat  Eyes: Negative for pain and visual disturbance  Respiratory: Negative for cough and shortness of breath  Cardiovascular: Negative for chest pain and palpitations  Gastrointestinal: Negative for abdominal pain and vomiting  Genitourinary: Negative for dysuria and hematuria  Musculoskeletal: Positive for arthralgias (bilateral knee ) and joint swelling (Left knee)  Negative for back pain  Skin: Negative for color change and rash  Neurological: Negative for seizures and syncope  Psychiatric/Behavioral: Negative  All other systems reviewed and are negative  Objective:  BP Readings from Last 1 Encounters:   06/13/23 130/72      Wt Readings from Last 1 Encounters:   06/13/23 93 4 kg (206 lb)      BMI:   Estimated body mass index is 37 68 kg/m² as calculated from the following:    Height as of this encounter: 5' 2\" (1 575 m)  Weight as of this encounter: 93 4 kg (206 lb)  BSA:   Estimated body surface area is 1 94 meters squared as calculated from the following:    Height as of this encounter: 5' 2\" (1 575 m)  Weight as of this encounter: 93 4 kg (206 lb)  Physical Exam  Vitals and nursing note reviewed  Constitutional:       Appearance: Normal appearance  She is well-developed  HENT:      Head: Normocephalic and atraumatic  Right Ear: External ear normal       Left Ear: External ear normal    Eyes:      Extraocular Movements: Extraocular movements intact        Conjunctiva/sclera: Conjunctivae normal    Pulmonary: " Effort: Pulmonary effort is normal    Musculoskeletal:         General: Tenderness (bilateral knee arthralgia ) present  Cervical back: Neck supple  Left knee: No effusion  Instability Tests: Medial Rolf test positive  Lateral Rolf test negative  Skin:     General: Skin is warm and dry  Neurological:      Mental Status: She is alert and oriented to person, place, and time  Deep Tendon Reflexes: Reflexes are normal and symmetric  Psychiatric:         Mood and Affect: Mood normal          Behavior: Behavior normal        Left Knee Exam     Muscle Strength   The patient has normal left knee strength  Tenderness   The patient is experiencing tenderness in the medial joint line  Range of Motion   Extension: normal   Flexion: normal     Tests   Rolf:  Medial - positive Lateral - negative  Varus: negative Valgus: negative  Patellar apprehension: negative    Other   Erythema: absent  Scars: absent  Sensation: normal  Pulse: present  Swelling: mild  Effusion: no effusion present    Comments:  Crepitation with motion of patella             I have personally reviewed pertinent films in PACS and my interpretation is Left knee arthrogram CT scan show intact meniscus, ACL, PCL, MCL, and LCL  There is moderate bursitis in the patellofemoral and medial compartments

## 2023-06-13 NOTE — ASSESSMENT & PLAN NOTE
Findings consistent with left knee osteoarthritis  Discussed findings and treatment options with the patient  I reviewed patient's left knee arthrogram CT scan  I discussed prognosis of her knee condition  I informed patient that it is no tear in her meniscus  Her arthritis is most likely causing her knee pain  Recommended use of medial offloading brace and continue low impact exercises  Discussed possible surgical intervention with knee replacements in the future if her symptoms does not improve  Patient will try the brace for the next 2 to 3 months and follow-up as needed  All patient's questions were answered to her satisfaction  This note is created using dictation transcription  It may contain typographical errors, grammatical errors, improperly dictated words, background noise and other errors

## 2023-06-22 DIAGNOSIS — F41.1 GAD (GENERALIZED ANXIETY DISORDER): ICD-10-CM

## 2023-06-22 DIAGNOSIS — G25.81 RESTLESS LEG SYNDROME: ICD-10-CM

## 2023-06-22 RX ORDER — PRAMIPEXOLE DIHYDROCHLORIDE 0.5 MG/1
1 TABLET ORAL DAILY
Qty: 180 TABLET | Refills: 0 | Status: SHIPPED | OUTPATIENT
Start: 2023-06-22 | End: 2023-09-20

## 2023-06-22 RX ORDER — LORAZEPAM 0.5 MG/1
0.5 TABLET ORAL
Qty: 30 TABLET | Refills: 0 | Status: SHIPPED | OUTPATIENT
Start: 2023-06-22 | End: 2023-07-22

## 2023-07-06 ENCOUNTER — OFFICE VISIT (OUTPATIENT)
Dept: OBGYN CLINIC | Facility: CLINIC | Age: 58
End: 2023-07-06
Payer: COMMERCIAL

## 2023-07-06 VITALS
HEIGHT: 62 IN | WEIGHT: 206 LBS | BODY MASS INDEX: 37.91 KG/M2 | DIASTOLIC BLOOD PRESSURE: 70 MMHG | SYSTOLIC BLOOD PRESSURE: 120 MMHG

## 2023-07-06 DIAGNOSIS — Z01.818 PREOP TESTING: ICD-10-CM

## 2023-07-06 DIAGNOSIS — M17.12 PRIMARY OSTEOARTHRITIS OF LEFT KNEE: Primary | ICD-10-CM

## 2023-07-06 PROCEDURE — 99214 OFFICE O/P EST MOD 30 MIN: CPT | Performed by: ORTHOPAEDIC SURGERY

## 2023-07-06 RX ORDER — FOLIC ACID 1 MG/1
1 TABLET ORAL DAILY
Qty: 30 TABLET | Refills: 2 | Status: SHIPPED | OUTPATIENT
Start: 2023-07-06

## 2023-07-06 RX ORDER — FERROUS SULFATE TAB EC 324 MG (65 MG FE EQUIVALENT) 324 (65 FE) MG
324 TABLET DELAYED RESPONSE ORAL
Qty: 60 TABLET | Refills: 2 | Status: SHIPPED | OUTPATIENT
Start: 2023-07-06

## 2023-07-06 RX ORDER — SODIUM CHLORIDE, SODIUM LACTATE, POTASSIUM CHLORIDE, CALCIUM CHLORIDE 600; 310; 30; 20 MG/100ML; MG/100ML; MG/100ML; MG/100ML
50 INJECTION, SOLUTION INTRAVENOUS CONTINUOUS
OUTPATIENT
Start: 2023-07-06

## 2023-07-06 RX ORDER — CHLORHEXIDINE GLUCONATE 4 G/100ML
SOLUTION TOPICAL DAILY PRN
OUTPATIENT
Start: 2023-07-06

## 2023-07-06 RX ORDER — CHLORHEXIDINE GLUCONATE 0.12 MG/ML
15 RINSE ORAL ONCE
OUTPATIENT
Start: 2023-07-06 | End: 2023-07-06

## 2023-07-06 RX ORDER — ASCORBIC ACID 500 MG
500 TABLET ORAL 2 TIMES DAILY
Qty: 60 TABLET | Refills: 2 | Status: SHIPPED | OUTPATIENT
Start: 2023-07-06

## 2023-07-06 NOTE — ASSESSMENT & PLAN NOTE
Findings consistent with left knee osteoarthritis. Discussed findings and treatment options with the patient. Patient continues to have significant pain despite conservative treatment. She would like to proceed in scheduling the left total knee arthroplasty. Risk, benefits and complications of surgery discussed in detail and informed consent was obtained. She is currently a smoker and discussed increased risk of infection and wound complications with smoking. Encourage patient to work with PCP to achieve cessation of smoking. She is to quit at least 2 weeks prior to surgery and 2 weeks after surgery. She will proceed with preoperative testing. All patient's questions were answered to her satisfaction. This note is created using dictation transcription. It may contain typographical errors, grammatical errors, improperly dictated words, background noise and other errors. Discussed with patient surgical risks and complications including but not limited to infection, persistent pain, nerve and vessel injury, complications associated with anesthesia, DVT, exposure tand o COVID virus, problem with prosthesis, etc.  Patient understands the risks and complication and consented to the surgery.

## 2023-07-06 NOTE — PROGRESS NOTES
Assessment:     1. Primary osteoarthritis of left knee    2. Preop testing        Plan:     Problem List Items Addressed This Visit        Musculoskeletal and Integument    Primary osteoarthritis of left knee - Primary     Findings consistent with left knee osteoarthritis. Discussed findings and treatment options with the patient. Patient continues to have significant pain despite conservative treatment. She would like to proceed in scheduling the left total knee arthroplasty. Risk, benefits and complications of surgery discussed in detail and informed consent was obtained. She is currently a smoker and discussed increased risk of infection and wound complications with smoking. Encourage patient to work with PCP to achieve cessation of smoking. She is to quit at least 2 weeks prior to surgery and 2 weeks after surgery. She will proceed with preoperative testing. All patient's questions were answered to her satisfaction. This note is created using dictation transcription. It may contain typographical errors, grammatical errors, improperly dictated words, background noise and other errors. Discussed with patient surgical risks and complications including but not limited to infection, persistent pain, nerve and vessel injury, complications associated with anesthesia, DVT, exposure tand o COVID virus, problem with prosthesis, etc.  Patient understands the risks and complication and consented to the surgery.          Relevant Medications    ascorbic acid (VITAMIN C) 500 MG tablet    ferrous sulfate 324 (65 Fe) mg    folic acid (FOLVITE) 1 mg tablet    Multiple Vitamin (MULTI-VITAMIN) tablet    Other Relevant Orders    Comprehensive metabolic panel    Hemoglobin A1C W/EAG Estimation    CBC and differential    Anemia Panel w/Reflex    Protime-INR    APTT    Type and screen    Ambulatory referral to [facility]    Ambulatory referral to surgical optimization    Ambulatory referral to Physical Therapy Case request operating room: ARTHROPLASTY KNEE TOTAL SAME DAY (Completed)    EKG 12 lead    XR chest pa & lateral   Other Visit Diagnoses     Preop testing        Relevant Medications    ascorbic acid (VITAMIN C) 500 MG tablet    ferrous sulfate 324 (65 Fe) mg    folic acid (FOLVITE) 1 mg tablet    Multiple Vitamin (MULTI-VITAMIN) tablet    Other Relevant Orders    Comprehensive metabolic panel    Hemoglobin A1C W/EAG Estimation    CBC and differential    Anemia Panel w/Reflex    Protime-INR    APTT    Type and screen    Ambulatory referral to [facility]    Ambulatory referral to surgical optimization    Ambulatory referral to Physical Therapy    EKG 12 lead    XR chest pa & lateral         Subjective:     Patient ID: Regina Hudson is a 61 y.o. female. Chief Complaint: This is a 50-year-old white female following up for left knee osteoarthritis. She has been treated conservatively with home exercise program, NSAIDs, cortisone injections and joint supplement injections. She has had minimal relief with those treatments. Pain continues to become progressively worse. It is aching and throbbing in nature. It is affecting her daily activities. She would like to discuss the left total knee arthroplasty today.     Allergy:  Allergies   Allergen Reactions   • Penicillins Hives and Throat Swelling   • Meloxicam Hives and Itching     Medications:  all current active meds have been reviewed  Past Medical History:  Past Medical History:   Diagnosis Date   • Anxiety 2005   • Bulging lumbar disc    • Chronic pain     low back   • Degenerative disk disease     lumbar spine   • Depression 2005   • Hyperlipidemia    • Hypertension    • Pinched nerve     lumbar spine   • Psychiatric disorder     depression   • Shingles [id number]     Past Surgical History:  Past Surgical History:   Procedure Laterality Date   • CARPAL TUNNEL RELEASE Bilateral 1998   • CHOLECYSTECTOMY  2008   • COLONOSCOPY  06/15/2015   • COLONOSCOPY  06/02/2015 Internal hemorrhoids, hyperplastic polyps which were removed. Ten year recall recommended June 2025   • COLONOSCOPY  09/16/2020    normal terminal ileum, 1 small polyp in the distal sigmoid colon that was hyperplastic. Talita Jones She did have a small amount of retained stool in the colon. She did have a small amount of retained stool in the colon. A 5 year recall was recommended due to poor prep, September 2025. • FL INJECTION LEFT KNEE (ARTHROGRAM)  6/6/2023   • SPINAL CORD STIMULATOR IMPLANT      2014   • SPINE SURGERY  2017    Stimulator inplant   • WISDOM TOOTH EXTRACTION Bilateral 1982     Family History:  Family History   Problem Relation Age of Onset   • Skin cancer Mother    • Heart disease Father         Dx with lewy body dementia in 2012   • Dementia Father         Dx with lewy body dementia in 2012   • Breast cancer Maternal Aunt 28   • Breast cancer Sister 64   • BRCA1 Negative Sister    • BRCA2 Negative Sister    • Depression Sister    • Prostate cancer Maternal Uncle    • Cancer Maternal Uncle         BLADDER   • Colon cancer Maternal Uncle    • Colon polyps Neg Hx      Social History:  Social History     Substance and Sexual Activity   Alcohol Use Yes   • Alcohol/week: 0.0 standard drinks of alcohol    Comment: Social drinker     Social History     Substance and Sexual Activity   Drug Use No     Social History     Tobacco Use   Smoking Status Every Day   • Packs/day: 1.00   • Years: 43.00   • Total pack years: 43.00   • Types: Cigarettes   • Start date: 1977   Smokeless Tobacco Never   Tobacco Comments    smokes 10 cigs daily as of 10/20     Review of Systems   Constitutional: Negative for chills and fever. HENT: Negative for ear pain and sore throat. Eyes: Negative for pain and visual disturbance. Respiratory: Negative for cough and shortness of breath. Cardiovascular: Negative for chest pain and palpitations. Gastrointestinal: Negative for abdominal pain and vomiting.    Genitourinary: Negative for dysuria and hematuria. Musculoskeletal: Positive for arthralgias (left knee), gait problem (Antalgic) and joint swelling (Left knee). Negative for back pain. Skin: Negative for color change and rash. Neurological: Negative for seizures and syncope. Psychiatric/Behavioral: Negative. All other systems reviewed and are negative. Objective:  BP Readings from Last 1 Encounters:   07/06/23 120/70      Wt Readings from Last 1 Encounters:   07/06/23 93.4 kg (206 lb)      BMI:   Estimated body mass index is 37.68 kg/m² as calculated from the following:    Height as of this encounter: 5' 2" (1.575 m). Weight as of this encounter: 93.4 kg (206 lb). BSA:   Estimated body surface area is 1.94 meters squared as calculated from the following:    Height as of this encounter: 5' 2" (1.575 m). Weight as of this encounter: 93.4 kg (206 lb). Physical Exam  Vitals and nursing note reviewed. Constitutional:       Appearance: Normal appearance. She is well-developed. HENT:      Head: Normocephalic and atraumatic. Right Ear: External ear normal.      Left Ear: External ear normal.   Eyes:      Extraocular Movements: Extraocular movements intact. Conjunctiva/sclera: Conjunctivae normal.      Pupils: Pupils are equal, round, and reactive to light. Cardiovascular:      Rate and Rhythm: Normal rate and regular rhythm. Heart sounds: Normal heart sounds. No murmur heard. No gallop. Pulmonary:      Effort: Pulmonary effort is normal. No respiratory distress. Breath sounds: Normal breath sounds. Chest:      Chest wall: No tenderness. Abdominal:      General: Abdomen is flat. There is no distension. Palpations: Abdomen is soft. Tenderness: There is no abdominal tenderness. Musculoskeletal:      Cervical back: Normal range of motion and neck supple. Left knee: No effusion. Instability Tests: Medial Rolf test positive. Lateral Rolf test negative. Skin:     General: Skin is warm and dry. Neurological:      Mental Status: She is alert and oriented to person, place, and time. Deep Tendon Reflexes: Reflexes are normal and symmetric. Psychiatric:         Mood and Affect: Mood normal.         Behavior: Behavior normal.       Left Knee Exam     Muscle Strength   The patient has normal left knee strength. Tenderness   The patient is experiencing tenderness in the medial joint line. Range of Motion   Extension: normal   Flexion: normal     Tests   Rolf:  Medial - positive Lateral - negative  Varus: negative Valgus: negative  Patellar apprehension: negative    Other   Erythema: absent  Scars: absent  Sensation: normal  Pulse: present  Swelling: mild  Effusion: no effusion present    Comments:  Crepitation with motion of patella             I have personally reviewed pertinent films in PACS and my interpretation is Left knee arthrogram CT scan show intact meniscus, ACL, PCL, MCL, and LCL. There is moderate to severe osteoarthritis in the patellofemoral and medial compartments.      Scribe Attestation    I,:  Jonathan Smith PA-C am acting as a scribe while in the presence of the attending physician.:       I,:  Madhu Diaz MD personally performed the services described in this documentation    as scribed in my presence.:

## 2023-07-06 NOTE — H&P (VIEW-ONLY)
Assessment:     1. Primary osteoarthritis of left knee    2. Preop testing        Plan:     Problem List Items Addressed This Visit        Musculoskeletal and Integument    Primary osteoarthritis of left knee - Primary     Findings consistent with left knee osteoarthritis. Discussed findings and treatment options with the patient. Patient continues to have significant pain despite conservative treatment. She would like to proceed in scheduling the left total knee arthroplasty. Risk, benefits and complications of surgery discussed in detail and informed consent was obtained. She is currently a smoker and discussed increased risk of infection and wound complications with smoking. Encourage patient to work with PCP to achieve cessation of smoking. She is to quit at least 2 weeks prior to surgery and 2 weeks after surgery. She will proceed with preoperative testing. All patient's questions were answered to her satisfaction. This note is created using dictation transcription. It may contain typographical errors, grammatical errors, improperly dictated words, background noise and other errors. Discussed with patient surgical risks and complications including but not limited to infection, persistent pain, nerve and vessel injury, complications associated with anesthesia, DVT, exposure tand o COVID virus, problem with prosthesis, etc.  Patient understands the risks and complication and consented to the surgery.          Relevant Medications    ascorbic acid (VITAMIN C) 500 MG tablet    ferrous sulfate 324 (65 Fe) mg    folic acid (FOLVITE) 1 mg tablet    Multiple Vitamin (MULTI-VITAMIN) tablet    Other Relevant Orders    Comprehensive metabolic panel    Hemoglobin A1C W/EAG Estimation    CBC and differential    Anemia Panel w/Reflex    Protime-INR    APTT    Type and screen    Ambulatory referral to [facility]    Ambulatory referral to surgical optimization    Ambulatory referral to Physical Therapy Case request operating room: ARTHROPLASTY KNEE TOTAL SAME DAY (Completed)    EKG 12 lead    XR chest pa & lateral   Other Visit Diagnoses     Preop testing        Relevant Medications    ascorbic acid (VITAMIN C) 500 MG tablet    ferrous sulfate 324 (65 Fe) mg    folic acid (FOLVITE) 1 mg tablet    Multiple Vitamin (MULTI-VITAMIN) tablet    Other Relevant Orders    Comprehensive metabolic panel    Hemoglobin A1C W/EAG Estimation    CBC and differential    Anemia Panel w/Reflex    Protime-INR    APTT    Type and screen    Ambulatory referral to [facility]    Ambulatory referral to surgical optimization    Ambulatory referral to Physical Therapy    EKG 12 lead    XR chest pa & lateral         Subjective:     Patient ID: Anastacia Sheldon is a 61 y.o. female. Chief Complaint: This is a 53-year-old white female following up for left knee osteoarthritis. She has been treated conservatively with home exercise program, NSAIDs, cortisone injections and joint supplement injections. She has had minimal relief with those treatments. Pain continues to become progressively worse. It is aching and throbbing in nature. It is affecting her daily activities. She would like to discuss the left total knee arthroplasty today.     Allergy:  Allergies   Allergen Reactions   • Penicillins Hives and Throat Swelling   • Meloxicam Hives and Itching     Medications:  all current active meds have been reviewed  Past Medical History:  Past Medical History:   Diagnosis Date   • Anxiety 2005   • Bulging lumbar disc    • Chronic pain     low back   • Degenerative disk disease     lumbar spine   • Depression 2005   • Hyperlipidemia    • Hypertension    • Pinched nerve     lumbar spine   • Psychiatric disorder     depression   • Shingles [id number]     Past Surgical History:  Past Surgical History:   Procedure Laterality Date   • CARPAL TUNNEL RELEASE Bilateral 1998   • CHOLECYSTECTOMY  2008   • COLONOSCOPY  06/15/2015   • COLONOSCOPY  06/02/2015 Internal hemorrhoids, hyperplastic polyps which were removed. Ten year recall recommended June 2025   • COLONOSCOPY  09/16/2020    normal terminal ileum, 1 small polyp in the distal sigmoid colon that was hyperplastic. Babatunde Meyer She did have a small amount of retained stool in the colon. She did have a small amount of retained stool in the colon. A 5 year recall was recommended due to poor prep, September 2025. • FL INJECTION LEFT KNEE (ARTHROGRAM)  6/6/2023   • SPINAL CORD STIMULATOR IMPLANT      2014   • SPINE SURGERY  2017    Stimulator inplant   • WISDOM TOOTH EXTRACTION Bilateral 1982     Family History:  Family History   Problem Relation Age of Onset   • Skin cancer Mother    • Heart disease Father         Dx with lewy body dementia in 2012   • Dementia Father         Dx with lewy body dementia in 2012   • Breast cancer Maternal Aunt 28   • Breast cancer Sister 64   • BRCA1 Negative Sister    • BRCA2 Negative Sister    • Depression Sister    • Prostate cancer Maternal Uncle    • Cancer Maternal Uncle         BLADDER   • Colon cancer Maternal Uncle    • Colon polyps Neg Hx      Social History:  Social History     Substance and Sexual Activity   Alcohol Use Yes   • Alcohol/week: 0.0 standard drinks of alcohol    Comment: Social drinker     Social History     Substance and Sexual Activity   Drug Use No     Social History     Tobacco Use   Smoking Status Every Day   • Packs/day: 1.00   • Years: 43.00   • Total pack years: 43.00   • Types: Cigarettes   • Start date: 1977   Smokeless Tobacco Never   Tobacco Comments    smokes 10 cigs daily as of 10/20     Review of Systems   Constitutional: Negative for chills and fever. HENT: Negative for ear pain and sore throat. Eyes: Negative for pain and visual disturbance. Respiratory: Negative for cough and shortness of breath. Cardiovascular: Negative for chest pain and palpitations. Gastrointestinal: Negative for abdominal pain and vomiting.    Genitourinary: Negative for dysuria and hematuria. Musculoskeletal: Positive for arthralgias (left knee), gait problem (Antalgic) and joint swelling (Left knee). Negative for back pain. Skin: Negative for color change and rash. Neurological: Negative for seizures and syncope. Psychiatric/Behavioral: Negative. All other systems reviewed and are negative. Objective:  BP Readings from Last 1 Encounters:   07/06/23 120/70      Wt Readings from Last 1 Encounters:   07/06/23 93.4 kg (206 lb)      BMI:   Estimated body mass index is 37.68 kg/m² as calculated from the following:    Height as of this encounter: 5' 2" (1.575 m). Weight as of this encounter: 93.4 kg (206 lb). BSA:   Estimated body surface area is 1.94 meters squared as calculated from the following:    Height as of this encounter: 5' 2" (1.575 m). Weight as of this encounter: 93.4 kg (206 lb). Physical Exam  Vitals and nursing note reviewed. Constitutional:       Appearance: Normal appearance. She is well-developed. HENT:      Head: Normocephalic and atraumatic. Right Ear: External ear normal.      Left Ear: External ear normal.   Eyes:      Extraocular Movements: Extraocular movements intact. Conjunctiva/sclera: Conjunctivae normal.      Pupils: Pupils are equal, round, and reactive to light. Cardiovascular:      Rate and Rhythm: Normal rate and regular rhythm. Heart sounds: Normal heart sounds. No murmur heard. No gallop. Pulmonary:      Effort: Pulmonary effort is normal. No respiratory distress. Breath sounds: Normal breath sounds. Chest:      Chest wall: No tenderness. Abdominal:      General: Abdomen is flat. There is no distension. Palpations: Abdomen is soft. Tenderness: There is no abdominal tenderness. Musculoskeletal:      Cervical back: Normal range of motion and neck supple. Left knee: No effusion. Instability Tests: Medial Rolf test positive. Lateral Rolf test negative. Skin:     General: Skin is warm and dry. Neurological:      Mental Status: She is alert and oriented to person, place, and time. Deep Tendon Reflexes: Reflexes are normal and symmetric. Psychiatric:         Mood and Affect: Mood normal.         Behavior: Behavior normal.       Left Knee Exam     Muscle Strength   The patient has normal left knee strength. Tenderness   The patient is experiencing tenderness in the medial joint line. Range of Motion   Extension: normal   Flexion: normal     Tests   Rolf:  Medial - positive Lateral - negative  Varus: negative Valgus: negative  Patellar apprehension: negative    Other   Erythema: absent  Scars: absent  Sensation: normal  Pulse: present  Swelling: mild  Effusion: no effusion present    Comments:  Crepitation with motion of patella             I have personally reviewed pertinent films in PACS and my interpretation is Left knee arthrogram CT scan show intact meniscus, ACL, PCL, MCL, and LCL. There is moderate to severe osteoarthritis in the patellofemoral and medial compartments.      Scribe Attestation    I,:  Mata Johnson PA-C am acting as a scribe while in the presence of the attending physician.:       I,:  Lion Villa MD personally performed the services described in this documentation    as scribed in my presence.:

## 2023-07-07 ENCOUNTER — APPOINTMENT (OUTPATIENT)
Dept: RADIOLOGY | Facility: CLINIC | Age: 58
End: 2023-07-07
Payer: COMMERCIAL

## 2023-07-07 ENCOUNTER — OFFICE VISIT (OUTPATIENT)
Dept: LAB | Facility: CLINIC | Age: 58
End: 2023-07-07
Payer: COMMERCIAL

## 2023-07-07 DIAGNOSIS — Z01.818 PREOP TESTING: ICD-10-CM

## 2023-07-07 DIAGNOSIS — M17.12 PRIMARY OSTEOARTHRITIS OF LEFT KNEE: ICD-10-CM

## 2023-07-07 PROCEDURE — 71046 X-RAY EXAM CHEST 2 VIEWS: CPT

## 2023-07-07 PROCEDURE — 93005 ELECTROCARDIOGRAM TRACING: CPT

## 2023-07-08 LAB
ABO GROUP BLD: NORMAL
ALBUMIN SERPL-MCNC: 4.6 G/DL (ref 3.8–4.9)
ALBUMIN/GLOB SERPL: 2.4 {RATIO} (ref 1.2–2.2)
ALP SERPL-CCNC: 92 IU/L (ref 44–121)
ALT SERPL-CCNC: 15 IU/L (ref 0–32)
APTT PPP: 28 SEC (ref 24–33)
AST SERPL-CCNC: 14 IU/L (ref 0–40)
ATRIAL RATE: 91 BPM
BASOPHILS # BLD AUTO: 0.1 X10E3/UL (ref 0–0.2)
BASOPHILS NFR BLD AUTO: 1 %
BILIRUB SERPL-MCNC: 0.3 MG/DL (ref 0–1.2)
BLD GP AB SCN SERPL QL: NEGATIVE
BUN SERPL-MCNC: 11 MG/DL (ref 6–24)
BUN/CREAT SERPL: 17 (ref 9–23)
CALCIUM SERPL-MCNC: 9.8 MG/DL (ref 8.7–10.2)
CHLORIDE SERPL-SCNC: 102 MMOL/L (ref 96–106)
CO2 SERPL-SCNC: 23 MMOL/L (ref 20–29)
CREAT SERPL-MCNC: 0.66 MG/DL (ref 0.57–1)
EGFR: 102 ML/MIN/1.73
EOSINOPHIL # BLD AUTO: 0.2 X10E3/UL (ref 0–0.4)
EOSINOPHIL NFR BLD AUTO: 2 %
ERYTHROCYTE [DISTWIDTH] IN BLOOD BY AUTOMATED COUNT: 13.6 % (ref 11.7–15.4)
EST. AVERAGE GLUCOSE BLD GHB EST-MCNC: 126 MG/DL
GLOBULIN SER-MCNC: 1.9 G/DL (ref 1.5–4.5)
GLUCOSE SERPL-MCNC: 98 MG/DL (ref 70–99)
HBA1C MFR BLD: 6 % (ref 4.8–5.6)
HCT VFR BLD AUTO: 42 % (ref 34–46.6)
HGB BLD-MCNC: 14.7 G/DL (ref 11.1–15.9)
IMM GRANULOCYTES # BLD: 0.1 X10E3/UL (ref 0–0.1)
IMM GRANULOCYTES NFR BLD: 1 %
INR PPP: 1 (ref 0.9–1.2)
IRON SATN MFR SERPL: 19 % (ref 15–55)
IRON SERPL-MCNC: 64 UG/DL (ref 27–159)
LYMPHOCYTES # BLD AUTO: 2.3 X10E3/UL (ref 0.7–3.1)
LYMPHOCYTES NFR BLD AUTO: 24 %
MCH RBC QN AUTO: 32.7 PG (ref 26.6–33)
MCHC RBC AUTO-ENTMCNC: 35 G/DL (ref 31.5–35.7)
MCV RBC AUTO: 93 FL (ref 79–97)
MONOCYTES # BLD AUTO: 0.6 X10E3/UL (ref 0.1–0.9)
MONOCYTES NFR BLD AUTO: 7 %
NEUTROPHILS # BLD AUTO: 6.4 X10E3/UL (ref 1.4–7)
NEUTROPHILS NFR BLD AUTO: 65 %
P AXIS: 32 DEGREES
PLATELET # BLD AUTO: 171 X10E3/UL (ref 150–450)
POTASSIUM SERPL-SCNC: 4.6 MMOL/L (ref 3.5–5.2)
PR INTERVAL: 182 MS
PROT SERPL-MCNC: 6.5 G/DL (ref 6–8.5)
PROTHROMBIN TIME: 10.4 SEC (ref 9.1–12)
QRS AXIS: 33 DEGREES
QRSD INTERVAL: 82 MS
QT INTERVAL: 368 MS
QTC INTERVAL: 452 MS
RBC # BLD AUTO: 4.5 X10E6/UL (ref 3.77–5.28)
RETICS/RBC NFR AUTO: 2.9 % (ref 0.6–2.6)
RH BLD: POSITIVE
SODIUM SERPL-SCNC: 142 MMOL/L (ref 134–144)
T WAVE AXIS: 27 DEGREES
TIBC SERPL-MCNC: 331 UG/DL (ref 250–450)
UIBC SERPL-MCNC: 267 UG/DL (ref 131–425)
VENTRICULAR RATE: 91 BPM
WBC # BLD AUTO: 9.6 X10E3/UL (ref 3.4–10.8)

## 2023-07-08 PROCEDURE — 93010 ELECTROCARDIOGRAM REPORT: CPT | Performed by: INTERNAL MEDICINE

## 2023-07-11 LAB
ATRIAL RATE: 93 BPM
P AXIS: 30 DEGREES
PR INTERVAL: 182 MS
QRS AXIS: 32 DEGREES
QRSD INTERVAL: 84 MS
QT INTERVAL: 358 MS
QTC INTERVAL: 445 MS
T WAVE AXIS: 21 DEGREES
VENTRICULAR RATE: 93 BPM

## 2023-07-11 PROCEDURE — 93010 ELECTROCARDIOGRAM REPORT: CPT | Performed by: INTERNAL MEDICINE

## 2023-07-14 ENCOUNTER — ANESTHESIA EVENT (OUTPATIENT)
Dept: PERIOP | Facility: HOSPITAL | Age: 58
End: 2023-07-14
Payer: COMMERCIAL

## 2023-07-14 ENCOUNTER — TELEPHONE (OUTPATIENT)
Dept: OBGYN CLINIC | Facility: HOSPITAL | Age: 58
End: 2023-07-14

## 2023-07-14 DIAGNOSIS — Z91.89 AT RISK FOR OBSTRUCTIVE SLEEP APNEA: Primary | ICD-10-CM

## 2023-07-14 NOTE — PRE-PROCEDURE INSTRUCTIONS
Pre-Surgery Instructions:   Medication Instructions   • ascorbic acid (VITAMIN C) 500 MG tablet Hold day of surgery. • atorvastatin (LIPITOR) 80 mg tablet Take day of surgery. • dicyclomine (BENTYL) 20 mg tablet Uses PRN- OK to take day of surgery   • ferrous sulfate 324 (65 Fe) mg Hold day of surgery. • folic acid (FOLVITE) 1 mg tablet Hold day of surgery. • lisinopril (ZESTRIL) 10 mg tablet Hold day of surgery. • LORazepam (ATIVAN) 0.5 mg tablet Uses PRN- OK to take day of surgery   • Multiple Vitamin (MULTI-VITAMIN) tablet Hold day of surgery. • pramipexole (MIRAPEX) 0.5 mg tablet Take day of surgery. • sertraline (ZOLOFT) 100 mg tablet Take day of surgery. • sertraline (ZOLOFT) 50 mg tablet Take day of surgery. Medication instructions for day surgery reviewed. Please use only a sip of water to take your instructed medications. Avoid all over the counter vitamins, supplements and NSAIDS for one week prior to surgery per anesthesia guidelines. Tylenol is ok to take as needed. You will receive a call one business day prior to surgery with an arrival time and hospital directions. If your surgery is scheduled on a Monday, the hospital will be calling you on the Friday prior to your surgery. If you have not heard from anyone by 8pm, please call the hospital supervisor through the hospital  at [phone]. Derinda Gottron [phone]). Do not eat or drink anything after midnight the night before your surgery, including candy, mints, lifesavers, or chewing gum. Do not drink alcohol 24hrs before your surgery. Try not to smoke at least 24hrs before your surgery. Follow the pre surgery showering instructions as listed in the [facility] Surgical Experience Booklet” or otherwise provided by your surgeon's office. Do not shave the surgical area 24 hours before surgery. Do not apply any lotions, creams, including makeup, cologne, deodorant, or perfumes after showering on the day of your surgery. No contact lenses, eye make-up, or artificial eyelashes. Remove nail polish, including gel polish, and any artificial, gel, or acrylic nails if possible. Remove all jewelry including rings and body piercing jewelry. Wear causal clothing that is easy to take on and off. Consider your type of surgery. Keep any valuables, jewelry, piercings at home. Please bring any specially ordered equipment (sling, braces) if indicated. Arrange for a responsible person to drive you to and from the hospital on the day of your surgery. Visitor Guidelines discussed. Call the surgeon's office with any new illnesses, exposures, or additional questions prior to surgery. Please reference your [facility] Surgical Experience Booklet” for additional information to prepare for your upcoming surgery.

## 2023-07-16 DIAGNOSIS — F41.1 GAD (GENERALIZED ANXIETY DISORDER): ICD-10-CM

## 2023-07-16 DIAGNOSIS — I10 ESSENTIAL HYPERTENSION: ICD-10-CM

## 2023-07-16 RX ORDER — LISINOPRIL 10 MG/1
TABLET ORAL
Qty: 90 TABLET | Refills: 0 | Status: SHIPPED | OUTPATIENT
Start: 2023-07-16

## 2023-07-17 RX ORDER — LORAZEPAM 0.5 MG/1
TABLET ORAL
Qty: 30 TABLET | Refills: 0 | OUTPATIENT
Start: 2023-07-17

## 2023-07-18 ENCOUNTER — OFFICE VISIT (OUTPATIENT)
Dept: FAMILY MEDICINE CLINIC | Facility: CLINIC | Age: 58
End: 2023-07-18
Payer: COMMERCIAL

## 2023-07-18 VITALS
DIASTOLIC BLOOD PRESSURE: 72 MMHG | HEIGHT: 62 IN | BODY MASS INDEX: 37.76 KG/M2 | OXYGEN SATURATION: 94 % | TEMPERATURE: 98.7 F | HEART RATE: 83 BPM | WEIGHT: 205.2 LBS | RESPIRATION RATE: 16 BRPM | SYSTOLIC BLOOD PRESSURE: 120 MMHG

## 2023-07-18 DIAGNOSIS — Z79.899 CONTROLLED SUBSTANCE AGREEMENT SIGNED: ICD-10-CM

## 2023-07-18 DIAGNOSIS — F32.0 CURRENT MILD EPISODE OF MAJOR DEPRESSIVE DISORDER WITHOUT PRIOR EPISODE (HCC): ICD-10-CM

## 2023-07-18 DIAGNOSIS — F41.1 GAD (GENERALIZED ANXIETY DISORDER): ICD-10-CM

## 2023-07-18 DIAGNOSIS — F17.210 CIGARETTE NICOTINE DEPENDENCE WITHOUT COMPLICATION: ICD-10-CM

## 2023-07-18 DIAGNOSIS — M17.12 PRIMARY OSTEOARTHRITIS OF LEFT KNEE: ICD-10-CM

## 2023-07-18 DIAGNOSIS — R73.03 PREDIABETES: ICD-10-CM

## 2023-07-18 DIAGNOSIS — Z01.818 PREOPERATIVE CLEARANCE: Primary | ICD-10-CM

## 2023-07-18 DIAGNOSIS — I10 ESSENTIAL HYPERTENSION: ICD-10-CM

## 2023-07-18 DIAGNOSIS — E78.2 MIXED HYPERLIPIDEMIA: ICD-10-CM

## 2023-07-18 DIAGNOSIS — I25.10 MILD CAD: ICD-10-CM

## 2023-07-18 PROCEDURE — 99214 OFFICE O/P EST MOD 30 MIN: CPT | Performed by: PHYSICIAN ASSISTANT

## 2023-07-18 RX ORDER — SERTRALINE HYDROCHLORIDE 100 MG/1
100 TABLET, FILM COATED ORAL DAILY
Qty: 90 TABLET | Refills: 1 | Status: SHIPPED | OUTPATIENT
Start: 2023-07-18 | End: 2023-10-16

## 2023-07-18 RX ORDER — LORAZEPAM 0.5 MG/1
0.5 TABLET ORAL
Qty: 30 TABLET | Refills: 0 | Status: SHIPPED | OUTPATIENT
Start: 2023-07-18 | End: 2023-07-24

## 2023-07-18 NOTE — PROGRESS NOTES
Name: Paty Garcia      : 1965      MRN: [id number]  Encounter Provider: Alannah Jiang PA-C  Encounter Date: 2023   Encounter department: [facility]    Assessment & Plan     1. Preoperative clearance  Comments:  Anesthesia= spinal/local with twilight. Patient denies general anesthesia. Patient medically cleared for left total knee replacement. 2. Primary osteoarthritis of left knee    3. Controlled substance agreement signed  Comments:  Agreement reviewed with patient. 4. Mild CAD    5. Essential hypertension    6. Mixed hyperlipidemia    7. Current cigarette smoker  Comments:  Patient states recently stopped smoking approximately 1 week ago. 8. Current mild episode of major depressive disorder without prior episode (HCC)  -     sertraline (ZOLOFT) 100 mg tablet; Take 1 tablet (100 mg total) by mouth daily  -     sertraline (ZOLOFT) 50 mg tablet; Take 1 tablet (50 mg total) by mouth daily    9. KENDALL (generalized anxiety disorder)  -     LORazepam (ATIVAN) 0.5 mg tablet; Take 1 tablet (0.5 mg total) by mouth daily at bedtime    10. Prediabetes    Patient follow-up in 3 months or sooner if needed. Patient is medically cleared for left total knee replacement. Hemoglobin A1c, APTT, PT/INR and metabolic panel reviewed. Unremarkable except that patient follows in prediabetes range. EKG showing normal sinus rhythm with nonspecific T wave changes. Stress test from  unremarkable. Subjective     HPI   59-year-old female here today for preoperative clearance for history of left knee osteoarthritis scheduled for total knee replacement of left knee scheduled for 2023. Patient has no new complaints today. Anesthesia is reported to be local/spinal with twilight. Patient denies general anesthesia will be used.     Review of Systems    Past Medical History:   Diagnosis Date   • Anxiety    • Bulging lumbar disc    • Chronic pain     low back   • Degenerative disk disease     lumbar spine   • Depression 2005   • Hyperlipidemia    • Hypertension    • Pinched nerve     lumbar spine   • Psychiatric disorder     depression   • Shingles 20179     Past Surgical History:   Procedure Laterality Date   • CARPAL TUNNEL RELEASE Bilateral 1998   • CHOLECYSTECTOMY  2008   • COLONOSCOPY  06/15/2015   • COLONOSCOPY  06/02/2015    Internal hemorrhoids, hyperplastic polyps which were removed. Ten year recall recommended June 2025   • COLONOSCOPY  09/16/2020    normal terminal ileum, 1 small polyp in the distal sigmoid colon that was hyperplastic. Janice Sampson She did have a small amount of retained stool in the colon. She did have a small amount of retained stool in the colon. A 5 year recall was recommended due to poor prep, September 2025.     • FL INJECTION LEFT KNEE (ARTHROGRAM)  6/6/2023   • SPINAL CORD STIMULATOR IMPLANT      2014   • SPINE SURGERY  2017    Stimulator inplant   • WISDOM TOOTH EXTRACTION Bilateral 1982     Family History   Problem Relation Age of Onset   • Skin cancer Mother    • Heart disease Father         Dx with lewy body dementia in 2012   • Dementia Father         Dx with lewy body dementia in 2012   • Breast cancer Maternal Aunt 28   • Breast cancer Sister 64   • BRCA1 Negative Sister    • BRCA2 Negative Sister    • Depression Sister    • Prostate cancer Maternal Uncle    • Cancer Maternal Uncle         BLADDER   • Colon cancer Maternal Uncle    • Colon polyps Neg Hx      Social History     Socioeconomic History   • Marital status:      Spouse name: None   • Number of children: 1   • Years of education: None   • Highest education level: None   Occupational History   • Occupation: not working    Tobacco Use   • Smoking status: Every Day     Packs/day: 1.00     Years: 43.00     Total pack years: 43.00     Types: Cigarettes     Start date: 1977   • Smokeless tobacco: Never   • Tobacco comments:     smokes 10 cigs daily as of 10/20   Vaping Use   • Vaping Use: Never used   Substance and Sexual Activity   • Alcohol use: Yes     Alcohol/week: 0.0 standard drinks of alcohol     Comment: Social drinker   • Drug use: No   • Sexual activity: Yes     Partners: Male     Birth control/protection: Inserts, None   Other Topics Concern   • None   Social History Narrative         Social Determinants of Health     Financial Resource Strain: Low Risk  (5/10/2021)    Overall Financial Resource Strain (CARDIA)    • Difficulty of Paying Living Expenses: Not very hard   Food Insecurity: No Food Insecurity (5/10/2021)    Hunger Vital Sign    • Worried About Running Out of Food in the Last Year: Never true    • Ran Out of Food in the Last Year: Never true   Transportation Needs: No Transportation Needs (5/10/2021)    PRAPARE - Transportation    • Lack of Transportation (Medical): No    • Lack of Transportation (Non-Medical):  No   Physical Activity: Insufficiently Active (5/10/2021)    Exercise Vital Sign    • Days of Exercise per Week: 3 days    • Minutes of Exercise per Session: 30 min   Stress: No Stress Concern Present (5/10/2021)    [address] [place] [address]    • Feeling of Stress : Not at all   Social Connections: Socially Isolated (5/10/2021)    Social Connection and Isolation Panel [NHANES]    • Frequency of Communication with Friends and Family: More than three times a week    • Frequency of Social Gatherings with Friends and Family: Never    • Attends [employer] Services: Never    • Active Member of Clubs or Organizations: No    • Attends Club or Organization Meetings: Never    • Marital Status:    Intimate Partner Violence: Not At Risk (7/18/2023)    Humiliation, Afraid, Rape, and Kick questionnaire    • Fear of Current or Ex-Partner: No    • Emotionally Abused: No    • Physically Abused: No    • Sexually Abused: No   Housing Stability: Not on file     Current Outpatient Medications on File Prior to Visit Medication Sig   • ascorbic acid (VITAMIN C) 500 MG tablet Take 1 tablet (500 mg total) by mouth 2 (two) times a day   • atorvastatin (LIPITOR) 80 mg tablet Take 1 tablet (80 mg total) by mouth daily   • dicyclomine (BENTYL) 20 mg tablet Take 1 tablet (20 mg total) by mouth every 6 (six) hours as needed (pain, cramping)   • Euflexxa 20 MG/2ML SOSY    • ferrous sulfate 324 (65 Fe) mg Take 1 tablet (324 mg total) by mouth 2 (two) times a day before meals   • folic acid (FOLVITE) 1 mg tablet Take 1 tablet (1 mg total) by mouth daily   • lisinopril (ZESTRIL) 10 mg tablet Take 1 tablet by mouth once daily   • Multiple Vitamin (MULTI-VITAMIN) tablet Take 1 tablet by mouth daily   • pramipexole (MIRAPEX) 0.5 mg tablet Take 2 tablets (1 mg total) by mouth daily   • [DISCONTINUED] LORazepam (ATIVAN) 0.5 mg tablet Take 1 tablet (0.5 mg total) by mouth daily at bedtime   • [DISCONTINUED] sertraline (ZOLOFT) 100 mg tablet Take 1 tablet (100 mg total) by mouth daily   • [DISCONTINUED] sertraline (ZOLOFT) 50 mg tablet Take 1 tablet (50 mg total) by mouth daily     Allergies   Allergen Reactions   • Penicillins Hives and Throat Swelling     Childhood allergy per pt   • Meloxicam Hives and Itching     Immunization History   Administered Date(s) Administered   • COVID-19 PFIZER VACCINE 0.3 ML IM 04/03/2021, 04/28/2021   • INFLUENZA 09/19/2011, 10/01/2013, 01/09/2015, 10/01/2018, 10/07/2019   • Influenza, recombinant, quadrivalent,injectable, preservative free 09/11/2020, 09/20/2021, 09/26/2022   • Pneumococcal Polysaccharide PPV23 06/04/2012   • Tdap 04/06/2012, 02/21/2013, 04/03/2019   • Tuberculin Skin Test-PPD Intradermal 07/03/2012   • Zoster Vaccine Recombinant 08/09/2018, 12/05/2018       Objective     /72 (BP Location: Left arm, Patient Position: Sitting, Cuff Size: Large)   Pulse 83   Temp 98.7 °F (37.1 °C) (Tympanic)   Resp 16   Ht 5' 2" (1.575 m)   Wt 93.1 kg (205 lb 3.2 oz)   LMP  (LMP Unknown)   SpO2 94% BMI 37.53 kg/m²     Physical Exam  Vitals and nursing note reviewed. Constitutional:       General: She is not in acute distress. Appearance: She is not ill-appearing, toxic-appearing or diaphoretic. HENT:      Head: Normocephalic. Nose: Nose normal.      Mouth/Throat:      Mouth: Mucous membranes are moist.      Pharynx: Oropharynx is clear. Eyes:      General: No scleral icterus. Conjunctiva/sclera: Conjunctivae normal.   Cardiovascular:      Rate and Rhythm: Normal rate and regular rhythm. Heart sounds: Normal heart sounds. No murmur heard. Pulmonary:      Effort: Pulmonary effort is normal.      Breath sounds: Normal breath sounds. Abdominal:      General: Bowel sounds are normal.      Palpations: Abdomen is soft. Tenderness: There is no abdominal tenderness. Musculoskeletal:      Cervical back: Neck supple. Right lower leg: No edema. Left lower leg: No edema. Lymphadenopathy:      Cervical: No cervical adenopathy. Skin:     General: Skin is warm and dry. Neurological:      General: No focal deficit present. Mental Status: She is alert and oriented to person, place, and time.    Psychiatric:         Mood and Affect: Mood normal.       Tavia Osorio PA-C

## 2023-07-20 ENCOUNTER — EVALUATION (OUTPATIENT)
Dept: PHYSICAL THERAPY | Facility: CLINIC | Age: 58
End: 2023-07-20
Payer: COMMERCIAL

## 2023-07-20 DIAGNOSIS — Z01.818 PREOP TESTING: ICD-10-CM

## 2023-07-20 DIAGNOSIS — M17.12 PRIMARY OSTEOARTHRITIS OF LEFT KNEE: ICD-10-CM

## 2023-07-20 PROCEDURE — 97161 PT EVAL LOW COMPLEX 20 MIN: CPT | Performed by: PHYSICAL THERAPIST

## 2023-07-20 PROCEDURE — 97110 THERAPEUTIC EXERCISES: CPT | Performed by: PHYSICAL THERAPIST

## 2023-07-20 NOTE — PROGRESS NOTES
PT Evaluation     Today's date: 2023  Patient name: Sebastien Guzman  : 1965  MRN: [id number]  Referring provider: Renie Aschoff, PA-C  Dx:   Encounter Diagnosis     ICD-10-CM    1. Primary osteoarthritis of left knee  M17.12 Ambulatory referral to Physical Therapy      2. Preop testing  Z01.818 Ambulatory referral to Physical Therapy                     Assessment  Assessment details: Patient is a 61 y.o. female with PT prescription for pre op PT for left total knee arthroplasty scheduled for 23. Patient presents with decreased knee flexion and extension ROM, decreased LE strength, decreased quadriceps flexibility, and decreased knee stabilization/balance. Patient was educated on post op expectations, home program participation, sleeping positions, and gait training RW and SPC. Patient displayed understanding to all education and demonstrated good technique with assistive devices and with home exercises. She is appropriate for home program participation now followed by formal PT post operatively. Patient is in agreement with this plan. Impairments: abnormal gait, abnormal or restricted ROM, impaired balance, impaired physical strength, lacks appropriate home exercise program, pain with function, weight-bearing intolerance and poor body mechanics    Symptom irritability: moderateUnderstanding of Dx/Px/POC: good   Prognosis: good    Goals  Short term goals:  Patient is independent in home program to support plan of care and improve function. - 2 weeks goal met for current home program  Patient demonstrates 0 degrees knee extension PROM so she can sleep with less pain. - 2 weeks post op  Patient demonstrates 110 degrees knee flexion PROM so she can walk with normal gait pattern. - 2 weeks post op    Long term goals:  Patient demonstrates improvement in community participation with increase in FOTO score by 15%.  - 8 weeks post op  Patient demonstrates symmetrical stability in single limb stance so she can walk on uneven ground without pain or difficulty. - 8 weeks post op  Patient demonstrates 5/5 LE strength so she can climb stairs without pain. - 8 weeks post op  Patient can return to work with no greater than 2/10 pain. - 8 weeks      Plan  Patient would benefit from: skilled physical therapy  Planned modality interventions: cryotherapy  Planned therapy interventions: activity modification, ADL training, balance, body mechanics training, flexibility, functional ROM exercises, home exercise program, therapeutic exercise, therapeutic activities, strengthening, stretching, patient education, neuromuscular re-education, massage, manual therapy, joint mobilization and gait training  Frequency: 2x week  Duration in weeks: 10  Plan of Care beginning date: 2023  Plan of Care expiration date: 2023  Treatment plan discussed with: patient        Subjective Evaluation    History of Present Illness  Date of onset: 2020  Mechanism of injury: PT prescription for pre op PT for left TKA scheduled 23. She reports several year history of knee pain, with gradual worsening over the past year. Symptoms: L knee pain, increases with any weight bearing activity. Patient climbs steps 1 at a time. She is limited with household work.       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  Patient Goals  Patient goals for therapy: decreased pain, increased motion and increased strength  Patient goal: get back to gardening  Pain  Current pain ratin  At best pain ratin  At worst pain rating: 10          Objective     Observations     Additional Observation Details  Antalgic gait pattern, decreased stride length    Active Range of Motion   Left Knee   Flexion: 95 degrees with pain  Extension: -5 degrees with pain    Passive Range of Motion   Left Knee   Flexion: 105 degrees with pain  Extension: -2 degrees with pain    Additional Passive Range of Motion Details  hamstirngs WNL  Severe quadriceps tightness    Strength/Myotome Testing     Left Hip   Planes of Motion   Flexion: 4+  Extension: 4+  Abduction: 4+    Right Hip   Planes of Motion   Flexion: 4+    Left Knee   Flexion: 4  Extension: 4    Right Knee   Flexion: 5  Extension: 5    Tests     Additional Tests Details  SLS 25 seconds on R, 14 on L             Precautions: HTN    Manuals 7/20                                                                Neuro Re-Ed                          SLS             steamboats                                       Ther Ex             Supine Heel slides flexion 10"            Knee extension stretch, sitting 10"            Pt edu on plan of care, pathology, home program 8'                         Seated heel slides 10"                         Quad set 10"            SLR                                                                              Ther Activity                                       Gait Training                                       Modalities

## 2023-07-21 NOTE — TELEPHONE ENCOUNTER
Preoperative Elective Admission Assessment- spoke to patient    Living Situation:    Who does pt live with: Daughter   What kind of home: multi-level  How do they enter the home: front  How many levels in home: 2   # of steps to enter home: 5 to enter   # of steps to second floor: 16 to 2nd floor   Are there handrails: Yes  Are there landings: No  Sleeping arrangement: first/entry floor  Where is Bathroom: Second floor full bathroom walk in shower     First Floor Setup: Has plans. Is there a bathroom: No- using the BSC   Where would pt sleep: couch     DME: RW and BSC. We discussed clearing pathways in the home and making sure there is accessibly to use the walker, for example, removing throw rugs. Patient's Current Level of Function: Ambulates: Independently and ADLs: Independent    Post-op Caregiver: Family - will have brother stay first nights   Currently receive any HHC/aides/community supports: No     Post-op Transport: relative  To/from hospital: relative  To/from PT 2-3x/week: relative  Uses community transport now: No     Outpatient Physical Therapy Site:  Site: [place]   pre and post-op appts scheduled? Yes     Medication Management: self  Preferred Pharmacy for Post-op Medications: [address] [place] [address]  Blood Management Vitamin Regimen: Pt confirms taking as prescribed  Post-op anticoagulant: to be determined by surgical team postoperatively     DC Plan: Pt plans to be discharged home    Barriers to DC identified preoperatively: none identified    BMI: 37.53    Patient Education:  Pt educated on post-op pain, early mobilization (POD0), LOS goals, OP PT goals, and preoperative bathing. Patient educated that our goal is to appropriately discharge patient based off their post-op function while striving to maintain maximal independence. The goal is to discharge patient to home and for them to attend outpatient physical therapy.     Assigned to care team? Yes

## 2023-07-22 NOTE — ANESTHESIA PREPROCEDURE EVALUATION
Procedure:  ARTHROPLASTY KNEE TOTAL SAME DAY (Left: Knee)    Relevant Problems   CARDIO   (+) Essential hypertension   (+) Mild CAD   (+) Mixed hyperlipidemia      ENDO   (+) Subclinical hypothyroidism      MUSCULOSKELETAL   (+) Primary osteoarthritis of left knee   (+) Primary osteoarthritis of right knee   (+) Spondylosis of lumbosacral region without myelopathy or radiculopathy      NEURO/PSYCH   (+) Anxiety      Musculoskeletal and Integument   (+) Bulging lumbar disc      Other   (+) Current cigarette smoker   (+) Obesity (BMI 35.0-39.9 without comorbidity)        Physical Exam    Airway    Mallampati score: II  TM Distance: >3 FB  Neck ROM: full     Dental   upper dentures,     Cardiovascular      Pulmonary      Other Findings        Anesthesia Plan  ASA Score- 3     Anesthesia Type- spinal with ASA Monitors. Additional Monitors:   Airway Plan:     Comment: Adductor canal block with exparel. Plan Factors-    Chart reviewed. EKG reviewed. Existing labs reviewed. Patient summary reviewed. Patient is not a current smoker. Induction- intravenous. Postoperative Plan- Plan for postoperative opioid use. Informed Consent- Anesthetic plan and risks discussed with patient. I personally reviewed this patient with the CRNA. Discussed and agreed on the Anesthesia Plan with the CRNA. Isai Smiley

## 2023-07-24 ENCOUNTER — HOSPITAL ENCOUNTER (OUTPATIENT)
Facility: HOSPITAL | Age: 58
Setting detail: OUTPATIENT SURGERY
Discharge: HOME | End: 2023-07-24
Attending: ORTHOPAEDIC SURGERY | Admitting: ORTHOPAEDIC SURGERY
Payer: COMMERCIAL

## 2023-07-24 ENCOUNTER — ANESTHESIA (OUTPATIENT)
Dept: PERIOP | Facility: HOSPITAL | Age: 58
End: 2023-07-24
Payer: COMMERCIAL

## 2023-07-24 ENCOUNTER — TELEPHONE (OUTPATIENT)
Dept: OBGYN CLINIC | Facility: HOSPITAL | Age: 58
End: 2023-07-24

## 2023-07-24 VITALS
BODY MASS INDEX: 38.51 KG/M2 | TEMPERATURE: 98 F | HEIGHT: 61 IN | WEIGHT: 204 LBS | RESPIRATION RATE: 16 BRPM | SYSTOLIC BLOOD PRESSURE: 130 MMHG | DIASTOLIC BLOOD PRESSURE: 66 MMHG | HEART RATE: 96 BPM | OXYGEN SATURATION: 93 %

## 2023-07-24 DIAGNOSIS — M17.12 PRIMARY OSTEOARTHRITIS OF LEFT KNEE: Primary | ICD-10-CM

## 2023-07-24 LAB
ABO GROUP BLD: NORMAL
BLD GP AB SCN SERPL QL: NEGATIVE
RH BLD: POSITIVE
SPECIMEN EXPIRATION DATE: NORMAL

## 2023-07-24 PROCEDURE — 27447 TOTAL KNEE ARTHROPLASTY: CPT | Performed by: PHYSICIAN ASSISTANT

## 2023-07-24 PROCEDURE — 97167 OT EVAL HIGH COMPLEX 60 MIN: CPT

## 2023-07-24 PROCEDURE — 97163 PT EVAL HIGH COMPLEX 45 MIN: CPT

## 2023-07-24 PROCEDURE — 86900 BLOOD TYPING SEROLOGIC ABO: CPT | Performed by: ORTHOPAEDIC SURGERY

## 2023-07-24 PROCEDURE — 97535 SELF CARE MNGMENT TRAINING: CPT

## 2023-07-24 PROCEDURE — C1776 JOINT DEVICE (IMPLANTABLE): HCPCS | Performed by: ORTHOPAEDIC SURGERY

## 2023-07-24 PROCEDURE — C9290 INJ, BUPIVACAINE LIPOSOME: HCPCS | Performed by: ANESTHESIOLOGY

## 2023-07-24 PROCEDURE — 86850 RBC ANTIBODY SCREEN: CPT | Performed by: ORTHOPAEDIC SURGERY

## 2023-07-24 PROCEDURE — 97116 GAIT TRAINING THERAPY: CPT

## 2023-07-24 PROCEDURE — 27447 TOTAL KNEE ARTHROPLASTY: CPT | Performed by: ORTHOPAEDIC SURGERY

## 2023-07-24 PROCEDURE — C1713 ANCHOR/SCREW BN/BN,TIS/BN: HCPCS | Performed by: ORTHOPAEDIC SURGERY

## 2023-07-24 PROCEDURE — 86901 BLOOD TYPING SEROLOGIC RH(D): CPT | Performed by: ORTHOPAEDIC SURGERY

## 2023-07-24 DEVICE — ATTUNE KNEE SYSTEM FEMORAL POSTERIOR STABILIZED SIZE 4 LEFT CEMENTED
Type: IMPLANTABLE DEVICE | Site: KNEE | Status: FUNCTIONAL
Brand: ATTUNE

## 2023-07-24 DEVICE — ATTUNE PATELLA MEDIALIZED DOME 38MM CEMENTED AOX
Type: IMPLANTABLE DEVICE | Site: KNEE | Status: FUNCTIONAL
Brand: ATTUNE

## 2023-07-24 DEVICE — ATTUNE KNEE SYSTEM TIBIAL INSERT ROTATING PLATFORM POSTERIOR STABILIZED 4 6MM AOX
Type: IMPLANTABLE DEVICE | Site: KNEE | Status: FUNCTIONAL
Brand: ATTUNE

## 2023-07-24 DEVICE — ATTUNE KNEE SYSTEM TIBIAL BASE ROTATING PLATFORM SIZE 4 CEMENTED
Type: IMPLANTABLE DEVICE | Site: KNEE | Status: FUNCTIONAL
Brand: ATTUNE

## 2023-07-24 DEVICE — SMARTSET HIGH PERFORMANCE MV MEDIUM VISCOSITY BONE CEMENT 40G
Type: IMPLANTABLE DEVICE | Site: KNEE | Status: FUNCTIONAL
Brand: SMARTSET

## 2023-07-24 RX ORDER — PROPOFOL 10 MG/ML
INJECTION, EMULSION INTRAVENOUS CONTINUOUS PRN
Status: DISCONTINUED | OUTPATIENT
Start: 2023-07-24 | End: 2023-07-24

## 2023-07-24 RX ORDER — CHLORHEXIDINE GLUCONATE 4 G/100ML
SOLUTION TOPICAL DAILY PRN
Status: DISCONTINUED | OUTPATIENT
Start: 2023-07-24 | End: 2023-07-24 | Stop reason: HOSPADM

## 2023-07-24 RX ORDER — ONDANSETRON 2 MG/ML
INJECTION INTRAMUSCULAR; INTRAVENOUS AS NEEDED
Status: DISCONTINUED | OUTPATIENT
Start: 2023-07-24 | End: 2023-07-24

## 2023-07-24 RX ORDER — ACETAMINOPHEN 500 MG
1000 TABLET ORAL EVERY 8 HOURS
Qty: 60 TABLET | Refills: 1 | Status: SHIPPED | OUTPATIENT
Start: 2023-07-24

## 2023-07-24 RX ORDER — MIDAZOLAM HYDROCHLORIDE 2 MG/2ML
INJECTION, SOLUTION INTRAMUSCULAR; INTRAVENOUS
Status: COMPLETED | OUTPATIENT
Start: 2023-07-24 | End: 2023-07-24

## 2023-07-24 RX ORDER — OXYCODONE HYDROCHLORIDE 5 MG/1
5 TABLET ORAL EVERY 4 HOURS PRN
Qty: 30 TABLET | Refills: 0 | Status: SHIPPED | OUTPATIENT
Start: 2023-07-24 | End: 2023-07-27 | Stop reason: SDUPTHER

## 2023-07-24 RX ORDER — ONDANSETRON 2 MG/ML
4 INJECTION INTRAMUSCULAR; INTRAVENOUS ONCE AS NEEDED
Status: DISCONTINUED | OUTPATIENT
Start: 2023-07-24 | End: 2023-07-24 | Stop reason: HOSPADM

## 2023-07-24 RX ORDER — TRANEXAMIC ACID 10 MG/ML
1000 INJECTION, SOLUTION INTRAVENOUS ONCE
Status: COMPLETED | OUTPATIENT
Start: 2023-07-24 | End: 2023-07-24

## 2023-07-24 RX ORDER — BUPIVACAINE HYDROCHLORIDE 7.5 MG/ML
INJECTION, SOLUTION INTRASPINAL AS NEEDED
Status: DISCONTINUED | OUTPATIENT
Start: 2023-07-24 | End: 2023-07-24

## 2023-07-24 RX ORDER — FENTANYL CITRATE/PF 50 MCG/ML
25 SYRINGE (ML) INJECTION
Status: DISCONTINUED | OUTPATIENT
Start: 2023-07-24 | End: 2023-07-24 | Stop reason: HOSPADM

## 2023-07-24 RX ORDER — BUPIVACAINE HYDROCHLORIDE 5 MG/ML
INJECTION, SOLUTION EPIDURAL; INTRACAUDAL
Status: COMPLETED | OUTPATIENT
Start: 2023-07-24 | End: 2023-07-24

## 2023-07-24 RX ORDER — ONDANSETRON 2 MG/ML
4 INJECTION INTRAMUSCULAR; INTRAVENOUS ONCE
Status: COMPLETED | OUTPATIENT
Start: 2023-07-24 | End: 2023-07-24

## 2023-07-24 RX ORDER — ASPIRIN 81 MG/1
81 TABLET, CHEWABLE ORAL 2 TIMES DAILY
Qty: 60 TABLET | Refills: 0 | Status: SHIPPED | OUTPATIENT
Start: 2023-07-24

## 2023-07-24 RX ORDER — ACETAMINOPHEN 325 MG/1
975 TABLET ORAL EVERY 8 HOURS
Status: DISCONTINUED | OUTPATIENT
Start: 2023-07-24 | End: 2023-07-24 | Stop reason: HOSPADM

## 2023-07-24 RX ORDER — CEFAZOLIN SODIUM 2 G/50ML
2000 SOLUTION INTRAVENOUS ONCE
Status: COMPLETED | OUTPATIENT
Start: 2023-07-24 | End: 2023-07-24

## 2023-07-24 RX ORDER — SODIUM CHLORIDE, SODIUM LACTATE, POTASSIUM CHLORIDE, CALCIUM CHLORIDE 600; 310; 30; 20 MG/100ML; MG/100ML; MG/100ML; MG/100ML
50 INJECTION, SOLUTION INTRAVENOUS CONTINUOUS
Status: DISCONTINUED | OUTPATIENT
Start: 2023-07-24 | End: 2023-07-24 | Stop reason: HOSPADM

## 2023-07-24 RX ORDER — DEXAMETHASONE SODIUM PHOSPHATE 10 MG/ML
INJECTION, SOLUTION INTRAMUSCULAR; INTRAVENOUS AS NEEDED
Status: DISCONTINUED | OUTPATIENT
Start: 2023-07-24 | End: 2023-07-24

## 2023-07-24 RX ORDER — HYDROMORPHONE HCL/PF 1 MG/ML
0.5 SYRINGE (ML) INJECTION
Status: DISCONTINUED | OUTPATIENT
Start: 2023-07-24 | End: 2023-07-24 | Stop reason: HOSPADM

## 2023-07-24 RX ORDER — OXYCODONE HYDROCHLORIDE 5 MG/1
5 TABLET ORAL EVERY 4 HOURS PRN
Status: DISCONTINUED | OUTPATIENT
Start: 2023-07-24 | End: 2023-07-24 | Stop reason: HOSPADM

## 2023-07-24 RX ORDER — ALBUTEROL SULFATE 2.5 MG/3ML
2.5 SOLUTION RESPIRATORY (INHALATION) ONCE
Status: COMPLETED | OUTPATIENT
Start: 2023-07-24 | End: 2023-07-24

## 2023-07-24 RX ORDER — FENTANYL CITRATE 50 UG/ML
INJECTION, SOLUTION INTRAMUSCULAR; INTRAVENOUS
Status: COMPLETED | OUTPATIENT
Start: 2023-07-24 | End: 2023-07-24

## 2023-07-24 RX ORDER — CEFAZOLIN SODIUM 2 G/50ML
2000 SOLUTION INTRAVENOUS EVERY 8 HOURS
Status: COMPLETED | OUTPATIENT
Start: 2023-07-24 | End: 2023-07-24

## 2023-07-24 RX ORDER — SODIUM CHLORIDE, SODIUM LACTATE, POTASSIUM CHLORIDE, CALCIUM CHLORIDE 600; 310; 30; 20 MG/100ML; MG/100ML; MG/100ML; MG/100ML
75 INJECTION, SOLUTION INTRAVENOUS CONTINUOUS
Status: DISCONTINUED | OUTPATIENT
Start: 2023-07-24 | End: 2023-07-24 | Stop reason: HOSPADM

## 2023-07-24 RX ORDER — OXYCODONE HYDROCHLORIDE 5 MG/1
10 TABLET ORAL EVERY 4 HOURS PRN
Status: DISCONTINUED | OUTPATIENT
Start: 2023-07-24 | End: 2023-07-24 | Stop reason: HOSPADM

## 2023-07-24 RX ORDER — CHLORHEXIDINE GLUCONATE 0.12 MG/ML
15 RINSE ORAL ONCE
Status: COMPLETED | OUTPATIENT
Start: 2023-07-24 | End: 2023-07-24

## 2023-07-24 RX ADMIN — CEFAZOLIN SODIUM 2000 MG: 2 SOLUTION INTRAVENOUS at 09:46

## 2023-07-24 RX ADMIN — BUPIVACAINE HYDROCHLORIDE 7 ML: 5 INJECTION, SOLUTION EPIDURAL; INTRACAUDAL; PERINEURAL at 07:05

## 2023-07-24 RX ADMIN — DEXAMETHASONE SODIUM PHOSPHATE 10 MG: 10 INJECTION, SOLUTION INTRAMUSCULAR; INTRAVENOUS at 07:49

## 2023-07-24 RX ADMIN — PROPOFOL 70 MCG/KG/MIN: 10 INJECTION, EMULSION INTRAVENOUS at 07:44

## 2023-07-24 RX ADMIN — BUPIVACAINE 20 ML: 13.3 INJECTION, SUSPENSION, LIPOSOMAL INFILTRATION at 07:05

## 2023-07-24 RX ADMIN — SODIUM CHLORIDE, SODIUM LACTATE, POTASSIUM CHLORIDE, AND CALCIUM CHLORIDE 75 ML/HR: .6; .31; .03; .02 INJECTION, SOLUTION INTRAVENOUS at 09:47

## 2023-07-24 RX ADMIN — PHENYLEPHRINE HYDROCHLORIDE 40 MCG/MIN: 10 INJECTION INTRAVENOUS at 07:44

## 2023-07-24 RX ADMIN — TRANEXAMIC ACID 1000 MG: 10 INJECTION, SOLUTION INTRAVENOUS at 07:37

## 2023-07-24 RX ADMIN — ONDANSETRON HYDROCHLORIDE 4 MG: 2 INJECTION, SOLUTION INTRAMUSCULAR; INTRAVENOUS at 09:47

## 2023-07-24 RX ADMIN — BUPIVACAINE HYDROCHLORIDE IN DEXTROSE 1.4 ML: 7.5 INJECTION, SOLUTION SUBARACHNOID at 07:39

## 2023-07-24 RX ADMIN — CEFAZOLIN SODIUM 2000 MG: 2 SOLUTION INTRAVENOUS at 07:50

## 2023-07-24 RX ADMIN — ACETAMINOPHEN 325MG 975 MG: 325 TABLET ORAL at 10:19

## 2023-07-24 RX ADMIN — CHLORHEXIDINE GLUCONATE 15 ML: 1.2 RINSE ORAL at 06:47

## 2023-07-24 RX ADMIN — ONDANSETRON 4 MG: 2 INJECTION INTRAMUSCULAR; INTRAVENOUS at 07:49

## 2023-07-24 RX ADMIN — FENTANYL CITRATE 100 MCG: 50 INJECTION, SOLUTION INTRAMUSCULAR; INTRAVENOUS at 07:05

## 2023-07-24 RX ADMIN — MIDAZOLAM 2 MG: 1 INJECTION INTRAMUSCULAR; INTRAVENOUS at 07:05

## 2023-07-24 RX ADMIN — ALBUTEROL SULFATE 2.5 MG: 2.5 SOLUTION RESPIRATORY (INHALATION) at 11:28

## 2023-07-24 RX ADMIN — SODIUM CHLORIDE, SODIUM LACTATE, POTASSIUM CHLORIDE, AND CALCIUM CHLORIDE 50 ML/HR: .6; .31; .03; .02 INJECTION, SOLUTION INTRAVENOUS at 06:46

## 2023-07-24 NOTE — DISCHARGE INSTR - AVS FIRST PAGE
Dr. Lux Tirado MD  [address], [address] [place] [address] Drive  [phone]                                        PATIENT INSTRUCTIONS AFTER TOTAL KNEE REPLACEMENT/REVISION    Diet: You may resume your normal diet. It is important to maintain a healthy, balanced diet. Include plenty of fluids, as pain medicines tend to cause constipation. Medications  Pain Medications: A prescription for pain medication has been provided to upon your hospital discharge. Your goal is reduce your pain medication gradually over the next 4-6 weeks. Take your pain medicine with food to avoid stomach discomfort. Please allow at least 24-48 hours (Monday through Friday) from the time of your request to the time a will need the prescription. Constipation: To avoid constipation, take an over-the-counter stool softener (i.e. docusate sodium) twice daily such as docusate sodium or Senna S twice daily and Miralax laxative once daily while on pain medication. If the combination does not alleviate your symptoms after 3 days, use a rectal suppository such as dulcolax. Blood Clot Prevention as below:   Aspirin 81 mg TWICE daily x 6 weeks                   Activity  Rest Periods: Gradually increase your activity on a daily basis. The amount of time you spend out of bed and the number and distance of your walks should gradually increase each day. Between activities such as walking, meals, exercises, etc., take a rest period for approximately 1 hour in the morning, afternoon and evening. Limit sitting to 1 hour intervals for the next 4 - 6 weeks. Weight-bearing: You may put as much weight as is comfortable on your operative leg with activity. Use a walker or crutches while walking for at least 3 weeks. At that time, it is advised to use a cane until you are able to apply full weight to the operated leg.   Impact Loading: Low-impact activities such as golf, stationary bicycling and slow dancing may begin in 6 weeks, while swimming is allowed at 4 weeks after surgery. All activities involving quick starts and stops, or impact loading, such as tennis, should be avoided to lower your risk of early loosening of the prosthesis. Bathing: Your sticky dressing is waterproof and can get wet in the shower. Driving: You should not drive until approximately 4 weeks after surgery. While you are traveling as a passenger for the first 4 weeks following surgery, it is advised that you get out of the car at least hourly and take a short walk. Returning to work: The decision to return to work will be based on the type of work you do, your physical stamina, and whether you have other medical conditions. We recommend that you avoid making any major changes in your work or penitentiary plans until your recovery is complete. Wound Care  You can remove the ace wrap and soft padding tomorrow. Keep sticky dressing on until seen in office. It can get wet in the shower. No creams or ointments on the incision for 4 weeks. Your incision may be warm, itchy, and slightly red for several weeks after surgery. Excessive redness, soreness, or drainage from the incision area should be reported to our office. Common Problems  Leg & ankle swelling: You may have some swelling in your operated leg that should gradually decrease. If swelling occurs, lie down, elevate your legs, and rest. Use compression stocking during the day and remove at nighttime. Ice and elevate the knee multiple times a day. Pain:  Pain may be a result of over-activity. When you are in pain, sit or lie down, elevated your legs, and rest.  If the pain does not subside, take the pain medication prescribed for you. Pain is a protective mechanism that helps to prevent over-usage and should not be ignored. Return Appointments: You should have a scheduled appointment for followup.   If you do not already have a followup appointment scheduled, please call the office at [phone] to schedule an appointment. Call our office if you have:  Temperature of 101o or higher  Drainage from your incision  Increasing redness around your incision  Increasing  pain around the incision, unrelieved by pain medication  Excessive calf or thigh pain & swelling that does not go away with elevation and rest.     ANTIBIOTIC PROPHYLAXIS AFTER TOTAL JOINT REPLACEMENT  For protection against the remote possibility of blood-borne bacteria, carried from the mouth during a dental procedure, creating an infection in a total joint replacement, a combined task force of [employer] and the [employer] has made the following guideline recommendations: Following total joint replacement, all patients are advised to take an antibiotic regimen for the following dental procedures AS LIFETIME THERAPY:  Prophylactic cleaning of teeth or implants  Intraligamentary local anesthetic injections  Periodontal procedures  Root canal procedures  Dental extractions  Dental implant procedures  Implantation of avulsed teeth  Initial placement of orthodontic bands    The recommended antibiotic regimen (if not allergic to penicillin) is:  Amoxicillin, Cephalexin (e.g. Keflex), or Cephradine two (2.0) grams orally one (1) hour prior to the dental procedure. For patients with a penicillin allergy, the recommended antibiotic is:  Clindamycin (e.g. Cleocin) 600 mg orally one hour prior to the dental procedure. Antibiotic prophylaxis is not warranted for dental procedures for patients with previously placed orthopedic pins, plates or screws. The above recommendations are considered minimum guidelines. Your doctor and/or dentist are ultimately responsible for making individual treatment recommendations to you based on their clinical judgment.

## 2023-07-24 NOTE — PHYSICAL THERAPY NOTE
PHYSICAL THERAPY EVAL/TX  Physical Therapy Evaluation    Performed at least 2 patient identifiers during session:  Patient Active Problem List   Diagnosis    Mixed hyperlipidemia    Essential hypertension    Restless leg syndrome    Spondylosis of lumbosacral region without myelopathy or radiculopathy    Current cigarette smoker    Subclinical hypothyroidism    Adjustment disorder with mixed anxiety and depressed mood    Pulmonary nodules    IFG (impaired fasting glucose)    Abnormal CT of the chest    Mild CAD    Irritable bowel syndrome with diarrhea    Obesity (BMI 35.0-39.9 without comorbidity)    Chronic pain of right knee    Effusion of right knee    Primary osteoarthritis of right knee    Right elbow tendinitis    Primary osteoarthritis of left knee    Effusion of left knee    Bulging lumbar disc    Anxiety       Past Medical History:   Diagnosis Date    Anxiety 2005    Bulging lumbar disc     Chronic pain     low back    Degenerative disk disease     lumbar spine    Depression 2005    Hyperlipidemia     Hypertension     Pinched nerve     lumbar spine    Psychiatric disorder     depression    Shingles 20179       Past Surgical History:   Procedure Laterality Date    CARPAL TUNNEL RELEASE Bilateral 1998    CHOLECYSTECTOMY  2008    COLONOSCOPY  06/15/2015    COLONOSCOPY  06/02/2015    Internal hemorrhoids, hyperplastic polyps which were removed. Ten year recall recommended June 2025    COLONOSCOPY  09/16/2020    normal terminal ileum, 1 small polyp in the distal sigmoid colon that was hyperplastic. Frann Barfield She did have a small amount of retained stool in the colon. She did have a small amount of retained stool in the colon. A 5 year recall was recommended due to poor prep, September 2025.      FL INJECTION LEFT KNEE (ARTHROGRAM)  6/6/2023    SPINAL CORD STIMULATOR IMPLANT      2014    SPINE SURGERY  2017    Stimulator inplant WISDOM TOOTH EXTRACTION Bilateral 1982            07/24/23 1253   PT Last Visit   PT Visit Date 07/24/23   Note Type   Note type Evaluation   Pain Assessment   Pain Assessment Tool 0-10   Pain Score 8   Pain Location/Orientation Orientation: Left; Location: Knee   Hospital Pain Intervention(s) Medication (See MAR); Ambulation/increased activity   Restrictions/Precautions   Weight Bearing Precautions Per Order Yes   LLE Weight Bearing Per Order WBAT   Other Precautions Pain; Fall Risk;Multiple lines   Home Living   Type of [address] Two level  (1st floor setup. 5STE)   Bathroom Shower/Tub Walk-in shower   Home Equipment Walker;Cane   Additional Comments Was not using an AD prior to admission   Prior Function   Level of [place] Independent with ADLs; Independent with functional mobility; Independent with IADLS   Lives With Daughter   Receives Help From Family   IADLs Independent with driving; Independent with meal prep; Independent with medication management   Comments Family will be staying with her at discharge. General   Family/Caregiver Present Yes   Cognition   Overall Cognitive Status WFL   Arousal/Participation Alert   Orientation Level Oriented X4   Memory Within functional limits   Following Commands Follows all commands and directions without difficulty   Subjective   Subjective "I feel better now that I'm walking."   RLE Assessment   RLE Assessment WFL   LLE Assessment   LLE Assessment   (Not assessed due to pain)   Light Touch   RLE Light Touch Grossly intact   LLE Light Touch Grossly intact   Bed Mobility   Supine to Sit 5  Supervision   Additional Comments BP taken in supine, sitting, and standing. Stable. Transfers   Sit to Stand 5  Supervision   Additional items Verbal cues  (For hand placement)   Stand to Sit 5  Supervision   Additional items Verbal cues  (For hand placement)   Ambulation/Elevation   Gait pattern Step to;Decreased L stance; Antalgic   Gait Assistance 5  Supervision Assistive Device Rolling walker   Distance 75ft   Ambulation/Elevation Additional Comments Refer to treatment for additional mobility   Balance   Static Sitting Normal   Dynamic Sitting Good   Static Standing Fair +   Dynamic Standing Fair +   Ambulatory Fair +   Endurance Deficit   Endurance Deficit No   Activity Tolerance   Activity Tolerance Patient limited by pain   Medical Staff Made Aware Maddie BOWLING. DUGLAS Frank   Nurse Made Aware RN   Assessment   Prognosis Good   Problem List Decreased strength;Decreased range of motion; Impaired balance;Decreased mobility;Obesity;Pain   Assessment Patient is a 56y/o F POD 0 L TKA. Patient resides with Dtr in a home with a first floor setup and steps to enter. She is normally independent without an AD. Current medical status includes multiple lines, O2 desaturation, pain, fall risk, obesity, decreased strength, balance, endurance and mobility. Patient tolerated session well. Thoroughly educated on proper use of RW, amb and stair technique, positioning of LE in bed and use of ice and elevation. Patient performed all mobility at a supervision level. She ambulated a household distance and completed steps. O2 saturation at rest varied from 86-94%. After amb on RA 92%. No SOB. Recommending level 3 resources. The patient's AM-PAC Basic Mobility Inpatient Short Form Raw Score is 18. A Raw score of greater than 17 suggests the patient may benefit from discharge to home. Please also refer to the recommendation of the Physical Therapist for safe discharge planning. Barriers to Discharge None   Goals   Patient Goals To go home   STG Expiration Date 07/28/23   Short Term Goal #1 1. Perform supine<>sit with HOB flat without the use of bedrails ind 2. Perform sit<>stand transfers mod I 3. Ambulate 300ft with a RW mod I elvel 4.  Ascend/descend 16 steps with railing nonreciprocal pattern mod I level   PT Treatment Day 1   Plan   Treatment/Interventions Functional transfer training;LE strengthening/ROM; Elevations; Therapeutic exercise; Endurance training;Patient/family training;Equipment eval/education; Bed mobility;Gait training;Spoke to nursing;OT   PT Frequency Twice a day   Recommendation   UB Rehab Discharge Recommendation (PT/OT) Level 3   AM-PAC Basic Mobility Inpatient   Turning in Flat Bed Without Bedrails 3   Lying on Back to Sitting on Edge of Flat Bed Without Bedrails 3   Moving Bed to Chair 3   Standing Up From Chair Using Arms 3   Walk in Room 3   Climb 3-5 Stairs With Railing 3   Basic Mobility Inpatient Raw Score 18   Basic Mobility Standardized Score 41.05   Highest Level Of Mobility   JH-HLM Goal 6: Walk 10 steps or more   JH-HLM Achieved 7: Walk 25 feet or more   Additional Treatment Session   Start Time 8954   End Time 1253   Treatment Assessment Demonstration provided for stair technique/sequencing. Patient able to ascend/descend 4 steps with 1 railing. 2 hands on railing. Nonreciprocal pattern supervision level   End of Consult   Patient Position at End of Consult Bedside chair; All needs within reach  (OT present)     Thelma Francis, PT             Patient Name: Tawny Bolanos  JRHHF'[id number] Date: 7/24/2023

## 2023-07-24 NOTE — PLAN OF CARE
Problem: OCCUPATIONAL THERAPY ADULT  Goal: Performs self-care activities at highest level of function for planned discharge setting. See evaluation for individualized goals. Description: Treatment Interventions: ADL retraining, Functional transfer training, Patient/family training, Compensatory technique education          See flowsheet documentation for full assessment, interventions and recommendations. Note: Limitation: Decreased ADL status, Decreased self-care trans, Decreased high-level ADLs  Prognosis: Fair  Assessment: Pt is a 61 y.o. female seen for OT evaluation at [facility], admitted 7/24/2023 for elective L TKA. Comorbidities affecting pt's functional performance at time of assessment include: essential HTN. Personal factors affecting pt at time of IE include:steps to enter environment, difficulty performing ADLS, difficulty performing IADLS  and decreased functional mobility. Prior to admission, pt was living with daughter in house with steps to manage. Pt was I w/  ADLS and IADLS, (+) drove, & required bedside commode PTA. Upon evaluation: Pt requires sup for bed mobility, sup for functional mobility/transfers, sup for UB ADLs and sup for LB ADLS 2* the following deficits impacting occupational performance: decreased strength, decreased balance, decreased tolerance and increased pain. Full objective findings from OT assessment regarding body systems outlined above. These impairments, as well as pt's decreased caregiver support and risk for falls  limit pt's ability to safely engage in all baseline areas of occupation and mobility. Pt to benefit from continued skilled OT tx while in the hospital to address deficits as defined above and maximize level of functional independence w ADL's and functional mobility. Occupational Performance areas to address include: bathing/shower, toilet hygiene, dressing and functional mobility.   This evaluation required an extensive review of medical and/or therapy records and additional review of physical, cognitive and psychosocial history related to functional performance. Based upon functional performance deficits and assessments, this evaluation has been identified as a high complexity evaluation. The patient's raw score on the AM-PAC Daily Activity inpatient short form is 21, standardized score is 44.27, greater than 39.4. Patients at this level are likely to benefit from DC to home. However please refer to therapist recommendation for discharge planning given other factors that may influence destination. At this time, OT recommendations at time of discharge are level 4 high intensity resources.

## 2023-07-24 NOTE — INTERVAL H&P NOTE
H&P reviewed. After examining the patient I find no changes in the patients condition since the H&P had been written.     Vitals:    07/24/23 0635   BP: 128/62   Pulse: 83   Resp: 18   Temp: 98.1 °F (36.7 °C)   SpO2: 94%

## 2023-07-24 NOTE — ANESTHESIA POSTPROCEDURE EVALUATION
Post-Op Assessment Note    CV Status:  Stable  Pain Score: 0    Pain management: adequate     Mental Status:  Alert and awake   Hydration Status:  Euvolemic   PONV Controlled:  Controlled   Airway Patency:  Patent   Two or more mitigation strategies used for obstructive sleep apnea   Post Op Vitals Reviewed: Yes      Staff: Anesthesiologist, CRNA         No notable events documented.     BP   105/55   Temp   98   Pulse  72   Resp   18   SpO2   94

## 2023-07-24 NOTE — ANESTHESIA PROCEDURE NOTES
Spinal Block    Patient location during procedure: OR  Start time: 7/24/2023 7:39 AM  Reason for block: primary anesthetic  Staffing  Performed by: Yuri Garza DO  Authorized by: Yuri Garza DO    Preanesthetic Checklist  Completed: patient identified, IV checked, site marked, risks and benefits discussed, surgical consent, monitors and equipment checked, pre-op evaluation and timeout performed  Spinal Block  Patient position: sitting  Prep: ChloraPrep  Patient monitoring: heart rate, continuous pulse ox and frequent blood pressure checks  Approach: left paramedian  Location: L3-4  Injection technique: single-shot  Needle  Needle type: pencil-tip   Needle gauge: 24 G  Needle length: 5 cm  Assessment  Sensory level: T10  Events: cerebrospinal fluid  Injection Assessment:  negative aspiration for heme, no paresthesia on injection and positive aspiration for clear CSF.   Post-procedure:  site cleaned

## 2023-07-24 NOTE — TELEPHONE ENCOUNTER
Caller: [address] [place] [address]     Doctor: Sylvia Wasserman / Dr. Júnior Gutierrez / Mateo Pettit     Reason for call: Patient was Rx'd Oxycodone 5 mg for knee arthroplasty. Per pharmacist, the Mixture of Oxycodone 5mg and Lorazepam 0.5 is contraindicated. The Rx of Narcan needs to be sent to pharmacy and a call to pharmacist to authorize.         Call back#: [phone]

## 2023-07-24 NOTE — ANESTHESIA PROCEDURE NOTES
Peripheral Block    Patient location during procedure: holding area  Start time: 7/24/2023 7:05 AM  Reason for block: at surgeon's request and post-op pain management  Staffing  Performed by: Humaira Bernard DO  Authorized by: Humaira Bernard DO    Preanesthetic Checklist  Completed: patient identified, IV checked, site marked, risks and benefits discussed, surgical consent, monitors and equipment checked, pre-op evaluation and timeout performed  Peripheral Block  Patient position: supine  Prep: ChloraPrep  Patient monitoring: heart rate, continuous pulse ox and frequent blood pressure checks  Block type: adductor canal block  Laterality: left  Injection technique: single-shot  Procedures: ultrasound guided, Ultrasound guidance required for the procedure to increase accuracy and safety of medication placement and decrease risk of complications. bupivacaine (PF) (MARCAINE) injection 0.5 % - Perineural   7 mL - 7/24/2023 7:05:00 AM  midazolam (VERSED) 2 mg/2 mL - Intravenous   2 mg - 7/24/2023 7:05:00 AM  fentaNYL 50 mcg/mL - Intravenous   100 mcg - 7/24/2023 7:05:00 AM  Needle  Needle type: Stimuplex   Needle gauge: 22 G  Needle length: 4 in  Needle localization: ultrasound guidance  Test dose: negative  Assessment  Injection assessment: incremental injection, local visualized surrounding nerve on ultrasound, negative aspiration for heme and no paresthesia on injection  Paresthesia pain: none  Heart rate change: no  Slow fractionated injection: yes  Post-procedure:  site cleaned  patient tolerated the procedure well with no immediate complications

## 2023-07-24 NOTE — PLAN OF CARE
Problem: PHYSICAL THERAPY ADULT  Goal: Performs mobility at highest level of function for planned discharge setting. See evaluation for individualized goals. Description: Treatment/Interventions: Functional transfer training, LE strengthening/ROM, Elevations, Therapeutic exercise, Endurance training, Patient/family training, Equipment eval/education, Bed mobility, Gait training, Spoke to nursing, OT          See flowsheet documentation for full assessment, interventions and recommendations. Note: Prognosis: Good  Problem List: Decreased strength, Decreased range of motion, Impaired balance, Decreased mobility, Obesity, Pain  Assessment: Patient is a 58y/o F POD 0 L TKA. Patient resides with Dtr in a home with a first floor setup and steps to enter. She is normally independent without an AD. Current medical status includes multiple lines, O2 desaturation, pain, fall risk, obesity, decreased strength, balance, endurance and mobility. Patient tolerated session well. Thoroughly educated on proper use of RW, amb and stair technique, positioning of LE in bed and use of ice and elevation. Patient performed all mobility at a supervision level. She ambulated a household distance and completed steps. Recommending level 3 resources. The patient's AM-PAC Basic Mobility Inpatient Short Form Raw Score is 18. A Raw score of greater than 17 suggests the patient may benefit from discharge to home. Please also refer to the recommendation of the Physical Therapist for safe discharge planning. Barriers to Discharge: None          See flowsheet documentation for full assessment.

## 2023-07-24 NOTE — OCCUPATIONAL THERAPY NOTE
Occupational Therapy Evaluation & Treat     Patient Name: Peggyann Sandifer  WPVQO'G Date: 7/24/2023  Problem List  Active Problems:    Bulging lumbar disc    Anxiety    Past Medical History  Past Medical History:   Diagnosis Date    Anxiety 2005    Bulging lumbar disc     Chronic pain     low back    Degenerative disk disease     lumbar spine    Depression 2005    Hyperlipidemia     Hypertension     Pinched nerve     lumbar spine    Psychiatric disorder     depression    Shingles 20179     Past Surgical History  Past Surgical History:   Procedure Laterality Date    CARPAL TUNNEL RELEASE Bilateral 1998    CHOLECYSTECTOMY  2008    COLONOSCOPY  06/15/2015    COLONOSCOPY  06/02/2015    Internal hemorrhoids, hyperplastic polyps which were removed. Ten year recall recommended June 2025    COLONOSCOPY  09/16/2020    normal terminal ileum, 1 small polyp in the distal sigmoid colon that was hyperplastic. Sproul Busing She did have a small amount of retained stool in the colon. She did have a small amount of retained stool in the colon. A 5 year recall was recommended due to poor prep, September 2025. [place] INJECTION LEFT KNEE (ARTHROGRAM)  6/6/2023    SPINAL CORD STIMULATOR IMPLANT      2014    SPINE SURGERY  2017    Stimulator inplant    WISDOM TOOTH EXTRACTION Bilateral 1982         07/24/23 1300   OT Last Visit   OT Visit Date 07/24/23   Note Type   Note type Evaluation   Pain Assessment   Pain Assessment Tool 0-10   Pain Score 8   Pain Location/Orientation Orientation: Left; Location: Knee   Hospital Pain Intervention(s) Rest;Repositioned   Restrictions/Precautions   Weight Bearing Precautions Per Order Yes   LLE Weight Bearing Per Order WBAT   Other Precautions Fall Risk;Pain;Multiple lines   Home Living   Type of Home House   Home Layout Two level   Bathroom Shower/Tub Walk-in shower   Bathroom Toilet Standard   Home Equipment Walker;Cane  (no AD at baseline)   Prior Function   Level of [place] Independent with ADLs Lives With Daughter   Receives Help From Family   IADLs Independent with driving   Comments Pt's family to stay with pt upon D/C   Subjective   Subjective Pt received in supine position. Pt agreeable to session. ADL   Eating Assistance 7  Independent   Grooming Assistance 7  Independent   UB Bathing Assistance 5  Supervision/Setup   LB Bathing Assistance 5  Supervision/Setup   UB Dressing Assistance 5  Supervision/Setup   LB Dressing Assistance 4  Minimal Assistance   Toileting Assistance  5  Supervision/Setup   Bed Mobility   Supine to Sit 5  Supervision   Transfers   Sit to Stand 5  Supervision   Additional items Verbal cues   Stand to Sit 5  Supervision   Additional items Verbal cues   Functional Mobility   Functional Mobility 5  Supervision   Additional Comments RW   Balance   Static Sitting Normal   Dynamic Sitting Good   Static Standing Fair +   Dynamic Standing Fair +   Activity Tolerance   Activity Tolerance Patient limited by pain   Medical Staff Made Aware PT [place]   Nurse Made Aware PACU RN   RUE Assessment   RUE Assessment WFL   LUE Assessment   LUE Assessment WFL   Cognition   Overall Cognitive Status WFL   Arousal/Participation Alert   Attention Within functional limits   Orientation Level Oriented X4   Memory Within functional limits   Following Commands Follows all commands and directions without difficulty   Assessment   Limitation Decreased ADL status; Decreased self-care trans;Decreased high-level ADLs   Prognosis Fair   Assessment Pt is a 61 y.o. female seen for OT evaluation at [address] [place] [address], admitted 7/24/2023 for elective L TKA. Comorbidities affecting pt's functional performance at time of assessment include: essential HTN. Personal factors affecting pt at time of IE include:steps to enter environment, difficulty performing ADLS, difficulty performing IADLS  and decreased functional mobility. Prior to admission, pt was living with daughter in house with steps to manage.   Pt was I w/  ADLS and IADLS, (+) drove, & required bedside commode PTA. Upon evaluation: Pt requires sup for bed mobility, sup for functional mobility/transfers, sup for UB ADLs and sup for LB ADLS 2* the following deficits impacting occupational performance: decreased strength, decreased balance, decreased tolerance and increased pain. Full objective findings from OT assessment regarding body systems outlined above. These impairments, as well as pt's decreased caregiver support and risk for falls  limit pt's ability to safely engage in all baseline areas of occupation and mobility. Pt to benefit from continued skilled OT tx while in the hospital to address deficits as defined above and maximize level of functional independence w ADL's and functional mobility. Occupational Performance areas to address include: bathing/shower, toilet hygiene, dressing and functional mobility. This evaluation required an extensive review of medical and/or therapy records and additional review of physical, cognitive and psychosocial history related to functional performance. Based upon functional performance deficits and assessments, this evaluation has been identified as a high complexity evaluation. The patient's raw score on the -PAC Daily Activity inpatient short form is 21, standardized score is 44.27, greater than 39.4. Patients at this level are likely to benefit from DC to home. However please refer to therapist recommendation for discharge planning given other factors that may influence destination. At this time, OT recommendations at time of discharge are level 4 high intensity resources. Goals   Patient Goals Pt wishes to get home   Plan   Treatment Interventions ADL retraining;Functional transfer training;Patient/family training; Compensatory technique education   Goal Expiration Date 08/03/23   OT Treatment Day 0   OT Frequency 2-3x/wk   Recommendation   UB Rehab Discharge Recommendation (PT/OT) Level 3   -PAC Daily Activity Inpatient   Lower Body Dressing 3   Bathing 3   Toileting 3   Upper Body Dressing 4   Grooming 4   Eating 4   Daily Activity Raw Score 21   Daily Activity Standardized Score (Calc for Raw Score >=11) 44.27   AM-PAC Applied Cognition Inpatient   Following a Speech/Presentation 4   Understanding Ordinary Conversation 4   Taking Medications 4   Remembering Where Things Are Placed or Put Away 4   Remembering List of 4-5 Errands 4   Taking Care of Complicated Tasks 4   Applied Cognition Raw Score 24   Applied Cognition Standardized Score 62.21   Additional Treatment Session   Start Time 1250   End Time 1300   Treatment Assessment Pt performed sit<>stand transfers with supervision. VC provided for appropriate hand placement during transfers. Performed functional mobility with supervision with use of RW. Provided pt wit extensive ADL education. Pt performed LB dressing with supervision. Reviewed strategies for tub transfer and car transfers. Pt remained seated in recliner by end of session. NAD. End of Consult   Education Provided Yes   Patient Position at End of Consult Bedside chair; All needs within reach         Pt will achieve the following goals within 10 days. *Pt will complete UB bathing and dressing with mod I.    *Pt will complete LB bathing and dressing with mod I .    * Pt will complete toileting w/ mod I w/ G hygiene/thoroughness using DME PRN    *Pt will complete bed mobility with mod I, with bed flat and no side rail to prep for purposeful tasks    *Pt will perform functional transfers with on/off all surfaces with mod I using DME as needed w/ G balance/safety. *Pt will improve functional mobility during ADL/IADL/leisure tasks to mod I using DME as needed w/ G balance/safety.          Tristen Beauchamp, OTR/L

## 2023-07-24 NOTE — OP NOTE
OPERATIVE REPORT  PATIENT NAME: Tia Feliciano  : 1965  MRN: [id number]  Pt Location:   OR ROOM 03    Surgery Date: 2023    Surgeon(s) and Role:     * Sissy Johnston MD - Primary     * Chanel Duron PA-C - Assisting     Preop Diagnosis:  Primary osteoarthritis of left knee [M17.12]    Post-Op Diagnosis Codes:     * Primary osteoarthritis of left knee [M17.12]    Procedure(s):  Left - ARTHROPLASTY KNEE TOTAL SAME DAY    Specimens:  * No specimens in log *    Estimated Blood Loss:   Minimal    Drains:  * No LDAs found *    Anesthesia Type:   Spinal, Adductor canal block    Operative Indications:  Primary osteoarthritis of left knee [M17.12]    TT: 56 mins    Prosthesis: Depuy Attune+ posterior cruciate-sacrificing femur size 4, rotating tibia size 4, Patella 38, with 6 mm mobile spacer. Indications: Tia Feliciano is a 61y.o. years old female diagnosed with left knee osteoarthritis. Patient failed conservative treatments and elected to proceed with surgical intervention. The risks and complications are discussed with the patient. The patient consented to the procedure. Knee Technique: Suture (direct) Repair  Knee Approach: Medial Parapatellar    Procedure: Patient was brought into the OR and placed in supine position. Patient was anethetized with spinal. Patient also had Adductor canal block done in the holding area. Patient's left knee was prepped and draped in sterile fashion with tourniquet in the upper thigh. A time out was call and identified the left knee was the operating site. Patient's preop left knee range of motion 0 -100. The leg was then exsanguinated with Esmarch. The tourniquet is inflated to 250 mmHg. A longitudinal incision was made center over the left knee. Dissection was then carried down to the extensor mechanism. A medial parapatellar arthrotomy was done to gain access into the knee joint.  The patient appeared to have grade 3 changes in PF compartment and grade 4 in medial femoral condyle. The meniscus were removed. The anterior fat was also excised. The ACL and PCL were detached and excised. The tibia was then subluxed anteriorly with a retractor. An external tibia cutting guide is used to carried out the proximal tibia osteotomy. Care was make sure the external cutting guide was lined up with tibia anteriorly and also to the 2nd metatarsal with 3 degree of posterior sloping. Once the tibia cut was complete, the attention was then turn to the distal femur. An intramedullary cutting guide was used to perform the distal femoral cut. An extension block was use to assess the soft tissue balance with knee in full extension, which appears to be satisfactory with a 6 mm spacer. The distal femur was then sized and 2 pin were inserted to set the femoral component rotation. The flexion gap was assessed which appeared to have good balance. The #4 4-in-1 and notch cutting guide were used to complete the distal cut. The size 4 femoral trial was then placed onto the distal femur, which appears to have good fit with no lateral or medial overriding. Attention was then turned to the tibia, which was prepared with the #4 tibial tray in slight external rotation. With a 6 mm trial spacer placed, the knee had good stability in mid-flexion and full extension. The patella was prepared to accommodate a size 38 button. A trial button was then used. Range motion of the knee was then done. The patella appeared to be gliding nicely without tilting or subluxing. The trials were removed and the bony surface was irrigated with Irrisept solution for 1 minute. The Aquamantys was used to treat the posterior capsule. 30 cc of Duramorph solution was injected into the posterior, medial and lateral soft tissue. Once the bone surface is dried up and all the components were open. The cement was mixed and final prosthesis was then placed onto the distal femur, proximal tibia, and patella with cement.  Excess cement was removed. Once the cement was harden, the tourniquet was let down. There were no active arterial bleeding. The venous bleeding was control with electro-Bovie and Aquamantys. The extensor mechanism was then repaired with #1 Vicryl in an interrupted fashion. Layered closure was carried out with 0 Vicryl and 2-0 Vicryl, and Stratafix was used to close the skin. Patient's post-op left knee range of motion 0 -130 with good varus and valgus stability at full extension and mid-flex. Steri-strips were then applied with a sterile bulky dressing. The patient tolerated the procedure well without any complications. Patient was transferred to recovery room for post-op care. The family was contacted. There was no qualified resident available to assist.    Mrs. Cee Leiva was required in the OR. An assistant was necessary for the surgery given the complexity of the operation, and the need for a skilled surgical assistant for proper retraction of critical soft tissues including ligaments, tendons, and neurovascular structures, as well as adjacent bony structures. Precise retractor placement and maintenance of precise retraction is critical, as is skilled positioning of the limb during the different parts of the procedure.     Complications:   None    Patient Disposition:  PACU     SIGNATURE: Duy Crook MD  DATE: July 24, 2023  TIME: 9:31 AM

## 2023-07-25 ENCOUNTER — TELEPHONE (OUTPATIENT)
Dept: OBGYN CLINIC | Facility: HOSPITAL | Age: 58
End: 2023-07-25

## 2023-07-25 NOTE — TELEPHONE ENCOUNTER
Pt contacted for a postoperative follow up call assessment. She reports having a rough night, getting up frequently. She reports a current 9/10 pain level, ambulating with the RW. She states Swelling remains the same and she icing. She reports her dressing is dry with no drainage. PT is scheduled for Thursday. She is taking the following medications as listed on the AVS:  Tylenol 1000mg TID  Oxycodone 5mg every 4 hours  ASA 81mg BID  I did also recommend an OTC stool softener at this time, and increasing fluid and fiber intake, as she has not yet had a BM. She denies any questions, concerns, or issues, or any concerning symptoms. pt encouraged to call me with questions, concerns or issues.

## 2023-07-27 ENCOUNTER — OFFICE VISIT (OUTPATIENT)
Dept: PHYSICAL THERAPY | Facility: CLINIC | Age: 58
End: 2023-07-27
Payer: COMMERCIAL

## 2023-07-27 ENCOUNTER — TELEPHONE (OUTPATIENT)
Dept: OBGYN CLINIC | Facility: HOSPITAL | Age: 58
End: 2023-07-27

## 2023-07-27 DIAGNOSIS — M25.562 ACUTE PAIN OF LEFT KNEE: ICD-10-CM

## 2023-07-27 DIAGNOSIS — M17.12 PRIMARY OSTEOARTHRITIS OF LEFT KNEE: Primary | ICD-10-CM

## 2023-07-27 DIAGNOSIS — M17.12 PRIMARY OSTEOARTHRITIS OF LEFT KNEE: ICD-10-CM

## 2023-07-27 DIAGNOSIS — R26.2 DIFFICULTY WALKING: ICD-10-CM

## 2023-07-27 DIAGNOSIS — Z96.652 STATUS POST TOTAL LEFT KNEE REPLACEMENT: ICD-10-CM

## 2023-07-27 PROCEDURE — 97110 THERAPEUTIC EXERCISES: CPT | Performed by: PHYSICAL THERAPIST

## 2023-07-27 PROCEDURE — 97140 MANUAL THERAPY 1/> REGIONS: CPT | Performed by: PHYSICAL THERAPIST

## 2023-07-27 PROCEDURE — 97164 PT RE-EVAL EST PLAN CARE: CPT | Performed by: PHYSICAL THERAPIST

## 2023-07-27 PROCEDURE — 97010 HOT OR COLD PACKS THERAPY: CPT | Performed by: PHYSICAL THERAPIST

## 2023-07-27 RX ORDER — OXYCODONE HYDROCHLORIDE 5 MG/1
5 TABLET ORAL EVERY 6 HOURS PRN
Qty: 30 TABLET | Refills: 0 | Status: SHIPPED | OUTPATIENT
Start: 2023-07-27 | End: 2023-08-06

## 2023-07-27 NOTE — TELEPHONE ENCOUNTER
I refilled the medication. We usually only give one refill of this medication after surgery so she should start to slowly take it less frequently and wean off of it. She should be taking the tylenol as prescribed with frequent icing and elevation. If she is getting up frequently she can decrease the aspirin to once a day and start NSAIDS such as advil, motrin, aleve or ibuprofen in between tylenol doses for more pain relief.

## 2023-07-27 NOTE — PROGRESS NOTES
PT Evaluation     Today's date: 2023  Patient name: Laryr Her  : 1965  MRN: [id number]  Referring provider: Dony Salas PA-C  Dx:   Encounter Diagnosis     ICD-10-CM    1. Primary osteoarthritis of left knee  M17.12       2. Status post total left knee replacement  Z96.652       3. Difficulty walking  R26.2       4. Acute pain of left knee  M25.562                      Assessment  Assessment details: Patient presents to therapy following left TKA on 23. She presents with decreased knee flexion and extension ROM, decreased LE strength, decreased quadriceps flexibility, and decreased knee stabilization/balance. Patient is limited with standing, walking, household and community ambulation/access, stair climbing, sleeping, and food shopping. To address impairments and improve function, patient would benefit from skilled PT consisting manual therapy to improve knee ROM, gait training, therapeutic exercises and activities to improve LE strength and flexibility, neuromuscular reeducation to improve knee stability and balance, and patient education on home program.  Impairments: abnormal gait, abnormal or restricted ROM, impaired balance, impaired physical strength, lacks appropriate home exercise program, pain with function, weight-bearing intolerance and poor body mechanics    Symptom irritability: moderateUnderstanding of Dx/Px/POC: good   Prognosis: good    Goals  Short term goals:  Patient is independent in home program to support plan of care and improve function. - 2 weeks goal met for current home program  Patient demonstrates 0 degrees knee extension PROM so she can sleep with less pain. - 2 weeks post op  Patient demonstrates 110 degrees knee flexion PROM so she can walk with normal gait pattern. - 2 weeks post op    Long term goals:  Patient demonstrates improvement in community participation with increase in FOTO score by 15%.  - 8 weeks post op  Patient demonstrates symmetrical stability in single limb stance so she can walk on uneven ground without pain or difficulty. - 8 weeks post op  Patient demonstrates 5/5 LE strength so she can climb stairs without pain. - 8 weeks post op  Patient can return to work with no greater than 2/10 pain. - 8 weeks      Plan  Patient would benefit from: skilled physical therapy  Planned modality interventions: cryotherapy  Planned therapy interventions: activity modification, ADL training, balance, body mechanics training, flexibility, functional ROM exercises, home exercise program, therapeutic exercise, therapeutic activities, strengthening, stretching, patient education, neuromuscular re-education, massage, manual therapy, joint mobilization and gait training  Frequency: 2x week  Duration in weeks: 10  Plan of Care beginning date: 7/20/2023  Plan of Care expiration date: 9/29/2023  Treatment plan discussed with: patient        Subjective Evaluation    History of Present Illness  Date of onset: 7/1/2020  Mechanism of injury: Post op RE-EVALUATION 7/27/23: Patient reports that she has been managing her pain with medication and ice. She has been walking around her home a lot, trying to do the exercises. She has been waking about every 2 hours from pain, but last night only once. She has been doing steps to enter home one at a time, using RW. PT prescription for pre op PT for left TKA scheduled 7/24/23. She reports several year history of knee pain, with gradual worsening over the past year. Symptoms: L knee pain, increases with any weight bearing activity. Patient climbs steps 1 at a time. She is limited with household work.       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  Patient Goals  Patient goals for therapy: decreased pain, increased motion and increased strength  Patient goal: get back to gardening  Pain  Current pain rating: 7  At best pain ratin  At worst pain ratin          Objective     Observations     Additional Observation Details  Reciprocal gait pattern with RW, decreased knee flexion during swing and heel strike on L side    Active Range of Motion   Left Knee   Flexion: 82 degrees with pain  Extension: -16 degrees with pain    Passive Range of Motion   Left Knee   Flexion: 92 degrees with pain  Extension: -15 degrees with pain    Additional Passive Range of Motion Details  Moderate hamstrings tightness on L  Severe quadriceps tightness on L    Strength/Myotome Testing     Left Hip   Planes of Motion   Flexion: 4  Extension: 4+  Abduction: 4    Right Hip   Planes of Motion   Flexion: 4+    Left Knee   Flexion: 4  Extension: 4-  Quadriceps contraction: fair    Right Knee   Flexion: 5  Extension: 5    Additional Strength Details  Atrophy present in left quad    Tests     Additional Tests Details  Single limb balance testing not done today due to WB with RW    (-) homann's DVT test    General Comments:      Knee Comments  Visual swelling noted, no significant redness warmth or signs of infection noted             Precautions: HTN    Manuals                                      Knee PROM  JHON                        Neuro Re-Ed                          Tandem stance                          NMES quad set  5'                        Ther Ex             Supine Heel slides flexion 10" 5x10"           Knee extension stretch, sitting 10" 5x10"           Pt edu on plan of care, pathology, home program 8'            Seated calf stretch  5x10"           Seated heel slides 10" 5x10"                        Quad set 10" 5x10"           SLR  nv           Re-eval measurements  JHON                                                               Ther Activity                          Minisquats             Gait Training             Heel toe gait pattern   2' with RW                        Modalities             Cold pack end  8'

## 2023-07-27 NOTE — TELEPHONE ENCOUNTER
Spoke to patient, educated on the above refill and messaging. pt encouraged to call me with questions, concerns or issues.

## 2023-07-27 NOTE — TELEPHONE ENCOUNTER
Pt contacted Call Center requested refill of their medication. Medication Name: Oxycodone       Dosage of Med: 5mg       Frequency of Med: 1 tab every 4 hours      Remaining Medication: 10      Pharmacy and Location: [address] [place] [address] [facility]   [facility], [address] [place] [address]   Phone:  [phone]  Fax:  [phone]         Pt. Preferred Callback Phone Number: [phone]      Thank you.

## 2023-08-01 ENCOUNTER — OFFICE VISIT (OUTPATIENT)
Dept: PHYSICAL THERAPY | Facility: CLINIC | Age: 58
End: 2023-08-01
Payer: COMMERCIAL

## 2023-08-01 DIAGNOSIS — Z96.652 STATUS POST TOTAL LEFT KNEE REPLACEMENT: ICD-10-CM

## 2023-08-01 DIAGNOSIS — R26.2 DIFFICULTY WALKING: ICD-10-CM

## 2023-08-01 DIAGNOSIS — M17.12 PRIMARY OSTEOARTHRITIS OF LEFT KNEE: Primary | ICD-10-CM

## 2023-08-01 PROCEDURE — 97140 MANUAL THERAPY 1/> REGIONS: CPT

## 2023-08-01 PROCEDURE — 97110 THERAPEUTIC EXERCISES: CPT

## 2023-08-01 NOTE — PROGRESS NOTES
Daily Note     Today's date: 2023  Patient name: Paty Garcia  : 1965  MRN: [id number]  Referring provider: Nneka Aldridge PA-C  Dx:   Encounter Diagnosis     ICD-10-CM    1. Primary osteoarthritis of left knee  M17.12       2. Status post total left knee replacement  Z96.652       3. Difficulty walking  R26.2           Start Time: 1500  Stop Time: 1552  Total time in clinic (min): 52 minutes    Subjective: Patient reports that she feels better today than she did last week. She has continued edema into LLE and soreness graded  5/10 pre-treatment. Patient states that she ascended stairs (one step at a time) and slept in her bed last night. She positioned with pillows and was able to sleep through the night. She did wake once, but not secondary to pain. Objective: See treatment diary below      Assessment: Tolerated treatment well. Good ROM noted with seated heel slides (92*) Added SLR AAROM with therapist assist but patient was able to activate quad. She was sore after TE and became emotional during manuals due to pain. Patient exhibited good technique with therapeutic exercises and would benefit from continued PT Educated patient on self edema massage techniques at home. She expressed understanding. Plan: Continue per plan of care. Progress treatment as tolerated.        Precautions: HTN    Manuals                                     Knee PROM  JHON JL          Edema Massage   JL          Neuro Re-Ed                          Tandem stance                          NMES quad set  5' 5'                       Ther Ex             Supine Heel slides flexion 10" 5x10" 5"x10          Knee extension stretch, sitting 10" 5x10"           Pt edu on plan of care, pathology, home program 8'            Seated calf stretch  5x10" With strap 20"x5          Seated heel slides 10" 5x10" 5" x20                       Quad set 10" 5x10" 5"x10          SLR  nv 2x5 min A          Re-eval measurements  JHON Ther Activity                          Minisquats             Gait Training             Heel toe gait pattern   2' with RW                        Modalities             Cold pack end  8' 8'

## 2023-08-02 ENCOUNTER — APPOINTMENT (OUTPATIENT)
Dept: PHYSICAL THERAPY | Facility: CLINIC | Age: 58
End: 2023-08-02
Payer: COMMERCIAL

## 2023-08-03 ENCOUNTER — APPOINTMENT (OUTPATIENT)
Dept: RADIOLOGY | Facility: CLINIC | Age: 58
End: 2023-08-03
Payer: COMMERCIAL

## 2023-08-03 ENCOUNTER — OFFICE VISIT (OUTPATIENT)
Dept: PHYSICAL THERAPY | Facility: CLINIC | Age: 58
End: 2023-08-03
Payer: COMMERCIAL

## 2023-08-03 ENCOUNTER — OFFICE VISIT (OUTPATIENT)
Dept: OBGYN CLINIC | Facility: CLINIC | Age: 58
End: 2023-08-03

## 2023-08-03 VITALS
DIASTOLIC BLOOD PRESSURE: 68 MMHG | BODY MASS INDEX: 38.89 KG/M2 | SYSTOLIC BLOOD PRESSURE: 118 MMHG | HEIGHT: 61 IN | WEIGHT: 206 LBS

## 2023-08-03 DIAGNOSIS — Z96.652 STATUS POST TOTAL LEFT KNEE REPLACEMENT: ICD-10-CM

## 2023-08-03 DIAGNOSIS — R26.2 DIFFICULTY WALKING: ICD-10-CM

## 2023-08-03 DIAGNOSIS — Z96.652 AFTERCARE FOLLOWING LEFT KNEE JOINT REPLACEMENT SURGERY: ICD-10-CM

## 2023-08-03 DIAGNOSIS — M25.562 ACUTE PAIN OF LEFT KNEE: ICD-10-CM

## 2023-08-03 DIAGNOSIS — Z96.652 AFTERCARE FOLLOWING LEFT KNEE JOINT REPLACEMENT SURGERY: Primary | ICD-10-CM

## 2023-08-03 DIAGNOSIS — Z47.1 AFTERCARE FOLLOWING LEFT KNEE JOINT REPLACEMENT SURGERY: Primary | ICD-10-CM

## 2023-08-03 DIAGNOSIS — M17.12 PRIMARY OSTEOARTHRITIS OF LEFT KNEE: Primary | ICD-10-CM

## 2023-08-03 DIAGNOSIS — Z47.1 AFTERCARE FOLLOWING LEFT KNEE JOINT REPLACEMENT SURGERY: ICD-10-CM

## 2023-08-03 PROCEDURE — 97110 THERAPEUTIC EXERCISES: CPT

## 2023-08-03 PROCEDURE — 97140 MANUAL THERAPY 1/> REGIONS: CPT

## 2023-08-03 PROCEDURE — 99024 POSTOP FOLLOW-UP VISIT: CPT | Performed by: ORTHOPAEDIC SURGERY

## 2023-08-03 PROCEDURE — 73562 X-RAY EXAM OF KNEE 3: CPT

## 2023-08-03 RX ORDER — NALOXONE HYDROCHLORIDE 4 MG/.1ML
SPRAY NASAL
COMMUNITY
Start: 2023-07-24

## 2023-08-03 NOTE — ASSESSMENT & PLAN NOTE
Drake Ackerman is doing well status post left total knee arthroplasty on July 24, 2023. X-rays were reviewed with her that revealed a well aligned prosthesis with no evidence of loosening. She may get the incision wet in the shower. Discussed lowering aspirin to once a day and taking NSAIDs as needed for pain. She can alternate the NSAIDs with the Tylenol. She is to continue regular icing and elevation. Continue outpatient physical therapy. Follow-up in 3 months with repeat x-rays. All questions were answered to patient's satisfaction.

## 2023-08-03 NOTE — PROGRESS NOTES
Assessment:     1. Aftercare following left knee joint replacement surgery        Plan:     Problem List Items Addressed This Visit        Other    Aftercare following left knee joint replacement surgery - Primary     Jackson Valenzuela is doing well status post left total knee arthroplasty on July 24, 2023. X-rays were reviewed with her that revealed a well aligned prosthesis with no evidence of loosening. She may get the incision wet in the shower. Discussed lowering aspirin to once a day and taking NSAIDs as needed for pain. She can alternate the NSAIDs with the Tylenol. She is to continue regular icing and elevation. Continue outpatient physical therapy. Follow-up in 3 months with repeat x-rays. All questions were answered to patient's satisfaction. Relevant Orders    XR knee 3 vw left non injury      Subjective:     Patient ID: Peggyann Sandifer is a 61 y.o. female. Chief Complaint: This is a 34-year-old white female who is status post left total knee arthroplasty on July 24, 2023. She arrives to the office using a walker for assistance. She is attending outpatient physical therapy. She denies any fevers at home. She states she has had the chills a few times, but has taken her temperature with no fever. She states that on Tuesday she went to a physical therapy session where they pushed her knee into extreme flexion and she had significant pain until this morning. Physical therapy states they will be more gentle with her. She does have swelling and bruising that extend down to the foot and ankle. She stopped using the compression stocking. She is icing and elevating.     Allergy:  Allergies   Allergen Reactions   • Penicillins Hives and Throat Swelling     Childhood allergy per pt   • Meloxicam Hives and Itching     Medications:  all current active meds have been reviewed  Past Medical History:  Past Medical History:   Diagnosis Date   • Anxiety 2005   • Bulging lumbar disc    • Chronic pain     low back • Degenerative disk disease     lumbar spine   • Depression 2005   • Hyperlipidemia    • Hypertension    • Pinched nerve     lumbar spine   • Psychiatric disorder     depression   • Shingles 20179     Past Surgical History:  Past Surgical History:   Procedure Laterality Date   • CARPAL TUNNEL RELEASE Bilateral 1998   • CHOLECYSTECTOMY  2008   • COLONOSCOPY  06/15/2015   • COLONOSCOPY  06/02/2015    Internal hemorrhoids, hyperplastic polyps which were removed. Ten year recall recommended June 2025   • COLONOSCOPY  09/16/2020    normal terminal ileum, 1 small polyp in the distal sigmoid colon that was hyperplastic. Caleb Velez She did have a small amount of retained stool in the colon. She did have a small amount of retained stool in the colon. A 5 year recall was recommended due to poor prep, September 2025. • FL INJECTION LEFT KNEE (ARTHROGRAM)  6/6/2023   • DC ARTHRP KNE CONDYLE&PLATU MEDIAL&LAT COMPARTMENTS Left 7/24/2023    Procedure: ARTHROPLASTY KNEE TOTAL SAME DAY;   Surgeon: Bita Larson MD;  Location:  MAIN OR;  Service: Orthopedics   • SPINAL CORD STIMULATOR IMPLANT      2014   • SPINE SURGERY  2017    Stimulator inplant   • WISDOM TOOTH EXTRACTION Bilateral 1982     Family History:  Family History   Problem Relation Age of Onset   • Skin cancer Mother    • Heart disease Father         Dx with lewy body dementia in 2012   • Dementia Father         Dx with lewy body dementia in 2012   • Breast cancer Maternal Aunt 28   • Breast cancer Sister 64   • BRCA1 Negative Sister    • BRCA2 Negative Sister    • Depression Sister    • Prostate cancer Maternal Uncle    • Cancer Maternal Uncle         BLADDER   • Colon cancer Maternal Uncle    • Colon polyps Neg Hx      Social History:  Social History     Substance and Sexual Activity   Alcohol Use Yes   • Alcohol/week: 0.0 standard drinks of alcohol    Comment: Social drinker     Social History     Substance and Sexual Activity   Drug Use No     Social History Tobacco Use   Smoking Status Every Day   • Packs/day: 1.00   • Years: 43.00   • Total pack years: 43.00   • Types: Cigarettes   • Start date: 0   Smokeless Tobacco Never   Tobacco Comments    smokes 10 cigs daily as of 10/20     Review of Systems   Constitutional: Negative. HENT: Negative. Eyes: Negative. Respiratory: Negative. Cardiovascular: Negative. Gastrointestinal: Negative. Endocrine: Negative. Genitourinary: Negative. Musculoskeletal: Positive for arthralgias (left knee), gait problem (using a walker) and joint swelling. Skin: Negative. Allergic/Immunologic: Negative. Hematological: Negative. Psychiatric/Behavioral: Negative. Objective:  BP Readings from Last 1 Encounters:   08/03/23 118/68      Wt Readings from Last 1 Encounters:   08/03/23 93.4 kg (206 lb)      BMI:   Estimated body mass index is 38.92 kg/m² as calculated from the following:    Height as of this encounter: 5' 1" (1.549 m). Weight as of this encounter: 93.4 kg (206 lb). BSA:   Estimated body surface area is 1.91 meters squared as calculated from the following:    Height as of this encounter: 5' 1" (1.549 m). Weight as of this encounter: 93.4 kg (206 lb). Physical Exam  Constitutional:       General: She is not in acute distress. Appearance: She is well-developed. HENT:      Head: Normocephalic. Eyes:      Conjunctiva/sclera: Conjunctivae normal.      Pupils: Pupils are equal, round, and reactive to light. Pulmonary:      Effort: Pulmonary effort is normal. No respiratory distress. Musculoskeletal:      Left knee: Effusion present. Skin:     General: Skin is warm and dry. Neurological:      Mental Status: She is alert and oriented to person, place, and time.    Psychiatric:         Behavior: Behavior normal.       Left Knee Exam     Range of Motion   Extension: -5   Flexion: 90     Tests   Varus: negative Valgus: negative    Other   Erythema: absent  Scars: present (Well-healing incision with no evidence of infection. No active drainage.)  Sensation: normal  Pulse: present  Swelling: moderate  Effusion: effusion present    Comments:  Calf soft and compressible  Swelling and ecchymosis in thigh extending to knee down to foot and ankle  Actively moving ankle and toes            I have personally reviewed pertinent films in PACS and my interpretation is X-rays of the left knee reveal a well aligned prosthesis with no evidence of loosening.

## 2023-08-03 NOTE — PROGRESS NOTES
Daily Note     Today's date: 8/3/2023  Patient name: Onur Brandt  : 1965  MRN: [id number]  Referring provider: Jonathan Smith PA-C  Dx:   Encounter Diagnosis     ICD-10-CM    1. Primary osteoarthritis of left knee  M17.12       2. Status post total left knee replacement  Z96.652       3. Difficulty walking  R26.2       4. Acute pain of left knee  M25.562           Start Time: 1103  Stop Time: 1147  Total time in clinic (min): 44 minutes    Subjective: Pt reports previous session in PT she had significant discomfort with OP of L knee flexion PROM and OP into flexion. She reports it was difficult to sleep last night, and she was able to manage pain with medication. She reports she I feeling significant relief today and rates pain as a 5/10. Objective: See treatment diary below      Assessment: Tolerated treatment well. She tolerates PROM, with Hip fllexion, and gravity allowing knee to bend to tolerance. She reports no elevation of sx and was given CP at the end of her session to assist residual soreness and swelling. Patient exhibited good technique with therapeutic exercises and would benefit from continued PT      Plan: Continue per plan of care.       Precautions: HTN    Manuals 7/20 7/27 8/1 8/3                                   Knee PROM  JHON JL  gentle today         Edema Massage   JL           Neuro Re-Ed                          Tandem stance                          NMES quad set  5' 5' 5'                      Ther Ex             Supine Heel slides flexion 10" 5x10" 5"x10 5''x10         Knee extension stretch, sitting 10" 5x10"           Pt edu on plan of care, pathology, home program 8'            Seated calf stretch  5x10" With strap 20"x5 With strap 20"x5         Seated heel slides 10" 5x10" 5" x20 5''x20                      Quad set 10" 5x10" 5"x10 5''x10, TKE 5''x10         SLR  nv 2x5 min A 2x5         Re-eval measurements  JHON Ther Activity                          Minisquats             Gait Training             Heel toe gait pattern   2' with RW                        Modalities             Cold pack end  8' 8' 8'

## 2023-08-07 ENCOUNTER — OFFICE VISIT (OUTPATIENT)
Dept: PHYSICAL THERAPY | Facility: CLINIC | Age: 58
End: 2023-08-07
Payer: COMMERCIAL

## 2023-08-07 DIAGNOSIS — M17.12 PRIMARY OSTEOARTHRITIS OF LEFT KNEE: Primary | ICD-10-CM

## 2023-08-07 DIAGNOSIS — M25.562 ACUTE PAIN OF LEFT KNEE: ICD-10-CM

## 2023-08-07 DIAGNOSIS — Z96.652 STATUS POST TOTAL LEFT KNEE REPLACEMENT: ICD-10-CM

## 2023-08-07 DIAGNOSIS — R26.2 DIFFICULTY WALKING: ICD-10-CM

## 2023-08-07 PROCEDURE — 97010 HOT OR COLD PACKS THERAPY: CPT | Performed by: PHYSICAL THERAPIST

## 2023-08-07 PROCEDURE — 97140 MANUAL THERAPY 1/> REGIONS: CPT | Performed by: PHYSICAL THERAPIST

## 2023-08-07 PROCEDURE — 97110 THERAPEUTIC EXERCISES: CPT | Performed by: PHYSICAL THERAPIST

## 2023-08-07 NOTE — PROGRESS NOTES
Daily Note     Today's date: 2023  Patient name: Tia Feliciano  : 1965  MRN: [id number]  Referring provider: Chanel Duron PA-C  Dx:   Encounter Diagnosis     ICD-10-CM    1. Primary osteoarthritis of left knee  M17.12       2. Status post total left knee replacement  Z96.652       3. Difficulty walking  R26.2       4. Acute pain of left knee  M25.562                      Subjective: Pt reports that her knee pain has been gradually improving. She has been doing her stretching exercises 4x per day as instructed. Objective: See treatment diary below    98/104 degrees L knee flexion A/PROM    -6 degrees full knee extension A/PROM    Assessment: Instructed patient in proper gait pattern with emphasis on heel to toe with quad cane. Patient demonstrated improved quad strength/activation with SLR. Significant improvement in knee flexion ROM seen, but still severely limited with knee extension. Progressed home program with addition of low load prolonged stretch with knee propped in extension on stool. Patient would benefit from continued skilled PT per plan of care to address impairments and improve function. Plan: Continue per plan of care.       Precautions: HTN    Manuals 7/20 7/27 8/1 8/3 8/7                                  Knee PROM  JHON JL FH gentle today JHON        Edema Massage   JL FH          Neuro Re-Ed                          Tandem stance     2x30"                     NMES quad set  5' 5' 5' 5'                     Ther Ex             Supine Heel slides flexion 10" 5x10" 5"x10 5''x10 5x10"        Knee extension stretch, sitting 10" 5x10"   5x10"        Pt edu on plan of care, pathology, home program 8'            Seated calf stretch  5x10" With strap 20"x5 With strap 20"x5 5x20"        Seated heel slides 10" 5x10" 5" x20 5''x20 5x10"        Seated knee ext LLPS on stool     3'        Quad set 10" 5x10" 5"x10 5''x10, TKE 5''x10         SLR  nv 2x5 min A 2x5 x10 Ther Activity                          Minisquats     10x        Gait Training             Heel toe gait pattern   2' with RW   5'                     Modalities             Cold pack end  8' 8' 8' 8'

## 2023-08-09 ENCOUNTER — OFFICE VISIT (OUTPATIENT)
Dept: PHYSICAL THERAPY | Facility: CLINIC | Age: 58
End: 2023-08-09
Payer: COMMERCIAL

## 2023-08-09 DIAGNOSIS — M25.562 ACUTE PAIN OF LEFT KNEE: ICD-10-CM

## 2023-08-09 DIAGNOSIS — Z96.652 STATUS POST TOTAL LEFT KNEE REPLACEMENT: ICD-10-CM

## 2023-08-09 DIAGNOSIS — R26.2 DIFFICULTY WALKING: ICD-10-CM

## 2023-08-09 DIAGNOSIS — M17.12 PRIMARY OSTEOARTHRITIS OF LEFT KNEE: Primary | ICD-10-CM

## 2023-08-09 PROCEDURE — 97110 THERAPEUTIC EXERCISES: CPT | Performed by: PHYSICAL THERAPIST

## 2023-08-09 PROCEDURE — 97530 THERAPEUTIC ACTIVITIES: CPT | Performed by: PHYSICAL THERAPIST

## 2023-08-09 PROCEDURE — 97010 HOT OR COLD PACKS THERAPY: CPT | Performed by: PHYSICAL THERAPIST

## 2023-08-09 PROCEDURE — 97112 NEUROMUSCULAR REEDUCATION: CPT | Performed by: PHYSICAL THERAPIST

## 2023-08-09 NOTE — PROGRESS NOTES
Daily Note     Today's date: 2023  Patient name: Del Marin  : 1965  MRN: [id number]  Referring provider: Breto Dunn PA-C  Dx:   Encounter Diagnosis     ICD-10-CM    1. Primary osteoarthritis of left knee  M17.12       2. Status post total left knee replacement  Z96.652       3. Difficulty walking  R26.2       4. Acute pain of left knee  M25.562                      Subjective: Pt reports that she think she overdid with the extension exercises yesterday and is more sore today from it. Objective: See treatment diary below      -4/3 degrees from full knee extension A/PROM    Assessment: Progressed functional strengthening today with addition of forward step up, step up and over, and increase in sets for minisquats. Patient demonstrates significant improvement in knee extension active and passive ROM. She still has some difficulty with quad activation during quad set, but tolerated increase in sets for SLRs well. Patient would benefit from continued skilled PT per plan of care to address impairments and improve function. Plan: Continue per plan of care. Progress knee ROM and strength as tolerated.       Precautions: HTN    Manuals 7/20 7/27 8/1 8/3 8/7 8/9                                 Knee PROM  JHON JL FH gentle today JHON JHON       Edema Massage   JL FH          Neuro Re-Ed                          Tandem stance     2x30" 3x30"                    NMES quad set  5' 5' 5' 5' 5'                    Ther Ex             Supine Heel slides flexion 10" 5x10" 5"x10 5''x10 5x10" 10x10"       Knee extension stretch, sitting 10" 5x10"   5x10" 5x10"       Pt edu on plan of care, pathology, home program 8'            Seated calf stretch  5x10" With strap 20"x5 With strap 20"x5 5x20" 4x20"       Seated heel slides 10" 5x10" 5" x20 5''x20 5x10"        Seated knee ext LLPS on stool     3' 3'       Quad set 10" 5x10" 5"x10 5''x10, TKE 5''x10  5x5"       SLR  nv 2x5 min A 2x5 x10 2x10       bridge      10x Hamstring stretch      4x20"                                              Ther Activity             Step up and over      10x lvl 1       Fwd step up      10x lvl 1       Minisquats     10x 2x10       Gait Training             Heel toe gait pattern   2' with RW   5'                     Modalities             Cold pack end  8' 8' 8' 8' 8'

## 2023-08-14 ENCOUNTER — OFFICE VISIT (OUTPATIENT)
Dept: PHYSICAL THERAPY | Facility: CLINIC | Age: 58
End: 2023-08-14
Payer: COMMERCIAL

## 2023-08-14 DIAGNOSIS — R26.2 DIFFICULTY WALKING: ICD-10-CM

## 2023-08-14 DIAGNOSIS — M17.12 PRIMARY OSTEOARTHRITIS OF LEFT KNEE: Primary | ICD-10-CM

## 2023-08-14 DIAGNOSIS — Z96.652 STATUS POST TOTAL LEFT KNEE REPLACEMENT: ICD-10-CM

## 2023-08-14 DIAGNOSIS — M25.562 ACUTE PAIN OF LEFT KNEE: ICD-10-CM

## 2023-08-14 PROCEDURE — 97530 THERAPEUTIC ACTIVITIES: CPT | Performed by: PHYSICAL THERAPIST

## 2023-08-14 PROCEDURE — 97010 HOT OR COLD PACKS THERAPY: CPT | Performed by: PHYSICAL THERAPIST

## 2023-08-14 PROCEDURE — 97110 THERAPEUTIC EXERCISES: CPT | Performed by: PHYSICAL THERAPIST

## 2023-08-14 NOTE — PROGRESS NOTES
Daily Note     Today's date: 2023  Patient name: Clotilde Mejia  : 1965  MRN: [id number]  Referring provider: Keri Quach PA-C  Dx:   Encounter Diagnosis     ICD-10-CM    1. Primary osteoarthritis of left knee  M17.12       2. Status post total left knee replacement  Z96.652       3. Difficulty walking  R26.2       4. Acute pain of left knee  M25.562                      Subjective: Pt reports that she still has difficulty with reciprocal gait pattern at home. She finds at times not needing her cane at home. Objective: See treatment diary below    114/118 knee flexion A/PROM    Assessment: Patient demonstrates significant improvement in knee flexion ROM today. Improved quad activation noted with quad set today. Patient would benefit from continued skilled PT per plan of care to address impairments and improve function. Plan: Continue per plan of care. Progress knee ROM and strength as tolerated.       Precautions: HTN    Manuals 7/20 7/27 8/1 8/3 8/7 8/9 8/14                                Knee PROM  JHON JL FH gentle today JHON JHON JHON      Edema Massage   JL     JHON STM quad      Neuro Re-Ed                          Tandem stance     2x30" 3x30" 3x30"                   NMES quad set  5' 5' 5' 5' 5'                    Ther Ex             Supine Heel slides flexion 10" 5x10" 5"x10 5''x10 5x10" 10x10" 10x10"      Knee extension stretch, sitting 10" 5x10"   5x10" 5x10"       Pt edu on plan of care, pathology, home program 8'            Seated calf stretch  5x10" With strap 20"x5 With strap 20"x5 5x20" 4x20" 4x20"      Seated heel slides 10" 5x10" 5" x20 5''x20 5x10"        Seated knee ext LLPS on stool     3' 3' 3'      LAQ       2x10 2#      Quad set 10" 5x10" 5"x10 5''x10, TKE 5''x10  5x5" 10x5"      SLR  nv 2x5 min A 2x5 x10 2x10 2x10      bridge      10x 2x10      Hamstring stretch      4x20" 4x20"      Bike for ROM       8' lvl 0                                Ther Activity             Step up and over      10x lvl 1 10x lvl 2      Fwd step up      10x lvl 1       Minisquats     10x 2x10 2x10      Gait Training             Heel toe gait pattern   2' with RW   5'                     Modalities             Cold pack end  8' 8' 8' 8' 8' 8'

## 2023-08-16 ENCOUNTER — OFFICE VISIT (OUTPATIENT)
Dept: PHYSICAL THERAPY | Facility: CLINIC | Age: 58
End: 2023-08-16
Payer: COMMERCIAL

## 2023-08-16 ENCOUNTER — TELEPHONE (OUTPATIENT)
Dept: OBGYN CLINIC | Facility: CLINIC | Age: 58
End: 2023-08-16

## 2023-08-16 DIAGNOSIS — Z01.818 PREOP TESTING: Primary | ICD-10-CM

## 2023-08-16 DIAGNOSIS — M17.12 PRIMARY OSTEOARTHRITIS OF LEFT KNEE: ICD-10-CM

## 2023-08-16 DIAGNOSIS — M25.562 ACUTE PAIN OF LEFT KNEE: ICD-10-CM

## 2023-08-16 DIAGNOSIS — R26.2 DIFFICULTY WALKING: ICD-10-CM

## 2023-08-16 DIAGNOSIS — Z96.652 STATUS POST TOTAL LEFT KNEE REPLACEMENT: ICD-10-CM

## 2023-08-16 PROCEDURE — 97112 NEUROMUSCULAR REEDUCATION: CPT

## 2023-08-16 PROCEDURE — 97110 THERAPEUTIC EXERCISES: CPT

## 2023-08-16 PROCEDURE — 97140 MANUAL THERAPY 1/> REGIONS: CPT

## 2023-08-16 NOTE — PROGRESS NOTES
Daily Note     Today's date: 2023  Patient name: Quynh Fitzgerald  : 1965  MRN: [id number]  Referring provider: Rg Santiago PA-C  Dx:   Encounter Diagnosis     ICD-10-CM    1. Preop testing  Z01.818       2. Primary osteoarthritis of left knee  M17.12       3. Status post total left knee replacement  Z96.652       4. Difficulty walking  R26.2       5. Acute pain of left knee  M25.562                      Subjective: Patient reports her knee is feeling better with minimal discomfort and is sleeping in her bed and able to walk up the stairs step over step. Objective: See treatment diary below      Assessment: Tolerated treatment well. Able to complete full revolutions on the Bike in reverse and initiating full revolutions forward. Heel Slide stretch with strap flexion 116 degrees. Limited in extension as well, but improving. Good quad control during SLR. Patient exhibited good technique with therapeutic exercises and would benefit from continued PT      Plan: Continue per plan of care.       Precautions: HTN    Manuals 7/20 7/27 8/1 8/3 8/7 8/9 8/14 8/16                               Knee PROM  JHON JL FH gentle today JHON JHON JHON [place]     Edema Massage   JL FH    JHON STM quad [place]     Neuro Re-Ed                          Tandem stance     2x30" 3x30" 3x30"                   NMES quad set  5' 5' 5' 5' 5'                    Ther Ex             Supine Heel slides flexion 10" 5x10" 5"x10 5''x10 5x10" 10x10" 10x10" 10x10"     Knee extension stretch, sitting 10" 5x10"   5x10" 5x10"       Pt edu on plan of care, pathology, home program 8'            Seated calf stretch  5x10" With strap 20"x5 With strap 20"x5 5x20" 4x20" 4x20" 4x20"     Seated heel slides 10" 5x10" 5" x20 5''x20 5x10"   5x10"     Seated knee ext LLPS on stool     3' 3' 3' 3'     LAQ       2x10 2# 2x10 2#     Quad set 10" 5x10" 5"x10 5''x10, TKE 5''x10  5x5" 10x5" 10x5"     SLR  nv 2x5 min A 2x5 x10 2x10 2x10 2x10     bridge      10x 2x10 2x10 Hamstring stretch      4x20" 4x20" 4x20"     Bike for ROM       8' lvl 0 5' Lvl 0                               Ther Activity             Step up and over      10x lvl 1 10x lvl 2 10x lvl 2     Fwd step up      10x lvl 1  10x ea Fwd/Side lvl1     Minisquats     10x 2x10 2x10 2x10     Gait Training             Heel toe gait pattern   2' with RW   5'                     Modalities             Cold pack end  8' 8' 8' 8' 8' 8' 8'

## 2023-08-16 NOTE — TELEPHONE ENCOUNTER
Patient came into office today asking for a return to work note for her to return to work on September 5, 2023. Please advise and thank you.

## 2023-08-16 NOTE — LETTER
August 18, 2023     Patient: Francisco Toney  YOB: 1965  Date of Visit: 8/16/2023      To Whom it May Concern:    Marichuy Salomon is under my professional care. Nanci Mendoza may return to work with no restrictions on 9/5/23. If you have any questions or concerns, please don't hesitate to call.          Sincerely,          Dr. Emmett Dumont MD        CC: No Recipients

## 2023-08-17 ENCOUNTER — TELEPHONE (OUTPATIENT)
Age: 58
End: 2023-08-17

## 2023-08-18 NOTE — TELEPHONE ENCOUNTER
Advised per JOSTIN's note. Pt states incision is actually healed over without scabs at this time. She is pleased and denies other questions.

## 2023-08-21 ENCOUNTER — OFFICE VISIT (OUTPATIENT)
Dept: PHYSICAL THERAPY | Facility: CLINIC | Age: 58
End: 2023-08-21
Payer: COMMERCIAL

## 2023-08-21 DIAGNOSIS — M25.562 ACUTE PAIN OF LEFT KNEE: ICD-10-CM

## 2023-08-21 DIAGNOSIS — Z96.652 STATUS POST TOTAL LEFT KNEE REPLACEMENT: ICD-10-CM

## 2023-08-21 DIAGNOSIS — M17.12 PRIMARY OSTEOARTHRITIS OF LEFT KNEE: Primary | ICD-10-CM

## 2023-08-21 DIAGNOSIS — R26.2 DIFFICULTY WALKING: ICD-10-CM

## 2023-08-21 PROCEDURE — 97140 MANUAL THERAPY 1/> REGIONS: CPT | Performed by: PHYSICAL THERAPIST

## 2023-08-21 PROCEDURE — 97110 THERAPEUTIC EXERCISES: CPT | Performed by: PHYSICAL THERAPIST

## 2023-08-21 PROCEDURE — 97112 NEUROMUSCULAR REEDUCATION: CPT | Performed by: PHYSICAL THERAPIST

## 2023-08-21 NOTE — PROGRESS NOTES
Daily Note     Today's date: 2023  Patient name: Fritz Linn  : 1965  MRN: [id number]  Referring provider: Erin Ospina PA-C  Dx:   Encounter Diagnosis     ICD-10-CM    1. Primary osteoarthritis of left knee  M17.12       2. Status post total left knee replacement  Z96.652       3. Difficulty walking  R26.2       4. Acute pain of left knee  M25.562                      Subjective: Patient reports that her knee is feeling better. She is having less difficulty sleeping at night. Objective: See treatment diary below    -7/-3 degrees short of full knee extension A/PROM    Assessment: Tolerated treatment well. Patient demonstrates gradual improvement in knee extension PROM, but is still limited, especially with AROM. Advanced closed chain strengthening with increased step height for lateral step ups and addition of ball squats. Patient would benefit from continued skilled PT per plan of care to address impairments and improve function. Plan: Continue per plan of care.  Improve strength and ROM as tolerated     Precautions: HTN    Manuals 7/20 7/27 8/1 8/3 8/7 8/9 8/14 8/16 8/21                              Knee PROM  JHON JL FH gentle today JHON JHON JHON [place] JHON    Edema Massage   JL FH    JHON STM quad Valri Prim quad and hams    Neuro Re-Ed                          Tandem stance     2x30" 3x30" 3x30"  1x30" ea    SLS         2x30" ea    NMES quad set  5' 5' 5' 5' 5'                    Ther Ex             Supine Heel slides flexion 10" 5x10" 5"x10 5''x10 5x10" 10x10" 10x10" 10x10" 10x10"    Knee extension stretch, sitting 10" 5x10"   5x10" 5x10"   10x10"    Pt edu on plan of care, pathology, home program 8'            Seated calf stretch  5x10" With strap 20"x5 With strap 20"x5 5x20" 4x20" 4x20" 4x20" 3x30"    Prone lying for knee extension         3'    Seated knee ext LLPS on stool     3' 3' 3' 3' 3'    LAQ       2x10 2# 2x10 2# 3x10 3#    Quad set 10" 5x10" 5"x10 5''x10, TKE 5''x10  5x5" 10x5" 10x5" SLR  nv 2x5 min A 2x5 x10 2x10 2x10 2x10 3x10    bridge      10x 2x10 2x10 3x10    Hamstring stretch      4x20" 4x20" 4x20"     Bike for ROM       8' lvl 0 5' Lvl 0 8' lvl 0                              Ther Activity             Step up and over      10x lvl 1 10x lvl 2 10x lvl 2 2x10 lvl 2    Lateral step up         10x lvl 2    Fwd step up      10x lvl 1  10x ea Fwd/Side lvl1     Ball squats         3x10    Minisquats     10x 2x10 2x10 2x10     Gait Training             Heel toe gait pattern   2' with RW   5'                     Modalities             Cold pack end  8' 8' 8' 8' 8' 8' 8' 8'

## 2023-08-23 ENCOUNTER — TELEPHONE (OUTPATIENT)
Dept: OBGYN CLINIC | Facility: HOSPITAL | Age: 58
End: 2023-08-23

## 2023-08-23 ENCOUNTER — OFFICE VISIT (OUTPATIENT)
Dept: PHYSICAL THERAPY | Facility: CLINIC | Age: 58
End: 2023-08-23
Payer: COMMERCIAL

## 2023-08-23 DIAGNOSIS — R26.2 DIFFICULTY WALKING: ICD-10-CM

## 2023-08-23 DIAGNOSIS — M17.12 PRIMARY OSTEOARTHRITIS OF LEFT KNEE: Primary | ICD-10-CM

## 2023-08-23 DIAGNOSIS — M25.562 ACUTE PAIN OF LEFT KNEE: ICD-10-CM

## 2023-08-23 DIAGNOSIS — Z96.652 STATUS POST TOTAL LEFT KNEE REPLACEMENT: ICD-10-CM

## 2023-08-23 PROCEDURE — 97112 NEUROMUSCULAR REEDUCATION: CPT | Performed by: PHYSICAL THERAPIST

## 2023-08-23 PROCEDURE — 97110 THERAPEUTIC EXERCISES: CPT | Performed by: PHYSICAL THERAPIST

## 2023-08-23 PROCEDURE — 97010 HOT OR COLD PACKS THERAPY: CPT | Performed by: PHYSICAL THERAPIST

## 2023-08-23 PROCEDURE — 97140 MANUAL THERAPY 1/> REGIONS: CPT | Performed by: PHYSICAL THERAPIST

## 2023-08-23 NOTE — PROGRESS NOTES
Daily Note     Today's date: 2023  Patient name: Da Forman  : 1965  MRN: [id number]  Referring provider: Tonia Daniels PA-C  Dx:   Encounter Diagnosis     ICD-10-CM    1. Primary osteoarthritis of left knee  M17.12       2. Status post total left knee replacement  Z96.652       3. Difficulty walking  R26.2       4. Acute pain of left knee  M25.562                      Subjective: Patient reports some increased soreness in her knee today, in part from last session but also from landing to hard when descending steps this morning. Objective: See treatment diary below    117/122 L knee flexion A/PROM    Assessment: Patient ambulated into clinic with increased antalgia compared to previous session. She demonstrated improved knee flexion following [place] and manual therapy, exhibiting full knee flexion for first time post op. Attempted progression of stair training but patient had increased pain with step down with 8 in step, but returned to using 6 in step (lvl 2). Patient also required cuing to avoid weight shift on to during ball squats due to left LE weakness. Patient would benefit from continued skilled PT per plan of care to address impairments and improve function. Plan: Continue per plan of care.  Improve strength and ROM as tolerated     Precautions: HTN    Manuals 7/20 7/27 8/1 8/3 8/7 8/9 8/14 8/16 8/21 8/23                             Knee PROM  JHON JL FH gentle today JHON JHON JHON [place] JHON JHON   Edema Massage   [facility]    JHON STM quad Eletha Hof quad and hams JHON   Neuro Re-Ed             Tandem walkign          2x20 ft   Tandem stance     2x30" 3x30" 3x30"  1x30" ea D/c   SLS         2x30" ea 3x30" ea   NMES quad set  5' 5' 5' 5' 5'                    Ther Ex             Supine Heel slides flexion 10" 5x10" 5"x10 5''x10 5x10" 10x10" 10x10" 10x10" 10x10" 10x10"   Knee extension stretch, sitting 10" 5x10"   5x10" 5x10"   10x10" 10x10"   Pt edu on plan of care, pathology, home program 8' Seated calf stretch  5x10" With strap 20"x5 With strap 20"x5 5x20" 4x20" 4x20" 4x20" 3x30"    Prone lying for knee extension         3' 3' x 3#   Seated knee ext LLPS on stool     3' 3' 3' 3' 3' 3'   LAQ       2x10 2# 2x10 2# 3x10 3#    Quad set 10" 5x10" 5"x10 5''x10, TKE 5''x10  5x5" 10x5" 10x5"  10x   SLR  nv 2x5 min A 2x5 x10 2x10 2x10 2x10 3x10 3x10   bridge      10x 2x10 2x10 3x10 3x10   Hamstring stretch      4x20" 4x20" 4x20"     Bike for ROM       8' lvl 0 5' Lvl 0 8' lvl 0 8' lvl 0                             Ther Activity             Step up and over      10x lvl 1 10x lvl 2 10x lvl 2 2x10 lvl 2 10x lvl 2   Lateral step up         10x lvl 2 2x10 lvl 3   Fwd step up      10x lvl 1  10x ea Fwd/Side lvl1     Ball squats         3x10 2x10   Minisquats     10x 2x10 2x10 2x10     Gait Training             Heel toe gait pattern   2' with RW   5'                     Modalities             Cold pack end  8' 8' 8' 8' 8' 8' 8' 8' 8'

## 2023-08-28 ENCOUNTER — OFFICE VISIT (OUTPATIENT)
Dept: PHYSICAL THERAPY | Facility: CLINIC | Age: 58
End: 2023-08-28
Payer: COMMERCIAL

## 2023-08-28 DIAGNOSIS — M25.562 ACUTE PAIN OF LEFT KNEE: ICD-10-CM

## 2023-08-28 DIAGNOSIS — R26.2 DIFFICULTY WALKING: ICD-10-CM

## 2023-08-28 DIAGNOSIS — Z96.652 STATUS POST TOTAL LEFT KNEE REPLACEMENT: ICD-10-CM

## 2023-08-28 DIAGNOSIS — M17.12 PRIMARY OSTEOARTHRITIS OF LEFT KNEE: Primary | ICD-10-CM

## 2023-08-28 PROCEDURE — 97010 HOT OR COLD PACKS THERAPY: CPT | Performed by: PHYSICAL THERAPIST

## 2023-08-28 PROCEDURE — 97140 MANUAL THERAPY 1/> REGIONS: CPT | Performed by: PHYSICAL THERAPIST

## 2023-08-28 PROCEDURE — 97110 THERAPEUTIC EXERCISES: CPT | Performed by: PHYSICAL THERAPIST

## 2023-08-28 PROCEDURE — 97530 THERAPEUTIC ACTIVITIES: CPT | Performed by: PHYSICAL THERAPIST

## 2023-08-28 NOTE — PROGRESS NOTES
Daily Note     Today's date: 2023  Patient name: Alena Dillon  : 1965  MRN: [id number]  Referring provider: Yareli De Leon PA-C  Dx:   Encounter Diagnosis     ICD-10-CM    1. Primary osteoarthritis of left knee  M17.12       2. Status post total left knee replacement  Z96.652       3. Difficulty walking  R26.2       4. Acute pain of left knee  M25.562                      Subjective: Patient reports feeling less soreness compared to previous session. Objective: See treatment diary below    -4/-2 deg. Knee extension A/PROM L side    Assessment: Patient required tactile cuing to reduce weight shift onto R side with ball squats, but overall displayed improved form compared to previous session. Patient demonstrates further improvements in knee extension ROM but continues to be limited from full motion. Progressed extension stretching with increased resistance for prone lying in extension and seated with knee extension on stool, low load prolonged stretch. Patient would benefit from continued skilled PT per plan of care to address impairments and improve function. Plan: Continue per plan of care.  Improve strength and ROM as tolerated     Precautions: HTN. EPOC 23    Manuals 8/28 7/27 8/1 8/3 8/7 8/9 8/14 8/16 8/21 8/23                             Knee PROM JHON JHON JL FH gentle today JHON JHON JHON KY JHON JHON   Edema Massage JHON  JL FH    JHON STM quad Elverna Banter quad and hams JHON   Neuro Re-Ed             Tandem walking 4x10 ft         2x20 ft                SLS 3x30"        2x30" ea 3x30" ea                             Ther Ex             Supine Heel slides flexion 10x10" 5x10" 5"x10 5''x10 5x10" 10x10" 10x10" 10x10" 10x10" 10x10"   Knee extension stretch, sitting 10"x10" 5x10"   5x10" 5x10"   10x10" 10x10"   Pt edu on plan of care, pathology, home program 8'            Seated calf stretch  5x10" With strap 20"x5 With strap 20"x5 5x20" 4x20" 4x20" 4x20" 3x30"    Prone lying for knee extension 3'x4#        3' 3' x 3#   Seated knee ext LLPS on stool 3'x4#    3' 3' 3' 3' 3' 3'                Quad set  5x10" 5"x10 5''x10, TKE 5''x10  5x5" 10x5" 10x5"  10x   SLR 3x10 1# nv 2x5 min A 2x5 x10 2x10 2x10 2x10 3x10 3x10   bridge 3x10     10x 2x10 2x10 3x10 3x10   Hamstring stretch      4x20" 4x20" 4x20"     Bike for ROM 8' lvl 0      8' lvl 0 5' Lvl 0 8' lvl 0 8' lvl 0                             Ther Activity             Step up and over 10x lvl 3     10x lvl 1 10x lvl 2 10x lvl 2 2x10 lvl 2 10x lvl 2   Lateral step up         10x lvl 2 2x10 lvl 3   Fwd step up 10x lvl 3     10x lvl 1  10x ea Fwd/Side lvl1     Ball squats 2x10         3x10 2x10                Gait Training                                       Modalities             Cold pack end 8' 8' 8' 8' 8' 8' 8' 8' 8' 8'

## 2023-08-29 DIAGNOSIS — G25.81 RESTLESS LEG SYNDROME: ICD-10-CM

## 2023-08-29 RX ORDER — PRAMIPEXOLE DIHYDROCHLORIDE 0.5 MG/1
1 TABLET ORAL DAILY
Qty: 180 TABLET | Refills: 0 | Status: SHIPPED | OUTPATIENT
Start: 2023-08-29 | End: 2023-11-27

## 2023-08-30 ENCOUNTER — OFFICE VISIT (OUTPATIENT)
Dept: PHYSICAL THERAPY | Facility: CLINIC | Age: 58
End: 2023-08-30
Payer: COMMERCIAL

## 2023-08-30 DIAGNOSIS — R26.2 DIFFICULTY WALKING: ICD-10-CM

## 2023-08-30 DIAGNOSIS — Z96.652 STATUS POST TOTAL LEFT KNEE REPLACEMENT: ICD-10-CM

## 2023-08-30 DIAGNOSIS — M17.12 PRIMARY OSTEOARTHRITIS OF LEFT KNEE: Primary | ICD-10-CM

## 2023-08-30 DIAGNOSIS — M25.562 ACUTE PAIN OF LEFT KNEE: ICD-10-CM

## 2023-08-30 PROCEDURE — 97110 THERAPEUTIC EXERCISES: CPT

## 2023-08-30 PROCEDURE — 97530 THERAPEUTIC ACTIVITIES: CPT

## 2023-08-30 PROCEDURE — 97140 MANUAL THERAPY 1/> REGIONS: CPT

## 2023-08-30 NOTE — PROGRESS NOTES
Daily Note     Today's date: 2023  Patient name: Hina Butts  : 1965  MRN: [id number]  Referring provider: Fritz Myers PA-C  Dx:   Encounter Diagnosis     ICD-10-CM    1. Primary osteoarthritis of left knee  M17.12       2. Status post total left knee replacement  Z96.652       3. Difficulty walking  R26.2       4. Acute pain of left knee  M25.562           Start Time: 1700  Stop Time: 1755  Total time in clinic (min): 55 minutes    Subjective: Patient reports that Left knee feels good today and she denies any complaints. She is pleased with progress. She is returning to work on Tuesday. Objective: See treatment diary below      Assessment: No changes to program this session. Patient required cuing to avoid shifting off of LLE with PB squats but was able to self correct and maintain. Tolerated treatment well. Patient demonstrated fatigue post treatment, exhibited good technique with therapeutic exercises and would benefit from continued PT      Plan: Continue per plan of care. Progress treatment as tolerated.        Precautions: HTN. EPOC 23    Manuals 8/28 8/30  8/3 8/7 8/9 8/14 8/16 8/21 8/23                             Knee PROM JHON JL  FH gentle today JHON JHON JHON KY JHON JHON   Edema Massage JHON JL  FH    JHON STM quad Fernie Boo quad and hams JHON   Neuro Re-Ed             Tandem walking 4x10 ft 4x10 ft        2x20 ft                SLS 3x30" 30"x3       2x30" ea 3x30" ea                             Ther Ex             Supine Heel slides flexion 10x10" 10"x10  5''x10 5x10" 10x10" 10x10" 10x10" 10x10" 10x10"   Knee extension stretch, sitting 10"x10"    5x10" 5x10"   10x10" 10x10"   Pt edu on plan of care, pathology, home program 8'            Seated calf stretch    With strap 20"x5 5x20" 4x20" 4x20" 4x20" 3x30"    Prone lying for knee extension 3'x4# 3'x4#       3' 3' x 3#   Seated knee ext LLPS on stool 3'x4# 3'x4#   3' 3' 3' 3' 3' 3'                Quad set    5''x10, TKE 5''x10  5x5" 10x5" 10x5"  10x SLR 3x10 1# 1# 3x10  2x5 x10 2x10 2x10 2x10 3x10 3x10   bridge 3x10 3x10    10x 2x10 2x10 3x10 3x10   Hamstring stretch      4x20" 4x20" 4x20"     Bike for ROM 8' lvl 0 L0 8m     8' lvl 0 5' Lvl 0 8' lvl 0 8' lvl 0                             Ther Activity             Step up and over 10x lvl 3 L3 10x    10x lvl 1 10x lvl 2 10x lvl 2 2x10 lvl 2 10x lvl 2   Lateral step up         10x lvl 2 2x10 lvl 3   Fwd step up 10x lvl 3 L3 10x    10x lvl 1  10x ea Fwd/Side lvl1     Ball squats 2x10  2x10       3x10 2x10                Gait Training                                       Modalities             Cold pack end 8' 8'  8' 8' 8' 8' 8' 8' 8'

## 2023-08-31 DIAGNOSIS — F41.1 GAD (GENERALIZED ANXIETY DISORDER): Primary | ICD-10-CM

## 2023-08-31 RX ORDER — LORAZEPAM 0.5 MG/1
0.5 TABLET ORAL
Qty: 30 TABLET | Refills: 0 | Status: SHIPPED | OUTPATIENT
Start: 2023-08-31

## 2023-09-05 ENCOUNTER — OFFICE VISIT (OUTPATIENT)
Dept: PHYSICAL THERAPY | Facility: CLINIC | Age: 58
End: 2023-09-05
Payer: COMMERCIAL

## 2023-09-05 DIAGNOSIS — R26.2 DIFFICULTY WALKING: ICD-10-CM

## 2023-09-05 DIAGNOSIS — Z01.818 PREOP TESTING: ICD-10-CM

## 2023-09-05 DIAGNOSIS — M17.12 PRIMARY OSTEOARTHRITIS OF LEFT KNEE: Primary | ICD-10-CM

## 2023-09-05 DIAGNOSIS — Z96.652 STATUS POST TOTAL LEFT KNEE REPLACEMENT: ICD-10-CM

## 2023-09-05 DIAGNOSIS — M25.562 ACUTE PAIN OF LEFT KNEE: ICD-10-CM

## 2023-09-05 PROCEDURE — 97530 THERAPEUTIC ACTIVITIES: CPT

## 2023-09-05 PROCEDURE — 97112 NEUROMUSCULAR REEDUCATION: CPT

## 2023-09-05 PROCEDURE — 97110 THERAPEUTIC EXERCISES: CPT

## 2023-09-05 NOTE — PROGRESS NOTES
Daily Note     Today's date: 2023  Patient name: Mary Bell  : 1965  MRN: [id number]  Referring provider: Arline Gordon PA-C  Dx:   Encounter Diagnosis     ICD-10-CM    1. Primary osteoarthritis of left knee  M17.12       2. Status post total left knee replacement  Z96.652       3. Difficulty walking  R26.2       4. Acute pain of left knee  M25.562       5. Preop testing  Z01.818                      Subjective: Pt reports she is doing well, went back to work today and had no pain or issues at all! Objective: See treatment diary below      Assessment: Tolerated treatment well. Pt performed all exercises without issue, continue to progress to tolerance. Pt would benefit from continued focus on strength and functionality. Pt demonstrated strong understanding of exercises and had good form throughout session. Patient demonstrated fatigue post treatment, exhibited good technique with therapeutic exercises and would benefit from continued PT      Plan: Continue per plan of care.       Precautions: HTN. EPOC 23    Manuals 8/28 8/30 9/5 8/3 8/7 8/9 8/14 8/16 8/21 8/23                             Knee PROM JHON JL KM FH gentle today JHON JHON JHON KY JHON JHON   Edema Massage JHON JL KM     JHON STM quad KY JHON quad and hams JHON   Neuro Re-Ed             Tandem walking 4x10 ft 4x10 ft 4x10 ft       2x20 ft                SLS 3x30" 30"x3 30"x3      2x30" ea 3x30" ea                             Ther Ex             Supine Heel slides flexion 10x10" 10"x10 10x10" 5''x10 5x10" 10x10" 10x10" 10x10" 10x10" 10x10"   Knee extension stretch, sitting 10"x10"    5x10" 5x10"   10x10" 10x10"   Pt edu on plan of care, pathology, home program 8'            Seated calf stretch    With strap 20"x5 5x20" 4x20" 4x20" 4x20" 3x30"    Prone lying for knee extension 3'x4# 3'x4# 3'x4#      3' 3' x 3#   Seated knee ext LLPS on stool 3'x4# 3'x4# 3'x4#  3' 3' 3' 3' 3' 3'                Quad set    5''x10, TKE 5''x10  5x5" 10x5" 10x5"  10x   SLR 3x10 1# 1# 3x10 1# 3x10 2x5 x10 2x10 2x10 2x10 3x10 3x10   bridge 3x10 3x10 3x10   10x 2x10 2x10 3x10 3x10   Hamstring stretch      4x20" 4x20" 4x20"     Bike for ROM 8' lvl 0 L0 8m L0 8'    8' lvl 0 5' Lvl 0 8' lvl 0 8' lvl 0                             Ther Activity             Step up and over 10x lvl 3 L3 10x L3 10x   10x lvl 1 10x lvl 2 10x lvl 2 2x10 lvl 2 10x lvl 2   Lateral step up   L3 10x      10x lvl 2 2x10 lvl 3   Fwd step up 10x lvl 3 L3 10x    10x lvl 1  10x ea Fwd/Side lvl1     Ball squats 2x10  2x10 2x10      3x10 2x10                Gait Training                                       Modalities             Cold pack end 8' 8' 8' 8' 8' 8' 8' 8' 8' 8'

## 2023-09-07 ENCOUNTER — OFFICE VISIT (OUTPATIENT)
Dept: PHYSICAL THERAPY | Facility: CLINIC | Age: 58
End: 2023-09-07
Payer: COMMERCIAL

## 2023-09-07 DIAGNOSIS — M25.562 ACUTE PAIN OF LEFT KNEE: ICD-10-CM

## 2023-09-07 DIAGNOSIS — M17.12 PRIMARY OSTEOARTHRITIS OF LEFT KNEE: Primary | ICD-10-CM

## 2023-09-07 DIAGNOSIS — Z96.652 STATUS POST TOTAL LEFT KNEE REPLACEMENT: ICD-10-CM

## 2023-09-07 DIAGNOSIS — R26.2 DIFFICULTY WALKING: ICD-10-CM

## 2023-09-07 PROCEDURE — 97112 NEUROMUSCULAR REEDUCATION: CPT | Performed by: PHYSICAL THERAPIST

## 2023-09-07 PROCEDURE — 97110 THERAPEUTIC EXERCISES: CPT | Performed by: PHYSICAL THERAPIST

## 2023-09-07 PROCEDURE — 97530 THERAPEUTIC ACTIVITIES: CPT | Performed by: PHYSICAL THERAPIST

## 2023-09-07 NOTE — PROGRESS NOTES
Daily Note     Today's date: 2023  Patient name: Page Toney  : 1965  MRN: [id number]  Referring provider: Goledn James PA-C  Dx:   Encounter Diagnosis     ICD-10-CM    1. Primary osteoarthritis of left knee  M17.12       2. Status post total left knee replacement  Z96.652       3. Difficulty walking  R26.2       4. Acute pain of left knee  M25.562                      Subjective: Pt reports that she is very tired from work today. She is able to ascend stairs reciprocally, but still has to descend one at a time. Objective: See treatment diary below    0 deg. Knee extension PROM on L    Assessment: Tolerated treatment well. Patient was able to perform reciprocal gait pattern on steps in clinic with close supervision and cues. Patient achieved full knee extension to date today. Patient would benefit from continued skilled PT per plan of care to address impairments and improve function. Plan: Continue per plan of care.       Precautions: HTN. EPOC 23    Manuals                              Knee PROM [address] JHON JHON   Edema Massage JHON JL KM    JHON STM quad Julia Gutting quad and hams JHON   Neuro Re-Ed             Tandem walking 4x10 ft 4x10 ft 4x10 ft 4x10 ft      2x20 ft                SLS 3x30" 30"x3 30"x3 3x30"     2x30" ea 3x30" ea                             Ther Ex             Supine Heel slides flexion 10x10" 10"x10 10x10"  5x10" 10x10" 10x10" 10x10" 10x10" 10x10"   Knee extension stretch, sitting 10"x10"   5x10" 5x10" 5x10"   10x10" 10x10"   Pt edu on plan of care, pathology, home program 8'            Seated calf stretch    With strap 20"x5 5x20" 4x20" 4x20" 4x20" 3x30"    Prone lying for knee extension 3'x4# 3'x4# 3'x4# 3'x4#     3' 3' x 3#   Seated knee ext LLPS on stool 3'x4# 3'x4# 3'x4# 3'x4# 3' 3' 3' 3' 3' 3'                             SLR 3x10 1# 1# 3x10 1# 3x10 1# 3x10 x10 2x10 2x10 2x10 3x10 3x10   bridge 3x10 3x10 3x10 3x10 10x 2x10 2x10 3x10 3x10   Hamstring stretch      4x20" 4x20" 4x20"     Bike for ROM 8' lvl 0 L0 8m L0 8' L1 8'   8' lvl 0 5' Lvl 0 8' lvl 0 8' lvl 0                             Ther Activity             Step up and over 10x lvl 3 L3 10x L3 10x L3 10x  10x lvl 1 10x lvl 2 10x lvl 2 2x10 lvl 2 10x lvl 2   Lateral step up   L3 10x      10x lvl 2 2x10 lvl 3   Fwd step up 10x lvl 3 L3 10x    10x lvl 1  10x ea Fwd/Side lvl1     Ball squats 2x10  2x10 2x10      3x10 2x10   Stair climbing    1x up/ down CS         Gait Training                                       Modalities             Cold pack end 8' 8' 8' 8' 8' 8' 8' 8' 8' 8'

## 2023-09-11 ENCOUNTER — OFFICE VISIT (OUTPATIENT)
Dept: PHYSICAL THERAPY | Facility: CLINIC | Age: 58
End: 2023-09-11
Payer: COMMERCIAL

## 2023-09-11 DIAGNOSIS — R26.2 DIFFICULTY WALKING: ICD-10-CM

## 2023-09-11 DIAGNOSIS — Z96.652 STATUS POST TOTAL LEFT KNEE REPLACEMENT: ICD-10-CM

## 2023-09-11 DIAGNOSIS — M25.562 ACUTE PAIN OF LEFT KNEE: ICD-10-CM

## 2023-09-11 DIAGNOSIS — M17.12 PRIMARY OSTEOARTHRITIS OF LEFT KNEE: Primary | ICD-10-CM

## 2023-09-11 PROCEDURE — 97112 NEUROMUSCULAR REEDUCATION: CPT | Performed by: PHYSICAL THERAPIST

## 2023-09-11 PROCEDURE — 97110 THERAPEUTIC EXERCISES: CPT | Performed by: PHYSICAL THERAPIST

## 2023-09-11 PROCEDURE — 97530 THERAPEUTIC ACTIVITIES: CPT | Performed by: PHYSICAL THERAPIST

## 2023-09-11 NOTE — PROGRESS NOTES
Daily Note     Today's date: 2023  Patient name: Wilver Farfan  : 1965  MRN: [id number]  Referring provider: Jerrell Mai PA-C  Dx:   No diagnosis found. Subjective: Pt reports that she is transitioning back to work well with minimal pain and fatigue. She denies any significant symptoms today. She does report pressure in the knee after going down half a flight of stairs with a step-through pattern. Objective: See treatment diary below    0 deg. Knee extension PROM on L    Assessment:  The patient demonstrated good mechanics with stair navigation but did report her usual pressure/pain half-way through the flight of stairs. Good tolerance to static SL balance which could be progressed NV. Patient would benefit from continued skilled PT per plan of care to address impairments and improve function. Plan: Continue per plan of care.       Precautions: HTN. EPOC 23    Manuals                              Knee PROM JHON JL KM JHON NB JHON JHON KY JHON JHON   Edema Massage JHON JL KM    JHON STM quad [place] [employer] quad and hams JHON   Neuro Re-Ed             Tandem walking 4x10 ft 4x10 ft 4x10 ft 4x10 ft 4x10ft     2x20 ft                SLS 3x30" 30"x3 30"x3 3x30" 3x30"    2x30" ea 3x30" ea                             Ther Ex             Supine Heel slides flexion 10x10" 10"x10 10x10"   10x10" 10x10" 10x10" 10x10" 10x10"   Knee extension stretch, sitting 10"x10"   5x10" 5x10" 5x10"   10x10" 10x10"   Pt edu on plan of care, pathology, home program 8'            Seated calf stretch    With strap 20"x5 With strap 20"x5 4x20" 4x20" 4x20" 3x30"    Prone lying for knee extension 3'x4# 3'x4# 3'x4# 3'x4# 3'x4#    3' 3' x 3#   Seated knee ext LLPS on stool 3'x4# 3'x4# 3'x4# 3'x4# 3'x4# 3' 3' 3' 3' 3'                             SLR 3x10 1# 1# 3x10 1# 3x10 1# 3x10 1# 3x10 2x10 2x10 2x10 3x10 3x10   bridge 3x10 3x10 3x10 3x10 3x10 10x 2x10 2x10 3x10 3x10   Hamstring stretch      4x20" 4x20" 4x20"     Bike for ROM 8' lvl 0 L0 8m L0 8' L1 8' L1 8'  8' lvl 0 5' Lvl 0 8' lvl 0 8' lvl 0                             Ther Activity             Step up and over 10x lvl 3 L3 10x L3 10x L3 10x L3 10x 10x lvl 1 10x lvl 2 10x lvl 2 2x10 lvl 2 10x lvl 2   Lateral step up   L3 10x      10x lvl 2 2x10 lvl 3   Fwd step up 10x lvl 3 L3 10x    10x lvl 1  10x ea Fwd/Side lvl1     Ball squats 2x10  2x10 2x10      3x10 2x10   Stair climbing    1x up/ down CS 1x up/down CS        Gait Training                                       Modalities             Cold pack end 8' 8' 8' 8' 8' 8' 8' 8' 8' 8'

## 2023-09-13 ENCOUNTER — OFFICE VISIT (OUTPATIENT)
Dept: PHYSICAL THERAPY | Facility: CLINIC | Age: 58
End: 2023-09-13
Payer: COMMERCIAL

## 2023-09-13 DIAGNOSIS — Z96.652 STATUS POST TOTAL LEFT KNEE REPLACEMENT: ICD-10-CM

## 2023-09-13 DIAGNOSIS — R26.2 DIFFICULTY WALKING: ICD-10-CM

## 2023-09-13 DIAGNOSIS — M17.12 PRIMARY OSTEOARTHRITIS OF LEFT KNEE: Primary | ICD-10-CM

## 2023-09-13 DIAGNOSIS — M25.562 ACUTE PAIN OF LEFT KNEE: ICD-10-CM

## 2023-09-13 PROCEDURE — 97010 HOT OR COLD PACKS THERAPY: CPT | Performed by: PHYSICAL THERAPIST

## 2023-09-13 PROCEDURE — 97110 THERAPEUTIC EXERCISES: CPT | Performed by: PHYSICAL THERAPIST

## 2023-09-13 PROCEDURE — 97530 THERAPEUTIC ACTIVITIES: CPT | Performed by: PHYSICAL THERAPIST

## 2023-09-13 PROCEDURE — 97140 MANUAL THERAPY 1/> REGIONS: CPT | Performed by: PHYSICAL THERAPIST

## 2023-09-13 NOTE — PROGRESS NOTES
Daily Note     Today's date: 2023  Patient name: Michael Almaraz  : 1965  MRN: [id number]  Referring provider: Serafin Coburn PA-C  Dx:   Encounter Diagnosis     ICD-10-CM    1. Primary osteoarthritis of left knee  M17.12       2. Status post total left knee replacement  Z96.652       3. Difficulty walking  R26.2       4. Acute pain of left knee  M25.562                      Subjective: Pt reports that she is noticing less fatigue at work. She still has some difficulty stair climbing but has been doing half of the stairs reciprocally. She reports that she still has some anterior pain when descending stairs. Objective: See treatment diary below      Assessment:  Progressed mobility exercises with addition of prone quad stretch which she tolerated well. Patient displayed improved LE control and strength with ball squats showing less weight shift to R side compared to last week. Continued stiffness at end range knee extension during passive stretching, but improved after several minutes of manual therapy. Patient would benefit from continued skilled PT per plan of care to address impairments and improve function. Plan: Continue per plan of care. Progress functional strengthening with step ups.      Precautions: HTN. EPOC 23    Manuals  9/                             Knee PROM JHON ABBY MATHEWS JHON NB JHON JHON KY JHON JHON   Edema Massage JHON JL KM   JHON STM hams JHON STM quad [place] [employer] quad and hams JHON   Neuro Re-Ed             Tandem walking 4x10 ft 4x10 ft 4x10 ft 4x10 ft 4x10ft     2x20 ft                SLS 3x30" 30"x3 30"x3 3x30" 3x30" 3x30"   2x30" ea 3x30" ea                             Ther Ex             Supine Heel slides flexion 10x10" 10"x10 10x10"   5x10" 10x10" 10x10" 10x10" 10x10"   Knee extension stretch, sitting 10"x10"   5x10" 5x10" 10x10"   10x10" 10x10"   Prone quad stretch      3x30"       Pt edu on plan of care, pathology, home program 8 Seated calf stretch    With strap 20"x5 With strap 20"x5 4x20" 4x20" 4x20" 3x30"    Prone lying for knee extension 3'x4# 3'x4# 3'x4# 3'x4# 3'x4#    3' 3' x 3#   Seated knee ext LLPS on stool 3'x4# 3'x4# 3'x4# 3'x4# 3'x4# 3'x4# 3' 3' 3' 3'                SLR abd      3x10       SLR 3x10 1# 1# 3x10 1# 3x10 1# 3x10 1# 3x10 1# 3x10 2x10 2x10 3x10 3x10   bridge 3x10 3x10 3x10 3x10 3x10 3x10 2x10 2x10 3x10 3x10                Bike for ROM 8' lvl 0 L0 8m L0 8' L1 8' L1 8' 10' lvl 1 8' lvl 0 5' Lvl 0 8' lvl 0 8' lvl 0                             Ther Activity             Step up and over 10x lvl 3 L3 10x L3 10x L3 10x L3 10x 10x lvl 3 10x lvl 2 10x lvl 2 2x10 lvl 2 10x lvl 2   Lateral step up   L3 10x   10x lvl 3   10x lvl 2 2x10 lvl 3   Fwd step up 10x lvl 3 L3 10x    10x lvl 3  10x ea Fwd/Side lvl1     Ball squats 2x10  2x10 2x10   3x10   3x10 2x10   Stair climbing    1x up/ down CS 1x up/down CS        Gait Training                                       Modalities             Cold pack end 8' 8' 8' 8' 8' 8' 8' 8' 8' 8'

## 2023-09-18 ENCOUNTER — OFFICE VISIT (OUTPATIENT)
Dept: PHYSICAL THERAPY | Facility: CLINIC | Age: 58
End: 2023-09-18
Payer: COMMERCIAL

## 2023-09-18 DIAGNOSIS — Z96.652 STATUS POST TOTAL LEFT KNEE REPLACEMENT: ICD-10-CM

## 2023-09-18 DIAGNOSIS — M25.562 ACUTE PAIN OF LEFT KNEE: ICD-10-CM

## 2023-09-18 DIAGNOSIS — R26.2 DIFFICULTY WALKING: ICD-10-CM

## 2023-09-18 DIAGNOSIS — M17.12 PRIMARY OSTEOARTHRITIS OF LEFT KNEE: Primary | ICD-10-CM

## 2023-09-18 PROCEDURE — 97112 NEUROMUSCULAR REEDUCATION: CPT | Performed by: PHYSICAL THERAPIST

## 2023-09-18 PROCEDURE — 97010 HOT OR COLD PACKS THERAPY: CPT | Performed by: PHYSICAL THERAPIST

## 2023-09-18 PROCEDURE — 97110 THERAPEUTIC EXERCISES: CPT | Performed by: PHYSICAL THERAPIST

## 2023-09-18 PROCEDURE — 97530 THERAPEUTIC ACTIVITIES: CPT | Performed by: PHYSICAL THERAPIST

## 2023-09-18 NOTE — PROGRESS NOTES
Daily Note     Today's date: 2023  Patient name: Amish Woodard  : 1965  MRN: [id number]  Referring provider: Idalia White PA-C  Dx:   Encounter Diagnosis     ICD-10-CM    1. Primary osteoarthritis of left knee  M17.12       2. Status post total left knee replacement  Z96.652       3. Difficulty walking  R26.2       4. Acute pain of left knee  M25.562                      Subjective: Pt reports that her knee is sore today but she attributes it to the weather. Objective: See treatment diary below      Assessment: Improved form with minimal weight shift seen today during ball squats, indicating less LE weakness and compensation. Patient noted less stiffness and soreness by end of session. Patient would benefit from continued skilled PT per plan of care to address impairments and improve function. Plan: Continue per plan of care. Progress functional strengthening with step ups.      Precautions: HTN. EPOC 23    Manuals                              Knee PROM JHON JL KM JHON NB JHON JHON KY JHON JHON   Edema Massage JHON JL KM   JHON STM hams  [place] JHON quad and hams JHON   Neuro Re-Ed             Tandem walking 4x10 ft 4x10 ft 4x10 ft 4x10 ft 4x10ft  4x10 ft   2x20 ft                SLS 3x30" 30"x3 30"x3 3x30" 3x30" 3x30" 3x30"  2x30" ea 3x30" ea                             Ther Ex             Supine Heel slides flexion 10x10" 10"x10 10x10"   5x10" 10x10" 10x10" 10x10" 10x10"   Knee extension stretch, sitting 10"x10"   5x10" 5x10" 10x10"   10x10" 10x10"   Prone quad stretch      3x30" 3x30"      Pt edu on plan of care, pathology, home program 8'            Seated calf stretch    With strap 20"x5 With strap 20"x5 4x20" 4x20" 4x20" 3x30"    Prone lying for knee extension 3'x4# 3'x4# 3'x4# 3'x4# 3'x4#    3' 3' x 3#   Seated knee ext LLPS on stool 3'x4# 3'x4# 3'x4# 3'x4# 3'x4# 3'x4# 5'x0# 3' 3' 3'                SLR abd      3x10 2x10 2#      SLR 3x10 1# 1# 3x10 1# 3x10 1# 3x10 1# 3x10 1# 3x10 3x10 2# 2x10 3x10 3x10   bridge 3x10 3x10 3x10 3x10 3x10 3x10 3x10 2x10 3x10 3x10                Bike for ROM 8' lvl 0 L0 8m L0 8' L1 8' L1 8' 10' lvl 1 10' lvl 1 5' Lvl 0 8' lvl 0 8' lvl 0                             Ther Activity             Step up and over 10x lvl 3 L3 10x L3 10x L3 10x L3 10x 10x lvl 3 10x lvl 3 10x lvl 2 2x10 lvl 2 10x lvl 2   Lateral step up   L3 10x   10x lvl 3 10x lvl 3  10x lvl 2 2x10 lvl 3   Fwd step up 10x lvl 3 L3 10x    10x lvl 3 10x lvl 3 10x ea Fwd/Side lvl1     Ball squats 2x10  2x10 2x10   3x10 3x10  3x10 2x10   Stair climbing    1x up/ down CS 1x up/down CS        Gait Training                                       Modalities             Cold pack end 8' 8' 8' 8' 8' 8' 8' 8' 8' 8'

## 2023-09-20 ENCOUNTER — OFFICE VISIT (OUTPATIENT)
Dept: PHYSICAL THERAPY | Facility: CLINIC | Age: 58
End: 2023-09-20
Payer: COMMERCIAL

## 2023-09-20 DIAGNOSIS — R26.2 DIFFICULTY WALKING: ICD-10-CM

## 2023-09-20 DIAGNOSIS — Z01.818 PREOP TESTING: ICD-10-CM

## 2023-09-20 DIAGNOSIS — M25.562 ACUTE PAIN OF LEFT KNEE: ICD-10-CM

## 2023-09-20 DIAGNOSIS — Z96.652 STATUS POST TOTAL LEFT KNEE REPLACEMENT: ICD-10-CM

## 2023-09-20 DIAGNOSIS — M17.12 PRIMARY OSTEOARTHRITIS OF LEFT KNEE: Primary | ICD-10-CM

## 2023-09-20 PROCEDURE — 97140 MANUAL THERAPY 1/> REGIONS: CPT

## 2023-09-20 PROCEDURE — 97110 THERAPEUTIC EXERCISES: CPT

## 2023-09-25 ENCOUNTER — OFFICE VISIT (OUTPATIENT)
Dept: PHYSICAL THERAPY | Facility: CLINIC | Age: 58
End: 2023-09-25
Payer: COMMERCIAL

## 2023-09-25 DIAGNOSIS — R26.2 DIFFICULTY WALKING: ICD-10-CM

## 2023-09-25 DIAGNOSIS — Z96.652 STATUS POST TOTAL LEFT KNEE REPLACEMENT: ICD-10-CM

## 2023-09-25 DIAGNOSIS — M17.12 PRIMARY OSTEOARTHRITIS OF LEFT KNEE: Primary | ICD-10-CM

## 2023-09-25 DIAGNOSIS — M25.562 ACUTE PAIN OF LEFT KNEE: ICD-10-CM

## 2023-09-25 PROCEDURE — 97110 THERAPEUTIC EXERCISES: CPT | Performed by: PHYSICAL THERAPIST

## 2023-09-25 PROCEDURE — 97530 THERAPEUTIC ACTIVITIES: CPT | Performed by: PHYSICAL THERAPIST

## 2023-09-25 PROCEDURE — 97010 HOT OR COLD PACKS THERAPY: CPT | Performed by: PHYSICAL THERAPIST

## 2023-09-25 PROCEDURE — 97140 MANUAL THERAPY 1/> REGIONS: CPT | Performed by: PHYSICAL THERAPIST

## 2023-09-25 NOTE — PROGRESS NOTES
Daily Note     Today's date: 2023  Patient name: Mary Bell  : 1965  MRN: [id number]  Referring provider: Arline Gordon PA-C  Dx:   Encounter Diagnosis     ICD-10-CM    1. Primary osteoarthritis of left knee  M17.12       2. Status post total left knee replacement  Z96.652       3. Difficulty walking  R26.2       4. Acute pain of left knee  M25.562                      Subjective: Patient reports that she has been able to work full weeks without much complaint. She still has a little difficulty with stairs but reports that she is feeling ready for discharge after this week. Objective: See treatment diary below      Assessment: Tolerated treatment well. Patient demonstrated improved form with squats with no weight shift or knee pain during. Less stiffness associated passive knee extension stretching today. Patient would benefit from continued skilled PT per plan of care to address impairments and improve function. Plan: Continue per plan of care.       Precautions: HTN. EPOC 23    Manuals                              Knee PROM JHON JL KM JHON NB JHON JHON KY JHON JHON   Edema Massage JHON JL KM   JHON STM hams    JHON   Neuro Re-Ed             Tandem walking 4x10 ft 4x10 ft 4x10 ft 4x10 ft 4x10ft  4x10 ft   2x20 ft                SLS 3x30" 30"x3 30"x3 3x30" 3x30" 3x30" 3x30" 3x30" 2x30" ea 3x30" ea                             Ther Ex             Supine Heel slides flexion 10x10" 10"x10 10x10"   5x10" 10x10" 10x10"  10x10"   Knee extension stretch, sitting 10"x10"   5x10" 5x10" 10x10"   10x10" 10x10"   Prone quad stretch      3x30" 3x30" 3x30" 3x30"    Pt edu on plan of care, pathology, home program 8'            Seated calf stretch    With strap 20"x5 With strap 20"x5 4x20" 4x20" 4x20" 3x30"    Prone lying for knee extension 3'x4# 3'x4# 3'x4# 3'x4# 3'x4#     3' x 3#   Seated knee ext LLPS on stool 3'x4# 3'x4# 3'x4# 3'x4# 3'x4# 3'x4# 5'x0# 5'x0# 3' 4# 3' SLR abd      3x10 2x10 2# 2x10 2# 3x10 2#    SLR 3x10 1# 1# 3x10 1# 3x10 1# 3x10 1# 3x10 1# 3x10 3x10 2# 3x10 2# 3x10 2# 3x10   bridge 3x10 3x10 3x10 3x10 3x10 3x10 3x10 3x10  3x10   Treadmill for endurnace         8' mph    Bike for ROM 8' lvl 0 L0 8m L0 8' L1 8' L1 8' 10' lvl 1 10' lvl 1 5' Lvl 0  8' lvl 0   Leg press          3x10 #                 Ther Activity             Step up and over 10x lvl 3 L3 10x L3 10x L3 10x L3 10x 10x lvl 3 10x lvl 3 10x lvl 3 10x  lvl 3 10x lvl 2   Lateral step up   L3 10x   10x lvl 3 10x lvl 3 10x lvl 3 10x  lvl 3 2x10 lvl 3   Fwd step up 10x lvl 3 L3 10x    10x lvl 3 10x lvl 3 10x  lvl 3 10x  lvl 3    Ball squats 2x10  2x10 2x10   3x10 3x10 3x10 3x10 2x10   Stair climbing    1x up/ down CS 1x up/down CS   Up&Down 16 steps w/1 handrail step over step w/S     Gait Training                                       Modalities             Cold pack end 8' 8' 8' 8' 8' 8' 8' 5' on stool 8' 8'

## 2023-09-27 ENCOUNTER — OFFICE VISIT (OUTPATIENT)
Dept: PHYSICAL THERAPY | Facility: CLINIC | Age: 58
End: 2023-09-27
Payer: COMMERCIAL

## 2023-09-27 DIAGNOSIS — Z96.652 STATUS POST TOTAL LEFT KNEE REPLACEMENT: ICD-10-CM

## 2023-09-27 DIAGNOSIS — M25.562 ACUTE PAIN OF LEFT KNEE: ICD-10-CM

## 2023-09-27 DIAGNOSIS — R26.2 DIFFICULTY WALKING: ICD-10-CM

## 2023-09-27 DIAGNOSIS — M17.12 PRIMARY OSTEOARTHRITIS OF LEFT KNEE: Primary | ICD-10-CM

## 2023-09-27 PROCEDURE — 97110 THERAPEUTIC EXERCISES: CPT | Performed by: PHYSICAL THERAPIST

## 2023-09-27 PROCEDURE — 97140 MANUAL THERAPY 1/> REGIONS: CPT | Performed by: PHYSICAL THERAPIST

## 2023-09-27 NOTE — PROGRESS NOTES
Daily Note     Today's date: 2023  Patient name: Daksha Seo  : 1965  MRN: [id number]  Referring provider: Jon Shankar PA-C  Dx:   Encounter Diagnosis     ICD-10-CM    1. Primary osteoarthritis of left knee  M17.12       2. Status post total left knee replacement  Z96.652       3. Difficulty walking  R26.2       4. Acute pain of left knee  M25.562                      Subjective: Patient reports that she has been able to descend full flights of stairs reciprocally the past few days without much issue. She feels ready for discharge to home program after today's session. Objective: See treatment diary below      Assessment: Tolerated treatment well. At this time, patient is appropriate for discharge to home program to maintain improvements and further progress upon impairments. Educated patient on updated home program and discharge recommendations; she verbalized understanding to all education.       Plan: Discharge to home program.       Precautions: HTN. EPOC 23    Manuals                              Knee PROM JHON JL KM JHON NB JHON JHON KY JHON JHON   Edema Massage JHON JL KM   JHON STM hams       Neuro Re-Ed             Tandem walking 4x10 ft 4x10 ft 4x10 ft 4x10 ft 4x10ft  4x10 ft                   SLS 3x30" 30"x3 30"x3 3x30" 3x30" 3x30" 3x30" 3x30" 2x30" ea 3x30" ea foam                             Ther Ex             Supine Heel slides flexion 10x10" 10"x10 10x10"   5x10" 10x10" 10x10"  10x10"   Knee extension stretch, sitting 10"x10"   5x10" 5x10" 10x10"   10x10" 10x10"   Prone quad stretch      3x30" 3x30" 3x30" 3x30" 3x30"   Pt edu on plan of care, pathology, home program 8'                         Prone lying for knee extension 3'x4# 3'x4# 3'x4# 3'x4# 3'x4#     3' x 3#   Seated knee ext LLPS on stool 3'x4# 3'x4# 3'x4# 3'x4# 3'x4# 3'x4# 5'x0# 5'x0# 3' 4# 3'                SLR abd      3x10 2x10 2# 2x10 2# 3x10 2# 3x10 2#   SLR 3x10 1# 1# 3x10 1# 3x10 1# 3x10 1# 3x10 1# 3x10 3x10 2# 3x10 2# 3x10 2# 3x10 2#    bridge 3x10 3x10 3x10 3x10 3x10 3x10 3x10 3x10     Treadmill for endurnace         8' mph    Bike for ROM 8' lvl 0 L0 8m L0 8' L1 8' L1 8' 10' lvl 1 10' lvl 1 5' Lvl 0  8' lvl 1   Leg press          3x10 # 3x10 105#                Ther Activity             Step up and over 10x lvl 3 L3 10x L3 10x L3 10x L3 10x 10x lvl 3 10x lvl 3 10x lvl 3 10x  lvl 3    Lateral step up   L3 10x   10x lvl 3 10x lvl 3 10x lvl 3 10x  lvl 3    Fwd step up 10x lvl 3 L3 10x    10x lvl 3 10x lvl 3 10x  lvl 3 10x  lvl 3    Ball squats 2x10  2x10 2x10   3x10 3x10 3x10 3x10 2x10 over chair   Stair climbing    1x up/ down CS 1x up/down CS   Up&Down 16 steps w/1 handrail step over step w/S     Gait Training                                       Modalities             Cold pack end 8' 8' 8' 8' 8' 8' 8' 5' on stool 8' 8'

## 2023-10-02 DIAGNOSIS — F41.1 GAD (GENERALIZED ANXIETY DISORDER): ICD-10-CM

## 2023-10-02 RX ORDER — LORAZEPAM 0.5 MG/1
0.5 TABLET ORAL
Qty: 30 TABLET | Refills: 0 | Status: SHIPPED | OUTPATIENT
Start: 2023-10-02

## 2023-10-06 ENCOUNTER — TELEPHONE (OUTPATIENT)
Dept: FAMILY MEDICINE CLINIC | Facility: CLINIC | Age: 58
End: 2023-10-06

## 2023-10-06 DIAGNOSIS — F41.1 GAD (GENERALIZED ANXIETY DISORDER): Primary | ICD-10-CM

## 2023-10-06 DIAGNOSIS — R73.03 PREDIABETES: ICD-10-CM

## 2023-10-06 DIAGNOSIS — I10 ESSENTIAL HYPERTENSION: ICD-10-CM

## 2023-10-06 DIAGNOSIS — G25.81 RESTLESS LEG SYNDROME: ICD-10-CM

## 2023-10-06 DIAGNOSIS — E78.2 MIXED HYPERLIPIDEMIA: ICD-10-CM

## 2023-10-06 NOTE — TELEPHONE ENCOUNTER
Patient called. She has an appt for a physical with you in December. She asked if you would want her to get blood work done for this visit? I told her I would check with you and I can call her back. If you do, she uses [employer].  Thank you. [phone]  Thank you.

## 2023-10-14 NOTE — H&P (VIEW-ONLY)
[id number] [facility] - Outpatient Follow-up Note  Chika Vasquez 53 y o  female MRN: [id number]  Encounter: [id number]    ASSESSMENT AND PLAN:      1  Abdominal cramping  2  Diarrhea, unspecified type  Continues with diarrhea, 2-3 episodes of watery stools daily  She will have occasional formed stools as well  Fecal calprotectin, fecal fat, fecal leukocytes and giardia were normal in Feb 2020  Labs including celiac panel, TSH were normal  Differential diagnoses include IBS, IBD, microscopic colitis, bile salt diarrhea (hx of previous cholecystectomy)  -continue Benefiber daily   - high fiber diet  - will trial dicyclomine (BENTYL) 20 mg tablet; Take 1 tablet (20 mg total) by mouth every 6 (six) hours as needed (pain, cramping)  Dispense: 90 tablet; Refill: 1  - consider trialing Questran, pt declined and would like to wait until results of colonoscopy   - arrange for colonoscopy at [facility]     3  Fatty liver   Identified on CT abdomen and pelvis on 2/12/20  LFT's in January 2020 were normal  She has been continuing to follow a healthy diet  - encouraged pt to continue to follow low fat diet   - encouraged regular exercise   - encouraged healthy weight loss   - will repeat LFT's and US in 1 year     4  Colon cancer screening  Average risk  Last colonoscopy on 6/12/2015 showed internal hemorrhoids and hyperplastic polyps which were removed  - will arrange for colonoscopy as above       Followup Appointment:  6 weeks   ______________________________________________________________________    Chief Complaint   Patient presents with    Follow-up     discuss colon     HPI:  Chika Vasquez is a 53y o  year old female was seen for a follow-up visit  She was last in office in April 2020  She has had chronic diarrhea for many years  She states that she still continues with persistent diarrhea, will have 2-3 episodes of watery diarrhea per day   She occasionally will have formed stools  She also reports occasional lower abdominal pain and cramping that is relieved with defecation  She has tried to cut back on fried/fatty foods which can trigger her symptoms  She denies any fever, chills, upper GI symptoms, nausea, vomiting, hematochezia, melena, nocturnal stooling  Her appetite is stable  She does report intentional 4-5 lb weight loss over the past few months  Historical Information   Past Medical History:   Diagnosis Date    Bulging lumbar disc     Chronic pain     low back    Degenerative disk disease     lumbar spine    Hypertension     Pinched nerve     lumbar spine    Psychiatric disorder     depression     Past Surgical History:   Procedure Laterality Date    CARPAL TUNNEL RELEASE Bilateral     CHOLECYSTECTOMY      COLONOSCOPY  06/15/2015    COLONOSCOPY  06/02/2015    Internal hemorrhoids, hyperplastic polyps which were removed    Ten year recall recommended June 2025    WISDOM TOOTH EXTRACTION Bilateral      Social History     Substance and Sexual Activity   Alcohol Use Yes    Frequency: 2-4 times a month    Drinks per session: 1 or 2    Binge frequency: Never     Social History     Substance and Sexual Activity   Drug Use No     Social History     Tobacco Use   Smoking Status Current Every Day Smoker    Packs/day: 1 00    Types: Cigarettes   Smokeless Tobacco Never Used     Family History   Problem Relation Age of Onset    Breast cancer Maternal Aunt 29    Breast cancer Sister 64    BRCA1 Negative Sister     BRCA2 Negative Sister     Prostate cancer Maternal Uncle     Cancer Maternal Uncle         BLADDER    Colon cancer Maternal Uncle     Colon polyps Neg Hx          Current Outpatient Medications:     atorvastatin (LIPITOR) 80 mg tablet    lisinopril (ZESTRIL) 10 mg tablet    LORazepam (ATIVAN) 0 5 mg tablet    pramipexole (MIRAPEX) 0 25 mg tablet    sertraline (ZOLOFT) 100 mg tablet    sertraline (ZOLOFT) 50 mg tablet    buPROPion (WELLBUTRIN SR) 150 mg 12 hr tablet    dicyclomine (BENTYL) 20 mg tablet  Allergies   Allergen Reactions    Penicillins      Reviewed medications and allergies and updated as indicated    PHYSICAL EXAM:    Blood pressure 122/76, pulse 82, temperature (!) 96 8 °F (36 °C), height 5' 1" (1 549 m), weight 88 5 kg (195 lb)  Body mass index is 36 84 kg/m²  General Appearance: NAD, cooperative, alert  Eyes: Anicteric, PERRLA, EOMI  ENT:  Normocephalic, atraumatic, normal mucosa  Neck:  Supple, symmetrical, trachea midline  Resp:  Clear to auscultation bilaterally; no rales, rhonchi or wheezing; respirations unlabored   CV:  S1 S2, Regular rate and rhythm; no murmur, rub, or gallop  GI:  Soft, +mild bilateral lower abdominal ttp, no guarding or rebound, normal bowel sounds; no masses, no organomegaly   Rectal: Deferred  Musculoskeletal: No cyanosis, clubbing or edema  Normal ROM  Skin:  No jaundice, rashes, or lesions   Heme/Lymph: No palpable cervical lymphadenopathy  Psych: Normal affect, good eye contact  Neuro: No gross deficits, AAOx3    Lab Results:   Lab Results   Component Value Date    WBC 8 4 07/15/2020    HGB 13 9 07/15/2020    HCT 40 5 07/15/2020    MCV 95 07/15/2020     07/15/2020     Lab Results   Component Value Date    K 4 4 07/15/2020     07/15/2020    CO2 27 07/15/2020    BUN 13 07/15/2020    CREATININE 0 74 07/15/2020    AST 18 07/15/2020    ALT 16 07/15/2020     No results found for: IRON, TIBC, FERRITIN  No results found for: LIPASE    Radiology Results:   No results found  thin

## 2023-10-18 DIAGNOSIS — G25.81 RESTLESS LEG SYNDROME: ICD-10-CM

## 2023-10-18 RX ORDER — PRAMIPEXOLE DIHYDROCHLORIDE 0.5 MG/1
1 TABLET ORAL DAILY
Qty: 180 TABLET | Refills: 0 | Status: SHIPPED | OUTPATIENT
Start: 2023-10-18 | End: 2024-01-16

## 2023-10-23 DIAGNOSIS — E78.2 MIXED HYPERLIPIDEMIA: ICD-10-CM

## 2023-10-23 RX ORDER — ATORVASTATIN CALCIUM 80 MG/1
80 TABLET, FILM COATED ORAL DAILY
Qty: 90 TABLET | Refills: 0 | Status: SHIPPED | OUTPATIENT
Start: 2023-10-23

## 2023-11-02 ENCOUNTER — APPOINTMENT (OUTPATIENT)
Dept: RADIOLOGY | Facility: CLINIC | Age: 58
End: 2023-11-02
Payer: COMMERCIAL

## 2023-11-02 ENCOUNTER — OFFICE VISIT (OUTPATIENT)
Dept: OBGYN CLINIC | Facility: CLINIC | Age: 58
End: 2023-11-02
Payer: COMMERCIAL

## 2023-11-02 VITALS
BODY MASS INDEX: 38.89 KG/M2 | WEIGHT: 206 LBS | DIASTOLIC BLOOD PRESSURE: 81 MMHG | SYSTOLIC BLOOD PRESSURE: 131 MMHG | HEIGHT: 61 IN | HEART RATE: 97 BPM

## 2023-11-02 DIAGNOSIS — Z96.652 AFTERCARE FOLLOWING LEFT KNEE JOINT REPLACEMENT SURGERY: ICD-10-CM

## 2023-11-02 DIAGNOSIS — F41.1 GAD (GENERALIZED ANXIETY DISORDER): ICD-10-CM

## 2023-11-02 DIAGNOSIS — Z47.1 AFTERCARE FOLLOWING LEFT KNEE JOINT REPLACEMENT SURGERY: Primary | ICD-10-CM

## 2023-11-02 DIAGNOSIS — Z47.1 AFTERCARE FOLLOWING LEFT KNEE JOINT REPLACEMENT SURGERY: ICD-10-CM

## 2023-11-02 DIAGNOSIS — Z96.652 AFTERCARE FOLLOWING LEFT KNEE JOINT REPLACEMENT SURGERY: Primary | ICD-10-CM

## 2023-11-02 PROBLEM — M25.462 EFFUSION OF LEFT KNEE: Status: RESOLVED | Noted: 2023-04-25 | Resolved: 2023-11-02

## 2023-11-02 PROBLEM — M17.12 PRIMARY OSTEOARTHRITIS OF LEFT KNEE: Status: RESOLVED | Noted: 2023-02-14 | Resolved: 2023-11-02

## 2023-11-02 PROCEDURE — 99214 OFFICE O/P EST MOD 30 MIN: CPT | Performed by: ORTHOPAEDIC SURGERY

## 2023-11-02 PROCEDURE — 73564 X-RAY EXAM KNEE 4 OR MORE: CPT

## 2023-11-02 RX ORDER — LORAZEPAM 0.5 MG/1
0.5 TABLET ORAL
Qty: 30 TABLET | Refills: 0 | Status: SHIPPED | OUTPATIENT
Start: 2023-11-02

## 2023-11-02 NOTE — PROGRESS NOTES
Assessment:     1. Aftercare following left knee joint replacement surgery        Plan:     Problem List Items Addressed This Visit          Other    Aftercare following left knee joint replacement surgery - Primary     Maru Wetzel is status post left total knee arthroplasty on July 24, 2023. X-rays were reviewed with her that revealed a well aligned prosthesis with no evidence of loosening. She is doing excellent. Continue home exercises. Reminded patient she is to take antibiotics prior to all dental procedures for the first year after surgery. It is then up to her if she wants to continue to take them prophylactically. Follow-up in 9 months with repeat x-rays. All patient's questions were answered to her satisfaction. This note is created using dictation transcription. It may contain typographical errors, grammatical errors, improperly dictated words, background noise and other errors. Relevant Orders    XR knee 4+ vw left injury      Subjective:     Patient ID: Celine Hansen is a 61 y.o. female. Chief Complaint: This is a 79-year-old white female who is status post left total knee arthroplasty on July 24, 2023. She is currently ambulating with no assistance. She has completed formal physical therapy and is doing the home exercises. She is very pleased with the results of the surgery so far. She has some mild intermittent soreness, but states she is back doing most of her normal activities. She is back working.       Allergy:  Allergies   Allergen Reactions    Penicillins Hives and Throat Swelling     Childhood allergy per pt    Meloxicam Hives and Itching     Medications:  all current active meds have been reviewed  Past Medical History:  Past Medical History:   Diagnosis Date    Anxiety 2005    Bulging lumbar disc     Chronic pain     low back    Degenerative disk disease     lumbar spine    Depression 2005    Effusion of left knee 04/25/2023    Hyperlipidemia     Hypertension     Pinched nerve lumbar spine    Primary osteoarthritis of left knee 02/14/2023    Psychiatric disorder     depression    Shingles [id number]     Past Surgical History:  Past Surgical History:   Procedure Laterality Date    CARPAL TUNNEL RELEASE Bilateral 1998    CHOLECYSTECTOMY  2008    COLONOSCOPY  06/15/2015    COLONOSCOPY  06/02/2015    Internal hemorrhoids, hyperplastic polyps which were removed. Ten year recall recommended June 2025    COLONOSCOPY  09/16/2020    normal terminal ileum, 1 small polyp in the distal sigmoid colon that was hyperplastic. Lena Donovan She did have a small amount of retained stool in the colon. She did have a small amount of retained stool in the colon. A 5 year recall was recommended due to poor prep, September 2025. FL INJECTION LEFT KNEE (ARTHROGRAM)  6/6/2023    AL ARTHRP KNE CONDYLE&PLATU MEDIAL&LAT COMPARTMENTS Left 7/24/2023    Procedure: ARTHROPLASTY KNEE TOTAL SAME DAY;   Surgeon: Balbir Zavala MD;  Location:  MAIN OR;  Service: Orthopedics    SPINAL CORD STIMULATOR IMPLANT      2014    SPINE SURGERY  2017    Stimulator inplant    WISDOM TOOTH EXTRACTION Bilateral 1982     Family History:  Family History   Problem Relation Age of Onset    Skin cancer Mother     Heart disease Father         Dx with lewy body dementia in 2012    Dementia Father         Dx with lewy body dementia in 2012    Breast cancer Maternal Aunt 29    Breast cancer Sister 64    BRCA1 Negative Sister     BRCA2 Negative Sister     Depression Sister     Prostate cancer Maternal Uncle     Cancer Maternal Uncle         BLADDER    Colon cancer Maternal Uncle     Colon polyps Neg Hx      Social History:  Social History     Substance and Sexual Activity   Alcohol Use Yes    Alcohol/week: 0.0 standard drinks of alcohol    Comment: Social drinker     Social History     Substance and Sexual Activity   Drug Use No     Social History     Tobacco Use   Smoking Status Every Day    Packs/day: 1.00    Years: 43.00    Total pack years: 43.00 Types: Cigarettes    Start date: 1977   Smokeless Tobacco Never   Tobacco Comments    smokes 10 cigs daily as of 10/20     Review of Systems   Constitutional: Negative. HENT: Negative. Eyes: Negative. Respiratory: Negative. Cardiovascular: Negative. Gastrointestinal: Negative. Endocrine: Negative. Genitourinary: Negative. Musculoskeletal:  Positive for joint swelling. Negative for arthralgias (left knee) and gait problem. Skin: Negative. Allergic/Immunologic: Negative. Hematological: Negative. Psychiatric/Behavioral: Negative. Objective:  BP Readings from Last 1 Encounters:   11/02/23 131/81      Wt Readings from Last 1 Encounters:   11/02/23 93.4 kg (206 lb)      BMI:   Estimated body mass index is 38.92 kg/m² as calculated from the following:    Height as of this encounter: 5' 1" (1.549 m). Weight as of this encounter: 93.4 kg (206 lb). BSA:   Estimated body surface area is 1.91 meters squared as calculated from the following:    Height as of this encounter: 5' 1" (1.549 m). Weight as of this encounter: 93.4 kg (206 lb). Physical Exam  Constitutional:       General: She is not in acute distress. Appearance: She is well-developed. HENT:      Head: Normocephalic. Eyes:      Conjunctiva/sclera: Conjunctivae normal.      Pupils: Pupils are equal, round, and reactive to light. Pulmonary:      Effort: Pulmonary effort is normal. No respiratory distress. Musculoskeletal:      Left knee: No effusion. Skin:     General: Skin is warm and dry. Neurological:      Mental Status: She is alert and oriented to person, place, and time. Psychiatric:         Behavior: Behavior normal.       Left Knee Exam     Tenderness   The patient is experiencing no tenderness.      Range of Motion   Extension:  normal   Flexion:  120     Tests   Varus: negative Valgus: negative    Other   Erythema: absent  Scars: present (Well-healed incision with no evidence of infection.)  Sensation: normal  Pulse: present  Swelling: mild  Effusion: no effusion present            I have personally reviewed pertinent films in PACS and my interpretation is X-rays of the left knee reveal a well aligned prosthesis with no evidence of loosening.     Scribe Attestation      I,:  Josh Khalil PA-C am acting as a scribe while in the presence of the attending physician.:       I,:  German Lam MD personally performed the services described in this documentation    as scribed in my presence.:

## 2023-11-02 NOTE — ASSESSMENT & PLAN NOTE
Handy Grewal is status post left total knee arthroplasty on July 24, 2023. X-rays were reviewed with her that revealed a well aligned prosthesis with no evidence of loosening. She is doing excellent. Continue home exercises. Reminded patient she is to take antibiotics prior to all dental procedures for the first year after surgery. It is then up to her if she wants to continue to take them prophylactically. Follow-up in 9 months with repeat x-rays. All patient's questions were answered to her satisfaction. This note is created using dictation transcription. It may contain typographical errors, grammatical errors, improperly dictated words, background noise and other errors.

## 2023-11-29 DIAGNOSIS — F41.1 GAD (GENERALIZED ANXIETY DISORDER): ICD-10-CM

## 2023-11-30 RX ORDER — LORAZEPAM 0.5 MG/1
0.5 TABLET ORAL
Qty: 30 TABLET | Refills: 0 | Status: SHIPPED | OUTPATIENT
Start: 2023-11-30

## 2023-12-13 DIAGNOSIS — I10 ESSENTIAL HYPERTENSION: ICD-10-CM

## 2023-12-13 RX ORDER — LISINOPRIL 10 MG/1
10 TABLET ORAL DAILY
Qty: 90 TABLET | Refills: 0 | Status: SHIPPED | OUTPATIENT
Start: 2023-12-13

## 2023-12-26 DIAGNOSIS — G25.81 RESTLESS LEG SYNDROME: ICD-10-CM

## 2023-12-26 RX ORDER — PRAMIPEXOLE DIHYDROCHLORIDE 0.5 MG/1
1 TABLET ORAL DAILY
Qty: 180 TABLET | Refills: 0 | Status: SHIPPED | OUTPATIENT
Start: 2023-12-26 | End: 2024-03-25

## 2024-01-03 DIAGNOSIS — F41.1 GAD (GENERALIZED ANXIETY DISORDER): ICD-10-CM

## 2024-01-03 RX ORDER — LORAZEPAM 0.5 MG/1
0.5 TABLET ORAL
Qty: 30 TABLET | Refills: 0 | Status: SHIPPED | OUTPATIENT
Start: 2024-01-03

## 2024-01-04 ENCOUNTER — OFFICE VISIT (OUTPATIENT)
Dept: FAMILY MEDICINE CLINIC | Facility: CLINIC | Age: 59
End: 2024-01-04
Payer: COMMERCIAL

## 2024-01-04 VITALS
WEIGHT: 207 LBS | RESPIRATION RATE: 16 BRPM | HEIGHT: 61 IN | OXYGEN SATURATION: 95 % | DIASTOLIC BLOOD PRESSURE: 78 MMHG | HEART RATE: 92 BPM | SYSTOLIC BLOOD PRESSURE: 124 MMHG | BODY MASS INDEX: 39.08 KG/M2

## 2024-01-04 DIAGNOSIS — H91.92 HEARING LOSS OF LEFT EAR, UNSPECIFIED HEARING LOSS TYPE: ICD-10-CM

## 2024-01-04 DIAGNOSIS — F32.0 CURRENT MILD EPISODE OF MAJOR DEPRESSIVE DISORDER WITHOUT PRIOR EPISODE (HCC): ICD-10-CM

## 2024-01-04 DIAGNOSIS — Z12.2 SCREENING FOR LUNG CANCER: ICD-10-CM

## 2024-01-04 DIAGNOSIS — Z00.00 ANNUAL PHYSICAL EXAM: Primary | ICD-10-CM

## 2024-01-04 DIAGNOSIS — F17.210 CIGARETTE NICOTINE DEPENDENCE WITHOUT COMPLICATION: ICD-10-CM

## 2024-01-04 DIAGNOSIS — Z23 ENCOUNTER FOR IMMUNIZATION: ICD-10-CM

## 2024-01-04 PROCEDURE — 90686 IIV4 VACC NO PRSV 0.5 ML IM: CPT

## 2024-01-04 PROCEDURE — 90471 IMMUNIZATION ADMIN: CPT

## 2024-01-04 PROCEDURE — 99396 PREV VISIT EST AGE 40-64: CPT | Performed by: PHYSICIAN ASSISTANT

## 2024-01-04 RX ORDER — SERTRALINE HYDROCHLORIDE 100 MG/1
100 TABLET, FILM COATED ORAL DAILY
Qty: 90 TABLET | Refills: 0 | Status: SHIPPED | OUTPATIENT
Start: 2024-01-04 | End: 2024-07-02

## 2024-01-04 NOTE — PROGRESS NOTES
ADULT ANNUAL PHYSICAL  [facility]    NAME: Zenaida Kathleen  AGE: 58 y.o. SEX: female  : 1965     DATE: 2024     Assessment and Plan:     Problem List Items Addressed This Visit          Other    Current cigarette smoker    Relevant Orders    CT lung screening program     Other Visit Diagnoses       Encounter for immunization    -  Primary    Relevant Orders    influenza vaccine, quadrivalent, 0.5 mL, preservative-free, for adult and pediatric patients 6 mos+ (AFLURIA, FLUARIX, FLULAVAL, FLUZONE) (Completed)    Screening for lung cancer        Relevant Orders    CT lung screening program    Hearing loss of left ear, unspecified hearing loss type        Relevant Orders    Ambulatory Referral to Audiology    Annual physical exam                Immunizations and preventive care screenings were discussed with patient today. Appropriate education was printed on patient's after visit summary.    Counseling:  Alcohol/drug use: discussed moderation in alcohol intake, the recommendations for healthy alcohol use, and avoidance of illicit drug use.  Dental Health: discussed importance of regular tooth brushing, flossing, and dental visits.  Exercise: the importance of regular exercise/physical activity was discussed. Recommend exercise 3-5 times per week for at least 30 minutes.       Depression Screening and Follow-up Plan: Patient was screened for depression during today's encounter. They screened negative with a PHQ-9 score of 0.    Tobacco Cessation Counseling: Tobacco cessation counseling was provided. The patient is sincerely urged to quit consumption of tobacco. She is not ready to quit tobacco.         Return in 6 months (on 2024).     Chief Complaint:     Chief Complaint   Patient presents with    Physical Exam      History of Present Illness:     Adult Annual Physical   Patient here for a comprehensive physical exam. The patient reports  no  new issues. .    Diet and Physical Activity  Diet/Nutrition:  Mediocre .   Exercise: walking and 3-4 times a week on average.      Depression Screening  PHQ-2/9 Depression Screening    Little interest or pleasure in doing things: 0 - not at all  Feeling down, depressed, or hopeless: 0 - not at all  Trouble falling or staying asleep, or sleeping too much: 0 - not at all  Feeling tired or having little energy: 0 - not at all  Poor appetite or overeatin - not at all  Feeling bad about yourself - or that you are a failure or have let yourself or your family down: 0 - not at all  Trouble concentrating on things, such as reading the newspaper or watching television: 0 - not at all  Moving or speaking so slowly that other people could have noticed. Or the opposite - being so fidgety or restless that you have been moving around a lot more than usual: 0 - not at all  Thoughts that you would be better off dead, or of hurting yourself in some way: 0 - not at all  PHQ-9 Score: 0  PHQ-9 Interpretation: No or Minimal depression       General Health  Sleep: gets 7-8 hours of sleep on average.   Hearing: decreased - left.  Vision: wears glasses.   Dental: regular dental visits.       /GYN Health  Follows with gynecology? yes   Patient is: postmenopausal    Advanced Care Planning  Do you have an advanced directive? yes  Do you have a durable medical power of ? yes     Review of Systems:     Review of Systems   Constitutional: Negative.    Respiratory: Negative.     Cardiovascular: Negative.    Gastrointestinal: Negative.    Genitourinary: Negative.    Neurological: Negative.    Psychiatric/Behavioral: Negative.     All other systems reviewed and are negative.     Past Medical History:     Past Medical History:   Diagnosis Date    Allergic     Anxiety 2005    Arthritis     Bulging lumbar disc     Chronic pain     low back    Degenerative disk disease     lumbar spine    Depression 2005    Effusion of left knee  04/25/2023    Hyperlipidemia     Hypertension     Pinched nerve     lumbar spine    Primary osteoarthritis of left knee 02/14/2023    Psychiatric disorder     depression    Shingles [id number]      Past Surgical History:     Past Surgical History:   Procedure Laterality Date    CARPAL TUNNEL RELEASE Bilateral 1998    CHOLECYSTECTOMY  2008    COLONOSCOPY  06/15/2015    COLONOSCOPY  06/02/2015    Internal hemorrhoids, hyperplastic polyps which were removed.  Ten year recall recommended June 2025    COLONOSCOPY  09/16/2020    normal terminal ileum, 1 small polyp in the distal sigmoid colon that was hyperplastic. .  She did have a small amount of retained stool in the colon.  She did have a small amount of retained stool in the colon.  A 5 year recall was recommended due to poor prep, September 2025.     EYE SURGERY      FL INJECTION LEFT KNEE (ARTHROGRAM)  06/06/2023    MS ARTHRP KNE CONDYLE&PLATU MEDIAL&LAT COMPARTMENTS Left 07/24/2023    Procedure: ARTHROPLASTY KNEE TOTAL SAME DAY;  Surgeon: Dale العلي MD;  Location:  MAIN OR;  Service: Orthopedics    SPINAL CORD STIMULATOR IMPLANT      2014    SPINE SURGERY  2017    Stimulator inplant    WISDOM TOOTH EXTRACTION Bilateral 1982      Social History:     Social History     Socioeconomic History    Marital status:      Spouse name: None    Number of children: 1    Years of education: None    Highest education level: None   Occupational History    Occupation: not working    Tobacco Use    Smoking status: Every Day     Current packs/day: 1.00     Average packs/day: 1 pack/day for 47.0 years (47.0 ttl pk-yrs)     Types: Cigarettes     Start date: 1/1/1977    Smokeless tobacco: Never    Tobacco comments:     smokes 10 cigs daily as of 10/20   Vaping Use    Vaping status: Never Used   Substance and Sexual Activity    Alcohol use: Yes     Comment: Social drinker    Drug use: No    Sexual activity: Yes     Partners: Male     Birth control/protection: Inserts, None    Other Topics Concern    None   Social History Narrative         Social Determinants of Health     Financial Resource Strain: Low Risk  (5/10/2021)    Overall Financial Resource Strain (CARDIA)     Difficulty of Paying Living Expenses: Not very hard   Food Insecurity: No Food Insecurity (5/10/2021)    Hunger Vital Sign     Worried About Running Out of Food in the Last Year: Never true     Ran Out of Food in the Last Year: Never true   Transportation Needs: No Transportation Needs (5/10/2021)    PRAPARE - Transportation     Lack of Transportation (Medical): No     Lack of Transportation (Non-Medical): No   Physical Activity: Insufficiently Active (5/10/2021)    Exercise Vital Sign     Days of Exercise per Week: 3 days     Minutes of Exercise per Session: 30 min   Stress: No Stress Concern Present (5/10/2021)    [employer] [place] [employer] - Occupational Stress Questionnaire     Feeling of Stress : Not at all   Social Connections: Socially Isolated (5/10/2021)    Social Connection and Isolation Panel [NHANES]     Frequency of Communication with Friends and Family: More than three times a week     Frequency of Social Gatherings with Friends and Family: Never     Attends Restoration Services: Never     Active Member of Clubs or Organizations: No     Attends Club or Organization Meetings: Never     Marital Status:    Intimate Partner Violence: Not At Risk (7/18/2023)    Humiliation, Afraid, Rape, and Kick questionnaire     Fear of Current or Ex-Partner: No     Emotionally Abused: No     Physically Abused: No     Sexually Abused: No   Housing Stability: Not on file      Family History:     Family History   Problem Relation Age of Onset    Skin cancer Mother     Heart disease Father         Dx with lewy body dementia in 2012    Dementia Father         Dx with lewy body dementia in 2016    Breast cancer Maternal Aunt 28    Cancer Maternal Aunt         Breast    Breast cancer Sister 56    BRCA1  "Negative Sister     BRCA2 Negative Sister     Depression Sister     Cancer Sister         Breast    Prostate cancer Maternal Uncle     Cancer Maternal Uncle         Bladder/prostate    Colon cancer Maternal Uncle     Colon polyps Neg Hx       Current Medications:     Current Outpatient Medications   Medication Sig Dispense Refill    atorvastatin (LIPITOR) 80 mg tablet Take 1 tablet by mouth once daily 90 tablet 0    lisinopril (ZESTRIL) 10 mg tablet Take 1 tablet (10 mg total) by mouth daily 90 tablet 0    LORazepam (Ativan) 0.5 mg tablet Take 1 tablet (0.5 mg total) by mouth daily at bedtime 30 tablet 0    pramipexole (MIRAPEX) 0.5 mg tablet Take 2 tablets (1 mg total) by mouth daily 180 tablet 0    sertraline (ZOLOFT) 100 mg tablet Take 1 tablet (100 mg total) by mouth daily 90 tablet 0    sertraline (ZOLOFT) 50 mg tablet Take 1 tablet (50 mg total) by mouth daily 90 tablet 0     No current facility-administered medications for this visit.      Allergies:     Allergies   Allergen Reactions    Penicillins Hives and Throat Swelling     Childhood allergy per pt    Meloxicam Hives and Itching      Physical Exam:     /78   Pulse 92   Resp 16   Ht 5' 1\" (1.549 m)   Wt 93.9 kg (207 lb)   LMP  (LMP Unknown)   SpO2 95%   BMI 39.11 kg/m²     Physical Exam  Vitals and nursing note reviewed.   Constitutional:       General: She is not in acute distress.     Appearance: She is not ill-appearing, toxic-appearing or diaphoretic.   HENT:      Head: Normocephalic.      Right Ear: Tympanic membrane normal.      Left Ear: Tympanic membrane normal.      Mouth/Throat:      Mouth: Mucous membranes are moist.      Pharynx: Oropharynx is clear.   Eyes:      General: No scleral icterus.     Conjunctiva/sclera: Conjunctivae normal.   Cardiovascular:      Rate and Rhythm: Normal rate and regular rhythm.      Heart sounds: Normal heart sounds.   Pulmonary:      Effort: Pulmonary effort is normal.      Breath sounds: Normal " breath sounds.   Abdominal:      General: Bowel sounds are normal.      Palpations: Abdomen is soft.      Tenderness: There is no abdominal tenderness.   Musculoskeletal:      Cervical back: Neck supple.      Right lower leg: No edema.      Left lower leg: No edema.   Lymphadenopathy:      Cervical: No cervical adenopathy.   Neurological:      Mental Status: She is alert and oriented to person, place, and time.   Psychiatric:         Mood and Affect: Mood normal.          Dipesh Gonzalez PA-C  [facility]

## 2024-01-04 NOTE — PATIENT INSTRUCTIONS
Wellness Visit for Adults   AMBULATORY CARE:   A wellness visit  is when you see your healthcare provider to get screened for health problems. Your healthcare provider will also give you advice on how to stay healthy. Write down your questions so you remember to ask them. Ask your healthcare provider how often you should have a wellness visit.  What happens at a wellness visit:  Your healthcare provider will ask about your health, and your family history of health problems. This includes high blood pressure, heart disease, and cancer. He or she will ask if you have symptoms that concern you, if you smoke, and about your mood. You may also be asked about your intake of medicines, supplements, food, and alcohol. Any of the following may be done:  Your weight  will be checked. Your height may also be checked so your body mass index (BMI) can be calculated. Your BMI shows if you are at a healthy weight.    Your blood pressure  and heart rate will be checked. Your temperature may also be checked.    Blood and urine tests  may be done. Blood tests may be done to check your cholesterol levels. Abnormal cholesterol levels increase your risk for heart disease and stroke. You may also need a blood or urine test to check for diabetes if you are at increased risk. Urine tests may be done to look for signs of an infection or kidney disease.    A physical exam  includes checking your heartbeat and lungs with a stethoscope. Your healthcare provider may also check your skin to look for sun damage.    Screening tests  may be recommended. A screening test is done to check for diseases that may not cause symptoms. The screening tests you may need depend on your age, gender, family history, and lifestyle habits. For example, colorectal screening may be recommended if you are 50 years old or older.    Screening tests you need if you are a woman:   A Pap smear  is used to screen for cervical cancer. Pap smears are usually done every 3 to  5 years depending on your age. You may need them more often if you have had abnormal Pap smear test results in the past. Ask your healthcare provider how often you should have a Pap smear.    A mammogram  is an x-ray of your breasts to screen for breast cancer. Experts recommend mammograms every 2 years starting at age 50 years. You may need a mammogram at age 49 years or younger if you have an increased risk for breast cancer. Talk to your healthcare provider about when you should start having mammograms and how often you need them.    Vaccines you may need:   Get an influenza vaccine  every year. The influenza vaccine protects you from the flu. Several types of viruses cause the flu. The viruses change over time, so new vaccines are made each year.    Get a tetanus-diphtheria (Td) booster vaccine  every 10 years. This vaccine protects you against tetanus and diphtheria. Tetanus is a severe infection that may cause painful muscle spasms and lockjaw. Diphtheria is a severe bacterial infection that causes a thick covering in the back of your mouth and throat.    Get a human papillomavirus (HPV) vaccine  if you are female and aged 19 to 26 or male 19 to 21 and never received it. This vaccine protects you from HPV infection. HPV is the most common infection spread by sexual contact. HPV may also cause vaginal, penile, and anal cancers.    Get a pneumococcal vaccine  if you are aged 65 years or older. The pneumococcal vaccine is an injection given to protect you from pneumococcal disease. Pneumococcal disease is an infection caused by pneumococcal bacteria. The infection may cause pneumonia, meningitis, or an ear infection.    Get a shingles vaccine  if you are 60 or older, even if you have had shingles before. The shingles vaccine is an injection to protect you from the varicella-zoster virus. This is the same virus that causes chickenpox. Shingles is a painful rash that develops in people who had chickenpox or have  been exposed to the virus.    How to eat healthy:  My Plate is a model for planning healthy meals. It shows the types and amounts of foods that should go on your plate. Fruits and vegetables make up about half of your plate, and grains and protein make up the other half. A serving of dairy is included on the side of your plate. The amount of calories and serving sizes you need depends on your age, gender, weight, and height. Examples of healthy foods are listed below:  Eat a variety of vegetables  such as dark green, red, and orange vegetables. You can also include canned vegetables low in sodium (salt) and frozen vegetables without added butter or sauces.    Eat a variety of fresh fruits , canned fruit in 100% juice, frozen fruit, and dried fruit.    Include whole grains.  At least half of the grains you eat should be whole grains. Examples include whole-wheat bread, wheat pasta, brown rice, and whole-grain cereals such as oatmeal.    Eat a variety of protein foods such as seafood (fish and shellfish), lean meat, and poultry without skin (turkey and chicken). Examples of lean meats include pork leg, shoulder, or tenderloin, and beef round, sirloin, tenderloin, and extra lean ground beef. Other protein foods include eggs and egg substitutes, beans, peas, soy products, nuts, and seeds.    Choose low-fat dairy products such as skim or 1% milk or low-fat yogurt, cheese, and cottage cheese.    Limit unhealthy fats  such as butter, hard margarine, and shortening.       Exercise:  Exercise at least 30 minutes per day on most days of the week. Some examples of exercise include walking, biking, dancing, and swimming. You can also fit in more physical activity by taking the stairs instead of the elevator or parking farther away from stores. Include muscle strengthening activities 2 days each week. Regular exercise provides many health benefits. It helps you manage your weight, and decreases your risk for type 2 diabetes,  heart disease, stroke, and high blood pressure. Exercise can also help improve your mood. Ask your healthcare provider about the best exercise plan for you.       General health and safety guidelines:   Do not smoke.  Nicotine and other chemicals in cigarettes and cigars can cause lung damage. Ask your healthcare provider for information if you currently smoke and need help to quit. E-cigarettes or smokeless tobacco still contain nicotine. Talk to your healthcare provider before you use these products.    Limit alcohol.  A drink of alcohol is 12 ounces of beer, 5 ounces of wine, or 1½ ounces of liquor.    Lose weight, if needed.  Being overweight increases your risk of certain health conditions. These include heart disease, high blood pressure, type 2 diabetes, and certain types of cancer.    Protect your skin.  Do not sunbathe or use tanning beds. Use sunscreen with a SPF 15 or higher. Apply sunscreen at least 15 minutes before you go outside. Reapply sunscreen every 2 hours. Wear protective clothing, hats, and sunglasses when you are outside.    Drive safely.  Always wear your seatbelt. Make sure everyone in your car wears a seatbelt. A seatbelt can save your life if you are in an accident. Do not use your cell phone when you are driving. This could distract you and cause an accident. Pull over if you need to make a call or send a text message.    Practice safe sex.  Use latex condoms if are sexually active and have more than one partner. Your healthcare provider may recommend screening tests for sexually transmitted infections (STIs).    Wear helmets, lifejackets, and protective gear.  Always wear a helmet when you ride a bike or motorcycle, go skiing, or play sports that could cause a head injury. Wear protective equipment when you play sports. Wear a lifejacket when you are on a boat or doing water sports.    © Copyright [employer] 2023 Information is for End User's use only and may not be sold, redistributed or  otherwise used for commercial purposes.  The above information is an  only. It is not intended as medical advice for individual conditions or treatments. Talk to your doctor, nurse or pharmacist before following any medical regimen to see if it is safe and effective for you.    Wellness Visit for Adults   AMBULATORY CARE:   A wellness visit  is when you see your healthcare provider to get screened for health problems. Your healthcare provider will also give you advice on how to stay healthy. Write down your questions so you remember to ask them. Ask your healthcare provider how often you should have a wellness visit.  What happens at a wellness visit:  Your healthcare provider will ask about your health, and your family history of health problems. This includes high blood pressure, heart disease, and cancer. He or she will ask if you have symptoms that concern you, if you smoke, and about your mood. You may also be asked about your intake of medicines, supplements, food, and alcohol. Any of the following may be done:  Your weight  will be checked. Your height may also be checked so your body mass index (BMI) can be calculated. Your BMI shows if you are at a healthy weight.    Your blood pressure  and heart rate will be checked. Your temperature may also be checked.    Blood and urine tests  may be done. Blood tests may be done to check your cholesterol levels. Abnormal cholesterol levels increase your risk for heart disease and stroke. You may also need a blood or urine test to check for diabetes if you are at increased risk. Urine tests may be done to look for signs of an infection or kidney disease.    A physical exam  includes checking your heartbeat and lungs with a stethoscope. Your healthcare provider may also check your skin to look for sun damage.    Screening tests  may be recommended. A screening test is done to check for diseases that may not cause symptoms. The screening tests you may need  depend on your age, gender, family history, and lifestyle habits. For example, colorectal screening may be recommended if you are 50 years old or older.    Screening tests you need if you are a woman:   A Pap smear  is used to screen for cervical cancer. Pap smears are usually done every 3 to 5 years depending on your age. You may need them more often if you have had abnormal Pap smear test results in the past. Ask your healthcare provider how often you should have a Pap smear.    A mammogram  is an x-ray of your breasts to screen for breast cancer. Experts recommend mammograms every 2 years starting at age 50 years. You may need a mammogram at age 49 years or younger if you have an increased risk for breast cancer. Talk to your healthcare provider about when you should start having mammograms and how often you need them.    Vaccines you may need:   Get an influenza vaccine  every year. The influenza vaccine protects you from the flu. Several types of viruses cause the flu. The viruses change over time, so new vaccines are made each year.    Get a tetanus-diphtheria (Td) booster vaccine  every 10 years. This vaccine protects you against tetanus and diphtheria. Tetanus is a severe infection that may cause painful muscle spasms and lockjaw. Diphtheria is a severe bacterial infection that causes a thick covering in the back of your mouth and throat.    Get a human papillomavirus (HPV) vaccine  if you are female and aged 19 to 26 or male 19 to 21 and never received it. This vaccine protects you from HPV infection. HPV is the most common infection spread by sexual contact. HPV may also cause vaginal, penile, and anal cancers.    Get a pneumococcal vaccine  if you are aged 65 years or older. The pneumococcal vaccine is an injection given to protect you from pneumococcal disease. Pneumococcal disease is an infection caused by pneumococcal bacteria. The infection may cause pneumonia, meningitis, or an ear infection.    Get  a shingles vaccine  if you are 60 or older, even if you have had shingles before. The shingles vaccine is an injection to protect you from the varicella-zoster virus. This is the same virus that causes chickenpox. Shingles is a painful rash that develops in people who had chickenpox or have been exposed to the virus.    How to eat healthy:  My Plate is a model for planning healthy meals. It shows the types and amounts of foods that should go on your plate. Fruits and vegetables make up about half of your plate, and grains and protein make up the other half. A serving of dairy is included on the side of your plate. The amount of calories and serving sizes you need depends on your age, gender, weight, and height. Examples of healthy foods are listed below:  Eat a variety of vegetables  such as dark green, red, and orange vegetables. You can also include canned vegetables low in sodium (salt) and frozen vegetables without added butter or sauces.    Eat a variety of fresh fruits , canned fruit in 100% juice, frozen fruit, and dried fruit.    Include whole grains.  At least half of the grains you eat should be whole grains. Examples include whole-wheat bread, wheat pasta, brown rice, and whole-grain cereals such as oatmeal.    Eat a variety of protein foods such as seafood (fish and shellfish), lean meat, and poultry without skin (turkey and chicken). Examples of lean meats include pork leg, shoulder, or tenderloin, and beef round, sirloin, tenderloin, and extra lean ground beef. Other protein foods include eggs and egg substitutes, beans, peas, soy products, nuts, and seeds.    Choose low-fat dairy products such as skim or 1% milk or low-fat yogurt, cheese, and cottage cheese.    Limit unhealthy fats  such as butter, hard margarine, and shortening.       Exercise:  Exercise at least 30 minutes per day on most days of the week. Some examples of exercise include walking, biking, dancing, and swimming. You can also fit in  more physical activity by taking the stairs instead of the elevator or parking farther away from stores. Include muscle strengthening activities 2 days each week. Regular exercise provides many health benefits. It helps you manage your weight, and decreases your risk for type 2 diabetes, heart disease, stroke, and high blood pressure. Exercise can also help improve your mood. Ask your healthcare provider about the best exercise plan for you.       General health and safety guidelines:   Do not smoke.  Nicotine and other chemicals in cigarettes and cigars can cause lung damage. Ask your healthcare provider for information if you currently smoke and need help to quit. E-cigarettes or smokeless tobacco still contain nicotine. Talk to your healthcare provider before you use these products.    Limit alcohol.  A drink of alcohol is 12 ounces of beer, 5 ounces of wine, or 1½ ounces of liquor.    Lose weight, if needed.  Being overweight increases your risk of certain health conditions. These include heart disease, high blood pressure, type 2 diabetes, and certain types of cancer.    Protect your skin.  Do not sunbathe or use tanning beds. Use sunscreen with a SPF 15 or higher. Apply sunscreen at least 15 minutes before you go outside. Reapply sunscreen every 2 hours. Wear protective clothing, hats, and sunglasses when you are outside.    Drive safely.  Always wear your seatbelt. Make sure everyone in your car wears a seatbelt. A seatbelt can save your life if you are in an accident. Do not use your cell phone when you are driving. This could distract you and cause an accident. Pull over if you need to make a call or send a text message.    Practice safe sex.  Use latex condoms if are sexually active and have more than one partner. Your healthcare provider may recommend screening tests for sexually transmitted infections (STIs).    Wear helmets, lifejackets, and protective gear.  Always wear a helmet when you ride a bike or  motorcycle, go skiing, or play sports that could cause a head injury. Wear protective equipment when you play sports. Wear a lifejacket when you are on a boat or doing water sports.    © Copyright [employer] 2023 Information is for End User's use only and may not be sold, redistributed or otherwise used for commercial purposes.  The above information is an  only. It is not intended as medical advice for individual conditions or treatments. Talk to your doctor, nurse or pharmacist before following any medical regimen to see if it is safe and effective for you.

## 2024-01-21 DIAGNOSIS — E78.2 MIXED HYPERLIPIDEMIA: ICD-10-CM

## 2024-01-22 RX ORDER — ATORVASTATIN CALCIUM 80 MG/1
80 TABLET, FILM COATED ORAL DAILY
Qty: 90 TABLET | Refills: 0 | Status: SHIPPED | OUTPATIENT
Start: 2024-01-22

## 2024-02-01 DIAGNOSIS — F41.1 GAD (GENERALIZED ANXIETY DISORDER): ICD-10-CM

## 2024-02-01 RX ORDER — LORAZEPAM 0.5 MG/1
0.5 TABLET ORAL
Qty: 30 TABLET | Refills: 0 | Status: SHIPPED | OUTPATIENT
Start: 2024-02-01

## 2024-03-01 DIAGNOSIS — G25.81 RESTLESS LEG SYNDROME: ICD-10-CM

## 2024-03-01 DIAGNOSIS — F41.1 GAD (GENERALIZED ANXIETY DISORDER): ICD-10-CM

## 2024-03-01 RX ORDER — LORAZEPAM 0.5 MG/1
0.5 TABLET ORAL
Qty: 30 TABLET | Refills: 0 | Status: SHIPPED | OUTPATIENT
Start: 2024-03-01

## 2024-03-01 RX ORDER — PRAMIPEXOLE DIHYDROCHLORIDE 0.5 MG/1
1 TABLET ORAL DAILY
Qty: 180 TABLET | Refills: 1 | Status: SHIPPED | OUTPATIENT
Start: 2024-03-01 | End: 2024-08-28

## 2024-03-11 DIAGNOSIS — I10 ESSENTIAL HYPERTENSION: ICD-10-CM

## 2024-03-11 RX ORDER — LISINOPRIL 10 MG/1
10 TABLET ORAL DAILY
Qty: 90 TABLET | Refills: 1 | Status: SHIPPED | OUTPATIENT
Start: 2024-03-11

## 2024-03-29 DIAGNOSIS — F32.0 CURRENT MILD EPISODE OF MAJOR DEPRESSIVE DISORDER WITHOUT PRIOR EPISODE (HCC): ICD-10-CM

## 2024-03-29 RX ORDER — SERTRALINE HYDROCHLORIDE 100 MG/1
100 TABLET, FILM COATED ORAL DAILY
Qty: 90 TABLET | Refills: 1 | Status: SHIPPED | OUTPATIENT
Start: 2024-03-29

## 2024-04-05 DIAGNOSIS — F41.1 GAD (GENERALIZED ANXIETY DISORDER): ICD-10-CM

## 2024-04-05 RX ORDER — LORAZEPAM 0.5 MG/1
0.5 TABLET ORAL
Qty: 30 TABLET | Refills: 0 | Status: SHIPPED | OUTPATIENT
Start: 2024-04-05

## 2024-04-16 DIAGNOSIS — E78.2 MIXED HYPERLIPIDEMIA: ICD-10-CM

## 2024-04-16 RX ORDER — ATORVASTATIN CALCIUM 80 MG/1
80 TABLET, FILM COATED ORAL DAILY
Qty: 90 TABLET | Refills: 0 | Status: SHIPPED | OUTPATIENT
Start: 2024-04-16

## 2024-04-27 LAB
ALBUMIN SERPL-MCNC: 4.4 G/DL (ref 3.8–4.9)
ALBUMIN/GLOB SERPL: 2 {RATIO} (ref 1.2–2.2)
ALP SERPL-CCNC: 101 IU/L (ref 44–121)
ALT SERPL-CCNC: 19 IU/L (ref 0–32)
APPEARANCE UR: CLEAR
AST SERPL-CCNC: 19 IU/L (ref 0–40)
BACTERIA URNS QL MICRO: ABNORMAL
BASOPHILS # BLD AUTO: 0.1 X10E3/UL (ref 0–0.2)
BASOPHILS NFR BLD AUTO: 1 %
BILIRUB SERPL-MCNC: 0.3 MG/DL (ref 0–1.2)
BILIRUB UR QL STRIP: NEGATIVE
BUN SERPL-MCNC: 15 MG/DL (ref 6–24)
BUN/CREAT SERPL: 21 (ref 9–23)
CALCIUM SERPL-MCNC: 9.4 MG/DL (ref 8.7–10.2)
CASTS URNS QL MICRO: ABNORMAL /LPF
CHLORIDE SERPL-SCNC: 100 MMOL/L (ref 96–106)
CHOLEST SERPL-MCNC: 174 MG/DL (ref 100–199)
CHOLEST/HDLC SERPL: 4.8 RATIO (ref 0–4.4)
CO2 SERPL-SCNC: 21 MMOL/L (ref 20–29)
COLOR UR: YELLOW
CREAT SERPL-MCNC: 0.72 MG/DL (ref 0.57–1)
EGFR: 96 ML/MIN/1.73
EOSINOPHIL # BLD AUTO: 0.2 X10E3/UL (ref 0–0.4)
EOSINOPHIL NFR BLD AUTO: 3 %
EPI CELLS #/AREA URNS HPF: >10 /HPF (ref 0–10)
ERYTHROCYTE [DISTWIDTH] IN BLOOD BY AUTOMATED COUNT: 13.6 % (ref 11.7–15.4)
EST. AVERAGE GLUCOSE BLD GHB EST-MCNC: 137 MG/DL
GLOBULIN SER-MCNC: 2.2 G/DL (ref 1.5–4.5)
GLUCOSE SERPL-MCNC: 114 MG/DL (ref 70–99)
GLUCOSE UR QL: NEGATIVE
HBA1C MFR BLD: 6.4 % (ref 4.8–5.6)
HCT VFR BLD AUTO: 43.3 % (ref 34–46.6)
HDLC SERPL-MCNC: 36 MG/DL
HGB BLD-MCNC: 14.8 G/DL (ref 11.1–15.9)
HGB UR QL STRIP: NEGATIVE
IMM GRANULOCYTES # BLD: 0 X10E3/UL (ref 0–0.1)
IMM GRANULOCYTES NFR BLD: 0 %
KETONES UR QL STRIP: NEGATIVE
LDLC SERPL CALC-MCNC: 94 MG/DL (ref 0–99)
LDLC SERPL DIRECT ASSAY-MCNC: 85 MG/DL (ref 0–99)
LEUKOCYTE ESTERASE UR QL STRIP: ABNORMAL
LYMPHOCYTES # BLD AUTO: 2.5 X10E3/UL (ref 0.7–3.1)
LYMPHOCYTES NFR BLD AUTO: 28 %
MCH RBC QN AUTO: 32.1 PG (ref 26.6–33)
MCHC RBC AUTO-ENTMCNC: 34.2 G/DL (ref 31.5–35.7)
MCV RBC AUTO: 94 FL (ref 79–97)
MICRO URNS: ABNORMAL
MONOCYTES # BLD AUTO: 0.6 X10E3/UL (ref 0.1–0.9)
MONOCYTES NFR BLD AUTO: 7 %
NEUTROPHILS # BLD AUTO: 5.5 X10E3/UL (ref 1.4–7)
NEUTROPHILS NFR BLD AUTO: 61 %
NITRITE UR QL STRIP: NEGATIVE
PH UR STRIP: 5.5 [PH] (ref 5–7.5)
PLATELET # BLD AUTO: 184 X10E3/UL (ref 150–450)
POTASSIUM SERPL-SCNC: 4 MMOL/L (ref 3.5–5.2)
PROT SERPL-MCNC: 6.6 G/DL (ref 6–8.5)
PROT UR QL STRIP: NEGATIVE
RBC # BLD AUTO: 4.61 X10E6/UL (ref 3.77–5.28)
RBC #/AREA URNS HPF: ABNORMAL /HPF (ref 0–2)
SL AMB VLDL CHOLESTEROL CALC: 44 MG/DL (ref 5–40)
SODIUM SERPL-SCNC: 140 MMOL/L (ref 134–144)
SP GR UR: 1.02 (ref 1–1.03)
TRIGL SERPL-MCNC: 263 MG/DL (ref 0–149)
TSH SERPL DL<=0.005 MIU/L-ACNC: 3.14 UIU/ML (ref 0.45–4.5)
UROBILINOGEN UR STRIP-ACNC: 0.2 MG/DL (ref 0.2–1)
WBC # BLD AUTO: 9 X10E3/UL (ref 3.4–10.8)
WBC #/AREA URNS HPF: ABNORMAL /HPF (ref 0–5)

## 2024-05-06 DIAGNOSIS — F41.1 GAD (GENERALIZED ANXIETY DISORDER): ICD-10-CM

## 2024-05-06 RX ORDER — LORAZEPAM 0.5 MG/1
0.5 TABLET ORAL
Qty: 30 TABLET | Refills: 0 | Status: SHIPPED | OUTPATIENT
Start: 2024-05-06

## 2024-06-05 DIAGNOSIS — F41.1 GAD (GENERALIZED ANXIETY DISORDER): ICD-10-CM

## 2024-06-05 NOTE — TELEPHONE ENCOUNTER
The prescription sent on 5/6/24 was filled and picked up although our system says last fill was in April.   Reason for call:   [x] Refill   [] Prior Auth  [] Other:     Office:   [x] PCP/Provider -   [] Specialty/Provider -     Medication: Lorazepam 0.5mg    Dose/Frequency: 1 tab daily at bedtime    Quantity: 30    Pharmacy: [facility] [address] [place] [address] EDDIE CARR [phone]     Does the patient have enough for 3 days?   [] Yes   [x] No - Send as HP to POD

## 2024-06-06 RX ORDER — LORAZEPAM 0.5 MG/1
0.5 TABLET ORAL
Qty: 30 TABLET | Refills: 1 | Status: SHIPPED | OUTPATIENT
Start: 2024-06-06

## 2024-07-15 ENCOUNTER — OFFICE VISIT (OUTPATIENT)
Dept: FAMILY MEDICINE CLINIC | Facility: CLINIC | Age: 59
End: 2024-07-15
Payer: COMMERCIAL

## 2024-07-15 VITALS
SYSTOLIC BLOOD PRESSURE: 126 MMHG | RESPIRATION RATE: 18 BRPM | DIASTOLIC BLOOD PRESSURE: 70 MMHG | HEIGHT: 61 IN | BODY MASS INDEX: 39.8 KG/M2 | TEMPERATURE: 97.8 F | OXYGEN SATURATION: 95 % | HEART RATE: 88 BPM | WEIGHT: 210.8 LBS

## 2024-07-15 DIAGNOSIS — E78.2 MIXED HYPERLIPIDEMIA: ICD-10-CM

## 2024-07-15 DIAGNOSIS — R93.89 ABNORMAL CT OF THE CHEST: ICD-10-CM

## 2024-07-15 DIAGNOSIS — F43.23 ADJUSTMENT DISORDER WITH MIXED ANXIETY AND DEPRESSED MOOD: ICD-10-CM

## 2024-07-15 DIAGNOSIS — R73.01 IFG (IMPAIRED FASTING GLUCOSE): ICD-10-CM

## 2024-07-15 DIAGNOSIS — I25.10 MILD CAD: Primary | ICD-10-CM

## 2024-07-15 PROCEDURE — 99214 OFFICE O/P EST MOD 30 MIN: CPT | Performed by: FAMILY MEDICINE

## 2024-07-15 NOTE — PROGRESS NOTES
Zenaida Kathleen 1965 female MRN: [id number]    Family Medicine Follow-up Visit    ASSESSMENT/PLAN  Problem List Items Addressed This Visit          Cardiovascular and Mediastinum    Mild CAD - Primary     Overdue for cardiology follow up  Referral placed  Continue atorvastatin 80 mg daily as prescribed          Relevant Orders    Ambulatory Referral to Cardiology       Endocrine    IFG (impaired fasting glucose)     Discussed with patient her lab work  Her blood sugar did increase this time  Will do close follow up in 3 months with labs prior         Relevant Orders    Lipid panel    Comprehensive metabolic panel    Hemoglobin A1C       Behavioral Health    Adjustment disorder with mixed anxiety and depressed mood     She is well controlled on zoloft 150 mg daily and prn ativan             Other    Mixed hyperlipidemia    Relevant Orders    Ambulatory Referral to Cardiology    Lipid panel    Comprehensive metabolic panel    Hemoglobin A1C    Abnormal CT of the chest     Encouraged to complete her CT lung as ordered at last visit             Follow up in 3 months with labs prior          Future Appointments   Date Time Provider Department Center   8/6/2024 10:15 AM Dale العلي MD [facility]          SUBJECTIVE  CC: Follow-up (6 month, review labs)      HPI:  Zenaida Kathleen is a 58 y.o. female who presents for check up    HPI    Review of Systems   Constitutional:  Negative for chills, fatigue and fever.   HENT:  Negative for congestion, postnasal drip, rhinorrhea and sinus pressure.    Eyes:  Negative for photophobia and visual disturbance.   Respiratory:  Negative for cough and shortness of breath.    Cardiovascular:  Negative for chest pain, palpitations and leg swelling.   Gastrointestinal:  Negative for abdominal pain, constipation, diarrhea, nausea and vomiting.   Genitourinary:  Negative for difficulty urinating and dysuria.   Musculoskeletal:  Negative for arthralgias and myalgias.   Skin:   Negative for color change and rash.   Neurological:  Negative for dizziness, weakness, light-headedness and headaches.       Historical Information   The patient history was reviewed as follows:    Past Medical History:   Diagnosis Date    Allergic     Anxiety 2005    Arthritis     Bulging lumbar disc     Chronic pain     low back    Degenerative disk disease     lumbar spine    Depression 2005    Effusion of left knee 04/25/2023    Hyperlipidemia     Hypertension     Pinched nerve     lumbar spine    Primary osteoarthritis of left knee 02/14/2023    Psychiatric disorder     depression    Shingles 20179     Past Surgical History:   Procedure Laterality Date    CARPAL TUNNEL RELEASE Bilateral 1998    CHOLECYSTECTOMY  2008    COLONOSCOPY  06/15/2015    COLONOSCOPY  06/02/2015    Internal hemorrhoids, hyperplastic polyps which were removed.  Ten year recall recommended June 2025    COLONOSCOPY  09/16/2020    normal terminal ileum, 1 small polyp in the distal sigmoid colon that was hyperplastic. .  She did have a small amount of retained stool in the colon.  She did have a small amount of retained stool in the colon.  A 5 year recall was recommended due to poor prep, September 2025.     EYE SURGERY      FL INJECTION LEFT KNEE (ARTHROGRAM)  06/06/2023    LA ARTHRP KNE CONDYLE&PLATU MEDIAL&LAT COMPARTMENTS Left 07/24/2023    Procedure: ARTHROPLASTY KNEE TOTAL SAME DAY;  Surgeon: Dale العلي MD;  Location:  MAIN OR;  Service: Orthopedics    SPINAL CORD STIMULATOR IMPLANT      2014    SPINE SURGERY  2017    Stimulator inplant    WISDOM TOOTH EXTRACTION Bilateral 1982     Family History   Problem Relation Age of Onset    Skin cancer Mother     Heart disease Father         Dx with lewy body dementia in 2012    Dementia Father         Dx with lewy body dementia in 2016    Breast cancer Maternal Aunt 28    Cancer Maternal Aunt         Breast    Breast cancer Sister 56    BRCA1 Negative Sister     BRCA2 Negative Sister      "Depression Sister     Cancer Sister         Breast    Prostate cancer Maternal Uncle     Cancer Maternal Uncle         Bladder/prostate    Colon cancer Maternal Uncle     Colon polyps Neg Hx       Social History   Social History     Substance and Sexual Activity   Alcohol Use Yes    Comment: Social drinker    hard liquior icetea     Social History     Substance and Sexual Activity   Drug Use No     Social History     Tobacco Use   Smoking Status Every Day    Current packs/day: 1.00    Average packs/day: 1 pack/day for 47.5 years (47.5 ttl pk-yrs)    Types: Cigarettes    Start date: 1/1/1977    Passive exposure: Current   Smokeless Tobacco Never   Tobacco Comments    smokes 13 cigs daily as of  7/15/24       Medications:     Current Outpatient Medications:     atorvastatin (LIPITOR) 80 mg tablet, Take 1 tablet by mouth once daily, Disp: 90 tablet, Rfl: 0    lisinopril (ZESTRIL) 10 mg tablet, Take 1 tablet by mouth once daily, Disp: 90 tablet, Rfl: 1    LORazepam (Ativan) 0.5 mg tablet, Take 1 tablet (0.5 mg total) by mouth daily at bedtime, Disp: 30 tablet, Rfl: 1    pramipexole (MIRAPEX) 0.5 mg tablet, Take 2 tablets (1 mg total) by mouth daily, Disp: 180 tablet, Rfl: 1    sertraline (ZOLOFT) 100 mg tablet, Take 1 tablet by mouth once daily, Disp: 90 tablet, Rfl: 1    sertraline (ZOLOFT) 50 mg tablet, Take 1 tablet by mouth once daily, Disp: 90 tablet, Rfl: 1  Allergies   Allergen Reactions    Penicillins Hives and Throat Swelling     Childhood allergy per pt    Meloxicam Hives and Itching       OBJECTIVE    Vitals:   Vitals:    07/15/24 1000   BP: 148/82   BP Location: Left arm   Patient Position: Sitting   Cuff Size: Large   Pulse: 88   Resp: 18   Temp: 97.8 °F (36.6 °C)   TempSrc: Tympanic   SpO2: 95%   Weight: 95.6 kg (210 lb 12.8 oz)   Height: 5' 1\" (1.549 m)           Physical Exam  Constitutional:       Appearance: She is well-developed.   HENT:      Head: Normocephalic and atraumatic.   Eyes:      Pupils: " Pupils are equal, round, and reactive to light.   Cardiovascular:      Rate and Rhythm: Normal rate and regular rhythm.      Heart sounds: Normal heart sounds.   Pulmonary:      Effort: Pulmonary effort is normal. No respiratory distress.      Breath sounds: Normal breath sounds. No wheezing.   Abdominal:      General: Bowel sounds are normal. There is no distension.      Palpations: Abdomen is soft.      Tenderness: There is no abdominal tenderness.   Musculoskeletal:         General: No tenderness. Normal range of motion.      Cervical back: Normal range of motion and neck supple.   Skin:     General: Skin is warm and dry.   Neurological:      Mental Status: She is alert and oriented to person, place, and time.   Psychiatric:         Behavior: Behavior normal.            Labs:        Luda Remy DO    7/15/2024

## 2024-07-15 NOTE — ASSESSMENT & PLAN NOTE
Discussed with patient her lab work  Her blood sugar did increase this time  Will do close follow up in 3 months with labs prior

## 2024-07-15 NOTE — ASSESSMENT & PLAN NOTE
Overdue for cardiology follow up  Referral placed  Continue atorvastatin 80 mg daily as prescribed

## 2024-07-21 DIAGNOSIS — G25.81 RESTLESS LEG SYNDROME: ICD-10-CM

## 2024-07-21 RX ORDER — PRAMIPEXOLE DIHYDROCHLORIDE 0.5 MG/1
1 TABLET ORAL DAILY
Qty: 180 TABLET | Refills: 1 | Status: SHIPPED | OUTPATIENT
Start: 2024-07-21 | End: 2025-01-17

## 2024-07-28 DIAGNOSIS — E78.2 MIXED HYPERLIPIDEMIA: ICD-10-CM

## 2024-07-28 RX ORDER — ATORVASTATIN CALCIUM 80 MG/1
80 TABLET, FILM COATED ORAL DAILY
Qty: 90 TABLET | Refills: 1 | Status: SHIPPED | OUTPATIENT
Start: 2024-07-28

## 2024-07-31 ENCOUNTER — HOSPITAL ENCOUNTER (OUTPATIENT)
Dept: ULTRASOUND IMAGING | Facility: CLINIC | Age: 59
Discharge: HOME | End: 2024-07-31
Payer: COMMERCIAL

## 2024-07-31 ENCOUNTER — HOSPITAL ENCOUNTER (OUTPATIENT)
Dept: MAMMOGRAPHY | Facility: CLINIC | Age: 59
Discharge: HOME | End: 2024-07-31
Payer: COMMERCIAL

## 2024-07-31 DIAGNOSIS — N63.0 LUMP IN FEMALE BREAST: ICD-10-CM

## 2024-07-31 PROCEDURE — G0279 TOMOSYNTHESIS, MAMMO: HCPCS

## 2024-07-31 PROCEDURE — 77065 DX MAMMO INCL CAD UNI: CPT

## 2024-07-31 PROCEDURE — 76642 ULTRASOUND BREAST LIMITED: CPT

## 2024-08-03 DIAGNOSIS — F41.1 GAD (GENERALIZED ANXIETY DISORDER): ICD-10-CM

## 2024-08-06 ENCOUNTER — OFFICE VISIT (OUTPATIENT)
Dept: OBGYN CLINIC | Facility: CLINIC | Age: 59
End: 2024-08-06
Payer: COMMERCIAL

## 2024-08-06 ENCOUNTER — APPOINTMENT (OUTPATIENT)
Dept: RADIOLOGY | Facility: CLINIC | Age: 59
End: 2024-08-06
Payer: COMMERCIAL

## 2024-08-06 VITALS
SYSTOLIC BLOOD PRESSURE: 128 MMHG | BODY MASS INDEX: 39.65 KG/M2 | WEIGHT: 210 LBS | HEIGHT: 61 IN | DIASTOLIC BLOOD PRESSURE: 78 MMHG

## 2024-08-06 DIAGNOSIS — Z47.1 AFTERCARE FOLLOWING LEFT KNEE JOINT REPLACEMENT SURGERY: ICD-10-CM

## 2024-08-06 DIAGNOSIS — Z96.652 HISTORY OF TOTAL LEFT KNEE REPLACEMENT: Primary | ICD-10-CM

## 2024-08-06 DIAGNOSIS — Z96.652 AFTERCARE FOLLOWING LEFT KNEE JOINT REPLACEMENT SURGERY: ICD-10-CM

## 2024-08-06 PROCEDURE — 99213 OFFICE O/P EST LOW 20 MIN: CPT | Performed by: ORTHOPAEDIC SURGERY

## 2024-08-06 PROCEDURE — 73562 X-RAY EXAM OF KNEE 3: CPT

## 2024-08-06 RX ORDER — LORAZEPAM 0.5 MG/1
0.5 TABLET ORAL
Qty: 30 TABLET | Refills: 0 | Status: SHIPPED | OUTPATIENT
Start: 2024-08-06

## 2024-08-06 NOTE — PROGRESS NOTES
Assessment:     1. History of total left knee replacement     Plan:     Problem List Items Addressed This Visit          Surgery/Wound/Pain    History of total left knee replacement - Primary     Zenaida is status post left total knee arthroplasty on July 24, 2023.  X-rays were reviewed with her that revealed a well aligned prosthesis with no evidence of loosening. She is doing well. She can continue to perform activity without restriction to tolerance. She does have tenderness to palpation over the greater trochanter bilaterally, consistent with bursitis. I recommend she do stretching and strengthening exercises for the hip. She can use Voltaren gel in addition to over-the-counter medications as needed. Follow up in 2-3 years or as needed. All patient's questions were answered to her satisfaction.  This note is created using dictation transcription.  It may contain typographical errors, grammatical errors, improperly dictated words, background noise and other errors.         Relevant Orders    XR knee 3 vw left non injury    Subjective:     Patient ID: Zenaida Kathleen is a 58 y.o. female.  Chief Complaint:  This is a 57-year-old white female who is status post left total knee arthroplasty on July 24, 2023. She states she is doing well overall at this time. She feels has been able to perform activity without any issues. She does note having some pain over the lateral aspect of both hips with TTP over the greater trochanter. She has pain with walking prolong distances. She is overall happy with her progress.    Allergy:  Allergies   Allergen Reactions    Penicillins Hives and Throat Swelling     Childhood allergy per pt    Meloxicam Hives and Itching     Medications:  all current active meds have been reviewed  Past Medical History:  Past Medical History:   Diagnosis Date    Allergic     Anxiety 2005    Arthritis     Bulging lumbar disc     Chronic pain     low back    Degenerative disk disease     lumbar spine     Depression 2005    Effusion of left knee 04/25/2023    Hyperlipidemia     Hypertension     Pinched nerve     lumbar spine    Primary osteoarthritis of left knee 02/14/2023    Psychiatric disorder     depression    Shingles [id number]     Past Surgical History:  Past Surgical History:   Procedure Laterality Date    CARPAL TUNNEL RELEASE Bilateral 1998    CHOLECYSTECTOMY  2008    COLONOSCOPY  06/15/2015    COLONOSCOPY  06/02/2015    Internal hemorrhoids, hyperplastic polyps which were removed.  Ten year recall recommended June 2025    COLONOSCOPY  09/16/2020    normal terminal ileum, 1 small polyp in the distal sigmoid colon that was hyperplastic. .  She did have a small amount of retained stool in the colon.  She did have a small amount of retained stool in the colon.  A 5 year recall was recommended due to poor prep, September 2025.     EYE SURGERY      FL INJECTION LEFT KNEE (ARTHROGRAM)  06/06/2023    SC ARTHRP KNE CONDYLE&PLATU MEDIAL&LAT COMPARTMENTS Left 07/24/2023    Procedure: ARTHROPLASTY KNEE TOTAL SAME DAY;  Surgeon: Dale العلي MD;  Location:  MAIN OR;  Service: Orthopedics    SPINAL CORD STIMULATOR IMPLANT      2014    SPINE SURGERY  2017    Stimulator inplant    WISDOM TOOTH EXTRACTION Bilateral 1982     Family History:  Family History   Problem Relation Age of Onset    Skin cancer Mother     Heart disease Father         Dx with lewy body dementia in 2012    Dementia Father         Dx with lewy body dementia in 2016    Breast cancer Maternal Aunt 28    Cancer Maternal Aunt         Breast    Breast cancer Sister 56    BRCA1 Negative Sister     BRCA2 Negative Sister     Depression Sister     Cancer Sister         Breast    Prostate cancer Maternal Uncle     Cancer Maternal Uncle         Bladder/prostate    Colon cancer Maternal Uncle     Colon polyps Neg Hx      Social History:  Social History     Substance and Sexual Activity   Alcohol Use Yes    Comment: Social drinker    hard liquior icetea     Social  "History     Substance and Sexual Activity   Drug Use No     Social History     Tobacco Use   Smoking Status Every Day    Current packs/day: 1.00    Average packs/day: 1 pack/day for 47.6 years (47.6 ttl pk-yrs)    Types: Cigarettes    Start date: 1/1/1977    Passive exposure: Current   Smokeless Tobacco Never   Tobacco Comments    smokes 13 cigs daily as of  7/15/24     Review of Systems   Constitutional: Negative.    HENT: Negative.     Eyes: Negative.    Respiratory: Negative.     Cardiovascular: Negative.    Gastrointestinal: Negative.    Endocrine: Negative.    Genitourinary: Negative.    Musculoskeletal:  Positive for joint swelling. Negative for arthralgias (left knee) and gait problem.   Skin: Negative.    Allergic/Immunologic: Negative.    Hematological: Negative.    Psychiatric/Behavioral: Negative.           Objective:  BP Readings from Last 1 Encounters:   08/06/24 128/78      Wt Readings from Last 1 Encounters:   08/06/24 95.3 kg (210 lb)      BMI:   Estimated body mass index is 39.68 kg/m² as calculated from the following:    Height as of this encounter: 5' 1\" (1.549 m).    Weight as of this encounter: 95.3 kg (210 lb).  BSA:   Estimated body surface area is 1.93 meters squared as calculated from the following:    Height as of this encounter: 5' 1\" (1.549 m).    Weight as of this encounter: 95.3 kg (210 lb).   Physical Exam  Constitutional:       General: She is not in acute distress.     Appearance: She is well-developed.   HENT:      Head: Normocephalic.   Eyes:      Conjunctiva/sclera: Conjunctivae normal.      Pupils: Pupils are equal, round, and reactive to light.   Pulmonary:      Effort: Pulmonary effort is normal. No respiratory distress.   Musculoskeletal:      Left knee: No effusion.   Skin:     General: Skin is warm and dry.   Neurological:      Mental Status: She is alert and oriented to person, place, and time.   Psychiatric:         Behavior: Behavior normal.       Left Knee Exam "     Tenderness   The patient is experiencing no tenderness.     Range of Motion   Extension:  normal   Flexion:  120     Tests   Varus: negative Valgus: negative    Other   Erythema: absent  Scars: present (Well-healed incision with no evidence of infection.)  Sensation: normal  Pulse: present  Swelling: mild  Effusion: no effusion present            I have personally reviewed pertinent films in PACS and my interpretation is X-rays of the left knee reveal a well aligned prosthesis with no evidence of loosening.    Scribe Attestation      I,:  Sherman Razo am acting as a scribe while in the presence of the attending physician.:       I,:  Dale العلي MD personally performed the services described in this documentation    as scribed in my presence.:

## 2024-08-06 NOTE — ASSESSMENT & PLAN NOTE
Zenaida is status post left total knee arthroplasty on July 24, 2023.  X-rays were reviewed with her that revealed a well aligned prosthesis with no evidence of loosening. She is doing well. She can continue to perform activity without restriction to tolerance. She does have tenderness to palpation over the greater trochanter bilaterally, consistent with bursitis. I recommend she do stretching and strengthening exercises for the hip. She can use Voltaren gel in addition to over-the-counter medications as needed. Follow up in 2-3 years or as needed. All patient's questions were answered to her satisfaction.  This note is created using dictation transcription.  It may contain typographical errors, grammatical errors, improperly dictated words, background noise and other errors.

## 2024-08-14 DIAGNOSIS — E78.2 MIXED HYPERLIPIDEMIA: ICD-10-CM

## 2024-08-14 RX ORDER — ATORVASTATIN CALCIUM 80 MG/1
80 TABLET, FILM COATED ORAL DAILY
Qty: 90 TABLET | Refills: 1 | Status: SHIPPED | OUTPATIENT
Start: 2024-08-14

## 2024-08-19 ENCOUNTER — TELEPHONE (OUTPATIENT)
Dept: CARDIOLOGY CLINIC | Facility: CLINIC | Age: 59
End: 2024-08-19

## 2024-08-19 NOTE — TELEPHONE ENCOUNTER
[facility] (3:44pm) to reschedule 11/27/2024 appointment with Dr Bernal.  Fabiola will not be in the office on this date

## 2024-08-30 ENCOUNTER — TELEPHONE (OUTPATIENT)
Dept: CARDIOLOGY CLINIC | Facility: CLINIC | Age: 59
End: 2024-08-30

## 2024-08-30 NOTE — TELEPHONE ENCOUNTER
[facility] (9:52am) to Reschedule 11/27/2024 Appointment with Dr Bernal.  Dr Bernal will not be in the office on this date

## 2024-08-31 DIAGNOSIS — I10 ESSENTIAL HYPERTENSION: ICD-10-CM

## 2024-09-01 RX ORDER — LISINOPRIL 10 MG/1
10 TABLET ORAL DAILY
Qty: 90 TABLET | Refills: 1 | Status: SHIPPED | OUTPATIENT
Start: 2024-09-01

## 2024-09-03 DIAGNOSIS — F41.1 GAD (GENERALIZED ANXIETY DISORDER): ICD-10-CM

## 2024-09-03 RX ORDER — LORAZEPAM 0.5 MG/1
0.5 TABLET ORAL
Qty: 30 TABLET | Refills: 0 | Status: SHIPPED | OUTPATIENT
Start: 2024-09-03

## 2024-09-09 ENCOUNTER — TELEPHONE (OUTPATIENT)
Dept: CARDIOLOGY CLINIC | Facility: CLINIC | Age: 59
End: 2024-09-09

## 2024-09-09 NOTE — TELEPHONE ENCOUNTER
[facility] (11:17am) to Reschedule 11/27/2024 with Teo liriano.  Dr Bernal will not be in the office on this date

## 2024-09-17 ENCOUNTER — TELEPHONE (OUTPATIENT)
Dept: CARDIOLOGY CLINIC | Facility: CLINIC | Age: 59
End: 2024-09-17

## 2024-09-17 NOTE — TELEPHONE ENCOUNTER
[facility] (11:04am) advising patient that her appointment on 11/27/2024 with Dr Bernal is cancelled.  Advised patient that multiple attempts have been made to reschedule her and to contact the Call Center if she would like anew appointment date and time.

## 2024-09-22 DIAGNOSIS — F41.1 GAD (GENERALIZED ANXIETY DISORDER): ICD-10-CM

## 2024-09-23 RX ORDER — LORAZEPAM 0.5 MG/1
0.5 TABLET ORAL 2 TIMES DAILY PRN
Qty: 60 TABLET | Refills: 0 | Status: SHIPPED | OUTPATIENT
Start: 2024-09-23 | End: 2024-10-23

## 2024-09-28 DIAGNOSIS — F32.0 CURRENT MILD EPISODE OF MAJOR DEPRESSIVE DISORDER WITHOUT PRIOR EPISODE (HCC): ICD-10-CM

## 2024-09-28 RX ORDER — SERTRALINE HYDROCHLORIDE 100 MG/1
100 TABLET, FILM COATED ORAL DAILY
Qty: 90 TABLET | Refills: 0 | Status: SHIPPED | OUTPATIENT
Start: 2024-09-28

## 2024-10-15 ENCOUNTER — APPOINTMENT (OUTPATIENT)
Dept: RADIOLOGY | Facility: CLINIC | Age: 59
End: 2024-10-15
Payer: COMMERCIAL

## 2024-10-15 ENCOUNTER — OFFICE VISIT (OUTPATIENT)
Dept: FAMILY MEDICINE CLINIC | Facility: CLINIC | Age: 59
End: 2024-10-15
Payer: COMMERCIAL

## 2024-10-15 VITALS
BODY MASS INDEX: 39.46 KG/M2 | TEMPERATURE: 98 F | DIASTOLIC BLOOD PRESSURE: 80 MMHG | OXYGEN SATURATION: 94 % | WEIGHT: 209 LBS | SYSTOLIC BLOOD PRESSURE: 134 MMHG | RESPIRATION RATE: 20 BRPM | HEART RATE: 91 BPM | HEIGHT: 61 IN

## 2024-10-15 DIAGNOSIS — M79.644 PAIN OF RIGHT THUMB: ICD-10-CM

## 2024-10-15 DIAGNOSIS — F17.210 CIGARETTE NICOTINE DEPENDENCE WITHOUT COMPLICATION: ICD-10-CM

## 2024-10-15 DIAGNOSIS — M79.644 PAIN OF RIGHT THUMB: Primary | ICD-10-CM

## 2024-10-15 PROCEDURE — 99213 OFFICE O/P EST LOW 20 MIN: CPT | Performed by: STUDENT IN AN ORGANIZED HEALTH CARE EDUCATION/TRAINING PROGRAM

## 2024-10-15 PROCEDURE — 73130 X-RAY EXAM OF HAND: CPT

## 2024-10-15 NOTE — PROGRESS NOTES
Ambulatory Visit  Name: Zenaida Kathleen      : 1965      MRN: [id number]  Encounter Provider: Cherelle Galeana MD  Encounter Date: 10/15/2024   Encounter department: [facility]    Assessment & Plan  Pain of right thumb  RUE first digit pain at IP and MCP w/ exam findings c/w OA vs De Quervain's tenosynovitis     Full ROM, no synovitis noted on exam; no other joint involvement, no NILO    Xrays ordered; PT trial- if no improvement will do Ortho referral     Pt to use voltaren gel PRN at home   Orders:    XR hand 3+ vw right; Future    Ambulatory Referral to Physical Therapy; Future    Current cigarette smoker  Discussed cessation, pt declined. Has pending LDCT          History of Present Illness     59 yo F w/ rahul of HTN, IBS, hypothyroidism, cigarette use, anxiety presenting for thumb pain of right hand. Reports present for one month. Notes in AM when she wakes the pain is the worst. Has difficulty with grasping objects due to the pain. No NILO. Denies any erythema or swelling         History obtained from : patient  Review of Systems   Constitutional:  Negative for chills and fever.   HENT:  Negative for trouble swallowing.    Eyes:  Negative for visual disturbance.   Respiratory:  Negative for cough and shortness of breath.    Cardiovascular:  Negative for chest pain and palpitations.   Gastrointestinal:  Negative for abdominal pain and diarrhea.   Genitourinary:  Negative for difficulty urinating, dysuria and hematuria.   Musculoskeletal:  Positive for arthralgias and back pain. Negative for joint swelling.   Skin:  Negative for color change and rash.   Neurological:  Negative for syncope and light-headedness.   All other systems reviewed and are negative.    Medical History Reviewed by provider this encounter:  Tobacco  Allergies  Meds  Problems  Med Hx  Surg Hx  Fam Hx       Current Outpatient Medications on File Prior to Visit   Medication Sig Dispense Refill     "atorvastatin (LIPITOR) 80 mg tablet Take 1 tablet (80 mg total) by mouth daily 90 tablet 1    lisinopril (ZESTRIL) 10 mg tablet Take 1 tablet by mouth once daily 90 tablet 1    LORazepam (Ativan) 0.5 mg tablet Take 1 tablet (0.5 mg total) by mouth 2 (two) times a day as needed for anxiety 60 tablet 0    pramipexole (MIRAPEX) 0.5 mg tablet Take 2 tablets (1 mg total) by mouth daily 180 tablet 1    sertraline (ZOLOFT) 100 mg tablet Take 1 tablet by mouth once daily 90 tablet 0    sertraline (ZOLOFT) 50 mg tablet Take 1 tablet by mouth once daily 90 tablet 0     No current facility-administered medications on file prior to visit.      Social History     Tobacco Use    Smoking status: Every Day     Current packs/day: 1.00     Average packs/day: 1 pack/day for 47.8 years (47.8 ttl pk-yrs)     Types: Cigarettes     Start date: 1/1/1977     Passive exposure: Current    Smokeless tobacco: Never    Tobacco comments:     smokes 13 cigs daily as of  7/15/24   Vaping Use    Vaping status: Never Used   Substance and Sexual Activity    Alcohol use: Yes     Comment: Social drinker    hard liquior icetea    Drug use: No    Sexual activity: Yes     Partners: Male     Birth control/protection: Inserts, None         Objective     /80 (BP Location: Left arm, Patient Position: Sitting, Cuff Size: Standard)   Pulse 91   Temp 98 °F (36.7 °C) (Tympanic)   Resp 20   Ht 5' 1\" (1.549 m)   Wt 94.8 kg (209 lb)   LMP  (LMP Unknown)   SpO2 94%   BMI 39.49 kg/m²     Physical Exam  Vitals and nursing note reviewed.   Constitutional:       General: She is not in acute distress.     Appearance: Normal appearance. She is well-developed.   HENT:      Head: Normocephalic and atraumatic.   Eyes:      Conjunctiva/sclera: Conjunctivae normal.   Cardiovascular:      Rate and Rhythm: Normal rate and regular rhythm.      Pulses: Normal pulses.      Heart sounds: Normal heart sounds. No murmur heard.  Pulmonary:      Effort: Pulmonary effort is " normal. No respiratory distress.      Breath sounds: Normal breath sounds.   Abdominal:      General: Bowel sounds are normal.      Palpations: Abdomen is soft.      Tenderness: There is no abdominal tenderness.   Musculoskeletal:         General: No swelling. Normal range of motion.        Arms:       Cervical back: Neck supple.      Comments: Herbens nodule noted at RUE first digit DIP; no synovitis noted; normal finger spread and  strength. Positive Finkelstein test   Skin:     General: Skin is warm and dry.      Capillary Refill: Capillary refill takes less than 2 seconds.   Neurological:      Mental Status: She is alert.   Psychiatric:         Mood and Affect: Mood normal.       Administrative Statements   I have spent a total time of 20 minutes in caring for this patient on the day of the visit/encounter including Risks and benefits of tx options, Instructions for management, Impressions, and Reviewing / ordering tests, medicine, procedures  .

## 2024-10-30 DIAGNOSIS — F41.1 GAD (GENERALIZED ANXIETY DISORDER): ICD-10-CM

## 2024-10-31 RX ORDER — LORAZEPAM 0.5 MG/1
0.5 TABLET ORAL 2 TIMES DAILY PRN
Qty: 60 TABLET | Refills: 0 | Status: SHIPPED | OUTPATIENT
Start: 2024-10-31 | End: 2024-11-30

## 2024-11-04 ENCOUNTER — HOSPITAL ENCOUNTER (OUTPATIENT)
Dept: CT IMAGING | Facility: HOSPITAL | Age: 59
Discharge: HOME | End: 2024-11-04

## 2024-11-04 DIAGNOSIS — F17.210 CIGARETTE NICOTINE DEPENDENCE WITHOUT COMPLICATION: ICD-10-CM

## 2024-11-04 DIAGNOSIS — Z12.2 SCREENING FOR LUNG CANCER: ICD-10-CM

## 2024-11-14 DIAGNOSIS — H53.9 VISION DISTURBANCE: Primary | ICD-10-CM

## 2024-11-15 DIAGNOSIS — M47.817 SPONDYLOSIS OF LUMBOSACRAL REGION WITHOUT MYELOPATHY OR RADICULOPATHY: Primary | ICD-10-CM

## 2024-11-29 ENCOUNTER — EVALUATION (OUTPATIENT)
Dept: OCCUPATIONAL THERAPY | Facility: CLINIC | Age: 59
End: 2024-11-29
Payer: COMMERCIAL

## 2024-11-29 DIAGNOSIS — M79.644 PAIN OF RIGHT THUMB: ICD-10-CM

## 2024-11-29 PROCEDURE — 97165 OT EVAL LOW COMPLEX 30 MIN: CPT | Performed by: OCCUPATIONAL THERAPIST

## 2024-11-29 PROCEDURE — 97140 MANUAL THERAPY 1/> REGIONS: CPT | Performed by: OCCUPATIONAL THERAPIST

## 2024-11-29 PROCEDURE — 97760 ORTHOTIC MGMT&TRAING 1ST ENC: CPT | Performed by: OCCUPATIONAL THERAPIST

## 2024-11-29 NOTE — PROGRESS NOTES
OT Evaluation     Today's date: 2024  Patient name: Zenaida Kathleen  : 1965  MRN: [id number]  Referring provider: Cherelle Galeana MD  Dx:   Encounter Diagnosis     ICD-10-CM    1. Pain of right thumb  M79.644 Ambulatory Referral to Physical Therapy                     Assessment  Impairments: abnormal or restricted ROM, impaired physical strength, lacks appropriate home exercise program, pain with function and unable to perform ADL  Functional limitations: pain and limitation with gripping , pinching , bottles jars , food prep  Symptom irritability: high    Assessment details: Zenaida presents with R thumb pain that started 3 mos ago. She had an xray that shows CMC OA . She c/o clicking and has tenderness at the a1 pulley that is consistent with trigger finger . She has no pain at rest . She has sever pain at times . She has limited thumb ROM . She has weak and painful  and pinch. She has mild edema. She has pain and limitation with gripping , bottles , jars , meal prep . She  works as a caregiver. She lives with her daughter.     She will benefit from a HB thumb spica .  Understanding of Dx/Px/POC: good     Prognosis: good    Goals  STG- 1 wk  1- I with HEP  2- compliant with splint use  3- worst pain 6/10    LTG- 4 wks  1- No pain R thumb  2- improve R  10#  3- improve R pinches 3#  4- I ADL - meal prep       Plan  Patient would benefit from: OT eval, skilled occupational therapy and custom splinting  Planned modality interventions: cryotherapy and thermotherapy: hydrocollator packs    Planned therapy interventions: activity modification, joint mobilization, kinesiology taping, manual therapy, strengthening, stretching, therapeutic activities, therapeutic exercise, home exercise program, functional ROM exercises and orthotic fitting/training    Frequency: 2x week  Plan of Care beginning date: 2024  Treatment plan discussed with: patient        Subjective Evaluation    History of Present  Illness  Date of onset: 2024  Mechanism of injury: Progressive onset of R thumb . She was dx with L thumb OA per xray   Quality of life: good    Patient Goals  Patient goals for therapy: decreased pain, increased strength, independence with ADLs/IADLs and increased motion  Patient goal: no pain R thumb  Pain  Current pain ratin  At best pain ratin  At worst pain rating: 10  Location: R thumb CMC  Quality: burning  Relieving factors: rest  Exacerbated by: night , pinching , opening  bottles/jars.  Progression: worsening    Social Support  Steps to enter house: yes  Stairs in house: yes   Lives in: multiple-level home  Lives with: adult children    Employment status: working  Hand dominance: right    Treatments  No previous or current treatments        Objective     Palpation     Additional Palpation Details  Tender R  thumb A1  , volar CMC     Neurological Testing     Additional Neurological Details  Denies paresthesias     Active Range of Motion     Right Wrist   Normal active range of motion    Right Thumb   Flexion     MP: 60    DIP: 55  Extension     MP: 0    DIP: 0  Radial Abduction    CMC: 50  Adduction    CMC: 50  Kapandji score: 9 degrees    Strength/Myotome Testing     Left Wrist/Hand      (2nd hand position)     Trial 1: 30    Thumb Strength  Key/Lateral Pinch     Trial 1: 12  Palmar/Three-Point Pinch     Trial 1: 7    Right Wrist/Hand   Normal wrist strength     (2nd hand position)     Trial 1: 20    Thumb Strength   Key/Lateral Pinch     Trial 1: 7  Palmar/Three-Point Pinch     Trial 1: 4    Additional Strength Details  Pain with  , pinches     Tests     Right Wrist/Hand   Positive CMC grind.     Swelling     Left Wrist/Hand   Thumb     Proximal: 6.8 cm    Right Wrist/Hand   Thumb     Proximal: 7 cm            Daily Treatment Diary   Precautions:univeral   CO-MORBIDITIES:hypothyroid   HEP ACCESS CODE:   FOTO Completed On: 24    POC Expires Reeval for Medicare to be completed   Auth Expiration Date PT/OT/STVisit Limit   12/31/24 By visit nA 12/31/24 30    Completed on visit                  Auth Status DATE 11/29        Approved Visit # 1         Remaining 29        MANUAL THERAPY         Graston thumb 4m        MOB CMC /MP/IP  4m        Retrograde  4m                          Custom HB TSS  13m        THERAPEUTIC EXERCISE HEP         Passive stretching R thumb   4m        Flex            Wrist ext                                                                                                                                   NEUROMUSCULAR REEDUCATION                                                                                                                                                       THERAPEUTIC ACTIVITY                                                                                                    MODALITIES         MH 5m        CP  5m

## 2024-12-04 ENCOUNTER — APPOINTMENT (OUTPATIENT)
Dept: OCCUPATIONAL THERAPY | Facility: CLINIC | Age: 59
End: 2024-12-04
Payer: COMMERCIAL

## 2024-12-05 ENCOUNTER — OFFICE VISIT (OUTPATIENT)
Dept: OCCUPATIONAL THERAPY | Facility: CLINIC | Age: 59
End: 2024-12-05
Payer: COMMERCIAL

## 2024-12-05 DIAGNOSIS — M79.644 PAIN OF RIGHT THUMB: Primary | ICD-10-CM

## 2024-12-05 PROCEDURE — 97110 THERAPEUTIC EXERCISES: CPT | Performed by: OCCUPATIONAL THERAPIST

## 2024-12-05 PROCEDURE — 97140 MANUAL THERAPY 1/> REGIONS: CPT | Performed by: OCCUPATIONAL THERAPIST

## 2024-12-05 NOTE — PROGRESS NOTES
Daily Note     Today's date: 2024  Patient name: Zenaida Kathleen  : 1965  MRN: [id number]  Referring provider: Cherelle Galeana MD  Dx:   Encounter Diagnosis     ICD-10-CM    1. Pain of right thumb  M79.644                      Subjective: feels a little better      Objective: See treatment diary below      Assessment: Tolerated treatment well. Patient  has continued trigger IP . CMC pain has improved       Plan: Continue per plan of care.      Daily Treatment Diary   Precautions:univeral   CO-MORBIDITIES:hypothyroid   HEP ACCESS CODE:   FOTO Completed On: 24    POC Expires Reeval for Medicare to be completed  Auth Expiration Date PT/OT/STVisit Limit   24 By visit nA 24 30    Completed on visit                  Auth Status DATE        Approved Visit # 1 2        Remaining 29 28       MANUAL THERAPY         Graston thumb 4m 4m       MOB CMC /MP/IP  4m 4m       Retrograde  4m 4m                         Custom HB TSS  13m        THERAPEUTIC EXERCISE HEP         Passive stretching R thumb   4m 4m       Flex     Yellow  3x10       Wrist ext    2#  3x10       Hammer sup    Sm  3x10                                                                                                                     NEUROMUSCULAR REEDUCATION                                                                                                                                                       THERAPEUTIC ACTIVITY                                                                                                    MODALITIES         MH 5m 5m       CP  5m 5m

## 2024-12-09 ENCOUNTER — OFFICE VISIT (OUTPATIENT)
Dept: OCCUPATIONAL THERAPY | Facility: CLINIC | Age: 59
End: 2024-12-09
Payer: COMMERCIAL

## 2024-12-09 DIAGNOSIS — M79.644 PAIN OF RIGHT THUMB: Primary | ICD-10-CM

## 2024-12-09 PROCEDURE — 97140 MANUAL THERAPY 1/> REGIONS: CPT | Performed by: OCCUPATIONAL THERAPIST

## 2024-12-09 PROCEDURE — 97110 THERAPEUTIC EXERCISES: CPT | Performed by: OCCUPATIONAL THERAPIST

## 2024-12-09 NOTE — PROGRESS NOTES
Daily Note     Today's date: 2024  Patient name: Zenaida Kathleen  : 1965  MRN: [id number]  Referring provider: Cherelle Galeana MD  Dx:   Encounter Diagnosis     ICD-10-CM    1. Pain of right thumb  M79.644                      Subjective: still triggers      Objective: See treatment diary below      Assessment: Tolerated treatment fair. Patient  has improved CMC pain . IP continues to trigger. IP block made for night      Plan: Continue per plan of care.      Daily Treatment Diary   Precautions:univeral   CO-MORBIDITIES:hypothyroid   HEP ACCESS CODE:   FOTO Completed On: 24    POC Expires Reeval for Medicare to be completed  Auth Expiration Date PT/OT/STVisit Limit   24 By visit nA 24 30    Completed on visit                  Auth Status DATE       Approved Visit # 1 2 3       Remaining 29 28 27      MANUAL THERAPY         Graston thumb 4m 4m 4m      MOB CMC /MP/IP  4m 4m 4m      Retrograde  4m 4m 4m                        Custom HB TSS  13m        THERAPEUTIC EXERCISE HEP         Passive stretching R thumb   4m 4m 4m      Flex     Yellow  3x10 Yellow  3x10      Wrist ext    2#  3x10 2#  3x10      Hammer sup    Sm  3x10 Sm  3x10                                                                                                                    NEUROMUSCULAR REEDUCATION                                                                                                                                                       THERAPEUTIC ACTIVITY                                                                                                    MODALITIES         MH 5m 5m 5m      CP  5m 5m 5m

## 2024-12-16 ENCOUNTER — OFFICE VISIT (OUTPATIENT)
Dept: OCCUPATIONAL THERAPY | Facility: CLINIC | Age: 59
End: 2024-12-16
Payer: COMMERCIAL

## 2024-12-16 ENCOUNTER — OFFICE VISIT (OUTPATIENT)
Dept: CARDIOLOGY CLINIC | Facility: CLINIC | Age: 59
End: 2024-12-16
Payer: COMMERCIAL

## 2024-12-16 VITALS
BODY MASS INDEX: 39.46 KG/M2 | WEIGHT: 209 LBS | HEIGHT: 61 IN | DIASTOLIC BLOOD PRESSURE: 62 MMHG | HEART RATE: 93 BPM | SYSTOLIC BLOOD PRESSURE: 122 MMHG

## 2024-12-16 DIAGNOSIS — I25.10 MILD CAD: ICD-10-CM

## 2024-12-16 DIAGNOSIS — E78.2 MIXED HYPERLIPIDEMIA: ICD-10-CM

## 2024-12-16 DIAGNOSIS — R07.2 PRECORDIAL PAIN: Primary | ICD-10-CM

## 2024-12-16 DIAGNOSIS — M79.644 PAIN OF RIGHT THUMB: Primary | ICD-10-CM

## 2024-12-16 PROCEDURE — 99204 OFFICE O/P NEW MOD 45 MIN: CPT | Performed by: INTERNAL MEDICINE

## 2024-12-16 PROCEDURE — 93000 ELECTROCARDIOGRAM COMPLETE: CPT | Performed by: INTERNAL MEDICINE

## 2024-12-16 PROCEDURE — 97110 THERAPEUTIC EXERCISES: CPT | Performed by: OCCUPATIONAL THERAPIST

## 2024-12-16 PROCEDURE — 97140 MANUAL THERAPY 1/> REGIONS: CPT | Performed by: OCCUPATIONAL THERAPIST

## 2024-12-16 NOTE — PATIENT INSTRUCTIONS
You were seen today in the Cardiology office to re-establish care and evaluation of symptoms.     Please continue your current cardiac medications as prescribed.      Any testing ordered in office today will be available for patient review via [employer], and reviewed with me at the follow-up office visit as scheduled.    Thank you for choosing [facility].    Please call our office or use [employer] with any questions.

## 2024-12-16 NOTE — PROGRESS NOTES
[facility]    Name:Zenaida Kathleen   DOS: 12/16/2024     Chief Complaint:   Chief Complaint   Patient presents with    New Patient Visit     Re [facility], overdue 1 month     Chest Pain     Occurs with anxiety, tightness     Palpitations     Random        HISTORY OF PRESENT ILLNESS:    HPI:  Zenaida Kathleen is a 59 y.o. female. She  has a past medical history of Allergic, Anxiety (2005), Arthritis, Bulging lumbar disc, Chronic pain, Degenerative disk disease, Depression (2005), Effusion of left knee (04/25/2023), Hyperlipidemia, Hypertension, Pinched nerve, Primary osteoarthritis of left knee (02/14/2023), Psychiatric disorder, and Shingles (20179).    She presents to reestablish care with a cardiologist.  Historically, has been seen by my colleague Dr. Ramirez on 10/16/2020.  At that time, she had been evaluated due to evidence of coronary calcification on a routine CT chest in addition to baseline risk factors including hypertension, hyperlipidemia.  She was referred for treadmill exercise stress testing at that time, and the study demonstrated no evidence of ischemia at peak stress.  The patient exercised 7 minutes and 11 seconds, achieving 93% of maximal predicted heart rate with no exercise-induced angina.    Today, she is here because she has been experiencing chest discomfort and palpitations.  The patient describes the chest discomfort is occasional, occurring randomly but clearly related to exertion, describes as a central tightness without radiation.  This occurs with activities such as climbing stairs or walking carrying something heavy. Anxiety is often a trigger for her discomfort as well, but never at rest.  As an entirely separate issue, she reports episodic intermittent palpitations.  These are described as single skipped beats, no sustained tachycardia or rhythm irregularity reported.  These occur in varying frequency based upon how she is feeling in regards to anxiety.  She otherwise  denies shortness of breath (except with exertion), diaphoresis, dizziness, orthopnea, edema.  She has had no syncope.  No issues with bruising or bleeding.       ROS    ROS: Pertinent positives and negatives as described in History of Present Illness. Remainder of a 14 point review of systems was negative.     Allergies   Allergen Reactions    Penicillins Hives and Throat Swelling     Childhood allergy per pt    Meloxicam Hives and Itching        Current Outpatient Medications on File Prior to Visit   Medication Sig Dispense Refill    atorvastatin (LIPITOR) 80 mg tablet Take 1 tablet (80 mg total) by mouth daily 90 tablet 1    lisinopril (ZESTRIL) 10 mg tablet Take 1 tablet by mouth once daily 90 tablet 1    LORazepam (Ativan) 0.5 mg tablet Take 1 tablet (0.5 mg total) by mouth 2 (two) times a day as needed for anxiety 60 tablet 0    pramipexole (MIRAPEX) 0.5 mg tablet Take 2 tablets (1 mg total) by mouth daily 180 tablet 1    sertraline (ZOLOFT) 100 mg tablet Take 1 tablet by mouth once daily 90 tablet 0    sertraline (ZOLOFT) 50 mg tablet Take 1 tablet by mouth once daily 90 tablet 0     No current facility-administered medications on file prior to visit.       Past Medical History:   Diagnosis Date    Allergic     Anxiety 2005    Arthritis     Bulging lumbar disc     Chronic pain     low back    Degenerative disk disease     lumbar spine    Depression 2005    Effusion of left knee 04/25/2023    Hyperlipidemia     Hypertension     Pinched nerve     lumbar spine    Primary osteoarthritis of left knee 02/14/2023    Psychiatric disorder     depression    Shingles 20179       Past Surgical History:   Procedure Laterality Date    CARPAL TUNNEL RELEASE Bilateral 1998    CHOLECYSTECTOMY  2008    COLONOSCOPY  06/15/2015    COLONOSCOPY  06/02/2015    Internal hemorrhoids, hyperplastic polyps which were removed.  Ten year recall recommended June 2025    COLONOSCOPY  09/16/2020    normal terminal ileum, 1 small polyp in the  distal sigmoid colon that was hyperplastic. .  She did have a small amount of retained stool in the colon.  She did have a small amount of retained stool in the colon.  A 5 year recall was recommended due to poor prep, September 2025.     EYE SURGERY      FL INJECTION LEFT KNEE (ARTHROGRAM)  06/06/2023    NE ARTHRP KNE CONDYLE&PLATU MEDIAL&LAT COMPARTMENTS Left 07/24/2023    Procedure: ARTHROPLASTY KNEE TOTAL SAME DAY;  Surgeon: Dale العلي MD;  Location:  MAIN OR;  Service: Orthopedics    SPINAL CORD STIMULATOR IMPLANT      2014    SPINE SURGERY  2017    Stimulator inplant    WISDOM TOOTH EXTRACTION Bilateral 1982       Family History   Problem Relation Age of Onset    Skin cancer Mother     Heart disease Father         Dx with lewy body dementia in 2012    Dementia Father         Dx with lewy body dementia in 2016    Breast cancer Maternal Aunt 28    Cancer Maternal Aunt         Breast    Breast cancer Sister 56    BRCA1 Negative Sister     BRCA2 Negative Sister     Depression Sister     Cancer Sister         Breast    Prostate cancer Maternal Uncle     Cancer Maternal Uncle         Bladder/prostate    Colon cancer Maternal Uncle     Colon polyps Neg Hx        Social History     Socioeconomic History    Marital status:      Spouse name: Not on file    Number of children: 1    Years of education: Not on file    Highest education level: Not on file   Occupational History    Occupation: not working    Tobacco Use    Smoking status: Every Day     Current packs/day: 1.00     Average packs/day: 1 pack/day for 48.0 years (48.0 ttl pk-yrs)     Types: Cigarettes     Start date: 1/1/1977     Passive exposure: Current    Smokeless tobacco: Never    Tobacco comments:     smokes 13 cigs daily as of  7/15/24   Vaping Use    Vaping status: Never Used   Substance and Sexual Activity    Alcohol use: Yes     Comment: Social drinker    hard liquior icetea    Drug use: No    Sexual activity: Yes     Partners: Male      "Birth control/protection: Inserts, None   Other Topics Concern    Not on file   Social History Narrative         Social Drivers of Health     Financial Resource Strain: Low Risk  (5/10/2021)    Overall Financial Resource Strain (CARDIA)     Difficulty of Paying Living Expenses: Not very hard   Food Insecurity: No Food Insecurity (5/10/2021)    Hunger Vital Sign     Worried About Running Out of Food in the Last Year: Never true     Ran Out of Food in the Last Year: Never true   Transportation Needs: No Transportation Needs (5/10/2021)    PRAPARE - Transportation     Lack of Transportation (Medical): No     Lack of Transportation (Non-Medical): No   Physical Activity: Insufficiently Active (5/10/2021)    Exercise Vital Sign     Days of Exercise per Week: 3 days     Minutes of Exercise per Session: 30 min   Stress: No Stress Concern Present (5/10/2021)    [employer] - Occupational Stress Questionnaire     Feeling of Stress : Not at all   Social Connections: Socially Isolated (5/10/2021)    Social Connection and Isolation Panel [NHANES]     Frequency of Communication with Friends and Family: More than three times a week     Frequency of Social Gatherings with Friends and Family: Never     Attends Congregation Services: Never     Active Member of Clubs or Organizations: No     Attends Club or Organization Meetings: Never     Marital Status:    Intimate Partner Violence: Not At Risk (7/18/2023)    Humiliation, Afraid, Rape, and Kick questionnaire     Fear of Current or Ex-Partner: No     Emotionally Abused: No     Physically Abused: No     Sexually Abused: No   Housing Stability: Not on file       OBJECTIVE:    /62 (BP Location: Left arm, Patient Position: Sitting, Cuff Size: Standard)   Pulse 93   Ht 5' 1\" (1.549 m)   Wt 94.8 kg (209 lb)   LMP  (LMP Unknown)   BMI 39.49 kg/m²      BP Readings from Last 3 Encounters:   12/16/24 122/62   10/15/24 134/80   08/06/24 " 128/78       Wt Readings from Last 3 Encounters:   12/16/24 94.8 kg (209 lb)   10/15/24 94.8 kg (209 lb)   08/06/24 95.3 kg (210 lb)         Physical Exam  Vitals reviewed.   Constitutional:       General: She is not in acute distress.     Appearance: Normal appearance. She is not diaphoretic.   HENT:      Head: Normocephalic and atraumatic.   Eyes:      Conjunctiva/sclera: Conjunctivae normal.   Neck:      Vascular: No JVD.   Cardiovascular:      Rate and Rhythm: Normal rate and regular rhythm.      Pulses: Normal pulses.      Heart sounds: Normal heart sounds. No murmur heard.     No friction rub. No gallop.   Pulmonary:      Effort: Pulmonary effort is normal.      Breath sounds: Normal breath sounds. No wheezing, rhonchi or rales.   Abdominal:      General: Abdomen is flat. Bowel sounds are normal.   Musculoskeletal:      Right lower leg: No edema.      Left lower leg: No edema.   Skin:     General: Skin is warm and dry.   Neurological:      General: No focal deficit present.      Mental Status: She is alert and oriented to person, place, and time.   Psychiatric:         Mood and Affect: Mood normal.         Behavior: Behavior normal.                                                       Cardiac testing:   EKG reviewed personally: Sinus rhythm, rightward axis possible limb lead reversal.    LABS:  Lab Results   Component Value Date    BUN 15 04/26/2024    CREATININE 0.72 04/26/2024    K 4.0 04/26/2024    CO2 21 04/26/2024     04/26/2024    AST 19 04/26/2024    ALT 19 04/26/2024        Lab Results   Component Value Date    WBC 9.0 04/26/2024    HGB 14.8 04/26/2024    HCT 43.3 04/26/2024    MCV 94 04/26/2024     04/26/2024       Lab Results   Component Value Date    HDL 36 (L) 04/26/2024    LDLCALC 94 04/26/2024    TRIG 263 (H) 04/26/2024       Lab Results   Component Value Date    HGBA1C 6.4 (H) 04/26/2024       Lab Results   Component Value Date    TSH 3.140 04/26/2024  "          ASSESSMENT/PLAN:  Diagnoses and all orders for this visit:    Precordial pain  -     POCT ECG  -     Echo stress test, exercise; Future    Mild CAD  -     Ambulatory Referral to Cardiology  -     POCT ECG  -     Echo stress test, exercise; Future    Mixed hyperlipidemia  -     Ambulatory Referral to Cardiology    59-year-old female presenting for evaluation of chest pain, shortness of breath.  Her pretest risk for coronary disease is at least intermediate, if not higher given known mild coronary disease suspected by prior CT of the chest.  She has mild coronary calcification on my personal review of her CT chest dated 11/20/2024.  Given symptoms and pretest risk, I have recommended a stress echo to further evaluate and exclude underlying ischemia.  Patient will follow-up in the office to review the results of testing.  In the interim, I recommended that she continue atorvastatin 80 mg once daily.  Continue lisinopril 10 mg once daily.  Advised to seek ER evaluation for any persistent chest discomfort.            Wilmer Bernal MD [facility]      Portions of the record may have been created with voice recognition software. Occasional wrong word or \"sound alike\" substitutions may have occurred due to the inherent limitations of voice recognition software. Please review the chart carefully and recognize, using context, where substitutions/typographical errors may have occurred.     "

## 2024-12-16 NOTE — PROGRESS NOTES
Daily Note     Today's date: 2024  Patient name: Zenaida Kathleen  : 1965  MRN: [id number]  Referring provider: Cherelle Galeana MD  Dx:   Encounter Diagnosis     ICD-10-CM    1. Pain of right thumb  M79.644                      Subjective: still clicks and is sore       Objective: See treatment diary below      Assessment: Tolerated treatment well. Patient  has improved pain post tx . IP triggering still present      Plan: Continue per plan of care.      Daily Treatment Diary   Precautions:univeral   CO-MORBIDITIES:hypothyroid   HEP ACCESS CODE:   FOTO Completed On: 24    POC Expires Reeval for Medicare to be completed  Auth Expiration Date PT/OT/STVisit Limit   24 By visit nA 24 30    Completed on visit                  Auth Status DATE      Approved Visit # 1 2 3 4      Remaining 29 28 27 28     MANUAL THERAPY         Graston thumb 4m 4m 4m 4m     MOB CMC /MP/IP  4m 4m 4m 4m     Retrograde  4m 4m 4m 4m                       Custom HB TSS  13m        THERAPEUTIC EXERCISE HEP         Passive stretching R thumb   4m 4m 4m 4m     Flex     Yellow  3x10 Yellow  3x10 Yellow  3x10     Wrist ext    2#  3x10 2#  3x10 2#  3x10     Hammer sup    Sm  3x10 Sm  3x10 Sm  3x10     Ecc thumb flexion     3x10                                                                                                         NEUROMUSCULAR REEDUCATION                                                                                                                                                       THERAPEUTIC ACTIVITY                                                                                                    MODALITIES         MH 5m 5m 5m 5m     CP  5m 5m 5m 5m

## 2024-12-18 DIAGNOSIS — G25.81 RESTLESS LEG SYNDROME: ICD-10-CM

## 2024-12-19 ENCOUNTER — OFFICE VISIT (OUTPATIENT)
Dept: OCCUPATIONAL THERAPY | Facility: CLINIC | Age: 59
End: 2024-12-19
Payer: COMMERCIAL

## 2024-12-19 DIAGNOSIS — M79.644 PAIN OF RIGHT THUMB: Primary | ICD-10-CM

## 2024-12-19 PROCEDURE — 97110 THERAPEUTIC EXERCISES: CPT | Performed by: OCCUPATIONAL THERAPIST

## 2024-12-19 PROCEDURE — 97140 MANUAL THERAPY 1/> REGIONS: CPT | Performed by: OCCUPATIONAL THERAPIST

## 2024-12-19 RX ORDER — PRAMIPEXOLE DIHYDROCHLORIDE 0.5 MG/1
1 TABLET ORAL DAILY
Qty: 180 TABLET | Refills: 1 | Status: SHIPPED | OUTPATIENT
Start: 2024-12-19 | End: 2025-06-17

## 2024-12-19 NOTE — PROGRESS NOTES
Daily Note     Today's date: 2024  Patient name: Zenaida Kathleen  : 1965  MRN: [id number]  Referring provider: Cherelle Galeana MD  Dx:   Encounter Diagnosis     ICD-10-CM    1. Pain of right thumb  M79.644                      Subjective: It hurts more      Objective: See treatment diary below      Assessment: Tolerated treatment well. Patient  has improved trigger but thumb hurts more.      Plan: as needed      Daily Treatment Diary   Precautions:univeral   CO-MORBIDITIES:hypothyroid   HEP ACCESS CODE:   FOTO Completed On: 24    POC Expires Reeval for Medicare to be completed  Auth Expiration Date PT/OT/STVisit Limit   24 By visit nA 24 30    Completed on visit                  Auth Status DATE     Approved Visit # 1 2 3 4 5     Remaining 29 28 27 28 25    MANUAL THERAPY         Graston thumb 4m 4m 4m 4m 4m    MOB CMC /MP/IP  4m 4m 4m 4m 4m    Retrograde  4m 4m 4m 4m 4m                      Custom HB TSS  13m    IP   5m    THERAPEUTIC EXERCISE HEP         Passive stretching R thumb   4m 4m 4m 4m 4m    Flex     Yellow  3x10 Yellow  3x10 Yellow  3x10 Yellow  3x10    Wrist ext    2#  3x10 2#  3x10 2#  3x10 2#  3x10    Hammer sup    Sm  3x10 Sm  3x10 Sm  3x10 Sm3x10    Ecc thumb flexion     3x10 3x10                                                                                                        NEUROMUSCULAR REEDUCATION                                                                                                                                                       THERAPEUTIC ACTIVITY                                                                                                    MODALITIES         MH 5m 5m 5m 5m 5m    CP  5m 5m 5m 5m 5m

## 2024-12-20 ENCOUNTER — OFFICE VISIT (OUTPATIENT)
Dept: FAMILY MEDICINE CLINIC | Facility: CLINIC | Age: 59
End: 2024-12-20
Payer: COMMERCIAL

## 2024-12-20 ENCOUNTER — PATIENT OUTREACH (OUTPATIENT)
Dept: CASE MANAGEMENT | Facility: OTHER | Age: 59
End: 2024-12-20

## 2024-12-20 VITALS
WEIGHT: 208.4 LBS | DIASTOLIC BLOOD PRESSURE: 78 MMHG | HEIGHT: 61 IN | RESPIRATION RATE: 17 BRPM | BODY MASS INDEX: 39.35 KG/M2 | OXYGEN SATURATION: 94 % | HEART RATE: 82 BPM | SYSTOLIC BLOOD PRESSURE: 132 MMHG | TEMPERATURE: 97.7 F

## 2024-12-20 DIAGNOSIS — Z71.89 GRIEF COUNSELING: Primary | ICD-10-CM

## 2024-12-20 DIAGNOSIS — F41.1 GAD (GENERALIZED ANXIETY DISORDER): ICD-10-CM

## 2024-12-20 DIAGNOSIS — F32.9 REACTIVE DEPRESSION: ICD-10-CM

## 2024-12-20 DIAGNOSIS — F32.0 CURRENT MILD EPISODE OF MAJOR DEPRESSIVE DISORDER WITHOUT PRIOR EPISODE (HCC): ICD-10-CM

## 2024-12-20 PROCEDURE — 99214 OFFICE O/P EST MOD 30 MIN: CPT | Performed by: FAMILY MEDICINE

## 2024-12-20 RX ORDER — SERTRALINE HYDROCHLORIDE 100 MG/1
100 TABLET, FILM COATED ORAL DAILY
Qty: 90 TABLET | Refills: 0 | Status: SHIPPED | OUTPATIENT
Start: 2024-12-20

## 2024-12-20 RX ORDER — BUPROPION HYDROCHLORIDE 150 MG/1
150 TABLET ORAL EVERY MORNING
Qty: 30 TABLET | Refills: 5 | Status: SHIPPED | OUTPATIENT
Start: 2024-12-20 | End: 2025-06-18

## 2024-12-20 RX ORDER — LORAZEPAM 0.5 MG/1
0.5 TABLET ORAL 2 TIMES DAILY PRN
Qty: 60 TABLET | Refills: 0 | Status: SHIPPED | OUTPATIENT
Start: 2024-12-20 | End: 2025-01-19

## 2024-12-20 NOTE — ASSESSMENT & PLAN NOTE
Zenaida is doing well on her Zoloft at 150 mg daily  Due to her recent increased depression symptoms we will add on Wellbutrin and dose adjust as needed

## 2024-12-20 NOTE — PROGRESS NOTES
Received referral from patients PCP Luda Remy DO requesting that [facility] outreach patient and assess for needs. Per chart review, patient had PCP visit today 12/20 and reported reactive depression. Patients father passed away a few months ago and interested in grief counseling. Patient takes Zoloft and Wellbutrin.    Attempted outreaching patient, no answer and left message for return call.

## 2024-12-23 ENCOUNTER — APPOINTMENT (OUTPATIENT)
Dept: OCCUPATIONAL THERAPY | Facility: CLINIC | Age: 59
End: 2024-12-23
Payer: COMMERCIAL

## 2024-12-24 ENCOUNTER — CONSULT (OUTPATIENT)
Dept: PAIN MEDICINE | Facility: CLINIC | Age: 59
End: 2024-12-24
Payer: COMMERCIAL

## 2024-12-24 ENCOUNTER — TELEPHONE (OUTPATIENT)
Dept: PAIN MEDICINE | Facility: CLINIC | Age: 59
End: 2024-12-24

## 2024-12-24 ENCOUNTER — APPOINTMENT (OUTPATIENT)
Dept: RADIOLOGY | Facility: CLINIC | Age: 59
End: 2024-12-24
Payer: COMMERCIAL

## 2024-12-24 VITALS — WEIGHT: 209 LBS | BODY MASS INDEX: 39.46 KG/M2 | TEMPERATURE: 98.2 F | HEIGHT: 61 IN | HEART RATE: 77 BPM

## 2024-12-24 DIAGNOSIS — M47.817 SPONDYLOSIS OF LUMBOSACRAL REGION WITHOUT MYELOPATHY OR RADICULOPATHY: ICD-10-CM

## 2024-12-24 DIAGNOSIS — G89.4 CHRONIC PAIN SYNDROME: ICD-10-CM

## 2024-12-24 DIAGNOSIS — Z45.42 BATTERY END OF LIFE OF SPINAL CORD STIMULATOR: Primary | ICD-10-CM

## 2024-12-24 DIAGNOSIS — Z45.42 BATTERY END OF LIFE OF SPINAL CORD STIMULATOR: ICD-10-CM

## 2024-12-24 PROCEDURE — 72100 X-RAY EXAM L-S SPINE 2/3 VWS: CPT

## 2024-12-24 PROCEDURE — 99204 OFFICE O/P NEW MOD 45 MIN: CPT | Performed by: ANESTHESIOLOGY

## 2024-12-24 PROCEDURE — 72070 X-RAY EXAM THORAC SPINE 2VWS: CPT

## 2024-12-24 NOTE — TELEPHONE ENCOUNTER
Called and LMOM for Andrea Samson @ [facility] [phone] a rep for a Stimulator that was implanted in 2014    Please give Andrea Patients contact info so he can contact her to meet up to check the battery life.

## 2024-12-24 NOTE — PROGRESS NOTES
Assessment  1. Battery end of life of spinal cord stimulator    2. Spondylosis of lumbosacral region without myelopathy or radiculopathy    3. Chronic pain syndrome        Plan    Pleasant 59-year-old female with a history of chronic pain as well as a Nevro spinal cord stimulator placed in 2014.  It appears her stimulators stopped working approxi-1 year ago most likely secondary to end of life of the battery.    I will obtain radiographs of 2 rule out any fracture of the lead.    In addition we will set her up with an evaluation with [facility] for stimulator interrogation.    Most likely the battery is at end-of-life and will refer to neurosurgery for battery replacement.    At this point she is maintaining on over-the-counter NSAIDs reviewed the risks and she is taking them an appropriate manner.    Zenaida is in agreement with the plan.    My impressions and treatment recommendations were discussed in detail with the patient who verbalized understanding and had no further questions.  Discharge instructions were provided. I personally saw and examined the patient and I agree with the above discussed plan of care.  This note is created using dictation transcription.  It may contain typographical errors, grammatical errors, improperly dictated words, background noise and other errors.    Orders Placed This Encounter   Procedures    X-ray thoracic spine 2 views     Standing Status:   Future     Expected Date:   12/24/2024     Expiration Date:   12/24/2028     Scheduling Instructions:      Bring along any outside films relating to this procedure.           Is the patient pregnant?:   No    X-ray lumbar spine 2 or 3 views     Standing Status:   Future     Expected Date:   12/24/2024     Expiration Date:   12/24/2028     Scheduling Instructions:      Bring along any outside films relating to this procedure.           Is the patient pregnant?:   No    Ambulatory referral to Neurosurgery     Standing Status:   Future      Expiration Date:   12/24/2025     Referral Priority:   Routine     Referral Type:   Consult - AMB     Referral Reason:   Specialty Services Required     Referred to Provider:   Robert Suggs MD     Requested Specialty:   Neurosurgery     Number of Visits Requested:   1     Expiration Date:   12/24/2025     No orders of the defined types were placed in this encounter.    Referred By: Luda Remy DO  History of Present Illness    Zenaida Kathleen is a 59 y.o. female with 1 year history of worsening low back and right greater than left lower extremity pain.  She is female with a history of chronic pain with lumbar spondylosis and a spinal cord stimulator placed at [facility] in 2014.  She states the Nevro device was working till approxi-1 year ago.  She has tried to get in touch with the vendor to no avail.    Currently her pain is severe she rates as 9 out of 10 the visual analog scale significant or fear with her daily living 30s.  Is nearly constant scribes cramping sharp and pressure-like she is subjective weakness of her lower limbs.  Standing bending sitting walking all aggravate her symptoms.  She denies any loss of bowel or bladder function.    I have personally reviewed and/or updated the patient's past medical history, past surgical history, family history, social history, current medications, allergies, and vital signs today.     Review of Systems   Constitutional:  Negative for fever and unexpected weight change.   HENT:  Negative for trouble swallowing.    Eyes:  Negative for visual disturbance.   Respiratory:  Negative for shortness of breath and wheezing.    Cardiovascular:  Negative for chest pain and palpitations.   Gastrointestinal:  Negative for constipation, diarrhea, nausea and vomiting.   Endocrine: Negative for cold intolerance, heat intolerance and polydipsia.   Genitourinary:  Negative for difficulty urinating and frequency.   Musculoskeletal:  Positive for arthralgias.  Negative for gait problem, joint swelling and myalgias.   Skin:  Negative for rash.   Neurological:  Negative for dizziness, seizures, syncope, weakness and headaches.   Hematological:  Does not bruise/bleed easily.   Psychiatric/Behavioral:  Positive for dysphoric mood.    All other systems reviewed and are negative.      Patient Active Problem List   Diagnosis    Mixed hyperlipidemia    Essential hypertension    Restless leg syndrome    Spondylosis of lumbosacral region without myelopathy or radiculopathy    Current cigarette smoker    Subclinical hypothyroidism    Adjustment disorder with mixed anxiety and depressed mood    Pulmonary nodules    IFG (impaired fasting glucose)    Abnormal CT of the chest    Mild CAD    Irritable bowel syndrome with diarrhea    Obesity (BMI 35.0-39.9 without comorbidity)    Chronic pain of right knee    Effusion of right knee    Primary osteoarthritis of right knee    Right elbow tendinitis    Bulging lumbar disc    Anxiety    History of total left knee replacement    Reactive depression    Grief counseling       Past Medical History:   Diagnosis Date    Allergic     Anxiety 2005    Arthritis     Bulging lumbar disc     CAD (coronary artery disease)     Chronic pain     low back    Degenerative disk disease     lumbar spine    Depression 2005    Effusion of left knee 04/25/2023    GERD (gastroesophageal reflux disease)     Hyperlipidemia     Hypertension     Pinched nerve     lumbar spine    Primary osteoarthritis of left knee 02/14/2023    Psychiatric disorder     depression    Shingles 20179       Past Surgical History:   Procedure Laterality Date    CARPAL TUNNEL RELEASE Bilateral 1998    CHOLECYSTECTOMY  2008    COLONOSCOPY  06/15/2015    COLONOSCOPY  06/02/2015    Internal hemorrhoids, hyperplastic polyps which were removed.  Ten year recall recommended June 2025    COLONOSCOPY  09/16/2020    normal terminal ileum, 1 small polyp in the distal sigmoid colon that was  hyperplastic. .  She did have a small amount of retained stool in the colon.  She did have a small amount of retained stool in the colon.  A 5 year recall was recommended due to poor prep, September 2025.     EYE SURGERY      FL INJECTION LEFT KNEE (ARTHROGRAM)  06/06/2023    [place] ARTHRP KNE CONDYLE&PLATU MEDIAL&LAT COMPARTMENTS Left 07/24/2023    Procedure: ARTHROPLASTY KNEE TOTAL SAME DAY;  Surgeon: Dale العلي MD;  Location:  MAIN OR;  Service: Orthopedics    SPINAL CORD STIMULATOR IMPLANT      2014    SPINE SURGERY  2017    Stimulator inplant    WISDOM TOOTH EXTRACTION Bilateral 1982       Family History   Problem Relation Age of Onset    Skin cancer Mother     Heart disease Father         Dx with lewy body dementia in 2012    Dementia Father         Dx with lewy body dementia in 2016    Breast cancer Maternal Aunt 28    Cancer Maternal Aunt         Breast    Breast cancer Sister 56    BRCA1 Negative Sister     BRCA2 Negative Sister     Depression Sister     Cancer Sister         Breast    Prostate cancer Maternal Uncle     Cancer Maternal Uncle         Bladder/prostate    Colon cancer Maternal Uncle     Colon polyps Neg Hx        Social History     Occupational History    Occupation: not working    Tobacco Use    Smoking status: Every Day     Current packs/day: 1.00     Average packs/day: 1 pack/day for 48.0 years (48.0 ttl pk-yrs)     Types: Cigarettes     Start date: 1/1/1977     Passive exposure: Current    Smokeless tobacco: Never    Tobacco comments:     smokes 13 cigs daily as of  7/15/24   Vaping Use    Vaping status: Never Used   Substance and Sexual Activity    Alcohol use: Yes     Comment: Social drinker    Drug use: No    Sexual activity: Yes     Partners: Male     Birth control/protection: Inserts, None       Current Outpatient Medications on File Prior to Visit   Medication Sig    atorvastatin (LIPITOR) 80 mg tablet Take 1 tablet (80 mg total) by mouth daily    buPROPion (WELLBUTRIN XL) 150 mg 24  "hr tablet Take 1 tablet (150 mg total) by mouth every morning    lisinopril (ZESTRIL) 10 mg tablet Take 1 tablet by mouth once daily    LORazepam (Ativan) 0.5 mg tablet Take 1 tablet (0.5 mg total) by mouth 2 (two) times a day as needed for anxiety    pramipexole (MIRAPEX) 0.5 mg tablet Take 2 tablets (1 mg total) by mouth daily    sertraline (ZOLOFT) 100 mg tablet Take 1 tablet (100 mg total) by mouth daily    sertraline (ZOLOFT) 50 mg tablet Take 1 tablet (50 mg total) by mouth daily     No current facility-administered medications on file prior to visit.       Allergies   Allergen Reactions    Penicillins Hives and Throat Swelling     Childhood allergy per pt    Meloxicam Hives and Itching       Physical Exam    Pulse 77   Temp 98.2 °F (36.8 °C)   Ht 5' 1\" (1.549 m)   Wt 94.8 kg (209 lb)   LMP  (LMP Unknown)   BMI 39.49 kg/m²     Constitutional: normal, well developed, well nourished, alert, in no distress and non-toxic and no overt pain behavior. and obese  Eyes: anicteric  HEENT: grossly intact  Neck: supple, symmetric, trachea midline and no masses   Pulmonary:even and unlabored  Cardiovascular:No edema or pitting edema present  Skin:Normal without rashes or lesions and well hydrated  Psychiatric:Mood and affect appropriate  Neurologic:Cranial Nerves II-XII grossly intact  Musculoskeletal:normal, difficulty going from sitting to standing sitting position; no obvious skin lesions or erythema lumbar sacral spine; mild tenderness in lumbar paravertebrals; deep tendon reflexes are diminished but symmetrical bilateral patellar and achilles; no focal motor deficit appreciated lower limbs; negative bilateral straight leg raising.    Imaging    "

## 2024-12-26 ENCOUNTER — APPOINTMENT (OUTPATIENT)
Dept: OCCUPATIONAL THERAPY | Facility: CLINIC | Age: 59
End: 2024-12-26
Payer: COMMERCIAL

## 2024-12-26 ENCOUNTER — PATIENT OUTREACH (OUTPATIENT)
Dept: CASE MANAGEMENT | Facility: OTHER | Age: 59
End: 2024-12-26

## 2024-12-26 NOTE — LETTER
[address] [facility] [id number]  [phone]    Re: Unable to reach   12/26/2024       Dear Zenaida,    I am a [facility]  and wanted to be certain you had information to contact me should you desire assistance with or have questions about non-medical aspects of your care such as [but not limited to] medical insurance, housing, transportation, material needs, or emergency needs.  If I do not have an answer I will assist you in finding the appropriate agency or individual who can help.      Please feel free to contact me at Dept: [phone]. Thank You.    Sincerely,         Pavithra Rivas

## 2024-12-26 NOTE — PROGRESS NOTES
2nd attempt outreaching patient to assess for needs. Per chart review, patient had PCP visit today 12/20 and reported reactive depression. Patients father passed away a few months ago and interested in grief counseling. Patient takes Zoloft and Wellbutrin. No answer and left message. UTR letter was sent via [employer].

## 2024-12-30 ENCOUNTER — APPOINTMENT (OUTPATIENT)
Dept: OCCUPATIONAL THERAPY | Facility: CLINIC | Age: 59
End: 2024-12-30
Payer: COMMERCIAL

## 2024-12-31 ENCOUNTER — TELEPHONE (OUTPATIENT)
Age: 59
End: 2024-12-31

## 2024-12-31 DIAGNOSIS — M65.30 TRIGGER FINGER, UNSPECIFIED FINGER, UNSPECIFIED LATERALITY: Primary | ICD-10-CM

## 2024-12-31 NOTE — TELEPHONE ENCOUNTER
Patient is requesting an insurance referral for the following specialty:      Test Name / Order Name:     DX Code: Trigger thumb and osteoarthritis    Date Of Service: 1/24    Location/Facility Name/Address/Phone #:   [facility] [place] [address]  [place] [address]  Phone: [phone]    Location / Facility NPI: [id number]    [facility] Phone # To Reach The Patient: [phone]     Please enter AMB referral in chart. Once completed, please route to Prior Auth for insurance referral, TY.

## 2025-01-02 ENCOUNTER — HOSPITAL ENCOUNTER (OUTPATIENT)
Dept: NON INVASIVE DIAGNOSTICS | Age: 60
Discharge: HOME | End: 2025-01-02
Payer: COMMERCIAL

## 2025-01-02 DIAGNOSIS — R07.2 PRECORDIAL PAIN: ICD-10-CM

## 2025-01-02 DIAGNOSIS — I25.10 MILD CAD: ICD-10-CM

## 2025-01-02 LAB
CHEST PAIN STATEMENT: NORMAL
MAX DIASTOLIC BP: 74 MMHG
MAX HR PERCENT: 95 %
MAX HR: 153 BPM
MAX PREDICTED HEART RATE: 160 BPM
PROTOCOL NAME: NORMAL
RATE PRESSURE PRODUCT: NORMAL
REASON FOR TERMINATION: NORMAL
SL CV LV EF: 60
SL CV STRESS STAGE REACHED: 2
STRESS ANGINA INDEX: 0
STRESS BASELINE HR: 87 BPM
STRESS PEAK HR: 153 BPM
STRESS POST ESTIMATED WORKLOAD: 7 METS
STRESS POST EXERCISE DUR MIN: 4 MIN
STRESS POST EXERCISE DUR MIN: 4 MIN
STRESS POST EXERCISE DUR SEC: 55 SEC
STRESS POST EXERCISE DUR SEC: 55 SEC
STRESS POST PEAK BP: 184 MMHG
STRESS POST PEAK HR: 153 BPM
STRESS POST PEAK SYSTOLIC BP: 168 MMHG
TARGET HR FORMULA: NORMAL
TEST INDICATION: NORMAL

## 2025-01-02 PROCEDURE — 93350 STRESS TTE ONLY: CPT | Performed by: INTERNAL MEDICINE

## 2025-01-02 PROCEDURE — 93350 STRESS TTE ONLY: CPT

## 2025-01-06 LAB
CHEST PAIN STATEMENT: NORMAL
MAX DIASTOLIC BP: 74 MMHG
MAX PREDICTED HEART RATE: 160 BPM
PROTOCOL NAME: NORMAL
REASON FOR TERMINATION: NORMAL
STRESS POST EXERCISE DUR MIN: 4 MIN
STRESS POST EXERCISE DUR SEC: 55 SEC
STRESS POST PEAK HR: 153 BPM
STRESS POST PEAK SYSTOLIC BP: 168 MMHG
TARGET HR FORMULA: NORMAL
TEST INDICATION: NORMAL

## 2025-01-10 ENCOUNTER — CONSULT (OUTPATIENT)
Age: 60
End: 2025-01-10
Payer: COMMERCIAL

## 2025-01-10 VITALS
HEIGHT: 61 IN | TEMPERATURE: 98.4 F | WEIGHT: 209 LBS | HEART RATE: 79 BPM | RESPIRATION RATE: 17 BRPM | DIASTOLIC BLOOD PRESSURE: 82 MMHG | BODY MASS INDEX: 39.46 KG/M2 | SYSTOLIC BLOOD PRESSURE: 130 MMHG | OXYGEN SATURATION: 93 %

## 2025-01-10 DIAGNOSIS — Z45.42 BATTERY END OF LIFE OF SPINAL CORD STIMULATOR: ICD-10-CM

## 2025-01-10 DIAGNOSIS — Z01.818 PRE-PROCEDURAL EXAMINATION: Primary | ICD-10-CM

## 2025-01-10 PROCEDURE — 99204 OFFICE O/P NEW MOD 45 MIN: CPT | Performed by: SPECIALIST

## 2025-01-10 RX ORDER — CHLORHEXIDINE GLUCONATE ORAL RINSE 1.2 MG/ML
15 SOLUTION DENTAL ONCE
OUTPATIENT
Start: 2025-01-10 | End: 2025-01-10

## 2025-01-10 NOTE — PROGRESS NOTES
Name: Zenaida Kathleen      : 1965      MRN: [id number]  Encounter Provider: Lali Henry MD  Encounter Date: 1/10/2025   Encounter department: [facility]    History and Physical - Neurosurgery   Zenaida Kathleen 59 y.o. female MRN: [id number]      Assessment:    Symptoms, as detailed in HPI, continue to significantly impact of patient's quality of life in daily activities.  After carefully considering presentation, investigations, functional status and co-morbidities, the risk/benefit profile of surgical intervention is favorable.     The bulk of our discussion today was devoted to treatment options, and we reviewed both surgical and nonsurgical treatment modalities.  Based on the fact her battery is at the end of life, the next step is to replace it.      Zenaida Kathleen has consented to undergo the following surgery:     .    Problem List Items Addressed This Visit    None  Visit Diagnoses         Battery end of life of spinal cord stimulator        Relevant Orders    Case request operating room: REPLACEMENT OF IMPLANTABLE PULSE GENERATOR DORSAL SPINAL COLUMN STIMULATOR LOCATED IN RIGHT BUTTOCK (Completed)            Surgery was described to the patient at length in a plain and simple fashion with the use of anatomic models as well as their own diagnostic study.  I reviewed in detail the expected hospitalization and postoperative recovery.  The concept that no guarantee with respect to the results of any operative procedure were discussed with the patient.  We also discussed with Zenaida in a plain and simple fashion reasonable expectations after surgery.  We discussed postoperative pain management, namely narcotic use.  We informed the patient that we will manage their pain medications during the expected postoperative period with minimal to no use of narcotics, if possible.  In the event the patient's pain is not well controlled beyond this period, the patient  will need to be referred to pain management or their primary care physician, as long-term management is beyond the scope of our practice. Zenaida vocalized her understanding of these fine points, and appreciates reasonable expectations after surgery.       I reviewed with the patient the risks, indications and benefits of the planned surgery at length. Risks include, but are not limited to, death, paralysis, infection, CSF leak, bleeding, stroke, phlebitis, pulmonary emboli, anesthetic complications, pneumonia, heart attack or other medical complications during or after surgery, bladder and anal sphincter dysfunction, sexual dysfunction, the fact that the results of surgery can never be guaranteed, the need for further surgeries, complications with incision healing, and other unforeseen complications. In a small percentage of patients the spinal cord stimulator wire migrates or fails to work for one reason or another, at which time it would need to be removed and/or replaced.  All of the patient's questions were answered to her satisfaction, and in this fashion informed consent for the proposed operative procedure was obtained. Zenaida Kathleen has signed the consent for procedure, which included consent for blood transfusion. Should the patient have any further questions or concerns, she knows to give me a call at the office.     We will order our office's standard pre-operative labs and review them before the patient is taken to the operating room.  We will refer the patient back to their primary care physician for pre-operative clearance in light of her comorbidities.  She will be discharged the same day from hospital barring any unexpected event/s.     It was our pleasure seeing Zenaida Kathleen in the office today, and we look forward to continuing her care during their hospitalization and post-operative recovery.  Should Zenaida have any further questions or concerns, she knows that she is always free to give me a call  at the office.  The patient expressed her gratitude to us for our detailed assessment and explanation of our findings, and was in agreement with this treatment plan.    Expected postoperative course, including activity restrictions, expected pain and postoperative medication were reviewed.    Patient provided verbal consent to surgical procedure and signed consent form: Yes    History, physical examination and diagnostic tests were reviewed and questions answered. Diagnosis, care plan and treatment options were discussed. The patient understand instructions and will follow up as directed.    Plan:    Follow-up:     Patient to tentatively have surgery on 1/28/25 at [facility]  Preop labs  Preop medical clearance  Hold aspirin, NSAID's, or blood thinners x 7 days before the scheduled surgery.    Stef Henry MD    Subjective/Objective     Chief Complaint    Spinal cord stimulator battery is at the end of life.    HPI    HPI    This is a 59-year-old female who has a spinal cord stimulator.  Her battery is now end-of-life.  The battery is located in the right buttock region.  The representative from the company was also present during this visit.  She came today to have further discussions about replacing her battery.  The representative told us that the batteries that have come out have a longer half-life and are smaller.  She has had no problems with her incision.  She is very eager to get this replaced.    ANGEL ORTIZ personally reviewed and updated.    Review of Systems    Family History    Family History   Problem Relation Age of Onset    Skin cancer Mother     Heart disease Father         Dx with lewy body dementia in 2012    Dementia Father         Dx with lewy body dementia in 2016    Breast cancer Maternal Aunt 28    Cancer Maternal Aunt         Breast    Breast cancer Sister 56    BRCA1 Negative Sister     BRCA2 Negative Sister     Depression Sister     Cancer Sister         Breast    Prostate cancer  Maternal Uncle     Cancer Maternal Uncle         Bladder/prostate    Colon cancer Maternal Uncle     Colon polyps Neg Hx        Social History    Social History     Socioeconomic History    Marital status:      Spouse name: Not on file    Number of children: 1    Years of education: Not on file    Highest education level: Not on file   Occupational History    Occupation: not working    Tobacco Use    Smoking status: Every Day     Current packs/day: 1.00     Average packs/day: 1 pack/day for 48.0 years (48.0 ttl pk-yrs)     Types: Cigarettes     Start date: 1/1/1977     Passive exposure: Current    Smokeless tobacco: Never    Tobacco comments:     smokes 13 cigs daily as of  7/15/24   Vaping Use    Vaping status: Never Used   Substance and Sexual Activity    Alcohol use: Yes     Comment: Social drinker    Drug use: No    Sexual activity: Yes     Partners: Male     Birth control/protection: Inserts, None   Other Topics Concern    Not on file   Social History Narrative         Social Drivers of Health     Financial Resource Strain: Low Risk  (5/10/2021)    Overall Financial Resource Strain (CARDIA)     Difficulty of Paying Living Expenses: Not very hard   Food Insecurity: No Food Insecurity (5/10/2021)    Hunger Vital Sign     Worried About Running Out of Food in the Last Year: Never true     Ran Out of Food in the Last Year: Never true   Transportation Needs: No Transportation Needs (5/10/2021)    PRAPARE - Transportation     Lack of Transportation (Medical): No     Lack of Transportation (Non-Medical): No   Physical Activity: Insufficiently Active (5/10/2021)    Exercise Vital Sign     Days of Exercise per Week: 3 days     Minutes of Exercise per Session: 30 min   Stress: No Stress Concern Present (5/10/2021)    [employer] [place] [employer] - Occupational Stress Questionnaire     Feeling of Stress : Not at all   Social Connections: Socially Isolated (5/10/2021)    Social Connection and  Isolation Panel [NHANES]     Frequency of Communication with Friends and Family: More than three times a week     Frequency of Social Gatherings with Friends and Family: Never     Attends Sikh Services: Never     Active Member of Clubs or Organizations: No     Attends Club or Organization Meetings: Never     Marital Status:    Intimate Partner Violence: Not At Risk (7/18/2023)    Humiliation, Afraid, Rape, and Kick questionnaire     Fear of Current or Ex-Partner: No     Emotionally Abused: No     Physically Abused: No     Sexually Abused: No   Housing Stability: Not on file       Past Medical History    Past Medical History:   Diagnosis Date    Allergic     Anxiety 2005    Arthritis     Bulging lumbar disc     CAD (coronary artery disease)     Chronic pain     low back    Degenerative disk disease     lumbar spine    Depression 2005    Effusion of left knee 04/25/2023    GERD (gastroesophageal reflux disease)     Hyperlipidemia     Hypertension     Pinched nerve     lumbar spine    Primary osteoarthritis of left knee 02/14/2023    Psychiatric disorder     depression    Shingles 20179       Surgical History    Past Surgical History:   Procedure Laterality Date    CARPAL TUNNEL RELEASE Bilateral 1998    CHOLECYSTECTOMY  2008    COLONOSCOPY  06/15/2015    COLONOSCOPY  06/02/2015    Internal hemorrhoids, hyperplastic polyps which were removed.  Ten year recall recommended June 2025    COLONOSCOPY  09/16/2020    normal terminal ileum, 1 small polyp in the distal sigmoid colon that was hyperplastic. .  She did have a small amount of retained stool in the colon.  She did have a small amount of retained stool in the colon.  A 5 year recall was recommended due to poor prep, September 2025.     EYE SURGERY      FL INJECTION LEFT KNEE (ARTHROGRAM)  06/06/2023    [place] ARTHRP KNE CONDYLE&PLATU MEDIAL&LAT COMPARTMENTS Left 07/24/2023    Procedure: ARTHROPLASTY KNEE TOTAL SAME DAY;  Surgeon: Dale العلي MD;  Location:  "[facility];  Service: Orthopedics    SPINAL CORD STIMULATOR IMPLANT      2014    SPINE SURGERY  2017    Stimulator inplant    WISDOM TOOTH EXTRACTION Bilateral 1982       Medications      Current Outpatient Medications:     atorvastatin (LIPITOR) 80 mg tablet, Take 1 tablet (80 mg total) by mouth daily, Disp: 90 tablet, Rfl: 1    buPROPion (WELLBUTRIN XL) 150 mg 24 hr tablet, Take 1 tablet (150 mg total) by mouth every morning, Disp: 30 tablet, Rfl: 5    lisinopril (ZESTRIL) 10 mg tablet, Take 1 tablet by mouth once daily, Disp: 90 tablet, Rfl: 1    LORazepam (Ativan) 0.5 mg tablet, Take 1 tablet (0.5 mg total) by mouth 2 (two) times a day as needed for anxiety, Disp: 60 tablet, Rfl: 0    pramipexole (MIRAPEX) 0.5 mg tablet, Take 2 tablets (1 mg total) by mouth daily, Disp: 180 tablet, Rfl: 1    sertraline (ZOLOFT) 100 mg tablet, Take 1 tablet (100 mg total) by mouth daily, Disp: 90 tablet, Rfl: 0    sertraline (ZOLOFT) 50 mg tablet, Take 1 tablet (50 mg total) by mouth daily, Disp: 90 tablet, Rfl: 0    Allergies    Allergies   Allergen Reactions    Penicillins Hives and Throat Swelling     Childhood allergy per pt    Meloxicam Hives and Itching       The following portions of the patient's history were reviewed and updated as appropriate: allergies, current medications, past family history, past medical history, past social history, past surgical history, and problem list.    Investigations        Rightward thoracolumbar curvature.     Hypertrophic degenerative changes throughout the visualized spine.     Neurostimulator leads project over the T8-T9 level. Wires seem intact       Physical Exam    Vitals:  Blood pressure 130/82, pulse 79, temperature 98.4 °F (36.9 °C), temperature source Temporal, resp. rate 17, height 5' 1\" (1.549 m), weight 94.8 kg (209 lb), SpO2 93%, not currently breastfeeding.,Body mass index is 39.49 kg/m².    Physical Exam  General appearance: alert, appears stated age, cooperative and no " distress  Head: Normocephalic, without obvious abnormality, atraumatic  Eyes: EOMI, PERRL  Neck: supple, symmetrical, trachea midline and NT  Back: no kyphosis present, no tenderness to percussion or palpation  Lungs: non labored breathing, equal breath sounds bilaterally without wheezing or crackles  Heart: regular heart rate, normal S1, S2, no murmors  Abdomen: Soft, nontender, nondistended. No pain to palpation  Neurologic:   Mental status: Alert, oriented x 3, thought content appropriate  Cranial nerves: grossly intact (Cranial nerves II-XII)  Sensory: normal to LT  Motor: moving all extremities without focal weakness   Reflexes: 2+ and symmetric  Coordination: finger to nose normal bilaterally, no drift bilaterally  Right buttock incision well healed. The battery was palpable.        Lali Henry MD

## 2025-01-13 ENCOUNTER — TELEPHONE (OUTPATIENT)
Age: 60
End: 2025-01-13

## 2025-01-13 NOTE — TELEPHONE ENCOUNTER
Pt called stating she was waiting to hear from Dr. Henry' SS.  Informed the pt that I would send this to Kat to follow-up with her.  Pt was appreciative.

## 2025-01-14 NOTE — TELEPHONE ENCOUNTER
Called and left message for patient to call back so that I may schedule her surgery. Left my direct contact information.

## 2025-01-17 ENCOUNTER — APPOINTMENT (OUTPATIENT)
Dept: LAB | Facility: CLINIC | Age: 60
End: 2025-01-17
Payer: COMMERCIAL

## 2025-01-17 ENCOUNTER — CONSULT (OUTPATIENT)
Dept: FAMILY MEDICINE CLINIC | Facility: CLINIC | Age: 60
End: 2025-01-17
Payer: COMMERCIAL

## 2025-01-17 VITALS
OXYGEN SATURATION: 94 % | BODY MASS INDEX: 39.5 KG/M2 | SYSTOLIC BLOOD PRESSURE: 130 MMHG | HEIGHT: 61 IN | HEART RATE: 88 BPM | RESPIRATION RATE: 17 BRPM | WEIGHT: 209.2 LBS | TEMPERATURE: 98.6 F | DIASTOLIC BLOOD PRESSURE: 74 MMHG

## 2025-01-17 DIAGNOSIS — R73.01 IMPAIRED FASTING GLUCOSE: ICD-10-CM

## 2025-01-17 DIAGNOSIS — M47.817 SPONDYLOSIS OF LUMBOSACRAL REGION WITHOUT MYELOPATHY OR RADICULOPATHY: ICD-10-CM

## 2025-01-17 DIAGNOSIS — Z45.42 BATTERY END OF LIFE OF SPINAL CORD STIMULATOR: ICD-10-CM

## 2025-01-17 DIAGNOSIS — I10 ESSENTIAL HYPERTENSION: ICD-10-CM

## 2025-01-17 DIAGNOSIS — Z01.818 PRE-PROCEDURAL EXAMINATION: ICD-10-CM

## 2025-01-17 DIAGNOSIS — E78.2 MIXED HYPERLIPIDEMIA: ICD-10-CM

## 2025-01-17 DIAGNOSIS — Z01.818 PRE-OP EXAMINATION: Primary | ICD-10-CM

## 2025-01-17 DIAGNOSIS — I25.10 MILD CAD: ICD-10-CM

## 2025-01-17 DIAGNOSIS — F41.9 ANXIETY: ICD-10-CM

## 2025-01-17 DIAGNOSIS — E78.2 MIXED HYPERLIPIDEMIA: Primary | ICD-10-CM

## 2025-01-17 LAB
ALBUMIN SERPL BCG-MCNC: 4.3 G/DL (ref 3.5–5)
ALP SERPL-CCNC: 82 U/L (ref 34–104)
ALT SERPL W P-5'-P-CCNC: 17 U/L (ref 7–52)
ANION GAP SERPL CALCULATED.3IONS-SCNC: 10 MMOL/L (ref 4–13)
APTT PPP: 28 SECONDS (ref 23–34)
AST SERPL W P-5'-P-CCNC: 16 U/L (ref 13–39)
BACTERIA UR QL AUTO: ABNORMAL /HPF
BASOPHILS # BLD AUTO: 0.06 THOUSANDS/ΜL (ref 0–0.1)
BASOPHILS NFR BLD AUTO: 1 % (ref 0–1)
BILIRUB SERPL-MCNC: 0.32 MG/DL (ref 0.2–1)
BILIRUB UR QL STRIP: NEGATIVE
BUN SERPL-MCNC: 11 MG/DL (ref 5–25)
CALCIUM SERPL-MCNC: 9.4 MG/DL (ref 8.4–10.2)
CHLORIDE SERPL-SCNC: 103 MMOL/L (ref 96–108)
CHOLEST SERPL-MCNC: 156 MG/DL (ref ?–200)
CLARITY UR: ABNORMAL
CO2 SERPL-SCNC: 25 MMOL/L (ref 21–32)
COLOR UR: YELLOW
CREAT SERPL-MCNC: 0.75 MG/DL (ref 0.6–1.3)
EOSINOPHIL # BLD AUTO: 0.35 THOUSAND/ΜL (ref 0–0.61)
EOSINOPHIL NFR BLD AUTO: 4 % (ref 0–6)
ERYTHROCYTE [DISTWIDTH] IN BLOOD BY AUTOMATED COUNT: 14.6 % (ref 11.6–15.1)
EST. AVERAGE GLUCOSE BLD GHB EST-MCNC: 134 MG/DL
GFR SERPL CREATININE-BSD FRML MDRD: 86 ML/MIN/1.73SQ M
GLUCOSE P FAST SERPL-MCNC: 130 MG/DL (ref 65–99)
GLUCOSE UR STRIP-MCNC: NEGATIVE MG/DL
HBA1C MFR BLD: 6.3 %
HCT VFR BLD AUTO: 46.9 % (ref 34.8–46.1)
HDLC SERPL-MCNC: 36 MG/DL
HGB BLD-MCNC: 14.6 G/DL (ref 11.5–15.4)
HGB UR QL STRIP.AUTO: NEGATIVE
HYALINE CASTS #/AREA URNS LPF: ABNORMAL /LPF
IMM GRANULOCYTES # BLD AUTO: 0.03 THOUSAND/UL (ref 0–0.2)
IMM GRANULOCYTES NFR BLD AUTO: 0 % (ref 0–2)
INR PPP: 0.97 (ref 0.85–1.19)
KETONES UR STRIP-MCNC: NEGATIVE MG/DL
LDLC SERPL CALC-MCNC: 73 MG/DL (ref 0–100)
LEUKOCYTE ESTERASE UR QL STRIP: NEGATIVE
LYMPHOCYTES # BLD AUTO: 2.21 THOUSANDS/ΜL (ref 0.6–4.47)
LYMPHOCYTES NFR BLD AUTO: 26 % (ref 14–44)
MCH RBC QN AUTO: 31.7 PG (ref 26.8–34.3)
MCHC RBC AUTO-ENTMCNC: 31.1 G/DL (ref 31.4–37.4)
MCV RBC AUTO: 102 FL (ref 82–98)
MONOCYTES # BLD AUTO: 0.53 THOUSAND/ΜL (ref 0.17–1.22)
MONOCYTES NFR BLD AUTO: 6 % (ref 4–12)
MUCOUS THREADS UR QL AUTO: ABNORMAL
NEUTROPHILS # BLD AUTO: 5.29 THOUSANDS/ΜL (ref 1.85–7.62)
NEUTS SEG NFR BLD AUTO: 63 % (ref 43–75)
NITRITE UR QL STRIP: NEGATIVE
NON-SQ EPI CELLS URNS QL MICRO: ABNORMAL /HPF
NONHDLC SERPL-MCNC: 120 MG/DL
NRBC BLD AUTO-RTO: 0 /100 WBCS
PH UR STRIP.AUTO: 6 [PH]
PLATELET # BLD AUTO: 182 THOUSANDS/UL (ref 149–390)
PMV BLD AUTO: 10.2 FL (ref 8.9–12.7)
POTASSIUM SERPL-SCNC: 4 MMOL/L (ref 3.5–5.3)
PROT SERPL-MCNC: 6.7 G/DL (ref 6.4–8.4)
PROT UR STRIP-MCNC: ABNORMAL MG/DL
PROTHROMBIN TIME: 13.2 SECONDS (ref 12.3–15)
RBC # BLD AUTO: 4.61 MILLION/UL (ref 3.81–5.12)
RBC #/AREA URNS AUTO: ABNORMAL /HPF
SODIUM SERPL-SCNC: 138 MMOL/L (ref 135–147)
SP GR UR STRIP.AUTO: 1.02 (ref 1–1.03)
TRIGL SERPL-MCNC: 234 MG/DL (ref ?–150)
UROBILINOGEN UR STRIP-ACNC: <2 MG/DL
WBC # BLD AUTO: 8.47 THOUSAND/UL (ref 4.31–10.16)
WBC #/AREA URNS AUTO: ABNORMAL /HPF

## 2025-01-17 PROCEDURE — 36415 COLL VENOUS BLD VENIPUNCTURE: CPT

## 2025-01-17 PROCEDURE — 85610 PROTHROMBIN TIME: CPT

## 2025-01-17 PROCEDURE — 83036 HEMOGLOBIN GLYCOSYLATED A1C: CPT

## 2025-01-17 PROCEDURE — 80061 LIPID PANEL: CPT

## 2025-01-17 PROCEDURE — 80053 COMPREHEN METABOLIC PANEL: CPT

## 2025-01-17 PROCEDURE — 99214 OFFICE O/P EST MOD 30 MIN: CPT | Performed by: STUDENT IN AN ORGANIZED HEALTH CARE EDUCATION/TRAINING PROGRAM

## 2025-01-17 PROCEDURE — 85730 THROMBOPLASTIN TIME PARTIAL: CPT

## 2025-01-17 PROCEDURE — 81001 URINALYSIS AUTO W/SCOPE: CPT

## 2025-01-17 PROCEDURE — 85025 COMPLETE CBC W/AUTO DIFF WBC: CPT

## 2025-01-17 NOTE — PATIENT INSTRUCTIONS
Pre-operative Medication Instructions    Avoid herbs or non-directed vitamins one week prior to surgery  Avoid aspirin containing medications or non-steroidal anti-inflammatory drugs one week preceding surgery  May take tylenol for pain up until the night before surgery    ACE Inhibitors or ARBs     Medication Name     lisinopril (ZESTRIL) 10 mg tablet      Continue this medication up to the evening before surgery/procedure, but do not take the morning of the day of surgery.    Antidepressant Meds     Medication Name     buPROPion (WELLBUTRIN XL) 150 mg 24 hr tablet     sertraline (ZOLOFT) 100 mg tablet     sertraline (ZOLOFT) 50 mg tablet      Continue to take this medication on your normal schedule.  If this is an oral medication and you take it in the morning, then you may take this medicine with a sip of water.    Parkinson Medications     Medication Name     pramipexole (MIRAPEX) 0.5 mg tablet      Continue to take this medication on your normal schedule.  If this is an oral medication and you take it in the morning, then you may take this medicine with a sip of water.    Benzodiazepine Antagonist     Medication Name     LORazepam (Ativan) 0.5 mg tablet      If this medication is needed please continue to take on your normal schedule.  If you take it in the morning, then you may take this medicine with a sip of water.    Cholesterol lowering meds     Medication Name     atorvastatin (LIPITOR) 80 mg tablet      Continue to take this medication on your normal schedule.  If this is an oral medication and you take it in the morning, then you may take this medicine with a sip of water.

## 2025-01-17 NOTE — ASSESSMENT & PLAN NOTE
60 yo F w/ rahul of CAD, IBS, hypothyroidism, MDD, preDM, RLS presenting for elective pre operative assessment for replacement of spinal column stimulator     The surgery specific risk of cardiovascular complication including MI and cardiac death is 0.1% using the Jean surgical risk calculator for preoperative myocardial infarction or cardiac  arrest.     RCRI score of 1.3%, Class II    Pt endorses a good functional status (METs >4) w/ DASI score of 50 pts/9 METs    Moderate risk patient for a moderate risk procedure.     As long as labs are within normal limits, pt may proceed with surgery. Pt aware that if labs are acutely abnormal, we may have to delay elective surgery. Labs were obtained today and were not available at time of apt for review. EKG was not available to review at time of apt. This was discussed with patient and patient agreed to continue with surgery pending labs and EKG review     Patient aware to stop NSAIDs and OTC supplements/vitamins 7 days before surgery.

## 2025-01-17 NOTE — PROGRESS NOTES
Pre-operative Clearance  Name: Zenaida Kathleen      : 1965      MRN: [id number]  Encounter Provider: Cherelle Galeana MD  Encounter Date: 2025   Encounter department: [facility]    Assessment & Plan  Pre-op examination         Spondylosis of lumbosacral region without myelopathy or radiculopathy  60 yo F w/ rahul of CAD, IBS, hypothyroidism, MDD, preDM, RLS presenting for elective pre operative assessment for replacement of spinal column stimulator     The surgery specific risk of cardiovascular complication including MI and cardiac death is 0.1% using the Jean surgical risk calculator for preoperative myocardial infarction or cardiac  arrest.     RCRI score of 1.3%, Class II    Pt endorses a good functional status (METs >4) w/ DASI score of 50 pts/9 METs    Moderate risk patient for a moderate risk procedure.     As long as labs are within normal limits, pt may proceed with surgery. Pt aware that if labs are acutely abnormal, we may have to delay elective surgery. Labs were obtained today and were not available at time of apt for review. EKG was not available to review at time of apt. This was discussed with patient and patient agreed to continue with surgery pending labs and EKG review     Patient aware to stop NSAIDs and OTC supplements/vitamins 7 days before surgery.             Essential hypertension  BP at goal; continue lisinopril 10 mg daily (hold AM of suglalo)       Mild CAD  No prior PCI; follows w/ Cardiology. Recent stress echocardiogram normal        Anxiety  Stable- continue current regimen        Mixed hyperlipidemia  Tolerating lipitor 80 mg daily        Pre-operative Clearance:     Revised Cardiac Risk Index:  RCI RISK CLASS II (1 risk factor, risk of major cardiac complications approximately 1.3%)    Clearance:  Patient is medically optimized (CLEARED) for proposed surgery without any additional cardiac testing.       As long as labs are within normal limits, pt  may proceed with surgery. Pt aware that if labs are acutely abnormal, we may have to delay elective surgery. Labs were obtained today and were not available at time of apt for review. EKG was not available to review at time of apt     Medication Instructions:   - Avoid herbs or non-directed vitamins one week prior to surgery    - Avoid aspirin containing medications or non-steroidal anti-inflammatory drugs one week preceding surgery    - May take tylenol for pain up until the night before surgery    - ACE Inhibitors or ARBs: Continue this medication up to the evening before surgery/procedure, but do not take the morning of the day of surgery.  - Antidepressants: Continue to take this medication on your normal schedule.  - Antiparkinson meds (ie, carbidopa-levodopa, amantadine, pramipexole, ropinirole): Continue to take this medication on your normal schedule.  - Benzodiazepines (ie, alprazolam, lorazepam, diazepam):  If the medication is needed, continue to take it on your normal schedule.  - Hyperlipidemia meds: Continue to take this medication on your normal schedule.       History of Present Illness     58 yo F w/ rahul of CAD, IBS, hypothyroidism, MDD, preDM, RLS presenting for elective pre operative assessment for replacement of spinal column stimulator. Stress echocardiogram completed Jan2025 and no ischemia noted. Denies any SOB, ORONA, CP, GI sx, light headedness     Pre-op Exam  Surgery: replacement of spinal column stimulator  Anticipated Date of Surgery: 28Jan2025  Surgeon: Dr Henry    Previous history of bleeding disorders or clots?: No  Previous Anesthesia reaction?: No  Prolonged steroid use in the last 6 months?: No    Assessment of Cardiac Risk:   - Unstable or severe angina or MI in the last 6 weeks or history of stent placement in the last year?: No   - Decompensated heart failure (e.g. New onset heart failure, NYHA  Class IV heart failure, or worsening existing heart failure)?: No  - Significant  arrhythmias such as high grade AV block, symptomatic ventricular arrhythmia, newly recognized ventricular tachycardia, supraventricular tachycardia with resting heart rate >100, or symptomatic bradycardia?: No  - Severe heart valve disease including aortic stenosis or symptomatic mitral stenosis?: No      Pre-operative Risk Factors:  Elevated-risk surgery: No    History of cerebrovascular disease: No    History of ischemic heart disease: Yes    Pre-operative treatment with insulin: No  Pre-operative creatinine >2 mg/dL: No    History of congestive heart failure: No    Duke Activity Status Index (DASI):   DASI Total Score: 50.7  METs: 9    Review of Systems   Constitutional:  Negative for chills and fever.   HENT:  Negative for trouble swallowing.    Eyes:  Negative for visual disturbance.   Respiratory:  Negative for cough and shortness of breath.    Cardiovascular:  Negative for chest pain and palpitations.   Gastrointestinal:  Negative for abdominal pain and blood in stool.   Genitourinary:  Negative for difficulty urinating, dysuria and hematuria.   Musculoskeletal:  Positive for arthralgias and back pain.   Skin:  Negative for color change and rash.   Neurological:  Negative for dizziness, syncope and light-headedness.   All other systems reviewed and are negative.    Past Medical History   Past Medical History:   Diagnosis Date    Allergic     Anxiety 2005    Arthritis     Bulging lumbar disc     CAD (coronary artery disease)     Chronic pain     low back    Degenerative disk disease     lumbar spine    Depression 2005    Effusion of left knee 04/25/2023    GERD (gastroesophageal reflux disease)     Hyperlipidemia     Hypertension     Pinched nerve     lumbar spine    Primary osteoarthritis of left knee 02/14/2023    Psychiatric disorder     depression    Shingles 20179     Past Surgical History:   Procedure Laterality Date    CARPAL TUNNEL RELEASE Bilateral 1998    CATARACT EXTRACTION Bilateral      CHOLECYSTECTOMY  2008    COLONOSCOPY  06/15/2015    COLONOSCOPY  06/02/2015    Internal hemorrhoids, hyperplastic polyps which were removed.  Ten year recall recommended June 2025    COLONOSCOPY  09/16/2020    normal terminal ileum, 1 small polyp in the distal sigmoid colon that was hyperplastic. .  She did have a small amount of retained stool in the colon.  She did have a small amount of retained stool in the colon.  A 5 year recall was recommended due to poor prep, September 2025.     FL INJECTION LEFT KNEE (ARTHROGRAM)  06/06/2023    GA ARTHRP KNE CONDYLE&PLATU MEDIAL&LAT COMPARTMENTS Left 07/24/2023    Procedure: ARTHROPLASTY KNEE TOTAL SAME DAY;  Surgeon: Dale العلي MD;  Location: [facility];  Service: Orthopedics    SPINAL CORD STIMULATOR IMPLANT      2014    SPINE SURGERY  2017    Stimulator inplant    WISDOM TOOTH EXTRACTION Bilateral 1982     Family History   Problem Relation Age of Onset    Skin cancer Mother     Heart disease Father         Dx with lewy body dementia in 2012    Dementia Father         Dx with lewy body dementia in 2016    Breast cancer Maternal Aunt 28    Cancer Maternal Aunt         Breast    Breast cancer Sister 56    BRCA1 Negative Sister     BRCA2 Negative Sister     Depression Sister     Cancer Sister         Breast    Prostate cancer Maternal Uncle     Cancer Maternal Uncle         Bladder/prostate    Colon cancer Maternal Uncle     Colon polyps Neg Hx      Social History     Tobacco Use    Smoking status: Every Day     Current packs/day: 0.50     Average packs/day: 0.5 packs/day for 48.0 years (24.0 ttl pk-yrs)     Types: Cigarettes     Start date: 1/1/1977     Passive exposure: Current    Smokeless tobacco: Never    Tobacco comments:     smokes 13 cigs daily as of  7/15/24   Vaping Use    Vaping status: Never Used   Substance and Sexual Activity    Alcohol use: Yes     Comment: Social drinker- less than 1 drink per week    Drug use: No    Sexual activity: Yes     Partners:  "Male     Birth control/protection: Inserts, None     Current Outpatient Medications on File Prior to Visit   Medication Sig    atorvastatin (LIPITOR) 80 mg tablet Take 1 tablet (80 mg total) by mouth daily    buPROPion (WELLBUTRIN XL) 150 mg 24 hr tablet Take 1 tablet (150 mg total) by mouth every morning    lisinopril (ZESTRIL) 10 mg tablet Take 1 tablet by mouth once daily    LORazepam (Ativan) 0.5 mg tablet Take 1 tablet (0.5 mg total) by mouth 2 (two) times a day as needed for anxiety    pramipexole (MIRAPEX) 0.5 mg tablet Take 2 tablets (1 mg total) by mouth daily    sertraline (ZOLOFT) 100 mg tablet Take 1 tablet (100 mg total) by mouth daily    sertraline (ZOLOFT) 50 mg tablet Take 1 tablet (50 mg total) by mouth daily     Allergies   Allergen Reactions    Penicillins Hives and Throat Swelling     Childhood allergy per pt    Meloxicam Hives and Itching     Objective   /74 (BP Location: Left arm, Patient Position: Sitting, Cuff Size: Large)   Pulse 88   Temp 98.6 °F (37 °C) (Tympanic)   Resp 17   Ht 5' 1\" (1.549 m)   Wt 94.9 kg (209 lb 3.2 oz)   LMP  (LMP Unknown)   SpO2 94%   BMI 39.53 kg/m²     Physical Exam  Vitals and nursing note reviewed.   Constitutional:       General: She is not in acute distress.     Appearance: Normal appearance. She is well-developed.   HENT:      Head: Normocephalic and atraumatic.      Nose: Nose normal.      Mouth/Throat:      Mouth: Mucous membranes are moist.      Pharynx: Oropharynx is clear.   Eyes:      Extraocular Movements: Extraocular movements intact.      Conjunctiva/sclera: Conjunctivae normal.      Pupils: Pupils are equal, round, and reactive to light.   Cardiovascular:      Rate and Rhythm: Normal rate and regular rhythm.      Pulses: Normal pulses.      Heart sounds: Normal heart sounds. No murmur heard.  Pulmonary:      Effort: Pulmonary effort is normal. No respiratory distress.      Breath sounds: Normal breath sounds. No wheezing or rales. "   Abdominal:      General: Bowel sounds are normal.      Palpations: Abdomen is soft.      Tenderness: There is no abdominal tenderness.   Musculoskeletal:         General: No swelling. Normal range of motion.      Cervical back: Normal range of motion and neck supple.   Skin:     General: Skin is warm and dry.      Capillary Refill: Capillary refill takes less than 2 seconds.   Neurological:      General: No focal deficit present.      Mental Status: She is alert.   Psychiatric:         Mood and Affect: Mood normal.       Administrative Statements   I have spent a total time of 30 minutes in caring for this patient on the day of the visit/encounter including Prognosis, Risks and benefits of tx options, Instructions for management, Patient and family education, Importance of tx compliance, Risk factor reductions, Impressions, Counseling / Coordination of care, and Documenting in the medical record.     Cherelle Galeana MD

## 2025-01-17 NOTE — PRE-PROCEDURE INSTRUCTIONS
Pre-Surgery Instructions:   Medication Instructions    atorvastatin (LIPITOR) 80 mg tablet Take Day of Surgery; unless usually taken at night     buPROPion (WELLBUTRIN XL) 150 mg 24 hr tablet Take Day of Surgery; unless usually taken at night     lisinopril (ZESTRIL) 10 mg tablet Do not take day of surgery; continue as prescribed excluding DOS     LORazepam (Ativan) 0.5 mg tablet Take Day of Surgery; unless usually taken at night -PRN    pramipexole (MIRAPEX) 0.5 mg tablet Take Day of Surgery; unless usually taken at night - usually takes at nigth    sertraline (ZOLOFT) 100 mg tablet Take Day of Surgery; unless usually taken at night     sertraline (ZOLOFT) 50 mg tablet Take Day of Surgery; unless usually taken at night     Medication instructions for day surgery reviewed. Please use only a sip of water to take your instructed medications. Avoid all over the counter vitamins, supplements and NSAIDS for one week prior to surgery per anesthesia guidelines. Tylenol is ok to take as needed.     You will receive a call one business day prior to surgery with an arrival time and hospital directions. If your surgery is scheduled on a Monday, the hospital will be calling you on the Friday prior to your surgery. If you have not heard from anyone by 8pm, please call the hospital supervisor through the hospital  at [phone]. ([place] [phone] or [place] [phone]).    Do not eat or drink anything after midnight the night before your surgery, including candy, mints, lifesavers, or chewing gum. Do not drink alcohol 24hrs before your surgery. Try not to smoke at least 24hrs before your surgery.       Follow the pre surgery showering instructions as listed in the “My Surgical Experience Booklet” or otherwise provided by your surgeon's office. Do not use a blade to shave the surgical area 1 week before surgery. It is okay to use a clean electric clippers up to 24 hours before surgery. Do not apply any lotions,  creams, including makeup, cologne, deodorant, or perfumes after showering on the day of your surgery. Do not use dry shampoo, hair spray, hair gel, or any type of hair products.     No contact lenses, eye make-up, or artificial eyelashes. Remove nail polish, including gel polish, and any artificial, gel, or acrylic nails if possible. Remove all jewelry including rings and body piercing jewelry.     Wear causal clothing that is easy to take on and off. Consider your type of surgery.    Keep any valuables, jewelry, piercings at home. Please bring any specially ordered equipment (sling, braces) if indicated.    Arrange for a responsible person to drive you to and from the hospital on the day of your surgery. Please confirm the visitor policy for the day of your procedure when you receive your phone call with an arrival time.     Call the surgeon's office with any new illnesses, exposures, or additional questions prior to surgery.    Please reference your “My Surgical Experience Booklet” for additional information to prepare for your upcoming surgery.

## 2025-01-24 LAB
ATRIAL RATE: 76 BPM
P AXIS: 35 DEGREES
PR INTERVAL: 180 MS
QRS AXIS: 16 DEGREES
QRSD INTERVAL: 82 MS
QT INTERVAL: 382 MS
QTC INTERVAL: 430 MS
T WAVE AXIS: 27 DEGREES
VENTRICULAR RATE: 76 BPM

## 2025-01-24 PROCEDURE — 93010 ELECTROCARDIOGRAM REPORT: CPT | Performed by: INTERNAL MEDICINE

## 2025-01-27 ENCOUNTER — ANESTHESIA EVENT (OUTPATIENT)
Dept: PERIOP | Facility: HOSPITAL | Age: 60
End: 2025-01-27
Payer: COMMERCIAL

## 2025-01-28 ENCOUNTER — TELEPHONE (OUTPATIENT)
Dept: NEUROSURGERY | Facility: CLINIC | Age: 60
End: 2025-01-28

## 2025-01-28 ENCOUNTER — ANESTHESIA (OUTPATIENT)
Dept: PERIOP | Facility: HOSPITAL | Age: 60
End: 2025-01-28
Payer: COMMERCIAL

## 2025-01-28 ENCOUNTER — HOSPITAL ENCOUNTER (OUTPATIENT)
Facility: HOSPITAL | Age: 60
Setting detail: OUTPATIENT SURGERY
Discharge: HOME | End: 2025-01-28
Attending: SPECIALIST | Admitting: SPECIALIST
Payer: COMMERCIAL

## 2025-01-28 VITALS
RESPIRATION RATE: 16 BRPM | HEART RATE: 95 BPM | OXYGEN SATURATION: 92 % | SYSTOLIC BLOOD PRESSURE: 120 MMHG | TEMPERATURE: 97.5 F | DIASTOLIC BLOOD PRESSURE: 65 MMHG

## 2025-01-28 DIAGNOSIS — Z45.42 BATTERY END OF LIFE OF SPINAL CORD STIMULATOR: Primary | ICD-10-CM

## 2025-01-28 LAB — GLUCOSE SERPL-MCNC: 124 MG/DL (ref 65–140)

## 2025-01-28 PROCEDURE — 82948 REAGENT STRIP/BLOOD GLUCOSE: CPT

## 2025-01-28 PROCEDURE — 63685 INS/RPLC SPI NPG/RCVR POCKET: CPT | Performed by: SPECIALIST

## 2025-01-28 PROCEDURE — C1822 GEN, NEURO, HF, RECHG BAT: HCPCS | Performed by: SPECIALIST

## 2025-01-28 PROCEDURE — C1787 PATIENT PROGR, NEUROSTIM: HCPCS | Performed by: SPECIALIST

## 2025-01-28 PROCEDURE — NC001 PR NO CHARGE: Performed by: SPECIALIST

## 2025-01-28 DEVICE — IPG MODEL IPG3000 KIT, NIPG3000
Type: IMPLANTABLE DEVICE | Site: BUTTOCKS | Status: FUNCTIONAL
Brand: SENZA

## 2025-01-28 RX ORDER — MAGNESIUM HYDROXIDE 1200 MG/15ML
LIQUID ORAL AS NEEDED
Status: DISCONTINUED | OUTPATIENT
Start: 2025-01-28 | End: 2025-01-28 | Stop reason: HOSPADM

## 2025-01-28 RX ORDER — KETAMINE HCL IN NACL, ISO-OSM 100MG/10ML
SYRINGE (ML) INJECTION AS NEEDED
Status: DISCONTINUED | OUTPATIENT
Start: 2025-01-28 | End: 2025-01-28

## 2025-01-28 RX ORDER — ONDANSETRON 2 MG/ML
INJECTION INTRAMUSCULAR; INTRAVENOUS AS NEEDED
Status: DISCONTINUED | OUTPATIENT
Start: 2025-01-28 | End: 2025-01-28

## 2025-01-28 RX ORDER — ONDANSETRON 2 MG/ML
4 INJECTION INTRAMUSCULAR; INTRAVENOUS EVERY 4 HOURS PRN
Status: DISCONTINUED | OUTPATIENT
Start: 2025-01-28 | End: 2025-01-28 | Stop reason: HOSPADM

## 2025-01-28 RX ORDER — FENTANYL CITRATE/PF 50 MCG/ML
25 SYRINGE (ML) INJECTION
Status: DISCONTINUED | OUTPATIENT
Start: 2025-01-28 | End: 2025-01-28 | Stop reason: HOSPADM

## 2025-01-28 RX ORDER — OXYCODONE HYDROCHLORIDE 5 MG/1
5 TABLET ORAL EVERY 4 HOURS PRN
Refills: 0 | Status: DISCONTINUED | OUTPATIENT
Start: 2025-01-28 | End: 2025-01-28 | Stop reason: HOSPADM

## 2025-01-28 RX ORDER — ONDANSETRON 2 MG/ML
4 INJECTION INTRAMUSCULAR; INTRAVENOUS ONCE AS NEEDED
Status: DISCONTINUED | OUTPATIENT
Start: 2025-01-28 | End: 2025-01-28 | Stop reason: HOSPADM

## 2025-01-28 RX ORDER — ACETAMINOPHEN 325 MG/1
975 TABLET ORAL EVERY 8 HOURS PRN
Status: DISCONTINUED | OUTPATIENT
Start: 2025-01-28 | End: 2025-01-28 | Stop reason: HOSPADM

## 2025-01-28 RX ORDER — CEFAZOLIN SODIUM 2 G/50ML
SOLUTION INTRAVENOUS AS NEEDED
Status: DISCONTINUED | OUTPATIENT
Start: 2025-01-28 | End: 2025-01-28

## 2025-01-28 RX ORDER — SODIUM CHLORIDE, SODIUM LACTATE, POTASSIUM CHLORIDE, CALCIUM CHLORIDE 600; 310; 30; 20 MG/100ML; MG/100ML; MG/100ML; MG/100ML
125 INJECTION, SOLUTION INTRAVENOUS CONTINUOUS
Status: DISCONTINUED | OUTPATIENT
Start: 2025-01-28 | End: 2025-01-28 | Stop reason: HOSPADM

## 2025-01-28 RX ORDER — OXYCODONE AND ACETAMINOPHEN 5; 325 MG/1; MG/1
1 TABLET ORAL EVERY 6 HOURS PRN
Qty: 20 TABLET | Refills: 0 | Status: SHIPPED | OUTPATIENT
Start: 2025-01-28

## 2025-01-28 RX ORDER — MIDAZOLAM HYDROCHLORIDE 2 MG/2ML
INJECTION, SOLUTION INTRAMUSCULAR; INTRAVENOUS AS NEEDED
Status: DISCONTINUED | OUTPATIENT
Start: 2025-01-28 | End: 2025-01-28

## 2025-01-28 RX ORDER — VANCOMYCIN HYDROCHLORIDE 1 G/20ML
INJECTION, POWDER, LYOPHILIZED, FOR SOLUTION INTRAVENOUS AS NEEDED
Status: DISCONTINUED | OUTPATIENT
Start: 2025-01-28 | End: 2025-01-28 | Stop reason: HOSPADM

## 2025-01-28 RX ORDER — HYDROMORPHONE HCL/PF 1 MG/ML
0.5 SYRINGE (ML) INJECTION
Status: DISCONTINUED | OUTPATIENT
Start: 2025-01-28 | End: 2025-01-28 | Stop reason: HOSPADM

## 2025-01-28 RX ORDER — FENTANYL CITRATE 50 UG/ML
INJECTION, SOLUTION INTRAMUSCULAR; INTRAVENOUS AS NEEDED
Status: DISCONTINUED | OUTPATIENT
Start: 2025-01-28 | End: 2025-01-28

## 2025-01-28 RX ORDER — CLINDAMYCIN HYDROCHLORIDE 300 MG/1
300 CAPSULE ORAL 4 TIMES DAILY
Qty: 40 CAPSULE | Refills: 0 | Status: SHIPPED | OUTPATIENT
Start: 2025-01-28 | End: 2025-02-07

## 2025-01-28 RX ORDER — ALBUTEROL SULFATE 0.83 MG/ML
2.5 SOLUTION RESPIRATORY (INHALATION) ONCE AS NEEDED
Status: DISCONTINUED | OUTPATIENT
Start: 2025-01-28 | End: 2025-01-28 | Stop reason: HOSPADM

## 2025-01-28 RX ORDER — OXYCODONE HYDROCHLORIDE 5 MG/1
10 TABLET ORAL EVERY 4 HOURS PRN
Refills: 0 | Status: DISCONTINUED | OUTPATIENT
Start: 2025-01-28 | End: 2025-01-28 | Stop reason: HOSPADM

## 2025-01-28 RX ORDER — PROPOFOL 10 MG/ML
INJECTION, EMULSION INTRAVENOUS CONTINUOUS PRN
Status: DISCONTINUED | OUTPATIENT
Start: 2025-01-28 | End: 2025-01-28

## 2025-01-28 RX ORDER — LIDOCAINE HYDROCHLORIDE 20 MG/ML
INJECTION, SOLUTION EPIDURAL; INFILTRATION; INTRACAUDAL; PERINEURAL AS NEEDED
Status: DISCONTINUED | OUTPATIENT
Start: 2025-01-28 | End: 2025-01-28

## 2025-01-28 RX ORDER — CHLORHEXIDINE GLUCONATE ORAL RINSE 1.2 MG/ML
15 SOLUTION DENTAL ONCE
Status: COMPLETED | OUTPATIENT
Start: 2025-01-28 | End: 2025-01-28

## 2025-01-28 RX ORDER — LIDOCAINE HYDROCHLORIDE AND EPINEPHRINE 10; 10 MG/ML; UG/ML
INJECTION, SOLUTION INFILTRATION; PERINEURAL AS NEEDED
Status: DISCONTINUED | OUTPATIENT
Start: 2025-01-28 | End: 2025-01-28 | Stop reason: HOSPADM

## 2025-01-28 RX ORDER — METHOCARBAMOL 500 MG/1
500 TABLET, FILM COATED ORAL EVERY 6 HOURS PRN
Status: DISCONTINUED | OUTPATIENT
Start: 2025-01-28 | End: 2025-01-28 | Stop reason: HOSPADM

## 2025-01-28 RX ORDER — GLYCOPYRROLATE 0.2 MG/ML
INJECTION INTRAMUSCULAR; INTRAVENOUS AS NEEDED
Status: DISCONTINUED | OUTPATIENT
Start: 2025-01-28 | End: 2025-01-28

## 2025-01-28 RX ADMIN — CEFAZOLIN SODIUM 2000 MG: 2 SOLUTION INTRAVENOUS at 07:27

## 2025-01-28 RX ADMIN — Medication 25 MG: at 07:38

## 2025-01-28 RX ADMIN — LIDOCAINE HYDROCHLORIDE 80 MG: 20 INJECTION, SOLUTION EPIDURAL; INFILTRATION; INTRACAUDAL at 07:38

## 2025-01-28 RX ADMIN — FENTANYL CITRATE 50 MCG: 50 INJECTION INTRAMUSCULAR; INTRAVENOUS at 07:40

## 2025-01-28 RX ADMIN — Medication 25 MG: at 07:50

## 2025-01-28 RX ADMIN — PROPOFOL 30 MCG/KG/MIN: 10 INJECTION, EMULSION INTRAVENOUS at 07:38

## 2025-01-28 RX ADMIN — FENTANYL CITRATE 50 MCG: 50 INJECTION INTRAMUSCULAR; INTRAVENOUS at 07:50

## 2025-01-28 RX ADMIN — GLYCOPYRROLATE 0.2 MCG: 0.2 INJECTION, SOLUTION INTRAMUSCULAR; INTRAVENOUS at 07:38

## 2025-01-28 RX ADMIN — PROPOFOL 20 MG: 10 INJECTION, EMULSION INTRAVENOUS at 07:39

## 2025-01-28 RX ADMIN — MIDAZOLAM 2 MG: 1 INJECTION INTRAMUSCULAR; INTRAVENOUS at 07:38

## 2025-01-28 RX ADMIN — DEXMEDETOMIDINE 8 MCG: 100 INJECTION, SOLUTION INTRAVENOUS at 07:43

## 2025-01-28 RX ADMIN — SODIUM CHLORIDE, SODIUM LACTATE, POTASSIUM CHLORIDE, AND CALCIUM CHLORIDE: .6; .31; .03; .02 INJECTION, SOLUTION INTRAVENOUS at 07:04

## 2025-01-28 RX ADMIN — SODIUM CHLORIDE, SODIUM LACTATE, POTASSIUM CHLORIDE, AND CALCIUM CHLORIDE: .6; .31; .03; .02 INJECTION, SOLUTION INTRAVENOUS at 07:34

## 2025-01-28 RX ADMIN — CHLORHEXIDINE GLUCONATE 15 ML: 1.2 SOLUTION ORAL at 06:51

## 2025-01-28 RX ADMIN — ONDANSETRON 4 MG: 2 INJECTION INTRAMUSCULAR; INTRAVENOUS at 07:59

## 2025-01-28 RX ADMIN — DEXMEDETOMIDINE 8 MCG: 100 INJECTION, SOLUTION INTRAVENOUS at 07:38

## 2025-01-28 NOTE — DISCHARGE INSTR - AVS FIRST PAGE
Neurosurgery Discharge Instructions  Spinal Cord Stimulator (SCS) replacement    Activity:  Do not lift more than 10 pounds for 6 weeks.  Avoid bending, lifting and twisting for 6 weeks.  No strenuous activities. No driving for 2 weeks.   When able to shower, continue to use clean towel and washcloth for 2 weeks post-op.  Continue to change bed linens and pajamas more frequently. Wear clean clothes daily.     Surgical incision care:  For Permanent SCS placement:  You may not require a dressing, but if  you have a dressing, keep dressings in place for 3 days.  Keep incisions dry for 3 days,then can start to shower with soap and water on day 4 after surgery.  Shower with mild antimicrobial soap after 3 days.   After 3 days, incisions may be left open to air, but must remain clean.  Do not immerse the incisions in water for 6 weeks.  Do not apply any creams or ointments to the incision for 6 weeks, unless otherwise instructed by [facility].  Contact office if increasing redness, drainage, pain or swelling around the incisions.    Postoperative medication:  Complete course of antibiotic as directed.  While taking the prescribed Percocet for pain control- HOLD/Do not take your Lorazepam. Do not take Lorazepam concurrently with the Percocet. Once you stop taking Percocet, you can resume your Lorazepam.   [facility] will provide pain medication as coordinated with your pain specialist. All prescriptions must come from a single provider.   Take all medications as prescribed.  Call office with any questions/concerns.  Please contact office for questions regarding dosage and modifications.  No antiplatelet, anticoagulation or Nonsteroidal anti-inflammatory (NSAIDs) medication until cleared by [facility], unless otherwise instructed.   Do not operate heavy machinery or vehicles while taking sedating medications.  Use a bowel regimen while on opioids as  they induce constipation. Ie. Senokot-S, Miralax, Colace, etc. Increase fiber and water intake.     ***Note that it may take some time for the stimulator to improve chronic pain symptoms. Adjustment of the stimulator program can begin 2 weeks after replacement. ***    ***PLEASE CONTACT YOUR REP PRIOR TO YOUR 2 WEEK POSTOPERATIVE VISIT TO PROVIDE THEM WITH THE DATE AND TIME OF YOUR APPOINTMENT***

## 2025-01-28 NOTE — OP NOTE
OPERATIVE REPORT  PATIENT NAME: Zenaida Kathleen    :  1965  MRN: [id number]  Pt Location: AN OR ROOM 01    SURGERY DATE: 2025    Surgeons and Role:     * Lali Henry MD - Primary  No qualified resident available to assist  Preop Diagnosis:  Battery end of life of spinal cord stimulator [Z45.42]    Post-Op Diagnosis Codes:     * Battery end of life of spinal cord stimulator [Z45.42]    Procedure(s):  Right - REPLACEMENT OF IMPLANTABLE PULSE GENERATOR DORSAL SPINAL COLUMN STIMULATOR LOCATED IN RIGHT BUTTOCK-Nevro HFX    Specimen(s):  None    Estimated Blood Loss:   Minimal    Drains:  * No LDAs found *    Anesthesia Type:   General    Operative Indications:  Battery end of life of spinal cord stimulator [Z45.42]    Operative Findings:  Battery replaced without complications. Rep informed me impedances all normal after replacement.       Complications:   None    Patient Disposition:  PACU     Clinical Note:    The goals and alternatives to the procedure described above were discussed with patient.  Surgery is intended to maintain therapeutic benefit of the system.  Weakness, numbness and back pain are less likely to improve.    The risks of surgery were described in detail.    1. Risk of IV anesthetic. There is risk of infection and bleeding and incision breakdown..  2. Risk of system malfunction and failure of treatment. Need for revision surgery.    Once all questions were answered to their satisfaction, they asked to proceed with surgery.    Operating Room Note    The patient was brought to the operating room and asked to lie on the left side on the OR gurney with right side up. We had good exposure of right buttock incision.   Care was taken to ensure that all pressure points were padded and the neck was in a neutral position.  The right buttock incision was identified and the skin was prepped and draped in usual sterile fashion.  A full surgical time-out was used to identify the site, side  and type of surgery.  It was also confirmed that the patient received preoperative antibiotic.  The planned incision was then infiltrated 1% lidocaine with 100,000 epinephrine.    Ten blade was used to incise the skin over the planned incision. Metzebaum scissors was used to dissect through subcutaneous tissue. Bipolar cautery was used to obtain hemostasis as needed. We identified the IPG pocket.  The IPG was then removed from the pocket and disconnected from the leads. The distal portion of the electrode was connected to the new generator.  Excess lead and generator was then placed in the pocket and secured in position with silk suture.  The system was then interrogated and was noted to be working within normal limits and impedances [facility]..      The incision was irrigated with antibiotic irrigation-1 L vancomycin.  Bipolar cautery was used for further hemostasis.  Inverted Vicryl suture was used to approximate the subcutaneous tissues.  Running Monocryl was then used to close the incision.  Glue was then used. When dry, a Miplex was applied to the incision.    The count was correct at the end of the case and there were no complications.    The patient was transferred to the PACU where they were noted to be hemodynamically stable and neurologically unchanged. The results of surgery were discussed with patient and family.  In the postoperative period, the patient underwent electronic analysis and complex programming of the stimulator system with the stimulator representatives.    Lali Henry MD          SIGNATURE: Lali Henry MD  DATE: January 28, 2025  TIME: 8:12 AM

## 2025-01-28 NOTE — TELEPHONE ENCOUNTER
1/28/25: SURGERY / DISCHARGE  ROUTING TO NURSE    2WK DANIELITO W/ NURSE  2/11/25 / 10:Mari / DOTTIE

## 2025-01-28 NOTE — ANESTHESIA POSTPROCEDURE EVALUATION
Post-Op Assessment Note    CV Status:  Stable    Pain management: adequate       Mental Status:  Alert and awake   Hydration Status:  Euvolemic   PONV Controlled:  Controlled   Airway Patency:  Patent  Two or more mitigation strategies used for obstructive sleep apnea   Post Op Vitals Reviewed: Yes    No anethesia notable event occurred.    Staff: CRNA           Last Filed PACU Vitals:  Vitals Value Taken Time   Temp 97.2    Pulse 84 01/28/25 0818   /58    Resp 15 01/28/25 0818   SpO2 98 % 01/28/25 0818   Vitals shown include unfiled device data.

## 2025-01-28 NOTE — ANESTHESIA PREPROCEDURE EVALUATION
Procedure:  REPLACEMENT OF IMPLANTABLE PULSE GENERATOR DORSAL SPINAL COLUMN STIMULATOR LOCATED IN RIGHT BUTTOCK (Right: Spine Thoracic)    Relevant Problems   CARDIO   (+) Essential hypertension   (+) Mild CAD   (+) Mixed hyperlipidemia      ENDO   (+) Subclinical hypothyroidism      MUSCULOSKELETAL   (+) Primary osteoarthritis of right knee   (+) Spondylosis of lumbosacral region without myelopathy or radiculopathy      NEURO/PSYCH   (+) Anxiety   (+) Reactive depression        Physical Exam    Airway    Mallampati score: III  TM Distance: >3 FB  Neck ROM: full     Dental    upper dentures,     Cardiovascular  Rhythm: regular, Rate: normal, Cardiovascular exam normal    Pulmonary  Pulmonary exam normal Breath sounds clear to auscultation    Other Findings  post-pubertal.      Anesthesia Plan  ASA Score- 2     Anesthesia Type- general with ASA Monitors.         Additional Monitors:     Airway Plan: ETT.           Plan Factors-Exercise tolerance (METS): >4 METS.    Chart reviewed.  Imaging results reviewed. Existing labs reviewed. Patient summary reviewed.                  Induction- intravenous.    Postoperative Plan- Plan for postoperative opioid use. Planned trial extubation        Informed Consent- Anesthetic plan and risks discussed with patient.  I personally reviewed this patient with the CRNA. Discussed and agreed on the Anesthesia Plan with the CRNA..      NPO Status:  Vitals Value Taken Time   Date of last liquid 01/27/25 01/28/25 0635   Time of last liquid 1800 01/28/25 0635   Date of last solid 01/27/25 01/28/25 0635   Time of last solid 1800 01/28/25 0635

## 2025-01-28 NOTE — TELEPHONE ENCOUNTER
Phone call received from [facility] pharmacy.  They have received the script for the patients post operative pain medication and stated that the insurance denied it because the patient is taking Lorazepam.  Pharmacy wanted to verify that the patient was instructed to not take her Lorazepam while taking her Percocet.      This RN stated that she would address and ensure that the patient was informed of this.      This RN reached out with the pharmacy scenario to JOSTIN Plummer who is working with the patient and her surgeon this day.      JOSTIN Plummer ensured that the the patient AVS that she is not to take the Lorazepam    I d/c'ed the lorazepam order and should reflect in the AVS. I also added to her AVS that she is to hold Lorazepam while taking Oxycodone. Please review and provide pharmacy updated info. Thanks so much.

## 2025-01-28 NOTE — H&P
Name: Zenaida Kathleen      : 1965      MRN: [id number]  Encounter Provider: Lali Henry MD  Encounter Date: 1/10/2025   Encounter department: [facility]     History and Physical - Neurosurgery   Zenaida Kathleen 59 y.o. female MRN: [id number]        Assessment:     Symptoms, as detailed in HPI, continue to significantly impact of patient's quality of life in daily activities.  After carefully considering presentation, investigations, functional status and co-morbidities, the risk/benefit profile of surgical intervention is favorable.     The bulk of our discussion today was devoted to treatment options, and we reviewed both surgical and nonsurgical treatment modalities.  Based on the fact her battery is at the end of life, the next step is to replace it.      Zenaida Kathleen has consented to undergo the following surgery:      .    Problem List Items Addressed This Visit    None  Visit Diagnoses           Battery end of life of spinal cord stimulator         Relevant Orders     Case request operating room: REPLACEMENT OF IMPLANTABLE PULSE GENERATOR DORSAL SPINAL COLUMN STIMULATOR LOCATED IN RIGHT BUTTOCK (Completed)                Surgery was described to the patient at length in a plain and simple fashion with the use of anatomic models as well as their own diagnostic study.  I reviewed in detail the expected hospitalization and postoperative recovery.  The concept that no guarantee with respect to the results of any operative procedure were discussed with the patient.  We also discussed with Zenaida in a plain and simple fashion reasonable expectations after surgery.  We discussed postoperative pain management, namely narcotic use.  We informed the patient that we will manage their pain medications during the expected postoperative period with minimal to no use of narcotics, if possible.  In the event the patient's pain is not well controlled beyond this period,  the patient will need to be referred to pain management or their primary care physician, as long-term management is beyond the scope of our practice. Zenaida vocalized her understanding of these fine points, and appreciates reasonable expectations after surgery.       I reviewed with the patient the risks, indications and benefits of the planned surgery at length. Risks include, but are not limited to, death, paralysis, infection, CSF leak, bleeding, stroke, phlebitis, pulmonary emboli, anesthetic complications, pneumonia, heart attack or other medical complications during or after surgery, bladder and anal sphincter dysfunction, sexual dysfunction, the fact that the results of surgery can never be guaranteed, the need for further surgeries, complications with incision healing, and other unforeseen complications. In a small percentage of patients the spinal cord stimulator wire migrates or fails to work for one reason or another, at which time it would need to be removed and/or replaced.  All of the patient's questions were answered to her satisfaction, and in this fashion informed consent for the proposed operative procedure was obtained. Zenaida Kathleen has signed the consent for procedure, which included consent for blood transfusion. Should the patient have any further questions or concerns, she knows to give me a call at the office.     We will order our office's standard pre-operative labs and review them before the patient is taken to the operating room.  We will refer the patient back to their primary care physician for pre-operative clearance in light of her comorbidities.  She will be discharged the same day from hospital barring any unexpected event/s.     It was our pleasure seeing Zenaida Kathleen in the office today, and we look forward to continuing her care during their hospitalization and post-operative recovery.  Should Zenaida have any further questions or concerns, she knows that she is always free to  give me a call at the office.  The patient expressed her gratitude to us for our detailed assessment and explanation of our findings, and was in agreement with this treatment plan.    Expected postoperative course, including activity restrictions, expected pain and postoperative medication were reviewed.     Patient provided verbal consent to surgical procedure and signed consent form: Yes     History, physical examination and diagnostic tests were reviewed and questions answered. Diagnosis, care plan and treatment options were discussed. The patient understand instructions and will follow up as directed.     Plan:     Follow-up:      Patient to tentatively have surgery on 1/28/25 at [facility]  Preop labs  Preop medical clearance  Hold aspirin, NSAID's, or blood thinners x 7 days before the scheduled surgery.     Stef Henry MD     Subjective/Objective  Chief Complaint     Spinal cord stimulator battery is at the end of life.     HPI     HPI     This is a 59-year-old female who has a spinal cord stimulator.  Her battery is now end-of-life.  The battery is located in the right buttock region.  The representative from the company was also present during this visit.  She came today to have further discussions about replacing her battery.  The representative told us that the batteries that have come out have a longer half-life and are smaller.  She has had no problems with her incision.  She is very eager to get this replaced.     ANGEL ORTIZ personally reviewed and updated.     Review of Systems     Family History     Family History         Family History   Problem Relation Age of Onset    Skin cancer Mother      Heart disease Father           Dx with lewy body dementia in 2012    Dementia Father           Dx with lewy body dementia in 2016    Breast cancer Maternal Aunt 28    Cancer Maternal Aunt           Breast    Breast cancer Sister 56    BRCA1 Negative Sister      BRCA2 Negative Sister      Depression  Sister      Cancer Sister           Breast    Prostate cancer Maternal Uncle      Cancer Maternal Uncle           Bladder/prostate    Colon cancer Maternal Uncle      Colon polyps Neg Hx              Social History     Social History               Socioeconomic History    Marital status:        Spouse name: Not on file    Number of children: 1    Years of education: Not on file    Highest education level: Not on file   Occupational History    Occupation: not working    Tobacco Use    Smoking status: Every Day       Current packs/day: 1.00       Average packs/day: 1 pack/day for 48.0 years (48.0 ttl pk-yrs)       Types: Cigarettes       Start date: 1/1/1977       Passive exposure: Current    Smokeless tobacco: Never    Tobacco comments:       smokes 13 cigs daily as of  7/15/24   Vaping Use    Vaping status: Never Used   Substance and Sexual Activity    Alcohol use: Yes       Comment: Social drinker    Drug use: No    Sexual activity: Yes       Partners: Male       Birth control/protection: Inserts, None   Other Topics Concern    Not on file   Social History Narrative           Social Drivers of Health           Financial Resource Strain: Low Risk  (5/10/2021)     Overall Financial Resource Strain (CARDIA)      Difficulty of Paying Living Expenses: Not very hard   Food Insecurity: No Food Insecurity (5/10/2021)     Hunger Vital Sign      Worried About Running Out of Food in the Last Year: Never true      Ran Out of Food in the Last Year: Never true   Transportation Needs: No Transportation Needs (5/10/2021)     PRAPARE - Transportation      Lack of Transportation (Medical): No      Lack of Transportation (Non-Medical): No   Physical Activity: Insufficiently Active (5/10/2021)     Exercise Vital Sign      Days of Exercise per Week: 3 days      Minutes of Exercise per Session: 30 min   Stress: No Stress Concern Present (5/10/2021)     [employer] - Occupational Stress  Questionnaire      Feeling of Stress : Not at all   Social Connections: Socially Isolated (5/10/2021)     Social Connection and Isolation Panel [NHANES]      Frequency of Communication with Friends and Family: More than three times a week      Frequency of Social Gatherings with Friends and Family: Never      Attends Baptism Services: Never      Active Member of Clubs or Organizations: No      Attends Club or Organization Meetings: Never      Marital Status:    Intimate Partner Violence: Not At Risk (7/18/2023)     Humiliation, Afraid, Rape, and Kick questionnaire      Fear of Current or Ex-Partner: No      Emotionally Abused: No      Physically Abused: No      Sexually Abused: No   Housing Stability: Not on file            Past Medical History     Medical History        Past Medical History:   Diagnosis Date    Allergic      Anxiety 2005    Arthritis      Bulging lumbar disc      CAD (coronary artery disease)      Chronic pain       low back    Degenerative disk disease       lumbar spine    Depression 2005    Effusion of left knee 04/25/2023    GERD (gastroesophageal reflux disease)      Hyperlipidemia      Hypertension      Pinched nerve       lumbar spine    Primary osteoarthritis of left knee 02/14/2023    Psychiatric disorder       depression    Shingles 20179            Surgical History     Surgical History         Past Surgical History:   Procedure Laterality Date    CARPAL TUNNEL RELEASE Bilateral 1998    CHOLECYSTECTOMY   2008    COLONOSCOPY   06/15/2015    COLONOSCOPY   06/02/2015     Internal hemorrhoids, hyperplastic polyps which were removed.  Ten year recall recommended June 2025    COLONOSCOPY   09/16/2020     normal terminal ileum, 1 small polyp in the distal sigmoid colon that was hyperplastic. .  She did have a small amount of retained stool in the colon.  She did have a small amount of retained stool in the colon.  A 5 year recall was recommended due to poor prep, September 2025.      EYE SURGERY        FL INJECTION LEFT KNEE (ARTHROGRAM)   06/06/2023    MN ARTHRP KNE CONDYLE&PLATU MEDIAL&LAT COMPARTMENTS Left 07/24/2023     Procedure: ARTHROPLASTY KNEE TOTAL SAME DAY;  Surgeon: Dale العلي MD;  Location:  MAIN OR;  Service: Orthopedics    SPINAL CORD STIMULATOR IMPLANT         2014    SPINE SURGERY   2017     Stimulator inplant    WISDOM TOOTH EXTRACTION Bilateral 1982            Medications       Current Medications      Current Outpatient Medications:     atorvastatin (LIPITOR) 80 mg tablet, Take 1 tablet (80 mg total) by mouth daily, Disp: 90 tablet, Rfl: 1    buPROPion (WELLBUTRIN XL) 150 mg 24 hr tablet, Take 1 tablet (150 mg total) by mouth every morning, Disp: 30 tablet, Rfl: 5    lisinopril (ZESTRIL) 10 mg tablet, Take 1 tablet by mouth once daily, Disp: 90 tablet, Rfl: 1    LORazepam (Ativan) 0.5 mg tablet, Take 1 tablet (0.5 mg total) by mouth 2 (two) times a day as needed for anxiety, Disp: 60 tablet, Rfl: 0    pramipexole (MIRAPEX) 0.5 mg tablet, Take 2 tablets (1 mg total) by mouth daily, Disp: 180 tablet, Rfl: 1    sertraline (ZOLOFT) 100 mg tablet, Take 1 tablet (100 mg total) by mouth daily, Disp: 90 tablet, Rfl: 0    sertraline (ZOLOFT) 50 mg tablet, Take 1 tablet (50 mg total) by mouth daily, Disp: 90 tablet, Rfl: 0        Allergies           Allergies   Allergen Reactions    Penicillins Hives and Throat Swelling       Childhood allergy per pt    Meloxicam Hives and Itching         The following portions of the patient's history were reviewed and updated as appropriate: allergies, current medications, past family history, past medical history, past social history, past surgical history, and problem list.     Investigations        Rightward thoracolumbar curvature.     Hypertrophic degenerative changes throughout the visualized spine.     Neurostimulator leads project over the T8-T9 level. Wires seem intact        Physical Exam     Vitals:  Blood pressure 130/82, pulse 79,  "temperature 98.4 °F (36.9 °C), temperature source Temporal, resp. rate 17, height 5' 1\" (1.549 m), weight 94.8 kg (209 lb), SpO2 93%, not currently breastfeeding.,Body mass index is 39.49 kg/m².     Physical Exam  General appearance: alert, appears stated age, cooperative and no distress  Head: Normocephalic, without obvious abnormality, atraumatic  Eyes: EOMI, PERRL  Neck: supple, symmetrical, trachea midline and NT  Back: no kyphosis present, no tenderness to percussion or palpation  Lungs: non labored breathing, equal breath sounds bilaterally without wheezing or crackles  Heart: regular heart rate, normal S1, S2, no murmors  Abdomen: Soft, nontender, nondistended. No pain to palpation  Neurologic:   Mental status: Alert, oriented x 3, thought content appropriate  Cranial nerves: grossly intact (Cranial nerves II-XII)  Sensory: normal to LT  Motor: moving all extremities without focal weakness   Reflexes: 2+ and symmetric  Coordination: finger to nose normal bilaterally, no drift bilaterally  Right buttock incision well healed. The battery was palpable.         Lali Henry MD   "

## 2025-01-30 ENCOUNTER — TELEPHONE (OUTPATIENT)
Dept: NEUROSURGERY | Facility: CLINIC | Age: 60
End: 2025-01-30

## 2025-01-30 DIAGNOSIS — E78.2 MIXED HYPERLIPIDEMIA: ICD-10-CM

## 2025-01-30 RX ORDER — ATORVASTATIN CALCIUM 80 MG/1
80 TABLET, FILM COATED ORAL DAILY
Qty: 90 TABLET | Refills: 1 | Status: SHIPPED | OUTPATIENT
Start: 2025-01-30

## 2025-01-30 NOTE — TELEPHONE ENCOUNTER
1st attempt-attempted to reach patient on preferred number to check in on status since discharge and to provide port operative instructions. No answer, left voicemail requesting callback. Will make another attempt if no callback is received.

## 2025-01-30 NOTE — TELEPHONE ENCOUNTER
Call back received.   Patient reports she is doing well overall and denies any incisional issues or fevers. Patient is able to ambulate around the house and complete ADLs. Educated the patient about the importance of preventing blood clots and provided measures how to prevent them.     Patient has moved her bowels since the surgery. Encouraged patient to take an over the counter stool softener, if she is taking narcotic pain medication. Encouraged fiber intake and fluids.    Reviewed incision care with the patient. Advised that after three days she may take a shower and gently wash the surgical site with soap and water. Use clean wash cloth, towels, and clothing. Do not submerge in water until cleared by the surgeon. Do not apply any creams, ointments, or lotions to the site.  Patient is aware to call the office if any redness, swelling, drainage, dehiscence of incision, or fever >100 F occurs.    Patient is aware to call the office if any concerns or questions may arise. Reminded patient of her upcoming appointments with the date/time/location. Patient was appreciative for the call.

## 2025-02-04 NOTE — ANESTHESIA POSTPROCEDURE EVALUATION
Post-Op Assessment Note    CV Status:  Stable    Pain management: adequate       Mental Status:  Alert and awake   Hydration Status:  Euvolemic   PONV Controlled:  Controlled   Airway Patency:  Patent     Post Op Vitals Reviewed: Yes    No anethesia notable event occurred.    Staff: Anesthesiologist, CRNA           Last Filed PACU Vitals:  Vitals Value Taken Time   Temp 97.4 °F (36.3 °C) 01/28/25 0845   Pulse 82 01/28/25 0902   /60 01/28/25 0900   Resp 9 01/28/25 0902   SpO2 93 % 01/28/25 0902   Vitals shown include unfiled device data.    Modified Diane:     Vitals Value Taken Time   Activity 2 01/28/25 0845   Respiration 2 01/28/25 0845   Circulation 2 01/28/25 0845   Consciousness 2 01/28/25 0845   Oxygen Saturation 1 01/28/25 0845     Modified Diane Score: 9

## 2025-02-07 ENCOUNTER — TELEPHONE (OUTPATIENT)
Age: 60
End: 2025-02-07

## 2025-02-07 NOTE — TELEPHONE ENCOUNTER
"Voice Mail Message left earlier this day.  Text to talk translation below:\    \"Hi this is Zenaida Kathleen. My YOB: 1965. My phone number is [phone]. My question is I had surgery last Tuesday and everything has been going until about Wednesday. This past Wednesday up until today, I'm experiencing some lower left discomfort in my lower back. I had a battery replaced on my spinal cord stimulator and I was told from the representative from [facility] when I was there last Tuesday not to charge the battery because it was fully charged. Until I see Doctor Edda on next Tuesday February 11th for my post op, it's been a little uncomfortable. I've been taking some Advil, putting the heating pad on as well as ice and it relieves it a little bit but then it comes back again. So I wasn't sure what was going on if I thought it first maybe I was sleeping the wrong way or maybe a slight cold in my back. But I would think if the battery was fully charged I wouldn't be experiencing this pain. So if somebody could call me back again. This is Zenaida Kathleen, YOB: 1965 and my phone number is [phone]. Thank you so much and I look forward to hearing from you. Bye bye.\"  "

## 2025-02-07 NOTE — TELEPHONE ENCOUNTER
This RN phoned patient to discuss, pt stated that she is actually feeling a bit better now.  She reports that the SCS normally covers this pain she is feeling on the left but it is currently not accessible to use till after her apt on Tuesday.  Pt confirmed that this is not a new pain but a worsening pain.  Patent denies any surgical site issues, denies any new neurovascular symptoms.      Pt has been using ice and heat on the left side (opposite side of incision) Pt informed she can also use OTC like Voltaren or lidocaine patches.      Patient appreciative for call back, she will continue to treat as she has been and will reach out with any new issues, concerns or questions that would arise prior to her two week POV.

## 2025-02-11 ENCOUNTER — CLINICAL SUPPORT (OUTPATIENT)
Age: 60
End: 2025-02-11

## 2025-02-11 VITALS — DIASTOLIC BLOOD PRESSURE: 72 MMHG | SYSTOLIC BLOOD PRESSURE: 128 MMHG | OXYGEN SATURATION: 94 % | TEMPERATURE: 98.4 F

## 2025-02-11 DIAGNOSIS — Z98.890 STATUS POST SURGERY: Primary | ICD-10-CM

## 2025-02-11 PROCEDURE — 99024 POSTOP FOLLOW-UP VISIT: CPT | Performed by: SPECIALIST

## 2025-02-11 NOTE — LETTER
February 11, 2025    To Whom It May Concern;        Zenaida Kathleen is under the care of this office and has the following physical restrictions in place start date 1/28/2025 through 3/11/2025.     ---Do not lift more than 10 pounds for 6 weeks.   ---Avoid bending, lifting and twisting for 6 weeks.     If you have any questions please do not hesitate to reach out.     Thank you.     Sincerely,    Wilber Aguilar RN

## 2025-02-11 NOTE — PATIENT INSTRUCTIONS
Do not lift more than 10 pounds for 6 weeks. Avoid bending, lifting and twisting for 6 weeks    Continue incision care as instructed. Cleans incision area(s) with soap and water and pat dry.  Avoid lotions, creams or ointments for four more weeks.  Avoid soaking in water (bath tubs, hot tubs) for four more weeks.    Maintain activity restrictions until cleared by the surgeon. Avoid heavy lifting and frequent bending/twisting.    Call the office immediately with any signs or symptoms of infection including: increasing redness, swelling, drainage or fevers (101 or greater).

## 2025-02-11 NOTE — LETTER
[facility] [place]  [address]  [address] [facility] [address]  [place] [address]  Phone#  [phone]  Fax#  [phone]      February 11, 2025      Dear:   Zenaida Kathleen is under the care of this office and has the following physical restrictions in place start date 1/28/2025 through 3/11/2025.     ---Do not lift more than 10 pounds for 6 weeks.   ---Avoid bending, lifting and twisting for 6 weeks.     If you have any questions please do not hesitate to reach out.     Thank you.     Sincerely,    Wilber Aguilar RN

## 2025-02-11 NOTE — PROGRESS NOTES
Post-Op Visit-Neurosurgery    Zenaida Kathleen 59 y.o. female MRN: [id number]    Chief Complaint:  Patient presents post: Replacement Of Implantable Pulse Generator Dorsal Spinal Column Stimulator Located In Right Buttock - Right    History of Present Illness:  Patient presents for 2 week POV for incision check alone and ambulating without an assistive device.  Patient reports she is doing well overall and denies any incisional issues or fevers.  She denies any new weakness, numbness or tingling since the surgery and denies any new issues with her bowel or bladder.  Patient admits to surgical pain at this time and rates their pain as a Pain Score:   2/10.  Patient is currently taking Percocet with some relief of pain symptoms.      Assessment:  Vitals:    02/11/25 1104   BP: 128/72   Temp: 98.4 °F (36.9 °C)   SpO2: 94%        Wound Exam: Incisions well approximated.  No erythema, edema or drainage present.   location:Right Buttocks   Incisions ALDEN.     Discussion/Summary:  Reviewed incision care with patient including daily observation for s/s infection including: increased erythema, edema, drainage, dehiscence of incision or fever >101.  Should these be observed, she understands that she is to call and/or return immediately for reassessment.  Advised patient to continue cleansing area with mild soap and water and pat dry. Not to apply any lotions, creams, or ointments, & not to submerge in any water for two more weeks. She is to maintain activity restrictions until cleared by the surgeon. Activity levels were also reviewed with the patient in detail, she is to lift no greater than 10 pounds, advised to limit bend/twisting at the waist and ambulation is encouraged as tolerated. Patient is to follow up with this office on an as needed basis.  She is to call the office with any further questions or concerns, or if any incisional issues or fevers  would arise. Patient will be meeting with the [employer] rep after today's visit for programming of the stimulator.

## 2025-02-17 DIAGNOSIS — F41.9 ANXIETY: Primary | ICD-10-CM

## 2025-02-17 RX ORDER — LORAZEPAM 0.5 MG/1
TABLET ORAL
Qty: 60 TABLET | Refills: 0 | Status: SHIPPED | OUTPATIENT
Start: 2025-02-17

## 2025-02-21 ENCOUNTER — OFFICE VISIT (OUTPATIENT)
Dept: CARDIOLOGY CLINIC | Facility: CLINIC | Age: 60
End: 2025-02-21
Payer: COMMERCIAL

## 2025-02-21 ENCOUNTER — OFFICE VISIT (OUTPATIENT)
Dept: FAMILY MEDICINE CLINIC | Facility: CLINIC | Age: 60
End: 2025-02-21
Payer: COMMERCIAL

## 2025-02-21 VITALS
OXYGEN SATURATION: 96 % | WEIGHT: 207.6 LBS | SYSTOLIC BLOOD PRESSURE: 124 MMHG | HEART RATE: 94 BPM | TEMPERATURE: 97.7 F | HEIGHT: 61 IN | BODY MASS INDEX: 39.2 KG/M2 | DIASTOLIC BLOOD PRESSURE: 80 MMHG | RESPIRATION RATE: 16 BRPM

## 2025-02-21 VITALS
HEIGHT: 61 IN | OXYGEN SATURATION: 96 % | DIASTOLIC BLOOD PRESSURE: 70 MMHG | WEIGHT: 208 LBS | BODY MASS INDEX: 39.27 KG/M2 | HEART RATE: 93 BPM | SYSTOLIC BLOOD PRESSURE: 126 MMHG

## 2025-02-21 DIAGNOSIS — I25.10 MILD CAD: ICD-10-CM

## 2025-02-21 DIAGNOSIS — I10 ESSENTIAL HYPERTENSION: ICD-10-CM

## 2025-02-21 DIAGNOSIS — E78.2 MIXED HYPERLIPIDEMIA: ICD-10-CM

## 2025-02-21 DIAGNOSIS — Z00.00 ANNUAL PHYSICAL EXAM: Primary | ICD-10-CM

## 2025-02-21 DIAGNOSIS — E03.8 SUBCLINICAL HYPOTHYROIDISM: ICD-10-CM

## 2025-02-21 DIAGNOSIS — E78.2 MIXED HYPERLIPIDEMIA: Primary | ICD-10-CM

## 2025-02-21 DIAGNOSIS — Z23 ENCOUNTER FOR IMMUNIZATION: ICD-10-CM

## 2025-02-21 DIAGNOSIS — R07.2 PRECORDIAL PAIN: ICD-10-CM

## 2025-02-21 DIAGNOSIS — R73.01 IFG (IMPAIRED FASTING GLUCOSE): ICD-10-CM

## 2025-02-21 DIAGNOSIS — Z79.899 CONTROLLED SUBSTANCE AGREEMENT SIGNED: ICD-10-CM

## 2025-02-21 PROCEDURE — 99396 PREV VISIT EST AGE 40-64: CPT | Performed by: FAMILY MEDICINE

## 2025-02-21 PROCEDURE — 90677 PCV20 VACCINE IM: CPT

## 2025-02-21 PROCEDURE — 90471 IMMUNIZATION ADMIN: CPT

## 2025-02-21 PROCEDURE — 99214 OFFICE O/P EST MOD 30 MIN: CPT | Performed by: INTERNAL MEDICINE

## 2025-02-21 NOTE — PROGRESS NOTES
Adult Annual Physical  Name: Zenaida Kathleen      : 1965      MRN: [id number]  Encounter Provider: Luda Remy DO  Encounter Date: 2025   Encounter department: [facility]    Assessment & Plan  Mixed hyperlipidemia    Orders:    Lipid panel; Future    Comprehensive metabolic panel; Future    CBC and differential; Future    TSH, 3rd generation with Free T4 reflex; Future    Hemoglobin A1C; Future    IFG (impaired fasting glucose)    Orders:    Lipid panel; Future    Comprehensive metabolic panel; Future    CBC and differential; Future    TSH, 3rd generation with Free T4 reflex; Future    Hemoglobin A1C; Future    Subclinical hypothyroidism    Orders:    Lipid panel; Future    Comprehensive metabolic panel; Future    CBC and differential; Future    TSH, 3rd generation with Free T4 reflex; Future    Hemoglobin A1C; Future    Essential hypertension    Orders:    Lipid panel; Future    Comprehensive metabolic panel; Future    CBC and differential; Future    TSH, 3rd generation with Free T4 reflex; Future    Hemoglobin A1C; Future    Annual physical exam    Orders:    Lipid panel; Future    Comprehensive metabolic panel; Future    CBC and differential; Future    TSH, 3rd generation with Free T4 reflex; Future    Hemoglobin A1C; Future    Encounter for immunization    Orders:    Pneumococcal Conjugate Vaccine 20-valent (Pcv20)    Controlled substance agreement signed  Agreement signed in office today  PDMP reviewed no red flags  May continue ativan Rx as prescribed          Immunizations and preventive care screenings were discussed with patient today. Appropriate education was printed on patient's after visit summary.    Counseling:  Alcohol/drug use: discussed moderation in alcohol intake, the recommendations for healthy alcohol use, and avoidance of illicit drug use.  Dental Health: discussed importance of regular tooth brushing, flossing, and dental visits.  Injury  prevention: discussed safety/seat belts, safety helmets, smoke detectors, carbon monoxide detectors, and smoking near bedding or upholstery.  Sexual health: discussed sexually transmitted diseases, partner selection, use of condoms, avoidance of unintended pregnancy, and contraceptive alternatives.  Exercise: the importance of regular exercise/physical activity was discussed. Recommend exercise 3-5 times per week for at least 30 minutes.          History of Present Illness     Adult Annual Physical:  Patient presents for annual physical.     Diet and Physical Activity:  - Diet/Nutrition: well balanced diet.  - Exercise: walking.    Depression Screening:    - PHQ-9 Score: 0    General Health:    - Dental: regular dental visits.    /GYN Health:  - Follows with GYN: yes.     Review of Systems   Constitutional:  Negative for chills, fatigue and fever.   HENT:  Negative for congestion, postnasal drip, rhinorrhea and sinus pressure.    Eyes:  Negative for photophobia and visual disturbance.   Respiratory:  Negative for cough and shortness of breath.    Cardiovascular:  Negative for chest pain, palpitations and leg swelling.   Gastrointestinal:  Negative for abdominal pain, constipation, diarrhea, nausea and vomiting.   Genitourinary:  Negative for difficulty urinating and dysuria.   Musculoskeletal:  Negative for arthralgias and myalgias.   Skin:  Negative for color change and rash.   Neurological:  Negative for dizziness, weakness, light-headedness and headaches.     Pertinent Medical History         Medical History Reviewed by provider this encounter:     .  Past Medical History   Past Medical History:   Diagnosis Date    Allergic     Anxiety 2005    Arthritis     Bulging lumbar disc     CAD (coronary artery disease)     Chronic pain     low back    Degenerative disk disease     lumbar spine    Depression 2005    Effusion of left knee 04/25/2023    GERD (gastroesophageal reflux disease)     Hyperlipidemia      Hypertension     Pinched nerve     lumbar spine    Primary osteoarthritis of left knee 02/14/2023    Psychiatric disorder     depression    Shingles [id number]     Past Surgical History:   Procedure Laterality Date    CARPAL TUNNEL RELEASE Bilateral 1998    CATARACT EXTRACTION Bilateral     CHOLECYSTECTOMY  2008    COLONOSCOPY  06/15/2015    COLONOSCOPY  06/02/2015    Internal hemorrhoids, hyperplastic polyps which were removed.  Ten year recall recommended June 2025    COLONOSCOPY  09/16/2020    normal terminal ileum, 1 small polyp in the distal sigmoid colon that was hyperplastic. .  She did have a small amount of retained stool in the colon.  She did have a small amount of retained stool in the colon.  A 5 year recall was recommended due to poor prep, September 2025.     FL INJECTION LEFT KNEE (ARTHROGRAM)  06/06/2023    IL ARTHRP KNE CONDYLE&PLATU MEDIAL&LAT COMPARTMENTS Left 07/24/2023    Procedure: ARTHROPLASTY KNEE TOTAL SAME DAY;  Surgeon: Dale العلي MD;  Location: [facility];  Service: Orthopedics    IL INSJ/RPLCMT SPINAL NPG/RCVR POCKET CRTJ&CONNJ Right 1/28/2025    Procedure: REPLACEMENT OF IMPLANTABLE PULSE GENERATOR DORSAL SPINAL COLUMN STIMULATOR LOCATED IN RIGHT BUTTOCK;  Surgeon: Lali Henry MD;  Location: [facility];  Service: Neurosurgery    SPINAL CORD STIMULATOR IMPLANT      2014    SPINE SURGERY  2017    Stimulator inplant    WISDOM TOOTH EXTRACTION Bilateral 1982     Family History   Problem Relation Age of Onset    Skin cancer Mother     Heart disease Father         Dx with lewy body dementia in 2012    Dementia Father         Dx with lewy body dementia in 2016    Breast cancer Maternal Aunt 28    Cancer Maternal Aunt         Breast    Breast cancer Sister 56    BRCA1 Negative Sister     BRCA2 Negative Sister     Depression Sister     Cancer Sister         Breast    Prostate cancer Maternal Uncle     Cancer Maternal Uncle         Bladder/prostate    Colon cancer Maternal Uncle      Colon polyps Neg Hx       reports that she has been smoking cigarettes. She started smoking about 48 years ago. She has a 24.1 pack-year smoking history. She has been exposed to tobacco smoke. She has never used smokeless tobacco. She reports current alcohol use. She reports that she does not use drugs.  Current Outpatient Medications   Medication Instructions    atorvastatin (LIPITOR) 80 mg, Oral, Daily    buPROPion (WELLBUTRIN XL) 150 mg, Oral, Every morning    lisinopril (ZESTRIL) 10 mg, Oral, Daily    LORazepam (Ativan) 0.5 mg tablet Take 1 tablet by mouth twice daily    pramipexole (MIRAPEX) 1 mg, Oral, Daily    sertraline (ZOLOFT) 50 mg, Oral, Daily    sertraline (ZOLOFT) 100 mg, Oral, Daily     Allergies   Allergen Reactions    Penicillins Hives and Throat Swelling     Childhood allergy per pt    Meloxicam Hives and Itching      Current Outpatient Medications on File Prior to Visit   Medication Sig Dispense Refill    atorvastatin (LIPITOR) 80 mg tablet Take 1 tablet by mouth once daily 90 tablet 1    buPROPion (WELLBUTRIN XL) 150 mg 24 hr tablet Take 1 tablet (150 mg total) by mouth every morning 30 tablet 5    lisinopril (ZESTRIL) 10 mg tablet Take 1 tablet by mouth once daily 90 tablet 1    LORazepam (Ativan) 0.5 mg tablet Take 1 tablet by mouth twice daily 60 tablet 0    pramipexole (MIRAPEX) 0.5 mg tablet Take 2 tablets (1 mg total) by mouth daily 180 tablet 1    sertraline (ZOLOFT) 100 mg tablet Take 1 tablet (100 mg total) by mouth daily 90 tablet 0    sertraline (ZOLOFT) 50 mg tablet Take 1 tablet (50 mg total) by mouth daily 90 tablet 0    [DISCONTINUED] oxyCODONE-acetaminophen (Percocet) 5-325 mg per tablet Take 1 tablet by mouth every 6 (six) hours as needed for moderate pain or severe pain Max Daily Amount: 4 tablets 20 tablet 0     No current facility-administered medications on file prior to visit.      Social History     Tobacco Use    Smoking status: Every Day     Current packs/day: 0.50      "Average packs/day: 0.5 packs/day for 48.1 years (24.1 ttl pk-yrs)     Types: Cigarettes     Start date: 1/1/1977     Passive exposure: Current    Smokeless tobacco: Never    Tobacco comments:     smokes 13 cigs daily as of  7/15/24   Vaping Use    Vaping status: Never Used   Substance and Sexual Activity    Alcohol use: Yes     Comment: Social drinker- less than 1 drink per week    Drug use: No    Sexual activity: Yes     Partners: Male     Birth control/protection: Inserts, None       Objective   /80   Pulse 94   Temp 97.7 °F (36.5 °C) (Tympanic)   Resp 16   Ht 5' 1\" (1.549 m)   Wt 94.2 kg (207 lb 9.6 oz)   LMP  (LMP Unknown)   SpO2 96%   BMI 39.23 kg/m²     Physical Exam  Constitutional:       General: She is not in acute distress.     Appearance: Normal appearance. She is not ill-appearing, toxic-appearing or diaphoretic.   HENT:      Head: Normocephalic and atraumatic.      Right Ear: Tympanic membrane and ear canal normal.      Left Ear: Tympanic membrane and ear canal normal.      Nose: Nose normal. No congestion.      Mouth/Throat:      Mouth: Mucous membranes are moist.      Pharynx: Oropharynx is clear. No oropharyngeal exudate.   Eyes:      Extraocular Movements: Extraocular movements intact.      Conjunctiva/sclera: Conjunctivae normal.      Pupils: Pupils are equal, round, and reactive to light.   Cardiovascular:      Rate and Rhythm: Normal rate and regular rhythm.      Pulses: Normal pulses.      Heart sounds: No murmur heard.  Pulmonary:      Effort: Pulmonary effort is normal.      Breath sounds: Normal breath sounds. No wheezing, rhonchi or rales.   Abdominal:      General: Bowel sounds are normal. There is no distension.      Palpations: Abdomen is soft.      Tenderness: There is no abdominal tenderness.   Musculoskeletal:         General: No swelling or tenderness. Normal range of motion.      Cervical back: Normal range of motion and neck supple.   Skin:     General: Skin is warm " and dry.      Capillary Refill: Capillary refill takes less than 2 seconds.   Neurological:      General: No focal deficit present.      Mental Status: She is alert and oriented to person, place, and time.      Cranial Nerves: No cranial nerve deficit.   Psychiatric:         Mood and Affect: Mood normal.         Behavior: Behavior normal.         Thought Content: Thought content normal.

## 2025-02-21 NOTE — PATIENT INSTRUCTIONS
You were seen today in the Cardiology office for follow up evaluation.     Below is a summary of the recommendations based upon today's visit:    1. Dietary modifications - Follow a Mediterranean diet - one that is low in saturated fats (avoid red meat, dairy, cheeses), emphasize chicken, fish, turkey, tofu, vegetables, fruit (moderate quantities); also avoid simple carbs/starch/sugars, use whole wheat/grain/bran/oatmeal, snack on small quantities of nuts and fruits.     2. Exercise is very important. Ideally, AT LEAST four to five times weekly for 30 to 60 minutes per session - both cardiovascular and resistance work is OK. Listen to your body and rest when needed.    3. Continue all present medications.    4. Testing prior to your next visit includes: None    5. Remember the ABCDEs to cardiovascular health for patients:  A: Awareness of cardiovascular symptoms  B: Blood pressure control  C: Cholesterol control  C: Cigarette avoidance  D: Diabetes control  D: Dietary choices  E: Exercise    6. Return in 12 months     Any testing ordered in office today will be available for patient review via [employer], and reviewed with me at the follow-up office visit as scheduled.    Thank you for choosing [facility].    Please call our office or use [employer] with any questions.

## 2025-02-21 NOTE — ASSESSMENT & PLAN NOTE
Orders:    Lipid panel; Future    Comprehensive metabolic panel; Future    CBC and differential; Future    TSH, 3rd generation with Free T4 reflex; Future    Hemoglobin A1C; Future

## 2025-02-21 NOTE — ASSESSMENT & PLAN NOTE
Agreement signed in office today  PDMP reviewed no red flags  May continue ativan Rx as prescribed

## 2025-02-21 NOTE — PATIENT INSTRUCTIONS
"Patient Education     Routine physical for adults   The Basics   Written by the doctors and editors at [facility]   What is a physical? -- A physical is a routine visit, or \"check-up,\" with your doctor. You might also hear it called a \"wellness visit\" or \"preventive visit.\"  During each visit, the doctor will:   Ask about your physical and mental health   Ask about your habits, behaviors, and lifestyle   Do an exam   Give you vaccines if needed   Talk to you about any medicines you take   Give advice about your health   Answer your questions  Getting regular check-ups is an important part of taking care of your health. It can help your doctor find and treat any problems you have. But it's also important for preventing health problems.  A routine physical is different from a \"sick visit.\" A sick visit is when you see a doctor because of a health concern or problem. Since physicals are scheduled ahead of time, you can think about what you want to ask the doctor.  How often should I get a physical? -- It depends on your age and health. In general, for people age 21 years and older:   If you are younger than 50 years, you might be able to get a physical every 3 years.   If you are 50 years or older, your doctor might recommend a physical every year.  If you have an ongoing health condition, like diabetes or high blood pressure, your doctor will probably want to see you more often.  What happens during a physical? -- In general, each visit will include:   Physical exam - The doctor or nurse will check your height, weight, heart rate, and blood pressure. They will also look at your eyes and ears. They will ask about how you are feeling and whether you have any symptoms that bother you.   Medicines - It's a good idea to bring a list of all the medicines you take to each doctor visit. Your doctor will talk to you about your medicines and answer any questions. Tell them if you are having any side effects that bother you. You " "should also tell them if you are having trouble paying for any of your medicines.   Habits and behaviors - This includes:   Your diet   Your exercise habits   Whether you smoke, drink alcohol, or use drugs   Whether you are sexually active   Whether you feel safe at home  Your doctor will talk to you about things you can do to improve your health and lower your risk of health problems. They will also offer help and support. For example, if you want to quit smoking, they can give you advice and might prescribe medicines. If you want to improve your diet or get more physical activity, they can help you with this, too.   Lab tests, if needed - The tests you get will depend on your age and situation. For example, your doctor might want to check your:   Cholesterol   Blood sugar   Iron level   Vaccines - The recommended vaccines will depend on your age, health, and what vaccines you already had. Vaccines are very important because they can prevent certain serious or deadly infections.   Discussion of screening - \"Screening\" means checking for diseases or other health problems before they cause symptoms. Your doctor can recommend screening based on your age, risk, and preferences. This might include tests to check for:   Cancer, such as breast, prostate, cervical, ovarian, colorectal, prostate, lung, or skin cancer   Sexually transmitted infections, such as chlamydia and gonorrhea   Mental health conditions like depression and anxiety  Your doctor will talk to you about the different types of screening tests. They can help you decide which screenings to have. They can also explain what the results might mean.   Answering questions - The physical is a good time to ask the doctor or nurse questions about your health. If needed, they can refer you to other doctors or specialists, too.  Adults older than 65 years often need other care, too. As you get older, your doctor will talk to you about:   How to prevent falling at " home   Hearing or vision tests   Memory testing   How to take your medicines safely   Making sure that you have the help and support you need at home  All topics are updated as new evidence becomes available and our peer review process is complete.  This topic retrieved from [employer] on: May 02, 2024.  Topic 588171 Version 1.0  Release: 32.4.3 - C32.122  © 2024 UpToDate, Inc. and/or its affiliates. All rights reserved.  Consumer Information Use and Disclaimer   Disclaimer: This generalized information is a limited summary of diagnosis, treatment, and/or medication information. It is not meant to be comprehensive and should be used as a tool to help the user understand and/or assess potential diagnostic and treatment options. It does NOT include all information about conditions, treatments, medications, side effects, or risks that may apply to a specific patient. It is not intended to be medical advice or a substitute for the medical advice, diagnosis, or treatment of a health care provider based on the health care provider's examination and assessment of a patient's specific and unique circumstances. Patients must speak with a health care provider for complete information about their health, medical questions, and treatment options, including any risks or benefits regarding use of medications. This information does not endorse any treatments or medications as safe, effective, or approved for treating a specific patient. UpToDate, Inc. and its affiliates disclaim any warranty or liability relating to this information or the use thereof.The use of this information is governed by the Terms of Use, available at https://www.[employer]ionsuwer.com/en/know/clinical-effectiveness-terms. 2024© UpToDate, Inc. and its affiliates and/or licensors. All rights reserved.  Copyright   © 2024 UpToDate, Inc. and/or its affiliates. All rights reserved.

## 2025-02-21 NOTE — PROGRESS NOTES
[facility]    Name:Zenaida Kathleen   DOS: 2/21/2025     Chief Complaint:   Chief Complaint   Patient presents with    Follow-up     Recent echo stress       HISTORY OF PRESENT ILLNESS:    HPI:  Zenaida Kathleen is a 59 y.o. female. She  has a past medical history of Allergic, Anxiety (2005), Arthritis, Bulging lumbar disc, CAD (coronary artery disease), Chronic pain, Degenerative disk disease, Depression (2005), Effusion of left knee (04/25/2023), GERD (gastroesophageal reflux disease), Hyperlipidemia, Hypertension, Pinched nerve, Primary osteoarthritis of left knee (02/14/2023), Psychiatric disorder, and Shingles (20179).    Her cardiac history is pertinent for hypertension, hyperlipidemia.    She presents for follow-up evaluation.  Last saw me in the office on December 16, 2024.  At that time, I had seen her for evaluation of chest discomfort and palpitations.  Per my prior office visit note:  The patient describes the chest discomfort is occasional, occurring randomly but clearly related to exertion, describes as a central tightness without radiation.  This occurs with activities such as climbing stairs or walking carrying something heavy. Anxiety is often a trigger for her discomfort as well, but never at rest.  As an entirely separate issue, she reports episodic intermittent palpitations.  These are described as single skipped beats, no sustained tachycardia or rhythm irregularity reported.  These occur in varying frequency based upon how she is feeling in regards to anxiety.     At the last office visit, I had referred her to for a treadmill stress echo to evaluate for underlying ischemia given her presenting symptoms.  The study was completed on 1/2/2025, independently reviewed by me in the office today.  The patient exercised for 4 minutes and 55 seconds achieving a maximal heart rate of 153 bpm which was 95% of her maximum predicted heart rate achieving 7.0 metabolic equivalents.  She  experienced no angina during the test.  This study was normal, and showed no echocardiographic or EKG evidence of ischemia.    Today, she reports no new cardiopulmonary complaints.  Has had no recurrent chest discomfort.  Denies chest pain, shortness of breath, diaphoresis, dizziness, palpitations, orthopnea, edema.  She has had no syncope.       ROS    ROS: Pertinent positives and negatives as described in History of Present Illness. Remainder of a 14 point review of systems was negative.     Allergies   Allergen Reactions    Penicillins Hives and Throat Swelling     Childhood allergy per pt    Meloxicam Hives and Itching        Current Outpatient Medications on File Prior to Visit   Medication Sig Dispense Refill    atorvastatin (LIPITOR) 80 mg tablet Take 1 tablet by mouth once daily 90 tablet 1    buPROPion (WELLBUTRIN XL) 150 mg 24 hr tablet Take 1 tablet (150 mg total) by mouth every morning 30 tablet 5    lisinopril (ZESTRIL) 10 mg tablet Take 1 tablet by mouth once daily 90 tablet 1    LORazepam (Ativan) 0.5 mg tablet Take 1 tablet by mouth twice daily 60 tablet 0    pramipexole (MIRAPEX) 0.5 mg tablet Take 2 tablets (1 mg total) by mouth daily 180 tablet 1    sertraline (ZOLOFT) 100 mg tablet Take 1 tablet (100 mg total) by mouth daily 90 tablet 0    sertraline (ZOLOFT) 50 mg tablet Take 1 tablet (50 mg total) by mouth daily 90 tablet 0    [DISCONTINUED] oxyCODONE-acetaminophen (Percocet) 5-325 mg per tablet Take 1 tablet by mouth every 6 (six) hours as needed for moderate pain or severe pain Max Daily Amount: 4 tablets 20 tablet 0     No current facility-administered medications on file prior to visit.       Past Medical History:   Diagnosis Date    Allergic     Anxiety 2005    Arthritis     Bulging lumbar disc     CAD (coronary artery disease)     Chronic pain     low back    Degenerative disk disease     lumbar spine    Depression 2005    Effusion of left knee 04/25/2023    GERD (gastroesophageal reflux  disease)     Hyperlipidemia     Hypertension     Pinched nerve     lumbar spine    Primary osteoarthritis of left knee 02/14/2023    Psychiatric disorder     depression    Shingles [id number]       Past Surgical History:   Procedure Laterality Date    CARPAL TUNNEL RELEASE Bilateral 1998    CATARACT EXTRACTION Bilateral     CHOLECYSTECTOMY  2008    COLONOSCOPY  06/15/2015    COLONOSCOPY  06/02/2015    Internal hemorrhoids, hyperplastic polyps which were removed.  Ten year recall recommended June 2025    COLONOSCOPY  09/16/2020    normal terminal ileum, 1 small polyp in the distal sigmoid colon that was hyperplastic. .  She did have a small amount of retained stool in the colon.  She did have a small amount of retained stool in the colon.  A 5 year recall was recommended due to poor prep, September 2025.     FL INJECTION LEFT KNEE (ARTHROGRAM)  06/06/2023    NV ARTHRP KNE CONDYLE&PLATU MEDIAL&LAT COMPARTMENTS Left 07/24/2023    Procedure: ARTHROPLASTY KNEE TOTAL SAME DAY;  Surgeon: Dale العلي MD;  Location: [facility];  Service: Orthopedics    NV INSJ/RPLCMT SPINAL NPG/RCVR POCKET CRTJ&CONNJ Right 1/28/2025    Procedure: REPLACEMENT OF IMPLANTABLE PULSE GENERATOR DORSAL SPINAL COLUMN STIMULATOR LOCATED IN RIGHT BUTTOCK;  Surgeon: Lali Henry MD;  Location: [facility];  Service: Neurosurgery    SPINAL CORD STIMULATOR IMPLANT      2014    SPINE SURGERY  2017    Stimulator inplant    WISDOM TOOTH EXTRACTION Bilateral 1982       Family History   Problem Relation Age of Onset    Skin cancer Mother     Heart disease Father         Dx with lewy body dementia in 2012    Dementia Father         Dx with lewy body dementia in 2016    Breast cancer Maternal Aunt 28    Cancer Maternal Aunt         Breast    Breast cancer Sister 56    BRCA1 Negative Sister     BRCA2 Negative Sister     Depression Sister     Cancer Sister         Breast    Prostate cancer Maternal Uncle     Cancer Maternal Uncle         Bladder/prostate     Colon cancer Maternal Uncle     Colon polyps Neg Hx        Social History     Socioeconomic History    Marital status:      Spouse name: Not on file    Number of children: 1    Years of education: Not on file    Highest education level: Not on file   Occupational History    Occupation: not working    Tobacco Use    Smoking status: Every Day     Current packs/day: 0.50     Average packs/day: 0.5 packs/day for 48.1 years (24.1 ttl pk-yrs)     Types: Cigarettes     Start date: 1/1/1977     Passive exposure: Current    Smokeless tobacco: Never    Tobacco comments:     smokes 13 cigs daily as of  7/15/24   Vaping Use    Vaping status: Never Used   Substance and Sexual Activity    Alcohol use: Yes     Comment: Social drinker- less than 1 drink per week    Drug use: No    Sexual activity: Yes     Partners: Male     Birth control/protection: Inserts, None   Other Topics Concern    Not on file   Social History Narrative         Social Drivers of Health     Financial Resource Strain: Low Risk  (5/10/2021)    Overall Financial Resource Strain (CARDIA)     Difficulty of Paying Living Expenses: Not very hard   Food Insecurity: No Food Insecurity (5/10/2021)    Hunger Vital Sign     Worried About Running Out of Food in the Last Year: Never true     Ran Out of Food in the Last Year: Never true   Transportation Needs: No Transportation Needs (5/10/2021)    PRAPARE - Transportation     Lack of Transportation (Medical): No     Lack of Transportation (Non-Medical): No   Physical Activity: Insufficiently Active (5/10/2021)    Exercise Vital Sign     Days of Exercise per Week: 3 days     Minutes of Exercise per Session: 30 min   Stress: No Stress Concern Present (5/10/2021)    [place] [employer] - Occupational Stress Questionnaire     Feeling of Stress : Not at all   Social Connections: Socially Isolated (5/10/2021)    Social Connection and Isolation Panel [NHANES]     Frequency of Communication  "with Friends and Family: More than three times a week     Frequency of Social Gatherings with Friends and Family: Never     Attends Religion Services: Never     Active Member of Clubs or Organizations: No     Attends Club or Organization Meetings: Never     Marital Status:    Intimate Partner Violence: Not At Risk (7/18/2023)    Humiliation, Afraid, Rape, and Kick questionnaire     Fear of Current or Ex-Partner: No     Emotionally Abused: No     Physically Abused: No     Sexually Abused: No   Housing Stability: Not on file       OBJECTIVE:    /70 (BP Location: Left arm, Patient Position: Sitting, Cuff Size: Standard)   Pulse 93   Ht 5' 1\" (1.549 m)   Wt 94.3 kg (208 lb)   LMP  (LMP Unknown)   SpO2 96%   BMI 39.30 kg/m²      BP Readings from Last 3 Encounters:   02/21/25 126/70   02/21/25 124/80   02/11/25 128/72       Wt Readings from Last 3 Encounters:   02/21/25 94.3 kg (208 lb)   02/21/25 94.2 kg (207 lb 9.6 oz)   01/17/25 94.9 kg (209 lb 3.2 oz)         Physical Exam  Vitals reviewed.   Constitutional:       General: She is not in acute distress.     Appearance: Normal appearance. She is not diaphoretic.   HENT:      Head: Normocephalic and atraumatic.   Eyes:      Conjunctiva/sclera: Conjunctivae normal.   Neck:      Vascular: No JVD.   Cardiovascular:      Rate and Rhythm: Normal rate and regular rhythm.      Pulses: Normal pulses.      Heart sounds: Normal heart sounds. No murmur heard.     No friction rub. No gallop.   Pulmonary:      Effort: Pulmonary effort is normal.      Breath sounds: Normal breath sounds. No wheezing, rhonchi or rales.   Abdominal:      General: Abdomen is flat. Bowel sounds are normal.   Musculoskeletal:      Right lower leg: No edema.      Left lower leg: No edema.   Skin:     General: Skin is warm and dry.   Neurological:      General: No focal deficit present.      Mental Status: She is alert and oriented to person, place, and time.   Psychiatric:         Mood " and Affect: Mood normal.         Behavior: Behavior normal.                                                       Cardiac testing:     Stress ECHO 1/2/25   A Alex protocol stress test was performed. Overall, the patient's exercise capacity was moderately impaired for their age. The patient reached stage 2.0 of the protocol after exercising for 4 min and 55 sec and had a maximal HR of 153 bpm (95% of MPHR) and 7.0 METS. The patient experienced no angina during the test. The patient requested the test to be stopped due to dyspnea and fatigue. Symptoms ended during recovery.    Left Ventricle: Left ventricular cavity size is normal. The left ventricular ejection fraction is 60%. Systolic function is normal. Wall motion is normal.    Stress ECG: The stress ECG is negative for ischemia after maximal exercise, without reproduction of symptoms.    Peak Stress Echo: Left ventricle cavity has normal reduction in size at peak stress. The left ventricle systolic function is normal at peak stress. The peak stress echo showed normal wall motion.    Echo Post Impression: The study is normal and shows no echocardiographic evidence of ischemia, after maximal exercise.        LABS:  Lab Results   Component Value Date    BUN 11 01/17/2025    CREATININE 0.75 01/17/2025    CALCIUM 9.4 01/17/2025    K 4.0 01/17/2025    CO2 25 01/17/2025     01/17/2025    ALKPHOS 82 01/17/2025    AST 16 01/17/2025    ALT 17 01/17/2025        Lab Results   Component Value Date    WBC 8.47 01/17/2025    HGB 14.6 01/17/2025    HCT 46.9 (H) 01/17/2025     (H) 01/17/2025     01/17/2025       Lab Results   Component Value Date    HDL 36 (L) 01/17/2025    LDLCALC 73 01/17/2025    TRIG 234 (H) 01/17/2025       Lab Results   Component Value Date    HGBA1C 6.3 (H) 01/17/2025       Lab Results   Component Value Date    TSH 3.140 04/26/2024           ASSESSMENT/PLAN:  Diagnoses and all orders for this visit:    Mixed hyperlipidemia    Essential  "hypertension    Mild CAD    Precordial pain      Plan:  Continue atorvastatin 80 mg once daily.  Lipids adequately controlled by most recent lipid panel 1/17/2025.  Repeat lipids are being managed by her PCP.  Continue lisinopril 10 mg once daily.  Blood pressure appears well-controlled on this regimen  1 year follow-up interval was recommended.      Wilmer Bernal MD [facility]      Portions of the record may have been created with voice recognition software. Occasional wrong word or \"sound alike\" substitutions may have occurred due to the inherent limitations of voice recognition software. Please review the chart carefully and recognize, using context, where substitutions/typographical errors may have occurred.     "

## 2025-02-26 ENCOUNTER — TELEPHONE (OUTPATIENT)
Age: 60
End: 2025-02-26

## 2025-02-26 NOTE — TELEPHONE ENCOUNTER
Patient is requesting an insurance referral for the following specialty:  dermatology       Test Name / Order Name:  Initial visit      DX Code:  Z12.83    Date Of Service:   2/27/25    Location/Facility Name/Address/Phone #:  [facility] [facility]   [phone]    Location / Facility NPI:  didn't have NPI    Best Phone # To Reach The Patient: [phone]      Patient is requesting an insurance referral for the following specialty:   general surgery     Test Name / Order Name:  follow up    DX Code:  Z8.03    Date Of Service:   3/31/25    Location/Facility Name/Address/Phone #:   [facility] [phone]    Location / Facility NPI:  didn't have NPI    Best Phone # To Reach The Patient:  [phone]

## 2025-02-27 ENCOUNTER — TELEPHONE (OUTPATIENT)
Age: 60
End: 2025-02-27

## 2025-02-27 DIAGNOSIS — I10 ESSENTIAL HYPERTENSION: ICD-10-CM

## 2025-02-27 RX ORDER — LISINOPRIL 10 MG/1
10 TABLET ORAL DAILY
Qty: 90 TABLET | Refills: 1 | Status: SHIPPED | OUTPATIENT
Start: 2025-02-27

## 2025-02-27 NOTE — TELEPHONE ENCOUNTER
Pt called to request a letter to return to work without restrictions on 3/12/25, she has the letter with restrictions up to 3/11/25 but employer is requesting new letter.  Pt will pick it up at office when it is completed, please call her and let her know when it is ready.  Thank You

## 2025-03-03 ENCOUNTER — OFFICE VISIT (OUTPATIENT)
Dept: OBGYN CLINIC | Facility: CLINIC | Age: 60
End: 2025-03-03
Payer: COMMERCIAL

## 2025-03-03 VITALS — BODY MASS INDEX: 38.51 KG/M2 | WEIGHT: 204 LBS | HEIGHT: 61 IN

## 2025-03-03 DIAGNOSIS — M65.311 TRIGGER THUMB OF RIGHT HAND: Primary | ICD-10-CM

## 2025-03-03 PROCEDURE — 99213 OFFICE O/P EST LOW 20 MIN: CPT | Performed by: ORTHOPAEDIC SURGERY

## 2025-03-03 PROCEDURE — 20550 NJX 1 TENDON SHEATH/LIGAMENT: CPT | Performed by: ORTHOPAEDIC SURGERY

## 2025-03-03 RX ORDER — BETAMETHASONE SODIUM PHOSPHATE AND BETAMETHASONE ACETATE 3; 3 MG/ML; MG/ML
6 INJECTION, SUSPENSION INTRA-ARTICULAR; INTRALESIONAL; INTRAMUSCULAR; SOFT TISSUE
Status: COMPLETED | OUTPATIENT
Start: 2025-03-03 | End: 2025-03-03

## 2025-03-03 RX ORDER — LIDOCAINE HYDROCHLORIDE 10 MG/ML
1 INJECTION, SOLUTION INFILTRATION; PERINEURAL
Status: COMPLETED | OUTPATIENT
Start: 2025-03-03 | End: 2025-03-03

## 2025-03-03 RX ADMIN — LIDOCAINE HYDROCHLORIDE 1 ML: 10 INJECTION, SOLUTION INFILTRATION; PERINEURAL at 14:00

## 2025-03-03 RX ADMIN — BETAMETHASONE SODIUM PHOSPHATE AND BETAMETHASONE ACETATE 6 MG: 3; 3 INJECTION, SUSPENSION INTRA-ARTICULAR; INTRALESIONAL; INTRAMUSCULAR; SOFT TISSUE at 14:00

## 2025-03-03 NOTE — PROGRESS NOTES
ORTHOPAEDIC HAND, WRIST, AND ELBOW OFFICE  VISIT     Name: Zenaida Kathleen      : 1965      MRN: [id number]  Encounter Provider: Kaila Dickson MD  Encounter Date: 3/3/2025   Encounter department[facility]  :  Assessment & Plan  Trigger thumb of right hand  Subjective history, clinical exam, and diagnostic studies reviewed with patient  Diagnosis discussed  Treatment options discussed which include nonoperative and operative management.  We discussed use of splinting, therapy, activity modification, use of NSAIDs (oral and topical), and injections.  We discussed risks and benefits of each as well as expected reasonable outcomes.  Surgery can be considered following unsuccessful treatment with nonoperative management.   I recommended CSI.    Symptoms may recur following CSI, especially in patients who present with longstanding symptoms and patient who have diabetes.  May have repeat CSI after 8 weeks.  If symptoms recur after 2 CSI's, patient will be offered surgery in the form of trigger finger release or the option of a 3rd CSI.  Patient with diabetes are counseled to monitor the blood sugars following the injection.    The patient was given the opportunity to ask questions.  Questions were answered to the patient's satisfaction.  The patient decided to move forward with CSI via shared decision making.  Follow up in 8 weeks for clinical evaluation. If asymptomatic, may cancel appointment     Orders:    Hand/upper extremity injection          General Discussions:  Trigger FInger: The anatomy and physiology of trigger finger was discussed with the patient today in the office.  Edema and increased contact pressure within the flexor tendons at the A1 pulley can cause pain, crepitation, and limitation of function.  Treatment options include resting MP blocking splints to decrease edema, oral anti-inflammatory medications, home or formal therapy exercises, up to  "2 steroid injections within the tendon sheath, or surgical release.  While majority of patients do respond to conservative treatment, up to 20% may require surgical release.     Operative Discussions:       History of Present Illness   HPI  Chief Complaint   Patient presents with    Right Thumb - Triggering, Pain       Zenaida Kathleen is a 59 y.o. female who presents today for evaluation for trigger thumb of right hand. She reports the thumb will lock-up on her and she will have to manual manipulate the thumb. She states this has been going on for about 8 months. She notes this has been going on for a while. She has been to hand therapy, but notes it was not helping her with her symptoms.     Hand dominance: Right    REVIEW OF SYSTEMS:  General: no fever, no chills  HEENT:  No loss of hearing or eyesight problems  Eyes:  No red eyes  Respiratory:  No coughing, shortness of breath or wheezing  Cardiovascular:  No chest pain, no palpitations  GI:  Abdomen soft nontender, denies nausea  Endocrine:  No muscle weakness, no frequent urination, no excessive thirst  Urinary:  No dysuria, no incontinence  Musculoskeletal: see HPI and PE  SKIN:  No skin rash, no dry skin  Neurological:  No headaches, no confusion  Psychiatric:  No suicide thoughts, no anxiety, no depression  Review of all other systems is negative    Objective   Ht 5' 1\" (1.549 m)   Wt 92.5 kg (204 lb)   LMP  (LMP Unknown)   BMI 38.55 kg/m²      General: well developed and well nourished, alert, oriented times 3, and appears comfortable  Psychiatric: Normal  HEENT: Trachea Midline, No torticollis  Cardiovascular: No discernable arrhythmia  Pulmonary: No wheezing or stridor  Abdomen: No rebound or guarding  Extremities: No peripheral edema  Skin: No masses, erythema, lacerations, fluctation, ulcerations  Neurovascular: Sensation Intact to the Median, Ulnar, Radial Nerve, Motor Intact to the Median, Ulnar, Radial Nerve, and Pulses Intact    Musculoskeletal " exam:  Examination of the affected extremity was compared to the unaffected extremity.  Skin was intact.  No swelling or ecchymosis.  No deformity.  Hand and fingers were warm and well-perfused.  Capillary refill was brisk.  Full active range of motion of the elbows, forearms, wrists, and fingers.      The affected finger was postured in a flexed position.  There was tenderness at the level of the A1 pulley.  There was crepitus with flexion and extension of the finger.  Catching/locking was observed during the examination.       STUDIES REVIEWED:  No Studies to review      PROCEDURES PERFORMED:  Hand/upper extremity injection: R thumb A1  Universal Protocol:  Consent: Verbal consent obtained.  Risks and benefits: risks, benefits and alternatives were discussed  Consent given by: patient  Patient understanding: patient states understanding of the procedure being performed  Patient identity confirmed: verbally with patient  Supporting Documentation  Indications: pain and tendon swelling   Procedure Details  Condition:trigger finger Location: thumb - R thumb A1   Needle size: 25 G  Ultrasound guidance: no  Approach: volar  Medications administered: 1 mL lidocaine 1 %; 6 mg betamethasone acetate-betamethasone sodium phosphate 6 (3-3) mg/mL  Patient tolerance: patient tolerated the procedure well with no immediate complications  Dressing:  Sterile dressing applied                Scribe Attestation      I,:  Sherman Razo am acting as a scribe while in the presence of the attending physician.:       I,:  Kaila Dickson MD personally performed the services described in this documentation    as scribed in my presence.:

## 2025-03-04 ENCOUNTER — TELEPHONE (OUTPATIENT)
Age: 60
End: 2025-03-04

## 2025-03-11 DIAGNOSIS — F32.0 CURRENT MILD EPISODE OF MAJOR DEPRESSIVE DISORDER WITHOUT PRIOR EPISODE (HCC): ICD-10-CM

## 2025-03-12 RX ORDER — SERTRALINE HYDROCHLORIDE 100 MG/1
100 TABLET, FILM COATED ORAL DAILY
Qty: 90 TABLET | Refills: 0 | Status: SHIPPED | OUTPATIENT
Start: 2025-03-12

## 2025-03-21 ENCOUNTER — TELEPHONE (OUTPATIENT)
Age: 60
End: 2025-03-21

## 2025-03-21 DIAGNOSIS — K58.0 IRRITABLE BOWEL SYNDROME WITH DIARRHEA: Primary | ICD-10-CM

## 2025-03-21 NOTE — TELEPHONE ENCOUNTER
Patient is requesting an insurance referral for the following specialty:      Test Name / Order Name: [facility]    DX Code: IBS     Date Of Service: 04/21/25    Location/Facility Name/Address/Phone #: # [phone]    Location / Facility NPI: [facility] NPI- [id number]  Group NPI : [id number]    [facility] Phone # To Reach The Patient: [phone]

## 2025-04-06 DIAGNOSIS — F41.9 ANXIETY: ICD-10-CM

## 2025-04-07 RX ORDER — LORAZEPAM 0.5 MG/1
TABLET ORAL
Qty: 60 TABLET | Refills: 0 | Status: SHIPPED | OUTPATIENT
Start: 2025-04-07

## 2025-04-11 DIAGNOSIS — M70.72 BURSITIS OF BOTH HIPS, UNSPECIFIED BURSA: Primary | ICD-10-CM

## 2025-04-11 DIAGNOSIS — M70.71 BURSITIS OF BOTH HIPS, UNSPECIFIED BURSA: Primary | ICD-10-CM

## 2025-05-01 DIAGNOSIS — G25.81 RESTLESS LEG SYNDROME: ICD-10-CM

## 2025-05-01 RX ORDER — PRAMIPEXOLE DIHYDROCHLORIDE 0.5 MG/1
1 TABLET ORAL DAILY
Qty: 180 TABLET | Refills: 0 | Status: CANCELLED | OUTPATIENT
Start: 2025-05-01 | End: 2025-10-28

## 2025-05-02 RX ORDER — PRAMIPEXOLE DIHYDROCHLORIDE 0.5 MG/1
1 TABLET ORAL DAILY
Qty: 180 TABLET | Refills: 0 | Status: SHIPPED | OUTPATIENT
Start: 2025-05-02

## 2025-05-07 DIAGNOSIS — M25.551 BILATERAL HIP PAIN: Primary | ICD-10-CM

## 2025-05-07 DIAGNOSIS — M25.552 BILATERAL HIP PAIN: Primary | ICD-10-CM

## 2025-05-08 ENCOUNTER — APPOINTMENT (OUTPATIENT)
Dept: RADIOLOGY | Facility: CLINIC | Age: 60
End: 2025-05-08
Payer: COMMERCIAL

## 2025-05-08 ENCOUNTER — OFFICE VISIT (OUTPATIENT)
Dept: OBGYN CLINIC | Facility: CLINIC | Age: 60
End: 2025-05-08
Payer: COMMERCIAL

## 2025-05-08 VITALS — BODY MASS INDEX: 39.27 KG/M2 | WEIGHT: 208 LBS | HEIGHT: 61 IN

## 2025-05-08 DIAGNOSIS — M25.551 BILATERAL HIP PAIN: ICD-10-CM

## 2025-05-08 DIAGNOSIS — M70.61 GREATER TROCHANTERIC BURSITIS OF BOTH HIPS: ICD-10-CM

## 2025-05-08 DIAGNOSIS — M25.552 BILATERAL HIP PAIN: ICD-10-CM

## 2025-05-08 DIAGNOSIS — M70.62 GREATER TROCHANTERIC BURSITIS OF BOTH HIPS: ICD-10-CM

## 2025-05-08 PROCEDURE — 20610 DRAIN/INJ JOINT/BURSA W/O US: CPT | Performed by: ORTHOPAEDIC SURGERY

## 2025-05-08 PROCEDURE — 99213 OFFICE O/P EST LOW 20 MIN: CPT | Performed by: ORTHOPAEDIC SURGERY

## 2025-05-08 PROCEDURE — 73521 X-RAY EXAM HIPS BI 2 VIEWS: CPT

## 2025-05-08 RX ORDER — BETAMETHASONE SODIUM PHOSPHATE AND BETAMETHASONE ACETATE 3; 3 MG/ML; MG/ML
6 INJECTION, SUSPENSION INTRA-ARTICULAR; INTRALESIONAL; INTRAMUSCULAR; SOFT TISSUE
Status: COMPLETED | OUTPATIENT
Start: 2025-05-08 | End: 2025-05-08

## 2025-05-08 RX ORDER — LIDOCAINE HYDROCHLORIDE 10 MG/ML
7 INJECTION, SOLUTION INFILTRATION; PERINEURAL
Status: COMPLETED | OUTPATIENT
Start: 2025-05-08 | End: 2025-05-08

## 2025-05-08 RX ADMIN — BETAMETHASONE SODIUM PHOSPHATE AND BETAMETHASONE ACETATE 6 MG: 3; 3 INJECTION, SUSPENSION INTRA-ARTICULAR; INTRALESIONAL; INTRAMUSCULAR; SOFT TISSUE at 10:00

## 2025-05-08 RX ADMIN — LIDOCAINE HYDROCHLORIDE 7 ML: 10 INJECTION, SOLUTION INFILTRATION; PERINEURAL at 10:00

## 2025-05-08 NOTE — ASSESSMENT & PLAN NOTE
Findings are consistent with bilateral hip greater trochanteric bursitis. Findings and treatment options discussed with patient.  X-rays of bilateral hip/pelvis were reviewed and radiologist report discussed with patient.  Discussed prognosis of condition with patient.  Bilateral greater trochanteric bursa cortisone injections given at today's visit.  Patient tolerated procedure well.  He can use a cool compress as needed over the next 24 hours.  Referral to physical therapy was given at today's visit.  Patient can use Tylenol NSAIDs and topical Voltaren gel or Aspercreme as needed for pain. Discussed with patient to avoid walking hills and uneven surfaces. Follow up in 2-3 months. All patient's questions were answered to her satisfaction.  This note is created using dictation transcription.  It may contain typographical errors, grammatical errors, improperly dictated words, background noise and other errors.

## 2025-05-08 NOTE — PROGRESS NOTES
Assessment:     1. Greater trochanteric bursitis of both hips        Plan:     Problem List Items Addressed This Visit          Musculoskeletal and Integument    Greater trochanteric bursitis of both hips    Findings are consistent with bilateral hip greater trochanteric bursitis. Findings and treatment options discussed with patient.  X-rays of bilateral hip/pelvis were reviewed and radiologist report discussed with patient.  Discussed prognosis of condition with patient.  Bilateral greater trochanteric bursa cortisone injections given at today's visit.  Patient tolerated procedure well.  He can use a cool compress as needed over the next 24 hours.  Referral to physical therapy was given at today's visit.  Patient can use Tylenol NSAIDs and topical Voltaren gel or Aspercreme as needed for pain. Discussed with patient to avoid walking hills and uneven surfaces. Follow up in 2-3 months. All patient's questions were answered to her satisfaction.  This note is created using dictation transcription.  It may contain typographical errors, grammatical errors, improperly dictated words, background noise and other errors.         Relevant Medications    lidocaine (XYLOCAINE) 1 % injection 7 mL (Completed)    lidocaine (XYLOCAINE) 1 % injection 7 mL (Completed)    betamethasone acetate-betamethasone sodium phosphate (CELESTONE) injection 6 mg (Completed)    betamethasone acetate-betamethasone sodium phosphate (CELESTONE) injection 6 mg (Completed)    Other Relevant Orders    Ambulatory Referral to Physical Therapy    Large joint arthrocentesis: bilateral greater trochanteric bursa (Completed)      Subjective:     Patient ID: Zenaida Kathleen is a 59 y.o. female.  Chief Complaint:  59 y.o. female presents today for evaluation for bilateral hip pain.  Referred by her PCP, Dr. Remy.  She reports having lateral hip pain bilaterally for the last year, with gradual onset and increase of her pain.  She reports the right is worse  than the left.  She has not had any treatment for the hips in the past.  She does report having a spinal stimulator.  She has tenderness over the right SI joint, but no tenderness over the left SI joint.  She notes any type of activity is aggravating to the hips.  She denies any groin pain.  Hip pain increases with prolonged walking.    Allergy:  Allergies   Allergen Reactions    Penicillins Hives and Throat Swelling     Childhood allergy per pt    Meloxicam Hives and Itching     Medications:  all current active meds have been reviewed  Past Medical History:  Past Medical History:   Diagnosis Date    Allergic     Anxiety 2005    Arthritis     Bulging lumbar disc     CAD (coronary artery disease)     Chronic pain     low back    Degenerative disk disease     lumbar spine    Depression 2005    Effusion of left knee 04/25/2023    GERD (gastroesophageal reflux disease)     Hyperlipidemia     Hypertension     Pinched nerve     lumbar spine    Primary osteoarthritis of left knee 02/14/2023    Psychiatric disorder     depression    Shingles [id number]     Past Surgical History:  Past Surgical History:   Procedure Laterality Date    BACK SURGERY  2017    Stimulator placed in lower back    CARPAL TUNNEL RELEASE Bilateral 1998    CATARACT EXTRACTION Bilateral     CHOLECYSTECTOMY  2008    COLONOSCOPY  06/15/2015    COLONOSCOPY  06/02/2015    Internal hemorrhoids, hyperplastic polyps which were removed.  Ten year recall recommended June 2025    COLONOSCOPY  09/16/2020    normal terminal ileum, 1 small polyp in the distal sigmoid colon that was hyperplastic. .  She did have a small amount of retained stool in the colon.  She did have a small amount of retained stool in the colon.  A 5 year recall was recommended due to poor prep, September 2025.     FL INJECTION LEFT KNEE (ARTHROGRAM)  06/06/2023    HAND SURGERY  1998    WA ARTHRP KNE CONDYLE&PLATU MEDIAL&LAT COMPARTMENTS Left 07/24/2023    Procedure: ARTHROPLASTY KNEE TOTAL SAME  DAY;  Surgeon: Dale العلي MD;  Location: [facility];  Service: Orthopedics    DC INSJ/RPLCMT SPINAL NPG/RCVR POCKET CRTJ&CONNJ Right 01/28/2025    Procedure: REPLACEMENT OF IMPLANTABLE PULSE GENERATOR DORSAL SPINAL COLUMN STIMULATOR LOCATED IN RIGHT BUTTOCK;  Surgeon: Lali Henry MD;  Location: [facility];  Service: Neurosurgery    SPINAL CORD STIMULATOR IMPLANT      2014    SPINE SURGERY  2017    Stimulator inplant    WISDOM TOOTH EXTRACTION Bilateral 1982    WRIST SURGERY  1998    Carpal tunnel surgery     Family History:  Family History   Problem Relation Age of Onset    Skin cancer Mother     Osteoporosis Mother     Vascular Disease Mother     Heart disease Father         Dx with lewy body dementia in 2012    Dementia Father         Dx with lewy body dementia in 2016    Breast cancer Maternal Aunt 28    Cancer Maternal Aunt         Breast    Breast cancer Sister 56    BRCA1 Negative Sister     BRCA2 Negative Sister     Depression Sister     Cancer Sister         Breast    Prostate cancer Maternal Uncle     Cancer Maternal Uncle         Bladder/prostate    Colon cancer Maternal Uncle     Colon polyps Neg Hx      Social History:  Social History     Substance and Sexual Activity   Alcohol Use Yes    Comment: Social drinker     Social History     Substance and Sexual Activity   Drug Use No     Social History     Tobacco Use   Smoking Status Every Day    Current packs/day: 0.50    Average packs/day: 0.5 packs/day for 48.3 years (24.2 ttl pk-yrs)    Types: Cigarettes    Start date: 1/1/1977    Passive exposure: Current   Smokeless Tobacco Never   Tobacco Comments    smokes 13 cigs daily as of  7/15/24     Review of Systems   Constitutional:  Negative for chills and fever.   HENT:  Negative for ear pain and sore throat.    Eyes:  Negative for pain and visual disturbance.   Respiratory:  Negative for cough and shortness of breath.    Cardiovascular:  Negative for chest pain and palpitations.  "  Gastrointestinal:  Negative for abdominal pain and vomiting.   Genitourinary:  Negative for dysuria and hematuria.   Musculoskeletal:  Positive for arthralgias (Bilateral hip) and gait problem (Antalgic). Negative for back pain.   Skin:  Negative for color change and rash.   Neurological:  Negative for seizures and syncope.   Psychiatric/Behavioral: Negative.     All other systems reviewed and are negative.        Objective:  BP Readings from Last 1 Encounters:   02/21/25 126/70      Wt Readings from Last 1 Encounters:   05/08/25 94.3 kg (208 lb)      BMI:   Estimated body mass index is 39.3 kg/m² as calculated from the following:    Height as of this encounter: 5' 1\" (1.549 m).    Weight as of this encounter: 94.3 kg (208 lb).  BSA:   Estimated body surface area is 1.92 meters squared as calculated from the following:    Height as of this encounter: 5' 1\" (1.549 m).    Weight as of this encounter: 94.3 kg (208 lb).   Physical Exam  Vitals and nursing note reviewed.   Constitutional:       Appearance: She is well-developed. She is obese.   HENT:      Head: Normocephalic and atraumatic.      Right Ear: External ear normal.      Left Ear: External ear normal.   Eyes:      General: No scleral icterus.     Extraocular Movements: Extraocular movements intact.      Conjunctiva/sclera: Conjunctivae normal.   Pulmonary:      Effort: Pulmonary effort is normal. No respiratory distress.   Musculoskeletal:      Cervical back: Neck supple.      Comments: See Ortho exam   Skin:     General: Skin is warm and dry.   Neurological:      General: No focal deficit present.      Mental Status: She is alert and oriented to person, place, and time.      Deep Tendon Reflexes: Reflexes are normal and symmetric.   Psychiatric:         Mood and Affect: Mood normal.         Behavior: Behavior normal.       Right Hip Exam     Tenderness   The patient is experiencing tenderness in the greater trochanter.    Range of Motion   Flexion:  120 "   External rotation:  40   Internal rotation:  20     Muscle Strength   Abduction: 5/5   Adduction: 5/5   Flexion: 5/5     Tests   STEWART: negative  Mendel: negative    Other   Erythema: absent  Scars: absent  Sensation: normal  Pulse: present      Left Hip Exam     Tenderness   The patient is experiencing tenderness in the greater trochanter.    Range of Motion   Flexion:  120   External rotation:  40   Internal rotation: 20     Muscle Strength   Abduction: 5/5   Adduction: 5/5   Flexion: 5/5     Tests   STEWART: negative  Mendel: negative    Other   Erythema: absent  Scars: absent  Sensation: normal  Pulse: present              Large joint arthrocentesis: bilateral greater trochanteric bursa    Performed by: Dale العلي MD  Authorized by: Dale العلي MD    Universal Protocol:  Consent: Verbal consent obtained.  Risks and benefits: risks, benefits and alternatives were discussed  Consent given by: patient  Patient understanding: patient states understanding of the procedure being performed  Site marked: the operative site was marked  Patient identity confirmed: verbally with patient  Supporting Documentation  Indications: pain     Is this a Visco injection? NoProcedure Details  Location: hip - bilateral greater trochanteric bursa  Needle size: 22 G  Ultrasound guidance: no  Approach: lateral    Medications (Right): 7 mL lidocaine 1 %; 6 mg betamethasone acetate-betamethasone sodium phosphate 6 (3-3) mg/mLMedications (Left): 7 mL lidocaine 1 %; 6 mg betamethasone acetate-betamethasone sodium phosphate 6 (3-3) mg/mL   Patient tolerance: patient tolerated the procedure well with no immediate complications  Dressing:  Sterile dressing applied           I have personally reviewed pertinent films in PACS and my interpretation is x-rays of bilateral hip/pelvis demonstrates no acute fracture or dislocation.  No osseous abnormalities.    Scribe Attestation      I,:  Sherman Razo am acting as a scribe while in the presence  of the attending physician.:       I,:  Dale العلي MD personally performed the services described in this documentation    as scribed in my presence.:

## 2025-05-23 ENCOUNTER — OFFICE VISIT (OUTPATIENT)
Dept: OBGYN CLINIC | Facility: CLINIC | Age: 60
End: 2025-05-23

## 2025-05-23 VITALS — HEIGHT: 61 IN | BODY MASS INDEX: 38.89 KG/M2 | WEIGHT: 206 LBS

## 2025-05-23 DIAGNOSIS — M65.311 TRIGGER THUMB OF RIGHT HAND: Primary | ICD-10-CM

## 2025-05-23 DIAGNOSIS — M65.312 TRIGGER THUMB OF LEFT HAND: ICD-10-CM

## 2025-05-23 RX ORDER — BETAMETHASONE SODIUM PHOSPHATE AND BETAMETHASONE ACETATE 3; 3 MG/ML; MG/ML
6 INJECTION, SUSPENSION INTRA-ARTICULAR; INTRALESIONAL; INTRAMUSCULAR; SOFT TISSUE
Status: COMPLETED | OUTPATIENT
Start: 2025-05-23 | End: 2025-05-23

## 2025-05-23 RX ADMIN — BETAMETHASONE SODIUM PHOSPHATE AND BETAMETHASONE ACETATE 6 MG: 3; 3 INJECTION, SUSPENSION INTRA-ARTICULAR; INTRALESIONAL; INTRAMUSCULAR; SOFT TISSUE at 10:45

## 2025-05-23 NOTE — PROGRESS NOTES
"    ORTHOPAEDIC HAND, WRIST, AND ELBOW OFFICE  VISIT     Name: Zenaida Kathleen      : 1965      MRN: [id number]  Encounter Provider: Kaila Dickson MD  Encounter Date: 2025   Encounter department: [facility]  :  Assessment & Plan  Trigger thumb of right hand  Resolved following CSI       Trigger thumb of left hand  Subjective history, clinical exam, and diagnostic studies reviewed with patient  Diagnosis discussed  Treatment options discussed which include nonoperative and operative management.  We discussed use of splinting, therapy, activity modification, use of NSAIDs (oral and topical), and injections.  We discussed risks and benefits of each as well as expected reasonable outcomes.  Surgery can be considered following unsuccessful treatment with nonoperative management.   I recommended CSI.    Symptoms may recur following CSI, especially in patients who present with longstanding symptoms and patient who have diabetes.  May have repeat CSI after 8 weeks.  If symptoms recur after 2 CSI's, patient will be offered surgery in the form of trigger finger release or the option of a 3rd CSI.  Patient with diabetes are counseled to monitor the blood sugars following the injection.    The patient was given the opportunity to ask questions.  Questions were answered to the patient's satisfaction.  The patient decided to move forward with CSI via shared decision making.  Follow up in 8 weeks for clinical evaluation. If asymptomatic, may cancel appointment                General Discussions:     Trigger FInger: The anatomy and physiology of trigger finger was discussed with the patient today in the office.  Edema and increased contact pressure within the flexor tendons at the A1 pulley can cause pain, crepitation, and limitation of function.  Treatment options include resting MP blocking splints, PIP blocking splints, or \"band-aid\" splints to decrease edema, oral " "anti-inflammatory medications, home or formal therapy exercises, up to 2 steroid injections, or surgical release.  While a majority of patients do respond to conservative treatment, up to 20% may require surgical release.         History of Present Illness   HPI  Chief Complaint   Patient presents with    Right Thumb - Follow-up, Pain, Triggering       Zenaida Kathleen is a 59 y.o. female who presents today for follow up for right thumb trigger thumb.  Resolved after CSI.  Now left thumb triggering for past 2 weeks.     Hand dominance: right    REVIEW OF SYSTEMS:  General: no fever, no chills  HEENT:  No loss of hearing or eyesight problems  Eyes:  No red eyes  Respiratory:  No coughing, shortness of breath or wheezing  Cardiovascular:  No chest pain, no palpitations  GI:  Abdomen soft nontender, denies nausea  Endocrine:  No muscle weakness, no frequent urination, no excessive thirst  Urinary:  No dysuria, no incontinence  Musculoskeletal: see HPI and PE  SKIN:  No skin rash, no dry skin  Neurological:  No headaches, no confusion  Psychiatric:  No suicide thoughts, no anxiety, no depression  Review of all other systems is negative    Objective   Ht 5' 1\" (1.549 m)   Wt 93.4 kg (206 lb)   LMP  (LMP Unknown)   BMI 38.92 kg/m²      General: well developed and well nourished, alert, oriented times 3, and appears comfortable  Psychiatric: Normal  HEENT: Trachea Midline, No torticollis  Cardiovascular: No discernable arrhythmia  Pulmonary: No wheezing or stridor  Abdomen: No rebound or guarding  Extremities: No peripheral edema  Skin: No masses, erythema, lacerations, fluctation, ulcerations  Neurovascular: Sensation Intact to the Median, Ulnar, Radial Nerve, Motor Intact to the Median, Ulnar, Radial Nerve, and Pulses Intact    Musculoskeletal exam:  Examination of the affected extremity was compared to the unaffected extremity.  Skin was intact.  No swelling or ecchymosis.  No deformity.  Hand and fingers were warm and " "well-perfused.  Capillary refill was brisk.  Full active range of motion of the elbows, forearms, wrists, and fingers.  5/5 elbow flexors/extensors, wrist flexor/extensors, and .          The affected finger was not postured in a flexed position.  There was tenderness at the level of the A1 pulley.  There was crepitus with flexion and extension of the finger.  Catching/locking was observed during the examination.       STUDIES REVIEWED:  No Studies to review      PROCEDURES PERFORMED:  Hand/upper extremity injection: L thumb A1    Universal Protocol:  Consent: Verbal consent obtained  Risks and benefits: risks, benefits and alternatives were discussed  Consent given by: patient  Time out: Immediately prior to procedure a \"time out\" was called to verify the correct patient, procedure, equipment, support staff and site/side marked as required.  Patient understanding: patient states understanding of the procedure being performed  Patient identity confirmed: verbally with patient  Supporting Documentation  Indications: tendon swelling and therapeutic   Procedure Details  Condition:trigger finger Location: thumb - L thumb A1   Preparation: Patient was prepped and draped in the usual sterile fashion  Needle size: 25 G  Ultrasound guidance: no  Medications administered: 6 mg betamethasone acetate-betamethasone sodium phosphate 6 (3-3) mg/mL  Patient tolerance: patient tolerated the procedure well with no immediate complications  Dressing:  Sterile dressing applied         -      Scribe Attestation      I,:  Sherman Razo am acting as a scribe while in the presence of the attending physician.:       I,:  Kaila Dickson MD personally performed the services described in this documentation    as scribed in my presence.:           "

## 2025-06-05 DIAGNOSIS — F41.9 ANXIETY: ICD-10-CM

## 2025-06-06 RX ORDER — LORAZEPAM 0.5 MG/1
TABLET ORAL
Qty: 60 TABLET | Refills: 0 | Status: SHIPPED | OUTPATIENT
Start: 2025-06-06

## 2025-06-10 DIAGNOSIS — F32.9 REACTIVE DEPRESSION: ICD-10-CM

## 2025-06-10 DIAGNOSIS — F32.0 CURRENT MILD EPISODE OF MAJOR DEPRESSIVE DISORDER WITHOUT PRIOR EPISODE (HCC): ICD-10-CM

## 2025-06-10 RX ORDER — SERTRALINE HYDROCHLORIDE 100 MG/1
100 TABLET, FILM COATED ORAL DAILY
Qty: 90 TABLET | Refills: 1 | Status: SHIPPED | OUTPATIENT
Start: 2025-06-10

## 2025-06-10 RX ORDER — BUPROPION HYDROCHLORIDE 150 MG/1
150 TABLET ORAL EVERY MORNING
Qty: 30 TABLET | Refills: 5 | Status: SHIPPED | OUTPATIENT
Start: 2025-06-10

## 2025-06-23 ENCOUNTER — EVALUATION (OUTPATIENT)
Dept: PHYSICAL THERAPY | Facility: CLINIC | Age: 60
End: 2025-06-23
Attending: ORTHOPAEDIC SURGERY
Payer: COMMERCIAL

## 2025-06-23 DIAGNOSIS — M70.62 GREATER TROCHANTERIC BURSITIS OF BOTH HIPS: ICD-10-CM

## 2025-06-23 DIAGNOSIS — M70.61 GREATER TROCHANTERIC BURSITIS OF BOTH HIPS: ICD-10-CM

## 2025-06-23 PROCEDURE — 97110 THERAPEUTIC EXERCISES: CPT

## 2025-06-23 PROCEDURE — 97161 PT EVAL LOW COMPLEX 20 MIN: CPT

## 2025-06-23 NOTE — PROGRESS NOTES
PT Evaluation     Today's date: 2025  Patient name: Zenaida Kathleen  : 1965  MRN: [id number]  Referring provider: Dale العلي MD  Dx:   Encounter Diagnosis     ICD-10-CM    1. Greater trochanteric bursitis of both hips  M70.61 Ambulatory Referral to Physical Therapy    M70.62           Start Time: 1700  Stop Time: 1745  Total time in clinic (min): 45 minutes    Assessment  Impairments: abnormal or restricted ROM, activity intolerance and pain with function  Symptom irritability: moderate  Understanding of Dx/Px/POC: good     Prognosis: good    Plan  Patient would benefit from: PT eval and skilled physical therapy  Planned modality interventions: TENS, cryotherapy, low level laser therapy, manual electrical stimulation, unattended electrical stimulation and thermotherapy: hydrocollator packs    Planned therapy interventions: joint mobilization, IASTM, manual therapy, massage, stretching, strengthening, therapeutic activities, therapeutic exercise and neuromuscular re-education    Frequency: 1-2x week  Plan of Care beginning date: 2025  Plan of Care expiration date: 2025  Treatment plan discussed with: patient        Subjective Evaluation    History of Present Illness  Mechanism of injury: Pt presents to OP PT secondary to bilateral hip bursitis with insidious onset 1 year ago. Pt has a hx of chronic low back pain and stimulator implanted and is concerned this may be causing pain. Pt reports majority of discomfort occurs within 5 min of ambulation, and mildly elevated when putting on shoes or socks. Denies any pain with standing or sitting. No sleep disturbances. Pt received bilateral hip injection with no relief.   Denies any referral into LEs.   Pt notes relief in sx with rest, and advil.           Recurrent probem    Quality of life: good    Patient Goals  Patient goals for therapy: decreased pain and increased strength  Patient goal: Improve tolerance to walking  Pain  Current pain rating:  0  At best pain ratin  At worst pain rating: 10  Location: Lateral hip pain bilaterally  Quality: sharp and dull ache  Relieving factors: rest    Social Support  Steps to enter house: yes  Stairs in house: yes   Lives in: multiple-level home    Employment status: working  Hand dominance: right      Diagnostic Tests  X-ray: normal  Treatments  Previous treatment: injection treatment        Objective     Palpation   Left   No palpable tenderness to the TFL.   Tenderness of the gluteus medius and piriformis.     Right   No palpable tenderness to the TFL.   Hypertonic in the gluteus medius.   Tenderness of the piriformis.     Lumbar Screen  Lumbar range of motion within normal limits.    Active Range of Motion   Left Hip   Flexion: WFL  Extension: WFL  External rotation (90/90): 43 degrees with pain  Internal rotation (90/90): 28 degrees     Right Hip   Flexion: WFL  Extension: WFL  External rotation (90/90): 38 degrees   Internal rotation (90/90): 18 degrees     Passive Range of Motion   Left Hip   Flexion: 120 degrees WFL  Abduction: 40 degrees   External rotation (90/90): 43 degrees   Internal rotation (90/90): 30 degrees     Right Hip   Flexion: 120 degrees WFL  Abduction: 40 degrees   External rotation (90/90): 40 degrees   Internal rotation (90/90): 28 degrees     Joint Play   Left Hip   Joints within functional limits are the posterior hip capsule, anterior hip capsule, lateral hip capsule and long axis distraction.     Right Hip   Joints within functional limits are the posterior hip capsule, anterior hip capsule, long axis distraction and long axis distraction.     Strength/Myotome Testing     Left Hip   Planes of Motion   Flexion: 4  Abduction: 4-  External rotation: 4  Internal rotation: 4-    Right Hip   Planes of Motion   Flexion: 4+  Abduction: 4-  External rotation: 4  Internal rotation: 4-    Tests     Left Hip   Positive STEWART and piriformis.   Negative scour and sign of the buttock.   90/90 SLR:  Negative.   SLR: Negative.     Right Hip   Positive STEWART and piriformis.   Negative FADIR, scour and sign of the buttock.   90/90 SLR: Negative.  SLR: Negative.       Access Code: [email or web address]  URL: [email or web address]  Date: 06/23/2025  Prepared by: Irena Sidhu    Exercises  - Sidelying Hip Abduction  - 1 x daily - 7 x weekly - 1 sets - 15 reps  - Prone Gluteal Sets  - 1 x daily - 7 x weekly - 2 sets - 10 reps - 5 sec hold  - Prone Hip Extension  - 1 x daily - 7 x weekly - 1 sets - 15 reps       Precautions: Past Medical History[1] hx of spinal stimulator        Manuals 6/23/25            PROM hip ER gentle NV            STM glutes bilaterally Stimulator battery right glute region                                       Neuro Re-Ed             Standing SLR 3 way nv            SLS  nv            Tband side steps                                                                 Ther Ex             Bike or elliptical for ROM NV            SLR NV            Clamshells W/ resistance NV            S/L SLR 1x15 ea            Prone SLR 1x15 ea            SL bridges nv                                      Ther Activity                                       Gait Training                                       Modalities                                                 [1]   Past Medical History:  Diagnosis Date    Allergic     Anxiety 2005    Arthritis     BRCA1 negative     BRCA2 negative     Bulging lumbar disc     CAD (coronary artery disease)     Carotid artery occlusion     Chronic pain     low back    Degenerative disk disease     lumbar spine    Depression 2005    Effusion of left knee 04/25/2023    Fibrocystic breast     GERD (gastroesophageal reflux disease)     Hyperlipidemia     Hypertension     Low back pain     Lumbosacral disc disease     Obsessive-compulsive disorder 2017    Pinched nerve     lumbar spine    Primary osteoarthritis of left knee 02/14/2023    Psychiatric disorder     depression     Shingles [id number]

## 2025-06-30 ENCOUNTER — OFFICE VISIT (OUTPATIENT)
Dept: PHYSICAL THERAPY | Facility: CLINIC | Age: 60
End: 2025-06-30
Attending: ORTHOPAEDIC SURGERY
Payer: COMMERCIAL

## 2025-06-30 DIAGNOSIS — M70.62 GREATER TROCHANTERIC BURSITIS OF BOTH HIPS: Primary | ICD-10-CM

## 2025-06-30 DIAGNOSIS — M70.61 GREATER TROCHANTERIC BURSITIS OF BOTH HIPS: Primary | ICD-10-CM

## 2025-06-30 PROCEDURE — 97140 MANUAL THERAPY 1/> REGIONS: CPT

## 2025-06-30 PROCEDURE — 97110 THERAPEUTIC EXERCISES: CPT

## 2025-06-30 NOTE — PROGRESS NOTES
"Daily Note     Today's date: 2025  Patient name: Zenaida Kathleen  : 1965  MRN: [id number]  Referring provider: Dale العلي MD  Dx:   Encounter Diagnosis     ICD-10-CM    1. Greater trochanteric bursitis of both hips  M70.61     M70.62           Start Time: 1706  Stop Time: 1745  Total time in clinic (min): 39 minutes    Subjective: Patient reports that she continues to have discomfort with prolonged ambulation. No pain pre-treatment and no adverse reactions to previous session reported. She also states that she was compliant with HEP and denies any difficulty.       Objective: See treatment diary below      Assessment: Initiated treatment as indicated below. She was able to complete without complaint but did seem challenged and fatigued afterwards. Tightness into L>R hip noted during manuals. Discussed potential for DOMS with treatment and encouraged to let us know how she felt. Tolerated treatment well. Patient demonstrated fatigue post treatment, exhibited good technique with therapeutic exercises, and would benefit from continued PT      Plan: Continue per plan of care.  Progress treatment as tolerated.       Precautions: Past Medical History[1] hx of spinal stimulator        Manuals 25           PROM hip ER gentle NV BL 8min           STM glutes bilaterally Stimulator battery right glute region                                       Neuro Re-Ed             Standing SLR 3 way nv X10 ea BL           SLS  nv 15\"x4 ea           Tband side steps                                                                 Ther Ex             Bike or elliptical for ROM NV Bike L1 6min           SLR NV 1x15 ea           Clamshells W/ resistance NV GTB 5\" x15 ea           S/L SLR 1x15 ea 1x15 ea           Prone SLR 1x15 ea 1x15 ea           SL bridges nv 3\" x10 ea                                     Ther Activity                                       Gait Training                                     "   Modalities                                                  [1]   Past Medical History:  Diagnosis Date    Allergic     Anxiety 2005    Arthritis     BRCA1 negative     BRCA2 negative     Bulging lumbar disc     CAD (coronary artery disease)     Carotid artery occlusion     Chronic pain     low back    Degenerative disk disease     lumbar spine    Depression 2005    Effusion of left knee 04/25/2023    Fibrocystic breast     GERD (gastroesophageal reflux disease)     Hyperlipidemia     Hypertension     Low back pain     Lumbosacral disc disease     Obsessive-compulsive disorder 2017    Pinched nerve     lumbar spine    Primary osteoarthritis of left knee 02/14/2023    Psychiatric disorder     depression    Shingles [id number]

## 2025-07-07 DIAGNOSIS — G25.81 RESTLESS LEG SYNDROME: ICD-10-CM

## 2025-07-09 ENCOUNTER — APPOINTMENT (OUTPATIENT)
Dept: PHYSICAL THERAPY | Facility: CLINIC | Age: 60
End: 2025-07-09
Attending: ORTHOPAEDIC SURGERY
Payer: COMMERCIAL

## 2025-07-09 RX ORDER — PRAMIPEXOLE DIHYDROCHLORIDE 0.5 MG/1
1 TABLET ORAL DAILY
Qty: 180 TABLET | Refills: 0 | Status: SHIPPED | OUTPATIENT
Start: 2025-07-09

## 2025-07-16 ENCOUNTER — APPOINTMENT (OUTPATIENT)
Dept: PHYSICAL THERAPY | Facility: CLINIC | Age: 60
End: 2025-07-16
Attending: ORTHOPAEDIC SURGERY
Payer: COMMERCIAL

## 2025-07-19 LAB
ALBUMIN SERPL-MCNC: 4.5 G/DL (ref 3.8–4.9)
ALP SERPL-CCNC: 97 IU/L (ref 44–121)
ALT SERPL-CCNC: 19 IU/L (ref 0–32)
AST SERPL-CCNC: 18 IU/L (ref 0–40)
BASOPHILS # BLD AUTO: 0.1 X10E3/UL (ref 0–0.2)
BASOPHILS NFR BLD AUTO: 1 %
BILIRUB SERPL-MCNC: 0.3 MG/DL (ref 0–1.2)
BUN SERPL-MCNC: 12 MG/DL (ref 8–27)
BUN/CREAT SERPL: 18 (ref 12–28)
CALCIUM SERPL-MCNC: 9.4 MG/DL (ref 8.7–10.3)
CHLORIDE SERPL-SCNC: 102 MMOL/L (ref 96–106)
CHOLEST SERPL-MCNC: 180 MG/DL (ref 100–199)
CHOLEST/HDLC SERPL: 5.3 RATIO (ref 0–4.4)
CO2 SERPL-SCNC: 23 MMOL/L (ref 20–29)
CREAT SERPL-MCNC: 0.67 MG/DL (ref 0.57–1)
EGFR: 100 ML/MIN/1.73
EOSINOPHIL # BLD AUTO: 0.3 X10E3/UL (ref 0–0.4)
EOSINOPHIL NFR BLD AUTO: 3 %
ERYTHROCYTE [DISTWIDTH] IN BLOOD BY AUTOMATED COUNT: 13.8 % (ref 11.7–15.4)
EST. AVERAGE GLUCOSE BLD GHB EST-MCNC: 134 MG/DL
GLOBULIN SER-MCNC: 2.3 G/DL (ref 1.5–4.5)
GLUCOSE SERPL-MCNC: 108 MG/DL (ref 70–99)
HBA1C MFR BLD: 6.3 % (ref 4.8–5.6)
HCT VFR BLD AUTO: 43.5 % (ref 34–46.6)
HDLC SERPL-MCNC: 34 MG/DL
HGB BLD-MCNC: 14.7 G/DL (ref 11.1–15.9)
IMM GRANULOCYTES # BLD: 0.1 X10E3/UL (ref 0–0.1)
IMM GRANULOCYTES NFR BLD: 1 %
LDL DIRECT COMMENT: NORMAL
LDLC SERPL CALC-MCNC: 95 MG/DL (ref 0–99)
LDLC SERPL DIRECT ASSAY-MCNC: 85 MG/DL (ref 0–99)
LYMPHOCYTES # BLD AUTO: 2.2 X10E3/UL (ref 0.7–3.1)
LYMPHOCYTES NFR BLD AUTO: 25 %
MCH RBC QN AUTO: 32.6 PG (ref 26.6–33)
MCHC RBC AUTO-ENTMCNC: 33.8 G/DL (ref 31.5–35.7)
MCV RBC AUTO: 97 FL (ref 79–97)
MONOCYTES # BLD AUTO: 0.6 X10E3/UL (ref 0.1–0.9)
MONOCYTES NFR BLD AUTO: 7 %
NEUTROPHILS # BLD AUTO: 5.6 X10E3/UL (ref 1.4–7)
NEUTROPHILS NFR BLD AUTO: 63 %
PLATELET # BLD AUTO: 174 X10E3/UL (ref 150–450)
POTASSIUM SERPL-SCNC: 4.2 MMOL/L (ref 3.5–5.2)
PROT SERPL-MCNC: 6.8 G/DL (ref 6–8.5)
RBC # BLD AUTO: 4.51 X10E6/UL (ref 3.77–5.28)
SL AMB VLDL CHOLESTEROL CALC: 51 MG/DL (ref 5–40)
SODIUM SERPL-SCNC: 139 MMOL/L (ref 134–144)
TRIGL SERPL-MCNC: 302 MG/DL (ref 0–149)
TSH SERPL DL<=0.005 MIU/L-ACNC: 1.84 UIU/ML (ref 0.45–4.5)
WBC # BLD AUTO: 8.8 X10E3/UL (ref 3.4–10.8)

## 2025-07-21 ENCOUNTER — APPOINTMENT (OUTPATIENT)
Dept: PHYSICAL THERAPY | Facility: CLINIC | Age: 60
End: 2025-07-21
Attending: ORTHOPAEDIC SURGERY
Payer: COMMERCIAL

## 2025-07-21 DIAGNOSIS — E78.2 MIXED HYPERLIPIDEMIA: ICD-10-CM

## 2025-07-22 ENCOUNTER — VBI (OUTPATIENT)
Dept: ADMINISTRATIVE | Facility: OTHER | Age: 60
End: 2025-07-22

## 2025-07-22 ENCOUNTER — OB ABSTRACT (OUTPATIENT)
Age: 60
End: 2025-07-22

## 2025-07-23 RX ORDER — ATORVASTATIN CALCIUM 80 MG/1
80 TABLET, FILM COATED ORAL DAILY
Qty: 90 TABLET | Refills: 1 | Status: SHIPPED | OUTPATIENT
Start: 2025-07-23

## 2025-07-23 NOTE — TELEPHONE ENCOUNTER
Upon review of the In Basket request we were able to locate, review, and update the patient chart as requested for Pap Smear (HPV) aka Cervical Cancer Screening.    Any additional questions or concerns should be emailed to the Practice Liaisons via the appropriate education email address, please do not reply via In Basket.    Thank you  Elisabet Carbajal MA   [facility]

## 2025-07-25 ENCOUNTER — TELEPHONE (OUTPATIENT)
Age: 60
End: 2025-07-25

## 2025-07-25 NOTE — TELEPHONE ENCOUNTER
07/25/25  Screened by: Marzena Hahn MA    Referring Provider     Pre- Screening:     There is no height or weight on file to calculate BMI.  Has patient been referred for a routine screening Colonoscopy? yes  Is the patient between 45-75 years old? yes      Previous Colonoscopy yes   If yes:    Date: 9/16/20    Facility: [facility]    Reason:         Does the patient want to see a Gastroenterologist prior to their procedure OR are they having any GI symptoms? yes, IBS symptoms, abdominal pain/cramping, diarrhea    Has the patient been hospitalized or had abdominal surgery in the past 6 months? no    Does the patient use supplemental oxygen? no    Does the patient take Coumadin, Lovenox, Plavix, Elliquis, Xarelto, or other blood thinning medication? no    Has the patient had a stroke, cardiac event, or stent placed in the past year? no      If patient is between 45yrs - 49yrs, please advise patient that we will have to confirm benefits & coverage with their insurance company for a routine screening colonoscopy.      PT UNDER THE CARE OF [facility] CARDIOLOGIST DR. SIMON RECIO FOR CORONARY ARTERY DISEASE. PT WILL NEED CARDIAC CLEARANCE.

## 2025-07-28 ENCOUNTER — OFFICE VISIT (OUTPATIENT)
Dept: PHYSICAL THERAPY | Facility: CLINIC | Age: 60
End: 2025-07-28
Attending: ORTHOPAEDIC SURGERY
Payer: COMMERCIAL

## 2025-07-28 DIAGNOSIS — M70.62 GREATER TROCHANTERIC BURSITIS OF BOTH HIPS: Primary | ICD-10-CM

## 2025-07-28 DIAGNOSIS — M70.61 GREATER TROCHANTERIC BURSITIS OF BOTH HIPS: Primary | ICD-10-CM

## 2025-07-28 PROCEDURE — 97140 MANUAL THERAPY 1/> REGIONS: CPT

## 2025-07-28 PROCEDURE — 97110 THERAPEUTIC EXERCISES: CPT

## 2025-07-28 PROCEDURE — 97112 NEUROMUSCULAR REEDUCATION: CPT

## 2025-07-29 ENCOUNTER — OFFICE VISIT (OUTPATIENT)
Dept: FAMILY MEDICINE CLINIC | Facility: CLINIC | Age: 60
End: 2025-07-29
Payer: COMMERCIAL

## 2025-07-29 ENCOUNTER — ANNUAL EXAM (OUTPATIENT)
Age: 60
End: 2025-07-29
Payer: COMMERCIAL

## 2025-07-29 VITALS
SYSTOLIC BLOOD PRESSURE: 120 MMHG | HEIGHT: 62 IN | DIASTOLIC BLOOD PRESSURE: 62 MMHG | WEIGHT: 208.9 LBS | BODY MASS INDEX: 38.44 KG/M2

## 2025-07-29 VITALS
WEIGHT: 208.4 LBS | OXYGEN SATURATION: 90 % | DIASTOLIC BLOOD PRESSURE: 78 MMHG | HEIGHT: 62 IN | RESPIRATION RATE: 20 BRPM | TEMPERATURE: 99 F | HEART RATE: 107 BPM | BODY MASS INDEX: 38.35 KG/M2 | SYSTOLIC BLOOD PRESSURE: 138 MMHG

## 2025-07-29 DIAGNOSIS — Z01.419 ENCOUNTER FOR ANNUAL ROUTINE GYNECOLOGICAL EXAMINATION: ICD-10-CM

## 2025-07-29 DIAGNOSIS — R73.01 IFG (IMPAIRED FASTING GLUCOSE): ICD-10-CM

## 2025-07-29 DIAGNOSIS — E78.2 MIXED HYPERLIPIDEMIA: ICD-10-CM

## 2025-07-29 DIAGNOSIS — Z12.31 ENCOUNTER FOR SCREENING MAMMOGRAM FOR MALIGNANT NEOPLASM OF BREAST: Primary | ICD-10-CM

## 2025-07-29 DIAGNOSIS — F41.9 ANXIETY: ICD-10-CM

## 2025-07-29 DIAGNOSIS — F43.23 ADJUSTMENT DISORDER WITH MIXED ANXIETY AND DEPRESSED MOOD: ICD-10-CM

## 2025-07-29 DIAGNOSIS — Z12.2 SCREENING FOR LUNG CANCER: ICD-10-CM

## 2025-07-29 DIAGNOSIS — F17.210 SMOKING GREATER THAN 20 PACK YEARS: ICD-10-CM

## 2025-07-29 DIAGNOSIS — I10 ESSENTIAL HYPERTENSION: Primary | ICD-10-CM

## 2025-07-29 PROCEDURE — 99214 OFFICE O/P EST MOD 30 MIN: CPT | Performed by: FAMILY MEDICINE

## 2025-07-29 PROCEDURE — S0612 ANNUAL GYNECOLOGICAL EXAMINA: HCPCS | Performed by: OBSTETRICS & GYNECOLOGY

## 2025-07-29 RX ORDER — LORAZEPAM 0.5 MG/1
TABLET ORAL
Qty: 60 TABLET | Refills: 0 | Status: SHIPPED | OUTPATIENT
Start: 2025-07-29

## 2025-08-04 ENCOUNTER — EVALUATION (OUTPATIENT)
Dept: PHYSICAL THERAPY | Facility: CLINIC | Age: 60
End: 2025-08-04
Attending: ORTHOPAEDIC SURGERY
Payer: COMMERCIAL

## 2025-08-04 DIAGNOSIS — M70.61 GREATER TROCHANTERIC BURSITIS OF BOTH HIPS: Primary | ICD-10-CM

## 2025-08-04 DIAGNOSIS — M70.62 GREATER TROCHANTERIC BURSITIS OF BOTH HIPS: Primary | ICD-10-CM

## 2025-08-04 PROCEDURE — 97110 THERAPEUTIC EXERCISES: CPT

## 2025-08-04 PROCEDURE — 97140 MANUAL THERAPY 1/> REGIONS: CPT

## 2025-08-13 ENCOUNTER — OFFICE VISIT (OUTPATIENT)
Dept: PHYSICAL THERAPY | Facility: CLINIC | Age: 60
End: 2025-08-13
Attending: ORTHOPAEDIC SURGERY
Payer: COMMERCIAL

## 2025-08-14 ENCOUNTER — OFFICE VISIT (OUTPATIENT)
Dept: OBGYN CLINIC | Facility: CLINIC | Age: 60
End: 2025-08-14
Payer: COMMERCIAL

## 2025-08-14 ENCOUNTER — APPOINTMENT (OUTPATIENT)
Dept: RADIOLOGY | Facility: CLINIC | Age: 60
End: 2025-08-14
Attending: ORTHOPAEDIC SURGERY
Payer: COMMERCIAL

## 2025-08-18 ENCOUNTER — OFFICE VISIT (OUTPATIENT)
Dept: PHYSICAL THERAPY | Facility: CLINIC | Age: 60
End: 2025-08-18
Attending: ORTHOPAEDIC SURGERY
Payer: COMMERCIAL

## 2025-08-18 DIAGNOSIS — M70.61 GREATER TROCHANTERIC BURSITIS OF BOTH HIPS: Primary | ICD-10-CM

## 2025-08-18 DIAGNOSIS — M70.62 GREATER TROCHANTERIC BURSITIS OF BOTH HIPS: Primary | ICD-10-CM

## 2025-08-18 DIAGNOSIS — I10 ESSENTIAL HYPERTENSION: ICD-10-CM

## 2025-08-18 PROCEDURE — 97140 MANUAL THERAPY 1/> REGIONS: CPT

## 2025-08-18 PROCEDURE — 97110 THERAPEUTIC EXERCISES: CPT

## 2025-08-20 RX ORDER — LISINOPRIL 10 MG/1
10 TABLET ORAL DAILY
Qty: 90 TABLET | Refills: 1 | Status: SHIPPED | OUTPATIENT
Start: 2025-08-20

## 2025-08-21 DIAGNOSIS — E78.2 MIXED HYPERLIPIDEMIA: ICD-10-CM

## 2025-08-22 RX ORDER — ATORVASTATIN CALCIUM 80 MG/1
80 TABLET, FILM COATED ORAL DAILY
Qty: 90 TABLET | Refills: 1 | Status: SHIPPED | OUTPATIENT
Start: 2025-08-22

## (undated) DEVICE — GLOVE INDICATOR PI UNDERGLOVE SZ 8 BLUE

## (undated) DEVICE — BETHLEHEM UNIV MAJ EXT ,KIT: Brand: CARDINAL HEALTH

## (undated) DEVICE — SUT STRATAFIX SPIRAL 3-0 PGA/PCL 30 X 30 CM SXMD2B408

## (undated) DEVICE — TELFA NON-ADHERENT ABSORBENT DRESSING: Brand: TELFA

## (undated) DEVICE — YELLOW BOAT

## (undated) DEVICE — BULB SYRINGE, IRRIGATION WITH PROTECTIVE CAP, 60 CC, INDIVIDUALLY WRAPPED: Brand: DOVER

## (undated) DEVICE — DRAPE SHEET THREE QUARTER

## (undated) DEVICE — PAD CAST 6 IN COTTON NON STERILE

## (undated) DEVICE — DRAIN SPONGES,6 PLY: Brand: EXCILON

## (undated) DEVICE — BANDAGE, ESMARK LF STR 6"X9' (20/CS): Brand: CYPRESS

## (undated) DEVICE — ELECTRODE RETURN SNGL PLATE W/CORD MEGADYNE

## (undated) DEVICE — SAW BLADE RECIPROCATING 179

## (undated) DEVICE — SUT SILK 2-0 SH 30 IN K833H

## (undated) DEVICE — SUT VICRYL 0 CT-1 27 IN J260H

## (undated) DEVICE — DRESSING MEPILEX AG BORDER POST-OP 4 X 8 IN

## (undated) DEVICE — ANTIBACTERIAL UNDYED BRAIDED (POLYGLACTIN 910), SYNTHETIC ABSORBABLE SUTURE: Brand: COATED VICRYL

## (undated) DEVICE — 3M™ DURAPORE™ SURGICAL TAPE 1538-3, 3 INCH X 10 YARD (7,5CM X 9,1M), 4 ROLLS/BOX: Brand: 3M™ DURAPORE™

## (undated) DEVICE — ANTIBACTERIAL VIOLET BRAIDED (POLYGLACTIN 910), SYNTHETIC ABSORBABLE SUTURE: Brand: COATED VICRYL

## (undated) DEVICE — CHLORAPREP HI-LITE 26ML ORANGE

## (undated) DEVICE — CHARGER 2500 KIT: Brand: OMNIA

## (undated) DEVICE — 3M™ STERI-STRIP™ COMPOUND BENZOIN TINCTURE 40 BAGS/CARTON 4 CARTONS/CASE C1544: Brand: 3M™ STERI-STRIP™

## (undated) DEVICE — GLOVE SRG BIOGEL 7.5

## (undated) DEVICE — COBAN 6 IN STERILE

## (undated) DEVICE — 450 ML BOTTLE OF 0.05% CHLORHEXIDINE GLUCONATE IN 99.95% STERILE WATER FOR IRRIGATION, USP AND APPLICATOR.: Brand: IRRISEPT ANTIMICROBIAL WOUND LAVAGE

## (undated) DEVICE — PENCIL ELECTROSURG E-Z CLEAN -0035H

## (undated) DEVICE — MEDI-VAC YANKAUER SUCTION HANDLE W/BULBOUS AND CONTROL VENT: Brand: CARDINAL HEALTH

## (undated) DEVICE — BETHLEHEM UNIVERSAL MINOR GEN: Brand: CARDINAL HEALTH

## (undated) DEVICE — SYRINGE 30ML LL

## (undated) DEVICE — 3M™ IOBAN™ 2 ANTIMICROBIAL INCISE DRAPE 6650EZ: Brand: IOBAN™ 2

## (undated) DEVICE — GLOVE INDICATOR PI UNDERGLOVE SZ 7 BLUE

## (undated) DEVICE — ANTIBACTERIAL VIOLET BRAIDED (POLYGLACTIN 910), SYNTHETIC ABSORBABLE SURGICAL SUTURE: Brand: COATED VICRYL

## (undated) DEVICE — ABDOMINAL PAD: Brand: DERMACEA

## (undated) DEVICE — CAPIT KNEE ATTUNE RP W/DOM

## (undated) DEVICE — Device

## (undated) DEVICE — GLOVE SRG BIOGEL ORTHOPEDIC 7.5

## (undated) DEVICE — NEEDLE 18 G X 1 1/2

## (undated) DEVICE — BLADE INTREX LRG BONE OSCILLATING

## (undated) DEVICE — INTENDED FOR TISSUE SEPARATION, AND OTHER PROCEDURES THAT REQUIRE A SHARP SURGICAL BLADE TO PUNCTURE OR CUT.: Brand: BARD-PARKER SAFETY BLADES SIZE 10, STERILE

## (undated) DEVICE — GLOVE SRG BIOGEL 7

## (undated) DEVICE — PLUMEPEN PRO 10FT

## (undated) DEVICE — 3M™ STERI-STRIP™ REINFORCED ADHESIVE SKIN CLOSURES, R1547, 1/2 IN X 4 IN (12 MM X 100 MM), 6 STRIPS/ENVELOPE: Brand: 3M™ STERI-STRIP™

## (undated) DEVICE — EXOFIN PRECISION PEN HIGH VISCOSITY TOPICAL SKIN ADHESIVE: Brand: EXOFIN PRECISION PEN, 1G

## (undated) DEVICE — NEEDLE HYPO 22G X 1-1/2 IN

## (undated) DEVICE — BLADE REAMER PATELLA 41MM

## (undated) DEVICE — CAST PADDING 6 IN STERILE

## (undated) DEVICE — HANDPIECE SET WITH RETRACTABLE COAXIAL FAN SPRAY TIP AND SUCTION TUBE: Brand: INTERPULSE

## (undated) DEVICE — ASTOUND STANDARD SURGICAL GOWN, XXL: Brand: CONVERTORS

## (undated) DEVICE — DRESSING MEPORE FILM ADHESIVE 4 X 5IN

## (undated) DEVICE — 5065 IMPAD REGULAR PAIR: Brand: A-V IMPULSE

## (undated) DEVICE — ACE WRAP 6 IN UNSTERILE

## (undated) DEVICE — PREP SURGICAL PURPREP 26ML

## (undated) DEVICE — 3M™ IOBAN™ 2 ANTIMICROBIAL INCISE DRAPE 6640EZ: Brand: IOBAN™ 2

## (undated) DEVICE — CORD BIPOLAR 12FT REUSEABLE

## (undated) DEVICE — HEAVY DUTY TABLE COVER: Brand: CONVERTORS

## (undated) DEVICE — TUBING SUCTION 5MM X 12 FT

## (undated) DEVICE — 3 BONE CEMENT MIXER: Brand: MIXEVAC

## (undated) DEVICE — HOOD: Brand: FLYTE, SURGICOOL

## (undated) DEVICE — NEEDLE 25G X 1 1/2